# Patient Record
Sex: FEMALE | Race: WHITE | NOT HISPANIC OR LATINO | Employment: UNEMPLOYED | URBAN - METROPOLITAN AREA
[De-identification: names, ages, dates, MRNs, and addresses within clinical notes are randomized per-mention and may not be internally consistent; named-entity substitution may affect disease eponyms.]

---

## 2021-07-30 ENCOUNTER — OFFICE VISIT (OUTPATIENT)
Dept: URGENT CARE | Facility: CLINIC | Age: 51
End: 2021-07-30
Payer: OTHER GOVERNMENT

## 2021-07-30 VITALS
HEART RATE: 99 BPM | TEMPERATURE: 97 F | OXYGEN SATURATION: 99 % | DIASTOLIC BLOOD PRESSURE: 82 MMHG | BODY MASS INDEX: 25.62 KG/M2 | HEIGHT: 69 IN | SYSTOLIC BLOOD PRESSURE: 138 MMHG | WEIGHT: 173 LBS

## 2021-07-30 DIAGNOSIS — Z20.822 EXPOSURE TO COVID-19 VIRUS: Primary | ICD-10-CM

## 2021-07-30 DIAGNOSIS — R05.9 COUGH: ICD-10-CM

## 2021-07-30 LAB
CTP QC/QA: YES
SARS-COV-2 RDRP RESP QL NAA+PROBE: NEGATIVE

## 2021-07-30 PROCEDURE — 99203 PR OFFICE/OUTPT VISIT, NEW, LEVL III, 30-44 MIN: ICD-10-PCS | Mod: S$GLB,,, | Performed by: NURSE PRACTITIONER

## 2021-07-30 PROCEDURE — 99203 OFFICE O/P NEW LOW 30 MIN: CPT | Mod: S$GLB,,, | Performed by: NURSE PRACTITIONER

## 2021-07-30 PROCEDURE — U0002: ICD-10-PCS | Mod: QW,S$GLB,, | Performed by: NURSE PRACTITIONER

## 2021-07-30 PROCEDURE — U0003 INFECTIOUS AGENT DETECTION BY NUCLEIC ACID (DNA OR RNA); SEVERE ACUTE RESPIRATORY SYNDROME CORONAVIRUS 2 (SARS-COV-2) (CORONAVIRUS DISEASE [COVID-19]), AMPLIFIED PROBE TECHNIQUE, MAKING USE OF HIGH THROUGHPUT TECHNOLOGIES AS DESCRIBED BY CMS-2020-01-R: HCPCS | Performed by: NURSE PRACTITIONER

## 2021-07-30 PROCEDURE — U0002 COVID-19 LAB TEST NON-CDC: HCPCS | Mod: QW,S$GLB,, | Performed by: NURSE PRACTITIONER

## 2021-07-30 RX ORDER — FLUOXETINE 10 MG/1
10 CAPSULE ORAL DAILY
COMMUNITY
Start: 2021-07-19 | End: 2021-11-16 | Stop reason: SDUPTHER

## 2021-07-30 RX ORDER — DEXTROAMPHETAMINE SACCHARATE, AMPHETAMINE ASPARTATE MONOHYDRATE, DEXTROAMPHETAMINE SULFATE AND AMPHETAMINE SULFATE 7.5; 7.5; 7.5; 7.5 MG/1; MG/1; MG/1; MG/1
CAPSULE, EXTENDED RELEASE ORAL
COMMUNITY
Start: 2020-10-06 | End: 2021-08-06 | Stop reason: DRUGHIGH

## 2021-07-30 RX ORDER — LEVOTHYROXINE SODIUM 75 UG/1
75 TABLET ORAL DAILY
COMMUNITY
End: 2021-10-22 | Stop reason: SDUPTHER

## 2021-08-02 LAB
SARS-COV-2 RNA RESP QL NAA+PROBE: NOT DETECTED
SARS-COV-2- CYCLE NUMBER: -1

## 2021-08-06 ENCOUNTER — HOSPITAL ENCOUNTER (OUTPATIENT)
Facility: HOSPITAL | Age: 51
Discharge: HOME | End: 2021-08-09
Attending: EMERGENCY MEDICINE | Admitting: INTERNAL MEDICINE
Payer: OTHER GOVERNMENT

## 2021-08-06 DIAGNOSIS — U07.1 COVID-19 VIRUS INFECTION: Primary | ICD-10-CM

## 2021-08-06 DIAGNOSIS — R20.2 PARESTHESIA OF BOTH LEGS: ICD-10-CM

## 2021-08-06 DIAGNOSIS — R29.898 WEAKNESS OF BOTH LOWER EXTREMITIES: ICD-10-CM

## 2021-08-06 LAB
ALBUMIN SERPL BCP-MCNC: 4.4 G/DL (ref 3.5–5.2)
ALP SERPL-CCNC: 74 U/L (ref 55–135)
ALT SERPL W/O P-5'-P-CCNC: 31 U/L (ref 10–44)
ANION GAP SERPL CALC-SCNC: 9 MMOL/L (ref 8–16)
AST SERPL-CCNC: 30 U/L (ref 10–40)
BASOPHILS # BLD AUTO: 0.01 K/UL (ref 0–0.2)
BASOPHILS NFR BLD: 0.2 % (ref 0–1.9)
BILIRUB SERPL-MCNC: 0.7 MG/DL (ref 0.1–1)
BUN SERPL-MCNC: 11 MG/DL (ref 6–20)
CALCIUM SERPL-MCNC: 9.3 MG/DL (ref 8.7–10.5)
CHLORIDE SERPL-SCNC: 100 MMOL/L (ref 95–110)
CK SERPL-CCNC: 78 U/L (ref 20–180)
CO2 SERPL-SCNC: 25 MMOL/L (ref 23–29)
CREAT SERPL-MCNC: 0.8 MG/DL (ref 0.5–1.4)
CRP SERPL-MCNC: 0.15 MG/DL
DIFFERENTIAL METHOD: ABNORMAL
EOSINOPHIL # BLD AUTO: 0 K/UL (ref 0–0.5)
EOSINOPHIL NFR BLD: 0.3 % (ref 0–8)
ERYTHROCYTE [DISTWIDTH] IN BLOOD BY AUTOMATED COUNT: 12.4 % (ref 11.5–14.5)
EST. GFR  (AFRICAN AMERICAN): >60 ML/MIN/1.73 M^2
EST. GFR  (NON AFRICAN AMERICAN): >60 ML/MIN/1.73 M^2
FERRITIN SERPL-MCNC: 135 NG/ML (ref 20–300)
GLUCOSE SERPL-MCNC: 100 MG/DL (ref 70–110)
HCT VFR BLD AUTO: 44.3 % (ref 37–48.5)
HGB BLD-MCNC: 14.9 G/DL (ref 12–16)
IMM GRANULOCYTES # BLD AUTO: 0.01 K/UL (ref 0–0.04)
IMM GRANULOCYTES NFR BLD AUTO: 0.2 % (ref 0–0.5)
LACTATE SERPL-SCNC: 1.4 MMOL/L (ref 0.5–1.9)
LDH SERPL L TO P-CCNC: 159 U/L (ref 110–260)
LYMPHOCYTES # BLD AUTO: 0.7 K/UL (ref 1–4.8)
LYMPHOCYTES NFR BLD: 11 % (ref 18–48)
MCH RBC QN AUTO: 30.2 PG (ref 27–31)
MCHC RBC AUTO-ENTMCNC: 33.6 G/DL (ref 32–36)
MCV RBC AUTO: 90 FL (ref 82–98)
MONOCYTES # BLD AUTO: 0.4 K/UL (ref 0.3–1)
MONOCYTES NFR BLD: 6 % (ref 4–15)
NEUTROPHILS # BLD AUTO: 5.1 K/UL (ref 1.8–7.7)
NEUTROPHILS NFR BLD: 82.3 % (ref 38–73)
NRBC BLD-RTO: 0 /100 WBC
PLATELET # BLD AUTO: 239 K/UL (ref 150–450)
PMV BLD AUTO: 10.1 FL (ref 9.2–12.9)
POTASSIUM SERPL-SCNC: 3.5 MMOL/L (ref 3.5–5.1)
PROCALCITONIN SERPL IA-MCNC: <0.05 NG/ML (ref 0–0.5)
PROT SERPL-MCNC: 8.2 G/DL (ref 6–8.4)
RBC # BLD AUTO: 4.94 M/UL (ref 4–5.4)
SARS-COV-2 RDRP RESP QL NAA+PROBE: POSITIVE
SODIUM SERPL-SCNC: 134 MMOL/L (ref 136–145)
TROPONIN I SERPL DL<=0.01 NG/ML-MCNC: <0.03 NG/ML
WBC # BLD AUTO: 6.21 K/UL (ref 3.9–12.7)

## 2021-08-06 PROCEDURE — G0378 HOSPITAL OBSERVATION PER HR: HCPCS

## 2021-08-06 PROCEDURE — 93005 ELECTROCARDIOGRAM TRACING: CPT | Mod: 59 | Performed by: INTERNAL MEDICINE

## 2021-08-06 PROCEDURE — U0002 COVID-19 LAB TEST NON-CDC: HCPCS | Performed by: EMERGENCY MEDICINE

## 2021-08-06 PROCEDURE — 84484 ASSAY OF TROPONIN QUANT: CPT | Performed by: EMERGENCY MEDICINE

## 2021-08-06 PROCEDURE — 62272 THER SPI PNXR DRG CSF: CPT

## 2021-08-06 PROCEDURE — A9585 GADOBUTROL INJECTION: HCPCS | Performed by: INTERNAL MEDICINE

## 2021-08-06 PROCEDURE — 25000003 PHARM REV CODE 250: Performed by: EMERGENCY MEDICINE

## 2021-08-06 PROCEDURE — 84145 PROCALCITONIN (PCT): CPT | Performed by: EMERGENCY MEDICINE

## 2021-08-06 PROCEDURE — 86140 C-REACTIVE PROTEIN: CPT | Performed by: EMERGENCY MEDICINE

## 2021-08-06 PROCEDURE — 82550 ASSAY OF CK (CPK): CPT | Performed by: EMERGENCY MEDICINE

## 2021-08-06 PROCEDURE — 25500020 PHARM REV CODE 255: Performed by: INTERNAL MEDICINE

## 2021-08-06 PROCEDURE — 93010 EKG 12-LEAD: ICD-10-PCS | Mod: ,,, | Performed by: INTERNAL MEDICINE

## 2021-08-06 PROCEDURE — 99285 EMERGENCY DEPT VISIT HI MDM: CPT | Mod: 25

## 2021-08-06 PROCEDURE — 93010 ELECTROCARDIOGRAM REPORT: CPT | Mod: ,,, | Performed by: INTERNAL MEDICINE

## 2021-08-06 PROCEDURE — 83605 ASSAY OF LACTIC ACID: CPT | Performed by: EMERGENCY MEDICINE

## 2021-08-06 PROCEDURE — 83615 LACTATE (LD) (LDH) ENZYME: CPT | Performed by: EMERGENCY MEDICINE

## 2021-08-06 PROCEDURE — 82728 ASSAY OF FERRITIN: CPT | Performed by: EMERGENCY MEDICINE

## 2021-08-06 PROCEDURE — 80053 COMPREHEN METABOLIC PANEL: CPT | Performed by: EMERGENCY MEDICINE

## 2021-08-06 PROCEDURE — 85025 COMPLETE CBC W/AUTO DIFF WBC: CPT | Performed by: EMERGENCY MEDICINE

## 2021-08-06 RX ORDER — ACETAMINOPHEN 325 MG/1
650 TABLET ORAL EVERY 6 HOURS PRN
Status: DISCONTINUED | OUTPATIENT
Start: 2021-08-07 | End: 2021-08-10 | Stop reason: HOSPADM

## 2021-08-06 RX ORDER — LIDOCAINE HYDROCHLORIDE 10 MG/ML
10 INJECTION, SOLUTION EPIDURAL; INFILTRATION; INTRACAUDAL; PERINEURAL
Status: COMPLETED | OUTPATIENT
Start: 2021-08-06 | End: 2021-08-06

## 2021-08-06 RX ORDER — PREDNISONE 20 MG/1
20 TABLET ORAL DAILY
COMMUNITY
Start: 2021-08-05 | End: 2021-09-16 | Stop reason: SDUPTHER

## 2021-08-06 RX ORDER — AZITHROMYCIN 250 MG/1
1 TABLET, FILM COATED ORAL SEE ADMIN INSTRUCTIONS
Status: ON HOLD | COMMUNITY
Start: 2021-08-05 | End: 2021-08-09 | Stop reason: HOSPADM

## 2021-08-06 RX ORDER — GADOBUTROL 604.72 MG/ML
7.5 INJECTION INTRAVENOUS
Status: COMPLETED | OUTPATIENT
Start: 2021-08-06 | End: 2021-08-06

## 2021-08-06 RX ORDER — LORAZEPAM 2 MG/ML
0.5 INJECTION INTRAMUSCULAR ONCE
Status: DISCONTINUED | OUTPATIENT
Start: 2021-08-06 | End: 2021-08-08

## 2021-08-06 RX ORDER — TALC
6 POWDER (GRAM) TOPICAL NIGHTLY PRN
Status: DISCONTINUED | OUTPATIENT
Start: 2021-08-07 | End: 2021-08-10 | Stop reason: HOSPADM

## 2021-08-06 RX ORDER — ONDANSETRON 2 MG/ML
4 INJECTION INTRAMUSCULAR; INTRAVENOUS EVERY 6 HOURS PRN
Status: DISCONTINUED | OUTPATIENT
Start: 2021-08-07 | End: 2021-08-07

## 2021-08-06 RX ORDER — BENZONATATE 100 MG/1
100 CAPSULE ORAL 3 TIMES DAILY PRN
Status: DISCONTINUED | OUTPATIENT
Start: 2021-08-07 | End: 2021-08-07

## 2021-08-06 RX ORDER — DEXTROAMPHETAMINE SACCHARATE, AMPHETAMINE ASPARTATE MONOHYDRATE, DEXTROAMPHETAMINE SULFATE AND AMPHETAMINE SULFATE 5; 5; 5; 5 MG/1; MG/1; MG/1; MG/1
1 CAPSULE, EXTENDED RELEASE ORAL DAILY
COMMUNITY
End: 2022-04-26

## 2021-08-06 RX ADMIN — GADOBUTROL 7.5 ML: 604.72 INJECTION INTRAVENOUS at 10:08

## 2021-08-06 RX ADMIN — LIDOCAINE HYDROCHLORIDE 100 MG: 10 INJECTION, SOLUTION EPIDURAL; INFILTRATION; INTRACAUDAL; PERINEURAL at 06:08

## 2021-08-07 PROBLEM — R20.2 PARESTHESIA OF BOTH LEGS: Status: ACTIVE | Noted: 2021-08-07

## 2021-08-07 LAB
ALBUMIN SERPL BCP-MCNC: 3.9 G/DL (ref 3.5–5.2)
ALP SERPL-CCNC: 64 U/L (ref 55–135)
ALT SERPL W/O P-5'-P-CCNC: 24 U/L (ref 10–44)
ANION GAP SERPL CALC-SCNC: 10 MMOL/L (ref 8–16)
AST SERPL-CCNC: 21 U/L (ref 10–40)
BASOPHILS # BLD AUTO: 0.01 K/UL (ref 0–0.2)
BASOPHILS NFR BLD: 0.2 % (ref 0–1.9)
BILIRUB SERPL-MCNC: 0.8 MG/DL (ref 0.1–1)
BUN SERPL-MCNC: 19 MG/DL (ref 6–20)
CALCIUM SERPL-MCNC: 9.1 MG/DL (ref 8.7–10.5)
CHLORIDE SERPL-SCNC: 105 MMOL/L (ref 95–110)
CHOLEST SERPL-MCNC: 173 MG/DL (ref 120–199)
CHOLEST/HDLC SERPL: 3.5 {RATIO} (ref 2–5)
CLARITY CSF: CLEAR
CO2 SERPL-SCNC: 27 MMOL/L (ref 23–29)
COLOR CSF: COLORLESS
CREAT SERPL-MCNC: 0.8 MG/DL (ref 0.5–1.4)
DIFFERENTIAL METHOD: NORMAL
EOSINOPHIL # BLD AUTO: 0 K/UL (ref 0–0.5)
EOSINOPHIL NFR BLD: 0.5 % (ref 0–8)
ERYTHROCYTE [DISTWIDTH] IN BLOOD BY AUTOMATED COUNT: 12.6 % (ref 11.5–14.5)
EST. GFR  (AFRICAN AMERICAN): >60 ML/MIN/1.73 M^2
EST. GFR  (NON AFRICAN AMERICAN): >60 ML/MIN/1.73 M^2
ESTIMATED AVG GLUCOSE: 108 MG/DL (ref 68–131)
GLUCOSE CSF-MCNC: 65 MG/DL (ref 40–70)
GLUCOSE SERPL-MCNC: 81 MG/DL (ref 70–110)
HBA1C MFR BLD: 5.4 % (ref 4.5–6.2)
HCT VFR BLD AUTO: 40.8 % (ref 37–48.5)
HDLC SERPL-MCNC: 49 MG/DL (ref 40–75)
HDLC SERPL: 28.3 % (ref 20–50)
HGB BLD-MCNC: 13.8 G/DL (ref 12–16)
IMM GRANULOCYTES # BLD AUTO: 0.02 K/UL (ref 0–0.04)
IMM GRANULOCYTES NFR BLD AUTO: 0.3 % (ref 0–0.5)
LDLC SERPL CALC-MCNC: 111.2 MG/DL (ref 63–159)
LYMPHOCYTES # BLD AUTO: 1.8 K/UL (ref 1–4.8)
LYMPHOCYTES NFR BLD: 31.1 % (ref 18–48)
LYMPHOCYTES NFR CSF MANUAL: 100 % (ref 40–80)
MAGNESIUM SERPL-MCNC: 2.2 MG/DL (ref 1.6–2.6)
MCH RBC QN AUTO: 30.5 PG (ref 27–31)
MCHC RBC AUTO-ENTMCNC: 33.8 G/DL (ref 32–36)
MCV RBC AUTO: 90 FL (ref 82–98)
MONOCYTES # BLD AUTO: 0.7 K/UL (ref 0.3–1)
MONOCYTES NFR BLD: 11.5 % (ref 4–15)
NEUTROPHILS # BLD AUTO: 3.3 K/UL (ref 1.8–7.7)
NEUTROPHILS NFR BLD: 56.4 % (ref 38–73)
NONHDLC SERPL-MCNC: 124 MG/DL
NRBC BLD-RTO: 0 /100 WBC
PHOSPHATE SERPL-MCNC: 3.9 MG/DL (ref 2.7–4.5)
PLATELET # BLD AUTO: 237 K/UL (ref 150–450)
PMV BLD AUTO: 10.3 FL (ref 9.2–12.9)
POTASSIUM SERPL-SCNC: 3.7 MMOL/L (ref 3.5–5.1)
PROT CSF-MCNC: 23 MG/DL (ref 15–40)
PROT SERPL-MCNC: 7.4 G/DL (ref 6–8.4)
RBC # BLD AUTO: 4.52 M/UL (ref 4–5.4)
RBC # CSF: 0 /CU MM
SODIUM SERPL-SCNC: 142 MMOL/L (ref 136–145)
SPECIMEN VOL CSF: 3 ML
TRIGL SERPL-MCNC: 64 MG/DL (ref 30–150)
TSH SERPL DL<=0.005 MIU/L-ACNC: 3.76 UIU/ML (ref 0.34–5.6)
TSH SERPL DL<=0.005 MIU/L-ACNC: 3.76 UIU/ML (ref 0.34–5.6)
VIT B12 SERPL-MCNC: 343 PG/ML (ref 210–950)
WBC # BLD AUTO: 5.76 K/UL (ref 3.9–12.7)
WBC # CSF: 1 /CU MM (ref 0–5)

## 2021-08-07 PROCEDURE — 96372 THER/PROPH/DIAG INJ SC/IM: CPT

## 2021-08-07 PROCEDURE — 82607 VITAMIN B-12: CPT | Performed by: INTERNAL MEDICINE

## 2021-08-07 PROCEDURE — 92610 EVALUATE SWALLOWING FUNCTION: CPT

## 2021-08-07 PROCEDURE — 85025 COMPLETE CBC W/AUTO DIFF WBC: CPT | Performed by: NURSE PRACTITIONER

## 2021-08-07 PROCEDURE — 99900031 HC PATIENT EDUCATION (STAT)

## 2021-08-07 PROCEDURE — 84425 ASSAY OF VITAMIN B-1: CPT | Performed by: INTERNAL MEDICINE

## 2021-08-07 PROCEDURE — 94640 AIRWAY INHALATION TREATMENT: CPT

## 2021-08-07 PROCEDURE — 25000242 PHARM REV CODE 250 ALT 637 W/ HCPCS: Performed by: NURSE PRACTITIONER

## 2021-08-07 PROCEDURE — 99900035 HC TECH TIME PER 15 MIN (STAT)

## 2021-08-07 PROCEDURE — 84157 ASSAY OF PROTEIN OTHER: CPT | Performed by: EMERGENCY MEDICINE

## 2021-08-07 PROCEDURE — 84446 ASSAY OF VITAMIN E: CPT | Performed by: INTERNAL MEDICINE

## 2021-08-07 PROCEDURE — 87205 SMEAR GRAM STAIN: CPT | Performed by: EMERGENCY MEDICINE

## 2021-08-07 PROCEDURE — 36415 COLL VENOUS BLD VENIPUNCTURE: CPT | Performed by: NURSE PRACTITIONER

## 2021-08-07 PROCEDURE — 84100 ASSAY OF PHOSPHORUS: CPT | Performed by: NURSE PRACTITIONER

## 2021-08-07 PROCEDURE — 84443 ASSAY THYROID STIM HORMONE: CPT | Performed by: NURSE PRACTITIONER

## 2021-08-07 PROCEDURE — G0378 HOSPITAL OBSERVATION PER HR: HCPCS

## 2021-08-07 PROCEDURE — 94799 UNLISTED PULMONARY SVC/PX: CPT

## 2021-08-07 PROCEDURE — 89051 BODY FLUID CELL COUNT: CPT | Performed by: EMERGENCY MEDICINE

## 2021-08-07 PROCEDURE — 94761 N-INVAS EAR/PLS OXIMETRY MLT: CPT

## 2021-08-07 PROCEDURE — 94010 BREATHING CAPACITY TEST: CPT

## 2021-08-07 PROCEDURE — 87070 CULTURE OTHR SPECIMN AEROBIC: CPT | Performed by: EMERGENCY MEDICINE

## 2021-08-07 PROCEDURE — 83036 HEMOGLOBIN GLYCOSYLATED A1C: CPT | Performed by: NURSE PRACTITIONER

## 2021-08-07 PROCEDURE — 63600175 PHARM REV CODE 636 W HCPCS: Performed by: NURSE PRACTITIONER

## 2021-08-07 PROCEDURE — 80061 LIPID PANEL: CPT | Performed by: NURSE PRACTITIONER

## 2021-08-07 PROCEDURE — 83735 ASSAY OF MAGNESIUM: CPT | Performed by: NURSE PRACTITIONER

## 2021-08-07 PROCEDURE — 80053 COMPREHEN METABOLIC PANEL: CPT | Performed by: NURSE PRACTITIONER

## 2021-08-07 PROCEDURE — 84207 ASSAY OF VITAMIN B-6: CPT | Performed by: INTERNAL MEDICINE

## 2021-08-07 PROCEDURE — 82945 GLUCOSE OTHER FLUID: CPT | Performed by: EMERGENCY MEDICINE

## 2021-08-07 PROCEDURE — 25000003 PHARM REV CODE 250: Performed by: NURSE PRACTITIONER

## 2021-08-07 RX ORDER — ALBUTEROL SULFATE 90 UG/1
2 AEROSOL, METERED RESPIRATORY (INHALATION) EVERY 8 HOURS
Status: DISCONTINUED | OUTPATIENT
Start: 2021-08-07 | End: 2021-08-08

## 2021-08-07 RX ORDER — ONDANSETRON 2 MG/ML
4 INJECTION INTRAMUSCULAR; INTRAVENOUS EVERY 6 HOURS PRN
Status: DISCONTINUED | OUTPATIENT
Start: 2021-08-07 | End: 2021-08-10 | Stop reason: HOSPADM

## 2021-08-07 RX ORDER — FLUOXETINE 10 MG/1
10 CAPSULE ORAL DAILY
Status: DISCONTINUED | OUTPATIENT
Start: 2021-08-07 | End: 2021-08-10 | Stop reason: HOSPADM

## 2021-08-07 RX ORDER — ATORVASTATIN CALCIUM 40 MG/1
40 TABLET, FILM COATED ORAL DAILY
Status: DISCONTINUED | OUTPATIENT
Start: 2021-08-07 | End: 2021-08-10 | Stop reason: HOSPADM

## 2021-08-07 RX ORDER — SODIUM CHLORIDE 0.9 % (FLUSH) 0.9 %
10 SYRINGE (ML) INJECTION
Status: DISCONTINUED | OUTPATIENT
Start: 2021-08-07 | End: 2021-08-10 | Stop reason: HOSPADM

## 2021-08-07 RX ORDER — ALBUTEROL SULFATE 90 UG/1
2 AEROSOL, METERED RESPIRATORY (INHALATION) EVERY 8 HOURS
Status: DISCONTINUED | OUTPATIENT
Start: 2021-08-07 | End: 2021-08-07

## 2021-08-07 RX ORDER — BENZONATATE 100 MG/1
100 CAPSULE ORAL 3 TIMES DAILY PRN
Status: DISCONTINUED | OUTPATIENT
Start: 2021-08-07 | End: 2021-08-10 | Stop reason: HOSPADM

## 2021-08-07 RX ORDER — ASPIRIN 81 MG/1
81 TABLET ORAL DAILY
Status: DISCONTINUED | OUTPATIENT
Start: 2021-08-07 | End: 2021-08-10 | Stop reason: HOSPADM

## 2021-08-07 RX ORDER — ENOXAPARIN SODIUM 100 MG/ML
40 INJECTION SUBCUTANEOUS EVERY 24 HOURS
Status: DISCONTINUED | OUTPATIENT
Start: 2021-08-07 | End: 2021-08-10 | Stop reason: HOSPADM

## 2021-08-07 RX ORDER — LEVOTHYROXINE SODIUM 25 UG/1
75 TABLET ORAL
Status: DISCONTINUED | OUTPATIENT
Start: 2021-08-07 | End: 2021-08-10 | Stop reason: HOSPADM

## 2021-08-07 RX ADMIN — ASPIRIN 81 MG: 81 TABLET, COATED ORAL at 11:08

## 2021-08-07 RX ADMIN — LEVOTHYROXINE SODIUM 75 MCG: 25 TABLET ORAL at 06:08

## 2021-08-07 RX ADMIN — FLUOXETINE 10 MG: 10 CAPSULE ORAL at 11:08

## 2021-08-07 RX ADMIN — ENOXAPARIN SODIUM 40 MG: 40 INJECTION SUBCUTANEOUS at 05:08

## 2021-08-07 RX ADMIN — ATORVASTATIN CALCIUM 40 MG: 40 TABLET, FILM COATED ORAL at 11:08

## 2021-08-07 RX ADMIN — ALBUTEROL SULFATE 2 PUFF: 90 AEROSOL, METERED RESPIRATORY (INHALATION) at 09:08

## 2021-08-07 RX ADMIN — ALBUTEROL SULFATE 2 PUFF: 90 AEROSOL, METERED RESPIRATORY (INHALATION) at 03:08

## 2021-08-07 RX ADMIN — ALBUTEROL SULFATE 2 PUFF: 90 AEROSOL, METERED RESPIRATORY (INHALATION) at 11:08

## 2021-08-08 LAB
ALBUMIN SERPL BCP-MCNC: 3.7 G/DL (ref 3.5–5.2)
ALP SERPL-CCNC: 61 U/L (ref 55–135)
ALT SERPL W/O P-5'-P-CCNC: 28 U/L (ref 10–44)
ANION GAP SERPL CALC-SCNC: 10 MMOL/L (ref 8–16)
AST SERPL-CCNC: 25 U/L (ref 10–40)
BASOPHILS # BLD AUTO: 0.02 K/UL (ref 0–0.2)
BASOPHILS NFR BLD: 0.4 % (ref 0–1.9)
BILIRUB SERPL-MCNC: 0.4 MG/DL (ref 0.1–1)
BUN SERPL-MCNC: 9 MG/DL (ref 6–20)
CALCIUM SERPL-MCNC: 8.6 MG/DL (ref 8.7–10.5)
CHLORIDE SERPL-SCNC: 102 MMOL/L (ref 95–110)
CO2 SERPL-SCNC: 26 MMOL/L (ref 23–29)
CREAT SERPL-MCNC: 0.8 MG/DL (ref 0.5–1.4)
CRP SERPL-MCNC: 0.04 MG/DL
DIFFERENTIAL METHOD: ABNORMAL
EOSINOPHIL # BLD AUTO: 0.1 K/UL (ref 0–0.5)
EOSINOPHIL NFR BLD: 1.9 % (ref 0–8)
ERYTHROCYTE [DISTWIDTH] IN BLOOD BY AUTOMATED COUNT: 12.6 % (ref 11.5–14.5)
ERYTHROCYTE [SEDIMENTATION RATE] IN BLOOD BY WESTERGREN METHOD: 15 MM/HR (ref 0–20)
EST. GFR  (AFRICAN AMERICAN): >60 ML/MIN/1.73 M^2
EST. GFR  (NON AFRICAN AMERICAN): >60 ML/MIN/1.73 M^2
GLUCOSE SERPL-MCNC: 92 MG/DL (ref 70–110)
HCT VFR BLD AUTO: 39.9 % (ref 37–48.5)
HGB BLD-MCNC: 13.2 G/DL (ref 12–16)
IMM GRANULOCYTES # BLD AUTO: 0.02 K/UL (ref 0–0.04)
IMM GRANULOCYTES NFR BLD AUTO: 0.4 % (ref 0–0.5)
LYMPHOCYTES # BLD AUTO: 2.4 K/UL (ref 1–4.8)
LYMPHOCYTES NFR BLD: 50.1 % (ref 18–48)
MCH RBC QN AUTO: 30.3 PG (ref 27–31)
MCHC RBC AUTO-ENTMCNC: 33.1 G/DL (ref 32–36)
MCV RBC AUTO: 92 FL (ref 82–98)
MONOCYTES # BLD AUTO: 0.4 K/UL (ref 0.3–1)
MONOCYTES NFR BLD: 8.7 % (ref 4–15)
NEUTROPHILS # BLD AUTO: 1.8 K/UL (ref 1.8–7.7)
NEUTROPHILS NFR BLD: 38.5 % (ref 38–73)
NRBC BLD-RTO: 0 /100 WBC
PLATELET # BLD AUTO: 225 K/UL (ref 150–450)
PMV BLD AUTO: 10.2 FL (ref 9.2–12.9)
POTASSIUM SERPL-SCNC: 4 MMOL/L (ref 3.5–5.1)
PROT SERPL-MCNC: 6.9 G/DL (ref 6–8.4)
RBC # BLD AUTO: 4.35 M/UL (ref 4–5.4)
SODIUM SERPL-SCNC: 138 MMOL/L (ref 136–145)
WBC # BLD AUTO: 4.71 K/UL (ref 3.9–12.7)

## 2021-08-08 PROCEDURE — 97535 SELF CARE MNGMENT TRAINING: CPT

## 2021-08-08 PROCEDURE — 83655 ASSAY OF LEAD: CPT | Performed by: INTERNAL MEDICINE

## 2021-08-08 PROCEDURE — 36415 COLL VENOUS BLD VENIPUNCTURE: CPT | Performed by: NURSE PRACTITIONER

## 2021-08-08 PROCEDURE — 97116 GAIT TRAINING THERAPY: CPT

## 2021-08-08 PROCEDURE — 25000003 PHARM REV CODE 250: Performed by: INTERNAL MEDICINE

## 2021-08-08 PROCEDURE — G0378 HOSPITAL OBSERVATION PER HR: HCPCS

## 2021-08-08 PROCEDURE — 96372 THER/PROPH/DIAG INJ SC/IM: CPT | Mod: 59

## 2021-08-08 PROCEDURE — 25000003 PHARM REV CODE 250: Performed by: NURSE PRACTITIONER

## 2021-08-08 PROCEDURE — 94761 N-INVAS EAR/PLS OXIMETRY MLT: CPT

## 2021-08-08 PROCEDURE — 80053 COMPREHEN METABOLIC PANEL: CPT | Performed by: NURSE PRACTITIONER

## 2021-08-08 PROCEDURE — 97161 PT EVAL LOW COMPLEX 20 MIN: CPT

## 2021-08-08 PROCEDURE — 85651 RBC SED RATE NONAUTOMATED: CPT | Performed by: INTERNAL MEDICINE

## 2021-08-08 PROCEDURE — 86140 C-REACTIVE PROTEIN: CPT | Performed by: INTERNAL MEDICINE

## 2021-08-08 PROCEDURE — 86038 ANTINUCLEAR ANTIBODIES: CPT | Performed by: INTERNAL MEDICINE

## 2021-08-08 PROCEDURE — 99900035 HC TECH TIME PER 15 MIN (STAT)

## 2021-08-08 PROCEDURE — 86431 RHEUMATOID FACTOR QUANT: CPT | Performed by: INTERNAL MEDICINE

## 2021-08-08 PROCEDURE — 85025 COMPLETE CBC W/AUTO DIFF WBC: CPT | Performed by: NURSE PRACTITIONER

## 2021-08-08 PROCEDURE — 99900031 HC PATIENT EDUCATION (STAT)

## 2021-08-08 PROCEDURE — 86225 DNA ANTIBODY NATIVE: CPT | Performed by: INTERNAL MEDICINE

## 2021-08-08 PROCEDURE — 63600175 PHARM REV CODE 636 W HCPCS: Performed by: NURSE PRACTITIONER

## 2021-08-08 PROCEDURE — 86235 NUCLEAR ANTIGEN ANTIBODY: CPT | Performed by: INTERNAL MEDICINE

## 2021-08-08 PROCEDURE — 94799 UNLISTED PULMONARY SVC/PX: CPT

## 2021-08-08 PROCEDURE — 97165 OT EVAL LOW COMPLEX 30 MIN: CPT

## 2021-08-08 PROCEDURE — 94640 AIRWAY INHALATION TREATMENT: CPT

## 2021-08-08 PROCEDURE — 36415 COLL VENOUS BLD VENIPUNCTURE: CPT | Performed by: INTERNAL MEDICINE

## 2021-08-08 RX ORDER — BUTALBITAL, ACETAMINOPHEN AND CAFFEINE 50; 325; 40 MG/1; MG/1; MG/1
1 TABLET ORAL EVERY 4 HOURS PRN
Status: DISCONTINUED | OUTPATIENT
Start: 2021-08-08 | End: 2021-08-10 | Stop reason: HOSPADM

## 2021-08-08 RX ORDER — ALBUTEROL SULFATE 90 UG/1
2 AEROSOL, METERED RESPIRATORY (INHALATION) EVERY 8 HOURS
Status: DISCONTINUED | OUTPATIENT
Start: 2021-08-08 | End: 2021-08-09

## 2021-08-08 RX ADMIN — ASPIRIN 81 MG: 81 TABLET, COATED ORAL at 09:08

## 2021-08-08 RX ADMIN — LEVOTHYROXINE SODIUM 75 MCG: 25 TABLET ORAL at 05:08

## 2021-08-08 RX ADMIN — ENOXAPARIN SODIUM 40 MG: 40 INJECTION SUBCUTANEOUS at 06:08

## 2021-08-08 RX ADMIN — BUTALBITAL, ACETAMINOPHEN AND CAFFEINE 1 TABLET: 50; 325; 40 TABLET ORAL at 08:08

## 2021-08-08 RX ADMIN — FLUOXETINE 10 MG: 10 CAPSULE ORAL at 09:08

## 2021-08-08 RX ADMIN — ATORVASTATIN CALCIUM 40 MG: 40 TABLET, FILM COATED ORAL at 09:08

## 2021-08-08 RX ADMIN — ALBUTEROL SULFATE 2 PUFF: 90 AEROSOL, METERED RESPIRATORY (INHALATION) at 05:08

## 2021-08-08 RX ADMIN — ACETAMINOPHEN 650 MG: 325 TABLET, FILM COATED ORAL at 09:08

## 2021-08-08 RX ADMIN — BUTALBITAL, ACETAMINOPHEN AND CAFFEINE 1 TABLET: 50; 325; 40 TABLET ORAL at 01:08

## 2021-08-08 RX ADMIN — ALBUTEROL SULFATE 2 PUFF: 90 AEROSOL, METERED RESPIRATORY (INHALATION) at 12:08

## 2021-08-08 RX ADMIN — ALBUTEROL SULFATE 2 PUFF: 90 AEROSOL, METERED RESPIRATORY (INHALATION) at 11:08

## 2021-08-09 VITALS
TEMPERATURE: 98 F | HEIGHT: 69 IN | HEART RATE: 71 BPM | OXYGEN SATURATION: 98 % | WEIGHT: 169.75 LBS | BODY MASS INDEX: 25.14 KG/M2 | DIASTOLIC BLOOD PRESSURE: 74 MMHG | RESPIRATION RATE: 18 BRPM | SYSTOLIC BLOOD PRESSURE: 127 MMHG

## 2021-08-09 PROBLEM — U07.1 COVID-19 VIRUS INFECTION: Status: RESOLVED | Noted: 2021-08-06 | Resolved: 2021-08-09

## 2021-08-09 PROBLEM — R20.2 PARESTHESIA OF BOTH LEGS: Status: RESOLVED | Noted: 2021-08-07 | Resolved: 2021-08-09

## 2021-08-09 PROCEDURE — G0378 HOSPITAL OBSERVATION PER HR: HCPCS

## 2021-08-09 PROCEDURE — 92526 ORAL FUNCTION THERAPY: CPT

## 2021-08-09 PROCEDURE — 97116 GAIT TRAINING THERAPY: CPT | Mod: CQ

## 2021-08-09 PROCEDURE — 25000003 PHARM REV CODE 250: Performed by: INTERNAL MEDICINE

## 2021-08-09 PROCEDURE — 94761 N-INVAS EAR/PLS OXIMETRY MLT: CPT

## 2021-08-09 PROCEDURE — 96372 THER/PROPH/DIAG INJ SC/IM: CPT | Mod: 59

## 2021-08-09 PROCEDURE — 94640 AIRWAY INHALATION TREATMENT: CPT

## 2021-08-09 PROCEDURE — 25000003 PHARM REV CODE 250: Performed by: NURSE PRACTITIONER

## 2021-08-09 PROCEDURE — 63600175 PHARM REV CODE 636 W HCPCS: Performed by: NURSE PRACTITIONER

## 2021-08-09 RX ORDER — LANOLIN ALCOHOL/MO/W.PET/CERES
1000 CREAM (GRAM) TOPICAL DAILY
Status: DISCONTINUED | OUTPATIENT
Start: 2021-08-10 | End: 2021-08-10 | Stop reason: HOSPADM

## 2021-08-09 RX ORDER — CYANOCOBALAMIN 1000 UG/ML
1000 INJECTION, SOLUTION INTRAMUSCULAR; SUBCUTANEOUS ONCE
Status: COMPLETED | OUTPATIENT
Start: 2021-08-09 | End: 2021-08-09

## 2021-08-09 RX ORDER — ALBUTEROL SULFATE 90 UG/1
2 AEROSOL, METERED RESPIRATORY (INHALATION) EVERY 8 HOURS
Status: DISCONTINUED | OUTPATIENT
Start: 2021-08-09 | End: 2021-08-10 | Stop reason: HOSPADM

## 2021-08-09 RX ADMIN — ALBUTEROL SULFATE 2 PUFF: 90 AEROSOL, METERED RESPIRATORY (INHALATION) at 08:08

## 2021-08-09 RX ADMIN — ACETAMINOPHEN 650 MG: 325 TABLET, FILM COATED ORAL at 04:08

## 2021-08-09 RX ADMIN — CYANOCOBALAMIN 1000 MCG: 1000 INJECTION, SOLUTION INTRAMUSCULAR at 11:08

## 2021-08-09 RX ADMIN — FLUOXETINE 10 MG: 10 CAPSULE ORAL at 08:08

## 2021-08-09 RX ADMIN — ATORVASTATIN CALCIUM 40 MG: 40 TABLET, FILM COATED ORAL at 08:08

## 2021-08-09 RX ADMIN — LEVOTHYROXINE SODIUM 75 MCG: 25 TABLET ORAL at 05:08

## 2021-08-09 RX ADMIN — BUTALBITAL, ACETAMINOPHEN AND CAFFEINE 1 TABLET: 50; 325; 40 TABLET ORAL at 10:08

## 2021-08-09 RX ADMIN — ASPIRIN 81 MG: 81 TABLET, COATED ORAL at 08:08

## 2021-08-10 LAB
ANA TITR SER IF: NEGATIVE {TITER}
DSDNA AB SER-ACNC: 3 IU/ML (ref 0–9)
LEAD BLDV-MCNC: <1 UG/DL (ref 0–4)
RHEUMATOID FACT SERPL-ACNC: <10 IU/ML (ref 0–13.9)

## 2021-08-11 LAB
CMV SPEC QL SHELL VIAL CULT: NO GROWTH
GRAM STN SPEC: NORMAL
GRAM STN SPEC: NORMAL
HISTONE IGG SER IA-ACNC: 0.6 UNITS (ref 0–0.9)

## 2021-08-12 ENCOUNTER — PATIENT MESSAGE (OUTPATIENT)
Dept: FAMILY MEDICINE | Facility: CLINIC | Age: 51
End: 2021-08-12

## 2021-08-12 LAB
A-TOCOPHEROL VIT E SERPL-MCNC: 8.1 MG/L (ref 7–25.1)
GAMMA TOCOPHEROL SERPL-MCNC: 1.3 MG/L (ref 0.5–5.5)
VIT B1 BLD-SCNC: 119.6 NMOL/L (ref 66.5–200)
VIT B6 SERPL-MCNC: 7.6 UG/L (ref 2–32.8)

## 2021-08-19 ENCOUNTER — OFFICE VISIT (OUTPATIENT)
Dept: FAMILY MEDICINE | Facility: CLINIC | Age: 51
End: 2021-08-19
Payer: OTHER GOVERNMENT

## 2021-08-19 DIAGNOSIS — Z00.00 ROUTINE GENERAL MEDICAL EXAMINATION AT A HEALTH CARE FACILITY: ICD-10-CM

## 2021-08-19 DIAGNOSIS — Z86.16 HISTORY OF COVID-19: ICD-10-CM

## 2021-08-19 DIAGNOSIS — Z09 HOSPITAL DISCHARGE FOLLOW-UP: Primary | ICD-10-CM

## 2021-08-19 PROCEDURE — 99212 PR OFFICE/OUTPT VISIT, EST, LEVL II, 10-19 MIN: ICD-10-PCS | Mod: 95,S$PBB,, | Performed by: STUDENT IN AN ORGANIZED HEALTH CARE EDUCATION/TRAINING PROGRAM

## 2021-08-19 PROCEDURE — 99212 OFFICE O/P EST SF 10 MIN: CPT | Mod: 95,S$PBB,, | Performed by: STUDENT IN AN ORGANIZED HEALTH CARE EDUCATION/TRAINING PROGRAM

## 2021-08-19 PROCEDURE — 99213 OFFICE O/P EST LOW 20 MIN: CPT | Mod: PBBFAC,PO | Performed by: STUDENT IN AN ORGANIZED HEALTH CARE EDUCATION/TRAINING PROGRAM

## 2021-08-19 PROCEDURE — 99999 PR PBB SHADOW E&M-EST. PATIENT-LVL III: ICD-10-PCS | Mod: PBBFAC,,, | Performed by: STUDENT IN AN ORGANIZED HEALTH CARE EDUCATION/TRAINING PROGRAM

## 2021-08-19 PROCEDURE — 99999 PR PBB SHADOW E&M-EST. PATIENT-LVL III: CPT | Mod: PBBFAC,,, | Performed by: STUDENT IN AN ORGANIZED HEALTH CARE EDUCATION/TRAINING PROGRAM

## 2021-08-25 DIAGNOSIS — Z12.31 OTHER SCREENING MAMMOGRAM: ICD-10-CM

## 2021-09-16 ENCOUNTER — LAB VISIT (OUTPATIENT)
Dept: LAB | Facility: HOSPITAL | Age: 51
End: 2021-09-16
Attending: STUDENT IN AN ORGANIZED HEALTH CARE EDUCATION/TRAINING PROGRAM
Payer: OTHER GOVERNMENT

## 2021-09-16 ENCOUNTER — TELEPHONE (OUTPATIENT)
Dept: FAMILY MEDICINE | Facility: CLINIC | Age: 51
End: 2021-09-16

## 2021-09-16 ENCOUNTER — OFFICE VISIT (OUTPATIENT)
Dept: FAMILY MEDICINE | Facility: CLINIC | Age: 51
End: 2021-09-16
Payer: OTHER GOVERNMENT

## 2021-09-16 VITALS
RESPIRATION RATE: 18 BRPM | OXYGEN SATURATION: 98 % | SYSTOLIC BLOOD PRESSURE: 114 MMHG | HEART RATE: 101 BPM | WEIGHT: 172.81 LBS | DIASTOLIC BLOOD PRESSURE: 84 MMHG | HEIGHT: 69 IN | BODY MASS INDEX: 25.6 KG/M2

## 2021-09-16 DIAGNOSIS — R23.3 EASY BRUISING: ICD-10-CM

## 2021-09-16 DIAGNOSIS — M65.331 TRIGGER MIDDLE FINGER OF RIGHT HAND: ICD-10-CM

## 2021-09-16 DIAGNOSIS — G62.9 NEUROPATHY: ICD-10-CM

## 2021-09-16 DIAGNOSIS — U07.1 COVID-19: Primary | ICD-10-CM

## 2021-09-16 DIAGNOSIS — G56.01 CARPAL TUNNEL SYNDROME OF RIGHT WRIST: ICD-10-CM

## 2021-09-16 LAB
BASOPHILS # BLD AUTO: 0.03 K/UL (ref 0–0.2)
BASOPHILS NFR BLD: 0.5 % (ref 0–1.9)
DIFFERENTIAL METHOD: NORMAL
EOSINOPHIL # BLD AUTO: 0.1 K/UL (ref 0–0.5)
EOSINOPHIL NFR BLD: 0.8 % (ref 0–8)
ERYTHROCYTE [DISTWIDTH] IN BLOOD BY AUTOMATED COUNT: 13.2 % (ref 11.5–14.5)
HCT VFR BLD AUTO: 37.5 % (ref 37–48.5)
HGB BLD-MCNC: 12.4 G/DL (ref 12–16)
IMM GRANULOCYTES # BLD AUTO: 0.01 K/UL (ref 0–0.04)
IMM GRANULOCYTES NFR BLD AUTO: 0.2 % (ref 0–0.5)
LYMPHOCYTES # BLD AUTO: 2.1 K/UL (ref 1–4.8)
LYMPHOCYTES NFR BLD: 32.3 % (ref 18–48)
MCH RBC QN AUTO: 30 PG (ref 27–31)
MCHC RBC AUTO-ENTMCNC: 33.1 G/DL (ref 32–36)
MCV RBC AUTO: 91 FL (ref 82–98)
MONOCYTES # BLD AUTO: 0.5 K/UL (ref 0.3–1)
MONOCYTES NFR BLD: 6.9 % (ref 4–15)
NEUTROPHILS # BLD AUTO: 3.9 K/UL (ref 1.8–7.7)
NEUTROPHILS NFR BLD: 59.3 % (ref 38–73)
NRBC BLD-RTO: 0 /100 WBC
PLATELET # BLD AUTO: 342 K/UL (ref 150–450)
PMV BLD AUTO: 12.4 FL (ref 9.2–12.9)
RBC # BLD AUTO: 4.13 M/UL (ref 4–5.4)
WBC # BLD AUTO: 6.5 K/UL (ref 3.9–12.7)

## 2021-09-16 PROCEDURE — 99214 PR OFFICE/OUTPT VISIT, EST, LEVL IV, 30-39 MIN: ICD-10-PCS | Mod: S$PBB,,, | Performed by: STUDENT IN AN ORGANIZED HEALTH CARE EDUCATION/TRAINING PROGRAM

## 2021-09-16 PROCEDURE — 99214 OFFICE O/P EST MOD 30 MIN: CPT | Mod: S$PBB,,, | Performed by: STUDENT IN AN ORGANIZED HEALTH CARE EDUCATION/TRAINING PROGRAM

## 2021-09-16 PROCEDURE — 99214 OFFICE O/P EST MOD 30 MIN: CPT | Mod: PBBFAC,PO | Performed by: STUDENT IN AN ORGANIZED HEALTH CARE EDUCATION/TRAINING PROGRAM

## 2021-09-16 PROCEDURE — 85730 THROMBOPLASTIN TIME PARTIAL: CPT | Performed by: STUDENT IN AN ORGANIZED HEALTH CARE EDUCATION/TRAINING PROGRAM

## 2021-09-16 PROCEDURE — 85610 PROTHROMBIN TIME: CPT | Performed by: STUDENT IN AN ORGANIZED HEALTH CARE EDUCATION/TRAINING PROGRAM

## 2021-09-16 PROCEDURE — 99999 PR PBB SHADOW E&M-EST. PATIENT-LVL IV: CPT | Mod: PBBFAC,,, | Performed by: STUDENT IN AN ORGANIZED HEALTH CARE EDUCATION/TRAINING PROGRAM

## 2021-09-16 PROCEDURE — 36415 COLL VENOUS BLD VENIPUNCTURE: CPT | Mod: PO | Performed by: STUDENT IN AN ORGANIZED HEALTH CARE EDUCATION/TRAINING PROGRAM

## 2021-09-16 PROCEDURE — 85025 COMPLETE CBC W/AUTO DIFF WBC: CPT | Performed by: STUDENT IN AN ORGANIZED HEALTH CARE EDUCATION/TRAINING PROGRAM

## 2021-09-16 PROCEDURE — 99999 PR PBB SHADOW E&M-EST. PATIENT-LVL IV: ICD-10-PCS | Mod: PBBFAC,,, | Performed by: STUDENT IN AN ORGANIZED HEALTH CARE EDUCATION/TRAINING PROGRAM

## 2021-09-16 RX ORDER — ALPRAZOLAM 0.5 MG/1
0.5 TABLET ORAL DAILY PRN
COMMUNITY
Start: 2021-09-08

## 2021-09-16 RX ORDER — ESCITALOPRAM OXALATE 10 MG/1
TABLET ORAL
COMMUNITY
End: 2021-09-27

## 2021-09-16 RX ORDER — PREDNISONE 20 MG/1
20 TABLET ORAL DAILY
Qty: 5 TABLET | Refills: 0 | Status: SHIPPED | OUTPATIENT
Start: 2021-09-16 | End: 2021-09-21

## 2021-09-17 LAB
APTT BLDCRRT: 29.6 SEC (ref 21–32)
INR PPP: 1.1 (ref 0.8–1.2)
PROTHROMBIN TIME: 11.7 SEC (ref 9–12.5)

## 2021-09-22 DIAGNOSIS — M79.642 BILATERAL HAND PAIN: Primary | ICD-10-CM

## 2021-09-22 DIAGNOSIS — M79.641 BILATERAL HAND PAIN: Primary | ICD-10-CM

## 2021-09-23 ENCOUNTER — PATIENT OUTREACH (OUTPATIENT)
Dept: ADMINISTRATIVE | Facility: OTHER | Age: 51
End: 2021-09-23

## 2021-09-23 ENCOUNTER — HOSPITAL ENCOUNTER (OUTPATIENT)
Dept: RADIOLOGY | Facility: HOSPITAL | Age: 51
Discharge: HOME | End: 2021-09-23
Attending: ORTHOPAEDIC SURGERY
Payer: OTHER GOVERNMENT

## 2021-09-23 ENCOUNTER — OFFICE VISIT (OUTPATIENT)
Dept: ORTHOPEDICS | Facility: CLINIC | Age: 51
End: 2021-09-23
Payer: OTHER GOVERNMENT

## 2021-09-23 ENCOUNTER — TELEPHONE (OUTPATIENT)
Dept: ORTHOPEDICS | Facility: CLINIC | Age: 51
End: 2021-09-23

## 2021-09-23 ENCOUNTER — PATIENT MESSAGE (OUTPATIENT)
Dept: FAMILY MEDICINE | Facility: CLINIC | Age: 51
End: 2021-09-23

## 2021-09-23 VITALS — BODY MASS INDEX: 25.48 KG/M2 | WEIGHT: 172 LBS | HEIGHT: 69 IN

## 2021-09-23 DIAGNOSIS — M79.641 BILATERAL HAND PAIN: ICD-10-CM

## 2021-09-23 DIAGNOSIS — M77.11 LATERAL EPICONDYLITIS, RIGHT ELBOW: Primary | ICD-10-CM

## 2021-09-23 DIAGNOSIS — G56.01 CARPAL TUNNEL SYNDROME OF RIGHT WRIST: ICD-10-CM

## 2021-09-23 DIAGNOSIS — M65.331 TRIGGER MIDDLE FINGER OF RIGHT HAND: ICD-10-CM

## 2021-09-23 DIAGNOSIS — M25.511 RIGHT SHOULDER PAIN, UNSPECIFIED CHRONICITY: Primary | ICD-10-CM

## 2021-09-23 DIAGNOSIS — M79.642 BILATERAL HAND PAIN: ICD-10-CM

## 2021-09-23 PROCEDURE — 99999 PR PBB SHADOW E&M-EST. PATIENT-LVL III: CPT | Mod: PBBFAC,,, | Performed by: ORTHOPAEDIC SURGERY

## 2021-09-23 PROCEDURE — 99204 OFFICE O/P NEW MOD 45 MIN: CPT | Mod: S$PBB,,, | Performed by: ORTHOPAEDIC SURGERY

## 2021-09-23 PROCEDURE — 73130 X-RAY EXAM OF HAND: CPT | Mod: TC,50,PN

## 2021-09-23 PROCEDURE — 99213 OFFICE O/P EST LOW 20 MIN: CPT | Mod: PBBFAC,PN | Performed by: ORTHOPAEDIC SURGERY

## 2021-09-23 PROCEDURE — 99999 PR PBB SHADOW E&M-EST. PATIENT-LVL III: ICD-10-PCS | Mod: PBBFAC,,, | Performed by: ORTHOPAEDIC SURGERY

## 2021-09-23 PROCEDURE — 73130 XR HAND COMPLETE 3 VIEWS BILATERAL: ICD-10-PCS | Mod: 26,50,, | Performed by: RADIOLOGY

## 2021-09-23 PROCEDURE — 99204 PR OFFICE/OUTPT VISIT, NEW, LEVL IV, 45-59 MIN: ICD-10-PCS | Mod: S$PBB,,, | Performed by: ORTHOPAEDIC SURGERY

## 2021-09-23 PROCEDURE — 73130 X-RAY EXAM OF HAND: CPT | Mod: 26,50,, | Performed by: RADIOLOGY

## 2021-09-27 ENCOUNTER — OFFICE VISIT (OUTPATIENT)
Dept: ORTHOPEDICS | Facility: CLINIC | Age: 51
End: 2021-09-27
Payer: OTHER GOVERNMENT

## 2021-09-27 ENCOUNTER — HOSPITAL ENCOUNTER (OUTPATIENT)
Dept: RADIOLOGY | Facility: HOSPITAL | Age: 51
Discharge: HOME | End: 2021-09-27
Attending: ORTHOPAEDIC SURGERY
Payer: OTHER GOVERNMENT

## 2021-09-27 VITALS — HEIGHT: 69 IN | BODY MASS INDEX: 25.48 KG/M2 | WEIGHT: 172 LBS | RESPIRATION RATE: 16 BRPM

## 2021-09-27 DIAGNOSIS — S40.011A CONTUSION OF RIGHT SHOULDER, INITIAL ENCOUNTER: Primary | ICD-10-CM

## 2021-09-27 DIAGNOSIS — M25.511 RIGHT SHOULDER PAIN, UNSPECIFIED CHRONICITY: Primary | ICD-10-CM

## 2021-09-27 DIAGNOSIS — M25.511 RIGHT SHOULDER PAIN, UNSPECIFIED CHRONICITY: ICD-10-CM

## 2021-09-27 PROCEDURE — 73030 X-RAY EXAM OF SHOULDER: CPT | Mod: TC,PN,RT

## 2021-09-27 PROCEDURE — 99999 PR PBB SHADOW E&M-EST. PATIENT-LVL III: CPT | Mod: PBBFAC,,, | Performed by: ORTHOPAEDIC SURGERY

## 2021-09-27 PROCEDURE — 99213 OFFICE O/P EST LOW 20 MIN: CPT | Mod: PBBFAC,PN | Performed by: ORTHOPAEDIC SURGERY

## 2021-09-27 PROCEDURE — 99213 PR OFFICE/OUTPT VISIT, EST, LEVL III, 20-29 MIN: ICD-10-PCS | Mod: S$PBB,,, | Performed by: ORTHOPAEDIC SURGERY

## 2021-09-27 PROCEDURE — 73030 XR SHOULDER TRAUMA 3 VIEW RIGHT: ICD-10-PCS | Mod: 26,RT,, | Performed by: RADIOLOGY

## 2021-09-27 PROCEDURE — 99213 OFFICE O/P EST LOW 20 MIN: CPT | Mod: S$PBB,,, | Performed by: ORTHOPAEDIC SURGERY

## 2021-09-27 PROCEDURE — 99999 PR PBB SHADOW E&M-EST. PATIENT-LVL III: ICD-10-PCS | Mod: PBBFAC,,, | Performed by: ORTHOPAEDIC SURGERY

## 2021-09-27 PROCEDURE — 73030 X-RAY EXAM OF SHOULDER: CPT | Mod: 26,RT,, | Performed by: RADIOLOGY

## 2021-09-27 RX ORDER — METHOCARBAMOL 750 MG/1
750 TABLET, FILM COATED ORAL 3 TIMES DAILY
Qty: 30 TABLET | Refills: 0 | Status: SHIPPED | OUTPATIENT
Start: 2021-09-27 | End: 2021-10-07

## 2021-09-28 ENCOUNTER — CLINICAL SUPPORT (OUTPATIENT)
Dept: REHABILITATION | Facility: HOSPITAL | Age: 51
End: 2021-09-28
Attending: ORTHOPAEDIC SURGERY
Payer: OTHER GOVERNMENT

## 2021-09-28 DIAGNOSIS — R29.898 WEAKNESS OF BOTH LOWER EXTREMITIES: ICD-10-CM

## 2021-09-28 DIAGNOSIS — R26.89 ABNORMALITY OF GAIT DUE TO IMPAIRMENT OF BALANCE: ICD-10-CM

## 2021-09-28 DIAGNOSIS — M25.611 DECREASED RIGHT SHOULDER RANGE OF MOTION: ICD-10-CM

## 2021-09-28 DIAGNOSIS — Z74.09 IMPAIRED MOBILITY AND ENDURANCE: ICD-10-CM

## 2021-09-28 DIAGNOSIS — G62.9 NEUROPATHY: ICD-10-CM

## 2021-09-28 PROCEDURE — 97162 PT EVAL MOD COMPLEX 30 MIN: CPT | Mod: PN

## 2021-09-30 ENCOUNTER — TELEPHONE (OUTPATIENT)
Dept: FAMILY MEDICINE | Facility: CLINIC | Age: 51
End: 2021-09-30

## 2021-09-30 DIAGNOSIS — E53.8 B12 DEFICIENCY: Primary | ICD-10-CM

## 2021-09-30 RX ORDER — CYANOCOBALAMIN 1000 UG/ML
1000 INJECTION, SOLUTION INTRAMUSCULAR; SUBCUTANEOUS
Qty: 10 ML | Refills: 0 | Status: SHIPPED | OUTPATIENT
Start: 2021-09-30 | End: 2022-04-26 | Stop reason: SDUPTHER

## 2021-10-07 ENCOUNTER — CLINICAL SUPPORT (OUTPATIENT)
Dept: REHABILITATION | Facility: HOSPITAL | Age: 51
End: 2021-10-07
Payer: OTHER GOVERNMENT

## 2021-10-07 ENCOUNTER — PATIENT MESSAGE (OUTPATIENT)
Dept: ADMINISTRATIVE | Facility: HOSPITAL | Age: 51
End: 2021-10-07

## 2021-10-07 DIAGNOSIS — G56.01 CARPAL TUNNEL SYNDROME OF RIGHT WRIST: ICD-10-CM

## 2021-10-07 DIAGNOSIS — M65.331 TRIGGER MIDDLE FINGER OF RIGHT HAND: ICD-10-CM

## 2021-10-07 DIAGNOSIS — R68.89 IMPAIRED FUNCTION OF UPPER EXTREMITY: ICD-10-CM

## 2021-10-07 DIAGNOSIS — R29.898 WEAKNESS OF BOTH LOWER EXTREMITIES: ICD-10-CM

## 2021-10-07 DIAGNOSIS — Z74.09 IMPAIRED MOBILITY AND ENDURANCE: ICD-10-CM

## 2021-10-07 DIAGNOSIS — R68.89 DECREASED FUNCTIONAL ACTIVITY TOLERANCE: ICD-10-CM

## 2021-10-07 DIAGNOSIS — M79.601 RIGHT ARM PAIN: ICD-10-CM

## 2021-10-07 DIAGNOSIS — R26.89 ABNORMALITY OF GAIT DUE TO IMPAIRMENT OF BALANCE: ICD-10-CM

## 2021-10-07 DIAGNOSIS — M77.11 LATERAL EPICONDYLITIS, RIGHT ELBOW: ICD-10-CM

## 2021-10-07 DIAGNOSIS — M25.611 DECREASED RIGHT SHOULDER RANGE OF MOTION: ICD-10-CM

## 2021-10-07 DIAGNOSIS — Z78.9 IMPAIRED INSTRUMENTAL ACTIVITIES OF DAILY LIVING (IADL): ICD-10-CM

## 2021-10-07 PROCEDURE — 97110 THERAPEUTIC EXERCISES: CPT | Mod: PN

## 2021-10-07 PROCEDURE — 97165 OT EVAL LOW COMPLEX 30 MIN: CPT | Mod: PN

## 2021-10-11 ENCOUNTER — CLINICAL SUPPORT (OUTPATIENT)
Dept: REHABILITATION | Facility: HOSPITAL | Age: 51
End: 2021-10-11
Payer: OTHER GOVERNMENT

## 2021-10-11 DIAGNOSIS — Z74.09 IMPAIRED MOBILITY AND ENDURANCE: ICD-10-CM

## 2021-10-11 DIAGNOSIS — M25.611 DECREASED RIGHT SHOULDER RANGE OF MOTION: ICD-10-CM

## 2021-10-11 DIAGNOSIS — R29.898 WEAKNESS OF BOTH LOWER EXTREMITIES: ICD-10-CM

## 2021-10-11 DIAGNOSIS — R26.89 ABNORMALITY OF GAIT DUE TO IMPAIRMENT OF BALANCE: ICD-10-CM

## 2021-10-11 PROCEDURE — 97110 THERAPEUTIC EXERCISES: CPT | Mod: PN,CQ

## 2021-10-12 ENCOUNTER — TELEPHONE (OUTPATIENT)
Dept: FAMILY MEDICINE | Facility: CLINIC | Age: 51
End: 2021-10-12

## 2021-10-12 DIAGNOSIS — Z86.16 HISTORY OF COVID-19: Primary | ICD-10-CM

## 2021-10-12 PROBLEM — R68.89 DECREASED FUNCTIONAL ACTIVITY TOLERANCE: Status: ACTIVE | Noted: 2021-10-12

## 2021-10-12 PROBLEM — Z78.9 IMPAIRED INSTRUMENTAL ACTIVITIES OF DAILY LIVING (IADL): Status: ACTIVE | Noted: 2021-10-07

## 2021-10-12 PROBLEM — M79.601 RIGHT ARM PAIN: Status: ACTIVE | Noted: 2021-10-07

## 2021-10-12 PROBLEM — R68.89 IMPAIRED FUNCTION OF UPPER EXTREMITY: Status: ACTIVE | Noted: 2021-10-07

## 2021-10-13 ENCOUNTER — CLINICAL SUPPORT (OUTPATIENT)
Dept: REHABILITATION | Facility: HOSPITAL | Age: 51
End: 2021-10-13
Payer: OTHER GOVERNMENT

## 2021-10-13 ENCOUNTER — PATIENT MESSAGE (OUTPATIENT)
Dept: FAMILY MEDICINE | Facility: CLINIC | Age: 51
End: 2021-10-13

## 2021-10-13 DIAGNOSIS — R26.89 ABNORMALITY OF GAIT DUE TO IMPAIRMENT OF BALANCE: ICD-10-CM

## 2021-10-13 DIAGNOSIS — Z74.09 IMPAIRED MOBILITY AND ENDURANCE: ICD-10-CM

## 2021-10-13 DIAGNOSIS — M25.611 DECREASED RIGHT SHOULDER RANGE OF MOTION: ICD-10-CM

## 2021-10-13 DIAGNOSIS — R29.898 WEAKNESS OF BOTH LOWER EXTREMITIES: ICD-10-CM

## 2021-10-13 PROCEDURE — 97110 THERAPEUTIC EXERCISES: CPT | Mod: PN

## 2021-10-13 PROCEDURE — 97112 NEUROMUSCULAR REEDUCATION: CPT | Mod: PN

## 2021-10-14 ENCOUNTER — PATIENT MESSAGE (OUTPATIENT)
Dept: FAMILY MEDICINE | Facility: CLINIC | Age: 51
End: 2021-10-14

## 2021-10-14 ENCOUNTER — CLINICAL SUPPORT (OUTPATIENT)
Dept: REHABILITATION | Facility: HOSPITAL | Age: 51
End: 2021-10-14
Payer: OTHER GOVERNMENT

## 2021-10-14 DIAGNOSIS — M79.601 RIGHT ARM PAIN: ICD-10-CM

## 2021-10-14 DIAGNOSIS — R68.89 IMPAIRED FUNCTION OF UPPER EXTREMITY: Primary | ICD-10-CM

## 2021-10-14 DIAGNOSIS — Z78.9 IMPAIRED INSTRUMENTAL ACTIVITIES OF DAILY LIVING (IADL): ICD-10-CM

## 2021-10-14 DIAGNOSIS — R68.89 DECREASED FUNCTIONAL ACTIVITY TOLERANCE: ICD-10-CM

## 2021-10-14 PROCEDURE — 97530 THERAPEUTIC ACTIVITIES: CPT | Mod: PN

## 2021-10-18 ENCOUNTER — CLINICAL SUPPORT (OUTPATIENT)
Dept: REHABILITATION | Facility: HOSPITAL | Age: 51
End: 2021-10-18
Payer: OTHER GOVERNMENT

## 2021-10-18 ENCOUNTER — HOSPITAL ENCOUNTER (OUTPATIENT)
Dept: RADIOLOGY | Facility: HOSPITAL | Age: 51
Discharge: HOME | End: 2021-10-18
Attending: OBSTETRICS & GYNECOLOGY
Payer: OTHER GOVERNMENT

## 2021-10-18 ENCOUNTER — OFFICE VISIT (OUTPATIENT)
Dept: OBSTETRICS AND GYNECOLOGY | Facility: CLINIC | Age: 51
End: 2021-10-18
Payer: OTHER GOVERNMENT

## 2021-10-18 VITALS
WEIGHT: 172.38 LBS | SYSTOLIC BLOOD PRESSURE: 114 MMHG | DIASTOLIC BLOOD PRESSURE: 80 MMHG | HEIGHT: 69 IN | BODY MASS INDEX: 25.53 KG/M2

## 2021-10-18 DIAGNOSIS — Z74.09 IMPAIRED MOBILITY AND ENDURANCE: ICD-10-CM

## 2021-10-18 DIAGNOSIS — R31.29 OTHER MICROSCOPIC HEMATURIA: ICD-10-CM

## 2021-10-18 DIAGNOSIS — Z12.31 OTHER SCREENING MAMMOGRAM: ICD-10-CM

## 2021-10-18 DIAGNOSIS — Z01.411 ENCOUNTER FOR GYNECOLOGICAL EXAMINATION (GENERAL) (ROUTINE) WITH ABNORMAL FINDINGS: Primary | ICD-10-CM

## 2021-10-18 DIAGNOSIS — R26.89 ABNORMALITY OF GAIT DUE TO IMPAIRMENT OF BALANCE: ICD-10-CM

## 2021-10-18 DIAGNOSIS — Z12.31 SCREENING MAMMOGRAM FOR BREAST CANCER: ICD-10-CM

## 2021-10-18 DIAGNOSIS — Z86.16 HISTORY OF COVID-19: ICD-10-CM

## 2021-10-18 DIAGNOSIS — Z12.4 ENCOUNTER FOR PAPANICOLAOU SMEAR FOR CERVICAL CANCER SCREENING: ICD-10-CM

## 2021-10-18 DIAGNOSIS — M25.611 DECREASED RIGHT SHOULDER RANGE OF MOTION: ICD-10-CM

## 2021-10-18 DIAGNOSIS — N95.2 VAGINAL ATROPHY: ICD-10-CM

## 2021-10-18 DIAGNOSIS — R29.898 WEAKNESS OF BOTH LOWER EXTREMITIES: ICD-10-CM

## 2021-10-18 DIAGNOSIS — R41.841 COGNITIVE COMMUNICATION DISORDER: Primary | ICD-10-CM

## 2021-10-18 PROCEDURE — 96125 COGNITIVE TEST BY HC PRO: CPT | Mod: PN

## 2021-10-18 PROCEDURE — 99999 PR PBB SHADOW E&M-EST. PATIENT-LVL III: ICD-10-PCS | Mod: PBBFAC,,, | Performed by: OBSTETRICS & GYNECOLOGY

## 2021-10-18 PROCEDURE — 77067 SCR MAMMO BI INCL CAD: CPT | Mod: 26,,, | Performed by: RADIOLOGY

## 2021-10-18 PROCEDURE — 87624 HPV HI-RISK TYP POOLED RSLT: CPT | Performed by: OBSTETRICS & GYNECOLOGY

## 2021-10-18 PROCEDURE — 77063 BREAST TOMOSYNTHESIS BI: CPT | Mod: 26,,, | Performed by: RADIOLOGY

## 2021-10-18 PROCEDURE — 99386 PR PREVENTIVE VISIT,NEW,40-64: ICD-10-PCS | Mod: 25,S$PBB,, | Performed by: OBSTETRICS & GYNECOLOGY

## 2021-10-18 PROCEDURE — 77067 SCR MAMMO BI INCL CAD: CPT | Mod: TC,PN

## 2021-10-18 PROCEDURE — 77067 MAMMO DIGITAL SCREENING BILAT WITH TOMO: ICD-10-PCS | Mod: 26,,, | Performed by: RADIOLOGY

## 2021-10-18 PROCEDURE — 97110 THERAPEUTIC EXERCISES: CPT | Mod: PN,CQ

## 2021-10-18 PROCEDURE — 88175 CYTOPATH C/V AUTO FLUID REDO: CPT | Performed by: OBSTETRICS & GYNECOLOGY

## 2021-10-18 PROCEDURE — 99386 PREV VISIT NEW AGE 40-64: CPT | Mod: 25,S$PBB,, | Performed by: OBSTETRICS & GYNECOLOGY

## 2021-10-18 PROCEDURE — 99999 PR PBB SHADOW E&M-EST. PATIENT-LVL III: CPT | Mod: PBBFAC,,, | Performed by: OBSTETRICS & GYNECOLOGY

## 2021-10-18 PROCEDURE — 77063 MAMMO DIGITAL SCREENING BILAT WITH TOMO: ICD-10-PCS | Mod: 26,,, | Performed by: RADIOLOGY

## 2021-10-18 PROCEDURE — 99213 OFFICE O/P EST LOW 20 MIN: CPT | Mod: PBBFAC,PN | Performed by: OBSTETRICS & GYNECOLOGY

## 2021-10-19 ENCOUNTER — CLINICAL SUPPORT (OUTPATIENT)
Dept: REHABILITATION | Facility: HOSPITAL | Age: 51
End: 2021-10-19
Payer: OTHER GOVERNMENT

## 2021-10-19 ENCOUNTER — OFFICE VISIT (OUTPATIENT)
Dept: DERMATOLOGY | Facility: CLINIC | Age: 51
End: 2021-10-19
Payer: OTHER GOVERNMENT

## 2021-10-19 DIAGNOSIS — M79.601 RIGHT ARM PAIN: ICD-10-CM

## 2021-10-19 DIAGNOSIS — R68.89 DECREASED FUNCTIONAL ACTIVITY TOLERANCE: ICD-10-CM

## 2021-10-19 DIAGNOSIS — Z78.9 IMPAIRED INSTRUMENTAL ACTIVITIES OF DAILY LIVING (IADL): ICD-10-CM

## 2021-10-19 DIAGNOSIS — D22.9 MULTIPLE BENIGN NEVI: ICD-10-CM

## 2021-10-19 DIAGNOSIS — L81.4 LENTIGINES: ICD-10-CM

## 2021-10-19 DIAGNOSIS — D18.01 CHERRY ANGIOMA: ICD-10-CM

## 2021-10-19 DIAGNOSIS — R68.89 IMPAIRED FUNCTION OF UPPER EXTREMITY: Primary | ICD-10-CM

## 2021-10-19 DIAGNOSIS — L82.1 SEBORRHEIC KERATOSES: ICD-10-CM

## 2021-10-19 DIAGNOSIS — L82.0 SEBORRHEIC KERATOSES, INFLAMED: ICD-10-CM

## 2021-10-19 DIAGNOSIS — H01.131 ECZEMATOUS DERMATITIS, UPPER EYELIDS, BILATERAL: Primary | ICD-10-CM

## 2021-10-19 DIAGNOSIS — L91.8 SKIN TAG: ICD-10-CM

## 2021-10-19 DIAGNOSIS — L21.9 SEBORRHEIC DERMATITIS: ICD-10-CM

## 2021-10-19 DIAGNOSIS — H01.134 ECZEMATOUS DERMATITIS, UPPER EYELIDS, BILATERAL: Primary | ICD-10-CM

## 2021-10-19 PROCEDURE — 97530 THERAPEUTIC ACTIVITIES: CPT | Mod: PN

## 2021-10-19 PROCEDURE — 99204 OFFICE O/P NEW MOD 45 MIN: CPT | Mod: S$PBB,,, | Performed by: STUDENT IN AN ORGANIZED HEALTH CARE EDUCATION/TRAINING PROGRAM

## 2021-10-19 PROCEDURE — 99999 PR PBB SHADOW E&M-EST. PATIENT-LVL III: CPT | Mod: PBBFAC,,, | Performed by: STUDENT IN AN ORGANIZED HEALTH CARE EDUCATION/TRAINING PROGRAM

## 2021-10-19 PROCEDURE — 99213 OFFICE O/P EST LOW 20 MIN: CPT | Mod: PBBFAC,PO | Performed by: STUDENT IN AN ORGANIZED HEALTH CARE EDUCATION/TRAINING PROGRAM

## 2021-10-19 PROCEDURE — 99204 PR OFFICE/OUTPT VISIT, NEW, LEVL IV, 45-59 MIN: ICD-10-PCS | Mod: S$PBB,,, | Performed by: STUDENT IN AN ORGANIZED HEALTH CARE EDUCATION/TRAINING PROGRAM

## 2021-10-19 PROCEDURE — 99999 PR PBB SHADOW E&M-EST. PATIENT-LVL III: ICD-10-PCS | Mod: PBBFAC,,, | Performed by: STUDENT IN AN ORGANIZED HEALTH CARE EDUCATION/TRAINING PROGRAM

## 2021-10-19 RX ORDER — TRIAMCINOLONE ACETONIDE 0.25 MG/G
CREAM TOPICAL 2 TIMES DAILY
Qty: 80 G | Refills: 0 | Status: SHIPPED | OUTPATIENT
Start: 2021-10-19

## 2021-10-20 ENCOUNTER — CLINICAL SUPPORT (OUTPATIENT)
Dept: REHABILITATION | Facility: HOSPITAL | Age: 51
End: 2021-10-20
Payer: OTHER GOVERNMENT

## 2021-10-20 ENCOUNTER — PATIENT MESSAGE (OUTPATIENT)
Dept: FAMILY MEDICINE | Facility: CLINIC | Age: 51
End: 2021-10-20

## 2021-10-20 DIAGNOSIS — R26.89 ABNORMALITY OF GAIT DUE TO IMPAIRMENT OF BALANCE: ICD-10-CM

## 2021-10-20 DIAGNOSIS — R29.898 WEAKNESS OF BOTH LOWER EXTREMITIES: ICD-10-CM

## 2021-10-20 DIAGNOSIS — M25.611 DECREASED RIGHT SHOULDER RANGE OF MOTION: ICD-10-CM

## 2021-10-20 DIAGNOSIS — Z74.09 IMPAIRED MOBILITY AND ENDURANCE: ICD-10-CM

## 2021-10-20 DIAGNOSIS — R41.841 COGNITIVE COMMUNICATION DISORDER: ICD-10-CM

## 2021-10-20 PROCEDURE — 97129 THER IVNTJ 1ST 15 MIN: CPT | Mod: PN

## 2021-10-20 PROCEDURE — 97110 THERAPEUTIC EXERCISES: CPT | Mod: PN,CQ

## 2021-10-20 PROCEDURE — 97130 THER IVNTJ EA ADDL 15 MIN: CPT | Mod: PN

## 2021-10-21 ENCOUNTER — CLINICAL SUPPORT (OUTPATIENT)
Dept: REHABILITATION | Facility: HOSPITAL | Age: 51
End: 2021-10-21
Payer: OTHER GOVERNMENT

## 2021-10-21 DIAGNOSIS — Z78.9 IMPAIRED INSTRUMENTAL ACTIVITIES OF DAILY LIVING (IADL): ICD-10-CM

## 2021-10-21 DIAGNOSIS — R68.89 DECREASED FUNCTIONAL ACTIVITY TOLERANCE: ICD-10-CM

## 2021-10-21 DIAGNOSIS — R68.89 IMPAIRED FUNCTION OF UPPER EXTREMITY: Primary | ICD-10-CM

## 2021-10-21 DIAGNOSIS — M79.601 RIGHT ARM PAIN: ICD-10-CM

## 2021-10-21 PROCEDURE — 97530 THERAPEUTIC ACTIVITIES: CPT | Mod: PN

## 2021-10-22 ENCOUNTER — PATIENT MESSAGE (OUTPATIENT)
Dept: FAMILY MEDICINE | Facility: CLINIC | Age: 51
End: 2021-10-22
Payer: OTHER GOVERNMENT

## 2021-10-22 DIAGNOSIS — E03.9 HYPOTHYROIDISM, UNSPECIFIED TYPE: Primary | ICD-10-CM

## 2021-10-22 RX ORDER — LEVOTHYROXINE SODIUM 75 UG/1
75 TABLET ORAL DAILY
Qty: 30 TABLET | Refills: 0 | Status: SHIPPED | OUTPATIENT
Start: 2021-10-22 | End: 2021-12-09

## 2021-10-25 ENCOUNTER — CLINICAL SUPPORT (OUTPATIENT)
Dept: REHABILITATION | Facility: HOSPITAL | Age: 51
End: 2021-10-25
Payer: OTHER GOVERNMENT

## 2021-10-25 DIAGNOSIS — R29.898 WEAKNESS OF BOTH LOWER EXTREMITIES: ICD-10-CM

## 2021-10-25 DIAGNOSIS — Z74.09 IMPAIRED MOBILITY AND ENDURANCE: ICD-10-CM

## 2021-10-25 DIAGNOSIS — M25.611 DECREASED RIGHT SHOULDER RANGE OF MOTION: ICD-10-CM

## 2021-10-25 DIAGNOSIS — R26.89 ABNORMALITY OF GAIT DUE TO IMPAIRMENT OF BALANCE: ICD-10-CM

## 2021-10-25 PROCEDURE — 97110 THERAPEUTIC EXERCISES: CPT | Mod: PN

## 2021-10-25 PROCEDURE — 97112 NEUROMUSCULAR REEDUCATION: CPT | Mod: PN

## 2021-10-27 ENCOUNTER — CLINICAL SUPPORT (OUTPATIENT)
Dept: REHABILITATION | Facility: HOSPITAL | Age: 51
End: 2021-10-27
Payer: OTHER GOVERNMENT

## 2021-10-27 ENCOUNTER — DOCUMENTATION ONLY (OUTPATIENT)
Dept: REHABILITATION | Facility: HOSPITAL | Age: 51
End: 2021-10-27
Payer: OTHER GOVERNMENT

## 2021-10-27 DIAGNOSIS — R41.841 COGNITIVE COMMUNICATION DISORDER: ICD-10-CM

## 2021-10-27 PROCEDURE — 97130 THER IVNTJ EA ADDL 15 MIN: CPT | Mod: PN

## 2021-10-27 PROCEDURE — 97129 THER IVNTJ 1ST 15 MIN: CPT | Mod: PN

## 2021-10-27 RX ORDER — LEVOTHYROXINE SODIUM 75 UG/1
75 TABLET ORAL DAILY
Qty: 90 TABLET | Refills: 1 | Status: CANCELLED | OUTPATIENT
Start: 2021-10-27

## 2021-10-28 ENCOUNTER — CLINICAL SUPPORT (OUTPATIENT)
Dept: REHABILITATION | Facility: HOSPITAL | Age: 51
End: 2021-10-28
Payer: OTHER GOVERNMENT

## 2021-10-28 DIAGNOSIS — R68.89 DECREASED FUNCTIONAL ACTIVITY TOLERANCE: ICD-10-CM

## 2021-10-28 DIAGNOSIS — Z78.9 IMPAIRED INSTRUMENTAL ACTIVITIES OF DAILY LIVING (IADL): ICD-10-CM

## 2021-10-28 DIAGNOSIS — M79.601 RIGHT ARM PAIN: ICD-10-CM

## 2021-10-28 DIAGNOSIS — R68.89 IMPAIRED FUNCTION OF UPPER EXTREMITY: Primary | ICD-10-CM

## 2021-10-28 DIAGNOSIS — R41.841 COGNITIVE COMMUNICATION DISORDER: ICD-10-CM

## 2021-10-28 PROCEDURE — 97530 THERAPEUTIC ACTIVITIES: CPT | Mod: PN

## 2021-10-28 PROCEDURE — 97129 THER IVNTJ 1ST 15 MIN: CPT | Mod: PN

## 2021-10-28 PROCEDURE — 97130 THER IVNTJ EA ADDL 15 MIN: CPT | Mod: PN

## 2021-11-01 ENCOUNTER — CLINICAL SUPPORT (OUTPATIENT)
Dept: REHABILITATION | Facility: HOSPITAL | Age: 51
End: 2021-11-01
Payer: OTHER GOVERNMENT

## 2021-11-01 DIAGNOSIS — M25.611 DECREASED RIGHT SHOULDER RANGE OF MOTION: ICD-10-CM

## 2021-11-01 DIAGNOSIS — R29.898 WEAKNESS OF BOTH LOWER EXTREMITIES: ICD-10-CM

## 2021-11-01 DIAGNOSIS — Z74.09 IMPAIRED MOBILITY AND ENDURANCE: ICD-10-CM

## 2021-11-01 DIAGNOSIS — R26.89 ABNORMALITY OF GAIT DUE TO IMPAIRMENT OF BALANCE: ICD-10-CM

## 2021-11-01 PROCEDURE — 97112 NEUROMUSCULAR REEDUCATION: CPT | Mod: PN

## 2021-11-01 PROCEDURE — 97110 THERAPEUTIC EXERCISES: CPT | Mod: PN

## 2021-11-05 ENCOUNTER — CLINICAL SUPPORT (OUTPATIENT)
Dept: REHABILITATION | Facility: HOSPITAL | Age: 51
End: 2021-11-05
Payer: OTHER GOVERNMENT

## 2021-11-05 DIAGNOSIS — Z78.9 IMPAIRED INSTRUMENTAL ACTIVITIES OF DAILY LIVING (IADL): ICD-10-CM

## 2021-11-05 DIAGNOSIS — M79.601 RIGHT ARM PAIN: ICD-10-CM

## 2021-11-05 DIAGNOSIS — M25.611 DECREASED RIGHT SHOULDER RANGE OF MOTION: ICD-10-CM

## 2021-11-05 DIAGNOSIS — R68.89 DECREASED FUNCTIONAL ACTIVITY TOLERANCE: ICD-10-CM

## 2021-11-05 DIAGNOSIS — R29.898 WEAKNESS OF BOTH LOWER EXTREMITIES: ICD-10-CM

## 2021-11-05 DIAGNOSIS — Z74.09 IMPAIRED MOBILITY AND ENDURANCE: ICD-10-CM

## 2021-11-05 DIAGNOSIS — R68.89 IMPAIRED FUNCTION OF UPPER EXTREMITY: Primary | ICD-10-CM

## 2021-11-05 DIAGNOSIS — R41.841 COGNITIVE COMMUNICATION DISORDER: ICD-10-CM

## 2021-11-05 DIAGNOSIS — R26.89 ABNORMALITY OF GAIT DUE TO IMPAIRMENT OF BALANCE: ICD-10-CM

## 2021-11-05 PROCEDURE — 97130 THER IVNTJ EA ADDL 15 MIN: CPT | Mod: PN

## 2021-11-05 PROCEDURE — 97530 THERAPEUTIC ACTIVITIES: CPT | Mod: PN

## 2021-11-05 PROCEDURE — 97129 THER IVNTJ 1ST 15 MIN: CPT | Mod: PN

## 2021-11-07 ENCOUNTER — PATIENT OUTREACH (OUTPATIENT)
Dept: ADMINISTRATIVE | Facility: OTHER | Age: 51
End: 2021-11-07
Payer: OTHER GOVERNMENT

## 2021-11-09 ENCOUNTER — CLINICAL SUPPORT (OUTPATIENT)
Dept: REHABILITATION | Facility: HOSPITAL | Age: 51
End: 2021-11-09
Payer: OTHER GOVERNMENT

## 2021-11-09 DIAGNOSIS — M79.601 RIGHT ARM PAIN: ICD-10-CM

## 2021-11-09 DIAGNOSIS — R68.89 DECREASED FUNCTIONAL ACTIVITY TOLERANCE: ICD-10-CM

## 2021-11-09 DIAGNOSIS — R41.841 COGNITIVE COMMUNICATION DISORDER: ICD-10-CM

## 2021-11-09 DIAGNOSIS — R68.89 IMPAIRED FUNCTION OF UPPER EXTREMITY: Primary | ICD-10-CM

## 2021-11-09 DIAGNOSIS — Z78.9 IMPAIRED INSTRUMENTAL ACTIVITIES OF DAILY LIVING (IADL): ICD-10-CM

## 2021-11-09 PROCEDURE — 97110 THERAPEUTIC EXERCISES: CPT | Mod: PN

## 2021-11-09 PROCEDURE — 97530 THERAPEUTIC ACTIVITIES: CPT | Mod: PN

## 2021-11-09 PROCEDURE — 97130 THER IVNTJ EA ADDL 15 MIN: CPT | Mod: PN

## 2021-11-09 PROCEDURE — 97129 THER IVNTJ 1ST 15 MIN: CPT | Mod: PN

## 2021-11-10 ENCOUNTER — PATIENT MESSAGE (OUTPATIENT)
Dept: FAMILY MEDICINE | Facility: CLINIC | Age: 51
End: 2021-11-10
Payer: OTHER GOVERNMENT

## 2021-11-11 ENCOUNTER — CLINICAL SUPPORT (OUTPATIENT)
Dept: REHABILITATION | Facility: HOSPITAL | Age: 51
End: 2021-11-11
Payer: OTHER GOVERNMENT

## 2021-11-11 DIAGNOSIS — R41.841 COGNITIVE COMMUNICATION DISORDER: ICD-10-CM

## 2021-11-11 PROCEDURE — 97129 THER IVNTJ 1ST 15 MIN: CPT | Mod: PN

## 2021-11-11 PROCEDURE — 97130 THER IVNTJ EA ADDL 15 MIN: CPT | Mod: PN

## 2021-11-16 ENCOUNTER — OFFICE VISIT (OUTPATIENT)
Dept: FAMILY MEDICINE | Facility: CLINIC | Age: 51
End: 2021-11-16
Payer: OTHER GOVERNMENT

## 2021-11-16 VITALS
DIASTOLIC BLOOD PRESSURE: 72 MMHG | WEIGHT: 170.19 LBS | OXYGEN SATURATION: 100 % | SYSTOLIC BLOOD PRESSURE: 128 MMHG | HEIGHT: 69 IN | HEART RATE: 97 BPM | BODY MASS INDEX: 25.21 KG/M2

## 2021-11-16 DIAGNOSIS — Z86.16 HISTORY OF COVID-19: ICD-10-CM

## 2021-11-16 DIAGNOSIS — F43.23 ADJUSTMENT DISORDER WITH MIXED ANXIETY AND DEPRESSED MOOD: Primary | ICD-10-CM

## 2021-11-16 PROCEDURE — 99214 OFFICE O/P EST MOD 30 MIN: CPT | Mod: PBBFAC,PO | Performed by: STUDENT IN AN ORGANIZED HEALTH CARE EDUCATION/TRAINING PROGRAM

## 2021-11-16 PROCEDURE — 99999 PR PBB SHADOW E&M-EST. PATIENT-LVL IV: ICD-10-PCS | Mod: PBBFAC,,, | Performed by: STUDENT IN AN ORGANIZED HEALTH CARE EDUCATION/TRAINING PROGRAM

## 2021-11-16 PROCEDURE — 99214 OFFICE O/P EST MOD 30 MIN: CPT | Mod: S$PBB,,, | Performed by: STUDENT IN AN ORGANIZED HEALTH CARE EDUCATION/TRAINING PROGRAM

## 2021-11-16 PROCEDURE — 99999 PR PBB SHADOW E&M-EST. PATIENT-LVL IV: CPT | Mod: PBBFAC,,, | Performed by: STUDENT IN AN ORGANIZED HEALTH CARE EDUCATION/TRAINING PROGRAM

## 2021-11-16 PROCEDURE — 99214 PR OFFICE/OUTPT VISIT, EST, LEVL IV, 30-39 MIN: ICD-10-PCS | Mod: S$PBB,,, | Performed by: STUDENT IN AN ORGANIZED HEALTH CARE EDUCATION/TRAINING PROGRAM

## 2021-11-16 RX ORDER — FLUOXETINE HYDROCHLORIDE 20 MG/1
20 CAPSULE ORAL DAILY
Qty: 30 CAPSULE | Refills: 2 | Status: SHIPPED | OUTPATIENT
Start: 2021-11-16 | End: 2023-03-28 | Stop reason: SDUPTHER

## 2021-11-17 ENCOUNTER — CLINICAL SUPPORT (OUTPATIENT)
Dept: REHABILITATION | Facility: HOSPITAL | Age: 51
End: 2021-11-17
Payer: OTHER GOVERNMENT

## 2021-11-17 DIAGNOSIS — R41.841 COGNITIVE COMMUNICATION DISORDER: ICD-10-CM

## 2021-11-17 DIAGNOSIS — Z74.09 IMPAIRED MOBILITY AND ENDURANCE: ICD-10-CM

## 2021-11-17 DIAGNOSIS — R26.89 ABNORMALITY OF GAIT DUE TO IMPAIRMENT OF BALANCE: ICD-10-CM

## 2021-11-17 DIAGNOSIS — R29.898 WEAKNESS OF BOTH LOWER EXTREMITIES: ICD-10-CM

## 2021-11-17 DIAGNOSIS — M25.611 DECREASED RIGHT SHOULDER RANGE OF MOTION: ICD-10-CM

## 2021-11-17 PROCEDURE — 97130 THER IVNTJ EA ADDL 15 MIN: CPT | Mod: PN

## 2021-11-17 PROCEDURE — 97110 THERAPEUTIC EXERCISES: CPT | Mod: PN,CQ

## 2021-11-17 PROCEDURE — 97112 NEUROMUSCULAR REEDUCATION: CPT | Mod: PN,CQ

## 2021-11-17 PROCEDURE — 97129 THER IVNTJ 1ST 15 MIN: CPT | Mod: PN

## 2021-11-19 ENCOUNTER — CLINICAL SUPPORT (OUTPATIENT)
Dept: REHABILITATION | Facility: HOSPITAL | Age: 51
End: 2021-11-19
Payer: OTHER GOVERNMENT

## 2021-11-19 ENCOUNTER — PATIENT MESSAGE (OUTPATIENT)
Dept: FAMILY MEDICINE | Facility: CLINIC | Age: 51
End: 2021-11-19
Payer: OTHER GOVERNMENT

## 2021-11-19 ENCOUNTER — LAB VISIT (OUTPATIENT)
Dept: LAB | Facility: HOSPITAL | Age: 51
End: 2021-11-19
Attending: STUDENT IN AN ORGANIZED HEALTH CARE EDUCATION/TRAINING PROGRAM
Payer: OTHER GOVERNMENT

## 2021-11-19 DIAGNOSIS — Z78.9 IMPAIRED INSTRUMENTAL ACTIVITIES OF DAILY LIVING (IADL): ICD-10-CM

## 2021-11-19 DIAGNOSIS — M25.611 DECREASED RIGHT SHOULDER RANGE OF MOTION: ICD-10-CM

## 2021-11-19 DIAGNOSIS — R68.89 IMPAIRED FUNCTION OF UPPER EXTREMITY: Primary | ICD-10-CM

## 2021-11-19 DIAGNOSIS — F43.23 ADJUSTMENT DISORDER WITH MIXED ANXIETY AND DEPRESSED MOOD: ICD-10-CM

## 2021-11-19 DIAGNOSIS — R26.89 ABNORMALITY OF GAIT DUE TO IMPAIRMENT OF BALANCE: ICD-10-CM

## 2021-11-19 DIAGNOSIS — R68.89 DECREASED FUNCTIONAL ACTIVITY TOLERANCE: ICD-10-CM

## 2021-11-19 DIAGNOSIS — R23.3 EASY BRUISING: ICD-10-CM

## 2021-11-19 DIAGNOSIS — M79.601 RIGHT ARM PAIN: ICD-10-CM

## 2021-11-19 DIAGNOSIS — R23.3 EASY BRUISING: Primary | ICD-10-CM

## 2021-11-19 DIAGNOSIS — R41.841 COGNITIVE COMMUNICATION DISORDER: ICD-10-CM

## 2021-11-19 DIAGNOSIS — Z74.09 IMPAIRED MOBILITY AND ENDURANCE: ICD-10-CM

## 2021-11-19 DIAGNOSIS — R29.898 WEAKNESS OF BOTH LOWER EXTREMITIES: ICD-10-CM

## 2021-11-19 LAB
BASOPHILS # BLD AUTO: 0.03 K/UL (ref 0–0.2)
BASOPHILS NFR BLD: 0.7 % (ref 0–1.9)
CORTIS SERPL-MCNC: 8.3 UG/DL
DIFFERENTIAL METHOD: NORMAL
EOSINOPHIL # BLD AUTO: 0.1 K/UL (ref 0–0.5)
EOSINOPHIL NFR BLD: 2 % (ref 0–8)
ERYTHROCYTE [DISTWIDTH] IN BLOOD BY AUTOMATED COUNT: 11.9 % (ref 11.5–14.5)
HCT VFR BLD AUTO: 42.1 % (ref 37–48.5)
HGB BLD-MCNC: 13.7 G/DL (ref 12–16)
IMM GRANULOCYTES # BLD AUTO: 0 K/UL (ref 0–0.04)
IMM GRANULOCYTES NFR BLD AUTO: 0 % (ref 0–0.5)
LYMPHOCYTES # BLD AUTO: 1.6 K/UL (ref 1–4.8)
LYMPHOCYTES NFR BLD: 37.3 % (ref 18–48)
MCH RBC QN AUTO: 30.6 PG (ref 27–31)
MCHC RBC AUTO-ENTMCNC: 32.5 G/DL (ref 32–36)
MCV RBC AUTO: 94 FL (ref 82–98)
MONOCYTES # BLD AUTO: 0.4 K/UL (ref 0.3–1)
MONOCYTES NFR BLD: 8.2 % (ref 4–15)
NEUTROPHILS # BLD AUTO: 2.3 K/UL (ref 1.8–7.7)
NEUTROPHILS NFR BLD: 51.8 % (ref 38–73)
NRBC BLD-RTO: 0 /100 WBC
PLATELET # BLD AUTO: 315 K/UL (ref 150–450)
PMV BLD AUTO: 11.5 FL (ref 9.2–12.9)
RBC # BLD AUTO: 4.47 M/UL (ref 4–5.4)
TSH SERPL DL<=0.005 MIU/L-ACNC: 1.54 UIU/ML (ref 0.4–4)
WBC # BLD AUTO: 4.4 K/UL (ref 3.9–12.7)

## 2021-11-19 PROCEDURE — 97130 THER IVNTJ EA ADDL 15 MIN: CPT | Mod: PN

## 2021-11-19 PROCEDURE — 80053 COMPREHEN METABOLIC PANEL: CPT | Performed by: STUDENT IN AN ORGANIZED HEALTH CARE EDUCATION/TRAINING PROGRAM

## 2021-11-19 PROCEDURE — 36415 COLL VENOUS BLD VENIPUNCTURE: CPT | Mod: PO | Performed by: STUDENT IN AN ORGANIZED HEALTH CARE EDUCATION/TRAINING PROGRAM

## 2021-11-19 PROCEDURE — 97110 THERAPEUTIC EXERCISES: CPT | Mod: PN

## 2021-11-19 PROCEDURE — 84443 ASSAY THYROID STIM HORMONE: CPT | Performed by: STUDENT IN AN ORGANIZED HEALTH CARE EDUCATION/TRAINING PROGRAM

## 2021-11-19 PROCEDURE — 85025 COMPLETE CBC W/AUTO DIFF WBC: CPT | Performed by: STUDENT IN AN ORGANIZED HEALTH CARE EDUCATION/TRAINING PROGRAM

## 2021-11-19 PROCEDURE — 97530 THERAPEUTIC ACTIVITIES: CPT | Mod: PN

## 2021-11-19 PROCEDURE — 97129 THER IVNTJ 1ST 15 MIN: CPT | Mod: PN

## 2021-11-19 PROCEDURE — 82533 TOTAL CORTISOL: CPT | Performed by: STUDENT IN AN ORGANIZED HEALTH CARE EDUCATION/TRAINING PROGRAM

## 2021-11-20 LAB
ALBUMIN SERPL BCP-MCNC: 4 G/DL (ref 3.5–5.2)
ALP SERPL-CCNC: 72 U/L (ref 55–135)
ALT SERPL W/O P-5'-P-CCNC: 14 U/L (ref 10–44)
ANION GAP SERPL CALC-SCNC: 10 MMOL/L (ref 8–16)
AST SERPL-CCNC: 21 U/L (ref 10–40)
BILIRUB SERPL-MCNC: 0.6 MG/DL (ref 0.1–1)
BUN SERPL-MCNC: 13 MG/DL (ref 6–20)
CALCIUM SERPL-MCNC: 9.6 MG/DL (ref 8.7–10.5)
CHLORIDE SERPL-SCNC: 103 MMOL/L (ref 95–110)
CO2 SERPL-SCNC: 27 MMOL/L (ref 23–29)
CREAT SERPL-MCNC: 0.8 MG/DL (ref 0.5–1.4)
EST. GFR  (AFRICAN AMERICAN): >60 ML/MIN/1.73 M^2
EST. GFR  (NON AFRICAN AMERICAN): >60 ML/MIN/1.73 M^2
GLUCOSE SERPL-MCNC: 60 MG/DL (ref 70–110)
POTASSIUM SERPL-SCNC: 3.8 MMOL/L (ref 3.5–5.1)
PROT SERPL-MCNC: 7.3 G/DL (ref 6–8.4)
SODIUM SERPL-SCNC: 140 MMOL/L (ref 136–145)

## 2021-11-23 ENCOUNTER — CLINICAL SUPPORT (OUTPATIENT)
Dept: REHABILITATION | Facility: HOSPITAL | Age: 51
End: 2021-11-23
Payer: OTHER GOVERNMENT

## 2021-11-23 DIAGNOSIS — R41.841 COGNITIVE COMMUNICATION DISORDER: ICD-10-CM

## 2021-11-23 DIAGNOSIS — Z78.9 IMPAIRED INSTRUMENTAL ACTIVITIES OF DAILY LIVING (IADL): ICD-10-CM

## 2021-11-23 DIAGNOSIS — R68.89 IMPAIRED FUNCTION OF UPPER EXTREMITY: Primary | ICD-10-CM

## 2021-11-23 DIAGNOSIS — R26.89 ABNORMALITY OF GAIT DUE TO IMPAIRMENT OF BALANCE: ICD-10-CM

## 2021-11-23 DIAGNOSIS — Z74.09 IMPAIRED MOBILITY AND ENDURANCE: ICD-10-CM

## 2021-11-23 DIAGNOSIS — M25.611 DECREASED RIGHT SHOULDER RANGE OF MOTION: ICD-10-CM

## 2021-11-23 DIAGNOSIS — R29.898 WEAKNESS OF BOTH LOWER EXTREMITIES: ICD-10-CM

## 2021-11-23 DIAGNOSIS — M79.601 RIGHT ARM PAIN: ICD-10-CM

## 2021-11-23 DIAGNOSIS — R68.89 DECREASED FUNCTIONAL ACTIVITY TOLERANCE: ICD-10-CM

## 2021-11-23 PROCEDURE — 97110 THERAPEUTIC EXERCISES: CPT | Mod: PN

## 2021-11-23 PROCEDURE — 97129 THER IVNTJ 1ST 15 MIN: CPT | Mod: PN

## 2021-11-23 PROCEDURE — 97530 THERAPEUTIC ACTIVITIES: CPT | Mod: PN

## 2021-11-23 PROCEDURE — 97130 THER IVNTJ EA ADDL 15 MIN: CPT | Mod: PN

## 2021-11-30 ENCOUNTER — HOSPITAL ENCOUNTER (OUTPATIENT)
Dept: RADIOLOGY | Facility: HOSPITAL | Age: 51
Discharge: HOME | End: 2021-11-30
Attending: STUDENT IN AN ORGANIZED HEALTH CARE EDUCATION/TRAINING PROGRAM
Payer: OTHER GOVERNMENT

## 2021-11-30 ENCOUNTER — PATIENT MESSAGE (OUTPATIENT)
Dept: FAMILY MEDICINE | Facility: CLINIC | Age: 51
End: 2021-11-30
Payer: OTHER GOVERNMENT

## 2021-11-30 DIAGNOSIS — R79.89 LOW SERUM CORTISOL LEVEL: ICD-10-CM

## 2021-11-30 DIAGNOSIS — Z86.16 HISTORY OF COVID-19: Primary | ICD-10-CM

## 2021-11-30 DIAGNOSIS — Z86.16 HISTORY OF COVID-19: ICD-10-CM

## 2021-11-30 PROCEDURE — 70551 MRI BRAIN STEM W/O DYE: CPT | Mod: TC

## 2021-11-30 PROCEDURE — 70551 MRI BRAIN STEM W/O DYE: CPT | Mod: 26,,, | Performed by: RADIOLOGY

## 2021-11-30 PROCEDURE — 70551 MRI BRAIN WITHOUT CONTRAST: ICD-10-PCS | Mod: 26,,, | Performed by: RADIOLOGY

## 2021-12-01 ENCOUNTER — PATIENT MESSAGE (OUTPATIENT)
Dept: FAMILY MEDICINE | Facility: CLINIC | Age: 51
End: 2021-12-01
Payer: OTHER GOVERNMENT

## 2021-12-01 DIAGNOSIS — E34.8: Primary | ICD-10-CM

## 2021-12-02 ENCOUNTER — TELEPHONE (OUTPATIENT)
Dept: FAMILY MEDICINE | Facility: CLINIC | Age: 51
End: 2021-12-02
Payer: OTHER GOVERNMENT

## 2021-12-02 ENCOUNTER — PATIENT MESSAGE (OUTPATIENT)
Dept: FAMILY MEDICINE | Facility: CLINIC | Age: 51
End: 2021-12-02
Payer: OTHER GOVERNMENT

## 2021-12-02 ENCOUNTER — HOSPITAL ENCOUNTER (OUTPATIENT)
Dept: RADIOLOGY | Facility: HOSPITAL | Age: 51
Discharge: HOME | End: 2021-12-02
Attending: STUDENT IN AN ORGANIZED HEALTH CARE EDUCATION/TRAINING PROGRAM
Payer: OTHER GOVERNMENT

## 2021-12-02 ENCOUNTER — OFFICE VISIT (OUTPATIENT)
Dept: ORTHOPEDICS | Facility: CLINIC | Age: 51
End: 2021-12-02
Payer: OTHER GOVERNMENT

## 2021-12-02 DIAGNOSIS — M77.11 LATERAL EPICONDYLITIS, RIGHT ELBOW: Primary | ICD-10-CM

## 2021-12-02 DIAGNOSIS — E34.8: ICD-10-CM

## 2021-12-02 PROCEDURE — 70553 MRI BRAIN STEM W/O & W/DYE: CPT | Mod: 26,,, | Performed by: RADIOLOGY

## 2021-12-02 PROCEDURE — 99213 OFFICE O/P EST LOW 20 MIN: CPT | Mod: S$PBB,,, | Performed by: ORTHOPAEDIC SURGERY

## 2021-12-02 PROCEDURE — 99212 OFFICE O/P EST SF 10 MIN: CPT | Mod: PBBFAC,25,PN | Performed by: ORTHOPAEDIC SURGERY

## 2021-12-02 PROCEDURE — 70553 MRI BRAIN STEM W/O & W/DYE: CPT | Mod: TC

## 2021-12-02 PROCEDURE — 25500020 PHARM REV CODE 255: Performed by: STUDENT IN AN ORGANIZED HEALTH CARE EDUCATION/TRAINING PROGRAM

## 2021-12-02 PROCEDURE — A9585 GADOBUTROL INJECTION: HCPCS | Performed by: STUDENT IN AN ORGANIZED HEALTH CARE EDUCATION/TRAINING PROGRAM

## 2021-12-02 PROCEDURE — 70553 MRI BRAIN PITUITARY W W/O CONTRAST: ICD-10-PCS | Mod: 26,,, | Performed by: RADIOLOGY

## 2021-12-02 PROCEDURE — 99999 PR PBB SHADOW E&M-EST. PATIENT-LVL II: CPT | Mod: PBBFAC,,, | Performed by: ORTHOPAEDIC SURGERY

## 2021-12-02 PROCEDURE — 99999 PR PBB SHADOW E&M-EST. PATIENT-LVL II: ICD-10-PCS | Mod: PBBFAC,,, | Performed by: ORTHOPAEDIC SURGERY

## 2021-12-02 PROCEDURE — 99213 PR OFFICE/OUTPT VISIT, EST, LEVL III, 20-29 MIN: ICD-10-PCS | Mod: S$PBB,,, | Performed by: ORTHOPAEDIC SURGERY

## 2021-12-02 RX ORDER — GADOBUTROL 604.72 MG/ML
4 INJECTION INTRAVENOUS
Status: COMPLETED | OUTPATIENT
Start: 2021-12-02 | End: 2021-12-02

## 2021-12-02 RX ADMIN — GADOBUTROL 4 ML: 604.72 INJECTION INTRAVENOUS at 08:12

## 2021-12-03 ENCOUNTER — PATIENT MESSAGE (OUTPATIENT)
Dept: FAMILY MEDICINE | Facility: CLINIC | Age: 51
End: 2021-12-03
Payer: OTHER GOVERNMENT

## 2021-12-07 ENCOUNTER — CLINICAL SUPPORT (OUTPATIENT)
Dept: REHABILITATION | Facility: HOSPITAL | Age: 51
End: 2021-12-07
Payer: OTHER GOVERNMENT

## 2021-12-07 ENCOUNTER — PATIENT MESSAGE (OUTPATIENT)
Dept: FAMILY MEDICINE | Facility: CLINIC | Age: 51
End: 2021-12-07
Payer: OTHER GOVERNMENT

## 2021-12-07 DIAGNOSIS — R41.841 COGNITIVE COMMUNICATION DISORDER: ICD-10-CM

## 2021-12-07 DIAGNOSIS — R29.898 WEAKNESS OF BOTH LOWER EXTREMITIES: ICD-10-CM

## 2021-12-07 DIAGNOSIS — R26.89 ABNORMALITY OF GAIT DUE TO IMPAIRMENT OF BALANCE: ICD-10-CM

## 2021-12-07 DIAGNOSIS — E34.8 CYST OF PINEAL GLAND: Primary | ICD-10-CM

## 2021-12-07 DIAGNOSIS — M25.611 DECREASED RIGHT SHOULDER RANGE OF MOTION: ICD-10-CM

## 2021-12-07 DIAGNOSIS — Z74.09 IMPAIRED MOBILITY AND ENDURANCE: ICD-10-CM

## 2021-12-07 PROCEDURE — 97110 THERAPEUTIC EXERCISES: CPT | Mod: PN

## 2021-12-07 PROCEDURE — 97129 THER IVNTJ 1ST 15 MIN: CPT | Mod: PN

## 2021-12-07 PROCEDURE — 97130 THER IVNTJ EA ADDL 15 MIN: CPT | Mod: PN

## 2021-12-08 ENCOUNTER — TELEPHONE (OUTPATIENT)
Dept: FAMILY MEDICINE | Facility: CLINIC | Age: 51
End: 2021-12-08
Payer: OTHER GOVERNMENT

## 2021-12-08 ENCOUNTER — PATIENT MESSAGE (OUTPATIENT)
Dept: FAMILY MEDICINE | Facility: CLINIC | Age: 51
End: 2021-12-08
Payer: OTHER GOVERNMENT

## 2021-12-08 ENCOUNTER — TELEPHONE (OUTPATIENT)
Dept: NEUROSURGERY | Facility: CLINIC | Age: 51
End: 2021-12-08
Payer: OTHER GOVERNMENT

## 2021-12-08 DIAGNOSIS — E03.9 HYPOTHYROIDISM, UNSPECIFIED TYPE: ICD-10-CM

## 2021-12-09 ENCOUNTER — TELEPHONE (OUTPATIENT)
Dept: OPTOMETRY | Facility: CLINIC | Age: 51
End: 2021-12-09
Payer: OTHER GOVERNMENT

## 2021-12-09 ENCOUNTER — PATIENT MESSAGE (OUTPATIENT)
Dept: FAMILY MEDICINE | Facility: CLINIC | Age: 51
End: 2021-12-09
Payer: OTHER GOVERNMENT

## 2021-12-09 RX ORDER — LEVOTHYROXINE SODIUM 75 UG/1
TABLET ORAL
Qty: 90 TABLET | Refills: 3 | Status: SHIPPED | OUTPATIENT
Start: 2021-12-09 | End: 2022-06-02 | Stop reason: SDUPTHER

## 2021-12-14 ENCOUNTER — PATIENT OUTREACH (OUTPATIENT)
Dept: ADMINISTRATIVE | Facility: OTHER | Age: 51
End: 2021-12-14
Payer: OTHER GOVERNMENT

## 2021-12-14 ENCOUNTER — DOCUMENTATION ONLY (OUTPATIENT)
Dept: REHABILITATION | Facility: HOSPITAL | Age: 51
End: 2021-12-14
Payer: OTHER GOVERNMENT

## 2021-12-15 ENCOUNTER — LAB VISIT (OUTPATIENT)
Dept: LAB | Facility: HOSPITAL | Age: 51
End: 2021-12-15
Attending: INTERNAL MEDICINE
Payer: OTHER GOVERNMENT

## 2021-12-15 ENCOUNTER — OFFICE VISIT (OUTPATIENT)
Dept: ENDOCRINOLOGY | Facility: CLINIC | Age: 51
End: 2021-12-15
Payer: OTHER GOVERNMENT

## 2021-12-15 VITALS
BODY MASS INDEX: 25.76 KG/M2 | DIASTOLIC BLOOD PRESSURE: 78 MMHG | OXYGEN SATURATION: 99 % | WEIGHT: 173.94 LBS | HEART RATE: 81 BPM | HEIGHT: 69 IN | SYSTOLIC BLOOD PRESSURE: 129 MMHG

## 2021-12-15 DIAGNOSIS — E03.9 HYPOTHYROIDISM, UNSPECIFIED TYPE: ICD-10-CM

## 2021-12-15 DIAGNOSIS — R79.89 LOW SERUM CORTISOL LEVEL: ICD-10-CM

## 2021-12-15 DIAGNOSIS — E34.8 PINEAL GLAND CYST: ICD-10-CM

## 2021-12-15 DIAGNOSIS — E03.9 HYPOTHYROIDISM, UNSPECIFIED TYPE: Primary | ICD-10-CM

## 2021-12-15 LAB — T4 FREE SERPL-MCNC: 0.95 NG/DL (ref 0.71–1.51)

## 2021-12-15 PROCEDURE — 99244 PR OFFICE CONSULTATION,LEVEL IV: ICD-10-PCS | Mod: S$PBB,,, | Performed by: INTERNAL MEDICINE

## 2021-12-15 PROCEDURE — 99999 PR PBB SHADOW E&M-EST. PATIENT-LVL III: ICD-10-PCS | Mod: PBBFAC,,, | Performed by: INTERNAL MEDICINE

## 2021-12-15 PROCEDURE — 36415 COLL VENOUS BLD VENIPUNCTURE: CPT | Performed by: INTERNAL MEDICINE

## 2021-12-15 PROCEDURE — 84439 ASSAY OF FREE THYROXINE: CPT | Performed by: INTERNAL MEDICINE

## 2021-12-15 PROCEDURE — 99244 OFF/OP CNSLTJ NEW/EST MOD 40: CPT | Mod: S$PBB,,, | Performed by: INTERNAL MEDICINE

## 2021-12-15 PROCEDURE — 99999 PR PBB SHADOW E&M-EST. PATIENT-LVL III: CPT | Mod: PBBFAC,,, | Performed by: INTERNAL MEDICINE

## 2021-12-15 PROCEDURE — 99213 OFFICE O/P EST LOW 20 MIN: CPT | Mod: PBBFAC | Performed by: INTERNAL MEDICINE

## 2021-12-17 PROBLEM — R79.89 LOW SERUM CORTISOL LEVEL: Status: ACTIVE | Noted: 2021-12-17

## 2021-12-17 PROBLEM — E34.8 PINEAL GLAND CYST: Status: ACTIVE | Noted: 2021-12-17

## 2021-12-17 PROBLEM — E03.9 HYPOTHYROIDISM: Status: ACTIVE | Noted: 2021-12-17

## 2021-12-17 RX ORDER — COSYNTROPIN 0.25 MG/ML
0.25 INJECTION, POWDER, FOR SOLUTION INTRAMUSCULAR; INTRAVENOUS ONCE
Status: COMPLETED | OUTPATIENT
Start: 2021-12-17 | End: 2021-12-29

## 2021-12-21 ENCOUNTER — OFFICE VISIT (OUTPATIENT)
Dept: NEUROSURGERY | Facility: CLINIC | Age: 51
End: 2021-12-21
Payer: OTHER GOVERNMENT

## 2021-12-21 VITALS
HEART RATE: 124 BPM | BODY MASS INDEX: 25.72 KG/M2 | WEIGHT: 174.19 LBS | SYSTOLIC BLOOD PRESSURE: 148 MMHG | DIASTOLIC BLOOD PRESSURE: 88 MMHG

## 2021-12-21 DIAGNOSIS — E34.8 CYST OF PINEAL GLAND: ICD-10-CM

## 2021-12-21 DIAGNOSIS — R29.898 WEAKNESS OF BOTH LOWER EXTREMITIES: ICD-10-CM

## 2021-12-21 DIAGNOSIS — R41.841 COGNITIVE COMMUNICATION DISORDER: Primary | ICD-10-CM

## 2021-12-21 PROCEDURE — 99999 PR PBB SHADOW E&M-EST. PATIENT-LVL IV: ICD-10-PCS | Mod: PBBFAC,,, | Performed by: NEUROLOGICAL SURGERY

## 2021-12-21 PROCEDURE — 99204 PR OFFICE/OUTPT VISIT, NEW, LEVL IV, 45-59 MIN: ICD-10-PCS | Mod: S$PBB,,, | Performed by: NEUROLOGICAL SURGERY

## 2021-12-21 PROCEDURE — 99214 OFFICE O/P EST MOD 30 MIN: CPT | Mod: PBBFAC | Performed by: NEUROLOGICAL SURGERY

## 2021-12-21 PROCEDURE — 99204 OFFICE O/P NEW MOD 45 MIN: CPT | Mod: S$PBB,,, | Performed by: NEUROLOGICAL SURGERY

## 2021-12-21 PROCEDURE — 99999 PR PBB SHADOW E&M-EST. PATIENT-LVL IV: CPT | Mod: PBBFAC,,, | Performed by: NEUROLOGICAL SURGERY

## 2021-12-29 ENCOUNTER — OFFICE VISIT (OUTPATIENT)
Dept: OPHTHALMOLOGY | Facility: CLINIC | Age: 51
End: 2021-12-29
Payer: OTHER GOVERNMENT

## 2021-12-29 ENCOUNTER — CLINICAL SUPPORT (OUTPATIENT)
Dept: ENDOCRINOLOGY | Facility: CLINIC | Age: 51
End: 2021-12-29
Payer: OTHER GOVERNMENT

## 2021-12-29 ENCOUNTER — CLINICAL SUPPORT (OUTPATIENT)
Dept: OPHTHALMOLOGY | Facility: CLINIC | Age: 51
End: 2021-12-29
Payer: OTHER GOVERNMENT

## 2021-12-29 DIAGNOSIS — R79.89 LOW SERUM CORTISOL LEVEL: ICD-10-CM

## 2021-12-29 DIAGNOSIS — E34.8 PINEAL GLAND CYST: Primary | ICD-10-CM

## 2021-12-29 DIAGNOSIS — E34.8 PINEAL GLAND CYST: ICD-10-CM

## 2021-12-29 LAB
CORTIS SERPL-MCNC: 20 UG/DL
CORTIS SERPL-MCNC: 23.8 UG/DL
CORTIS SERPL-MCNC: 7.7 UG/DL

## 2021-12-29 PROCEDURE — 99999 PR PBB SHADOW E&M-EST. PATIENT-LVL III: ICD-10-PCS | Mod: PBBFAC,,, | Performed by: OPHTHALMOLOGY

## 2021-12-29 PROCEDURE — 82533 TOTAL CORTISOL: CPT | Performed by: INTERNAL MEDICINE

## 2021-12-29 PROCEDURE — 82024 ASSAY OF ACTH: CPT | Performed by: INTERNAL MEDICINE

## 2021-12-29 PROCEDURE — 99213 OFFICE O/P EST LOW 20 MIN: CPT | Mod: PBBFAC | Performed by: OPHTHALMOLOGY

## 2021-12-29 PROCEDURE — 99999 PR PBB SHADOW E&M-EST. PATIENT-LVL III: CPT | Mod: PBBFAC,,, | Performed by: OPHTHALMOLOGY

## 2021-12-29 PROCEDURE — 99202 PR OFFICE/OUTPT VISIT, NEW, LEVL II, 15-29 MIN: ICD-10-PCS | Mod: S$PBB,,, | Performed by: OPHTHALMOLOGY

## 2021-12-29 PROCEDURE — 99202 OFFICE O/P NEW SF 15 MIN: CPT | Mod: S$PBB,,, | Performed by: OPHTHALMOLOGY

## 2021-12-29 RX ORDER — SUMATRIPTAN SUCCINATE 25 MG/1
TABLET ORAL
COMMUNITY
Start: 2021-12-02 | End: 2023-03-31 | Stop reason: SDUPTHER

## 2021-12-29 RX ORDER — BUSPIRONE HYDROCHLORIDE 10 MG/1
10 TABLET ORAL 2 TIMES DAILY
COMMUNITY
Start: 2021-12-14 | End: 2022-04-26

## 2021-12-29 RX ADMIN — COSYNTROPIN 0.25 MG: 0.25 INJECTION, POWDER, LYOPHILIZED, FOR SOLUTION INTRAMUSCULAR; INTRAVENOUS at 10:12

## 2021-12-30 LAB — ACTH PLAS-MCNC: 8 PG/ML (ref 0–46)

## 2022-01-06 ENCOUNTER — TELEPHONE (OUTPATIENT)
Dept: REHABILITATION | Facility: HOSPITAL | Age: 52
End: 2022-01-06
Payer: OTHER GOVERNMENT

## 2022-01-06 NOTE — TELEPHONE ENCOUNTER
"OT called to check in w/ pt. She says she's doing "OK" but has been overwhelmed by the multiple medical appointments. Nonetheless, she assured OT she will be at PT session Tuesday next week. She wants to return to [facility] to work on additional strategies and understands we will work on adding [facility] appointments that correspond with her current PT schedule and we will need to do plan of care updates at the next appointment.   Mary Beth Wakefield, PING, LOTR   "

## 2022-01-11 ENCOUNTER — HOSPITAL ENCOUNTER (OUTPATIENT)
Dept: RADIOLOGY | Facility: HOSPITAL | Age: 52
Discharge: HOME | End: 2022-01-11
Attending: NEUROLOGICAL SURGERY
Payer: OTHER GOVERNMENT

## 2022-01-11 ENCOUNTER — CLINICAL SUPPORT (OUTPATIENT)
Dept: REHABILITATION | Facility: HOSPITAL | Age: 52
End: 2022-01-11
Payer: OTHER GOVERNMENT

## 2022-01-11 DIAGNOSIS — R26.89 ABNORMALITY OF GAIT DUE TO IMPAIRMENT OF BALANCE: ICD-10-CM

## 2022-01-11 DIAGNOSIS — R29.898 WEAKNESS OF BOTH LOWER EXTREMITIES: ICD-10-CM

## 2022-01-11 DIAGNOSIS — E34.8 CYST OF PINEAL GLAND: ICD-10-CM

## 2022-01-11 DIAGNOSIS — R41.841 COGNITIVE COMMUNICATION DISORDER: ICD-10-CM

## 2022-01-11 DIAGNOSIS — Z74.09 IMPAIRED MOBILITY AND ENDURANCE: ICD-10-CM

## 2022-01-11 DIAGNOSIS — M25.611 DECREASED RIGHT SHOULDER RANGE OF MOTION: ICD-10-CM

## 2022-01-11 PROCEDURE — 70553 MRI BRAIN STEM W/O & W/DYE: CPT | Mod: TC

## 2022-01-11 PROCEDURE — 70553 MRI BRAIN STEM W/O & W/DYE: CPT | Mod: 26,,, | Performed by: RADIOLOGY

## 2022-01-11 PROCEDURE — 70553 MRI BRAIN SYNAPTIVE STEALTH W/WO CONTRAST: ICD-10-PCS | Mod: 26,,, | Performed by: RADIOLOGY

## 2022-01-11 PROCEDURE — 97530 THERAPEUTIC ACTIVITIES: CPT | Mod: PN

## 2022-01-11 PROCEDURE — 25500020 PHARM REV CODE 255: Performed by: NEUROLOGICAL SURGERY

## 2022-01-11 PROCEDURE — A9585 GADOBUTROL INJECTION: HCPCS | Performed by: NEUROLOGICAL SURGERY

## 2022-01-11 RX ORDER — GADOBUTROL 604.72 MG/ML
9 INJECTION INTRAVENOUS
Status: COMPLETED | OUTPATIENT
Start: 2022-01-11 | End: 2022-01-11

## 2022-01-11 RX ADMIN — GADOBUTROL 9 ML: 604.72 INJECTION INTRAVENOUS at 03:01

## 2022-01-11 NOTE — PROGRESS NOTES
[facility]   Physical Therapy Treatment Note     Name: Jessy Mayberry  Clinic Number: [id number]    Therapy Diagnosis:   Encounter Diagnoses   Name Primary?    Weakness of both lower extremities     Abnormality of gait due to impairment of balance     Impaired mobility and endurance     Decreased right shoulder range of motion      Physician: Roger Nation MD    Visit Date: 1/11/2022    Physician Orders: PT Eval and Treat   Medical Diagnosis from Referral: Neuropathy [G62.9]  Evaluation Date: 9/28/2021; Updated Plan of Care 11/23/2021  Authorization Period Expiration: 1/20/2022  Plan of Care Expiration: 2/4/2022  Progress Note Due: 12/23/2021   Visit # / Visits authorized: 1/3 (14 episode visits)     PTA Visit #: 0/5     Time In: 1046  Time Out: 1131  Total Billable Time: 45 minutes    SUBJECTIVE     Pt reports: Her break was very good mentally. She continues to have fatigue. She did not over extend herself during the break but she did things that made her happy like painting some things in her house. Reports her symptoms are relatively the same, she still sways with her balance when she is tired. It feels like she is walking on broken glass when walking with bare feet. She has another MRI today to further look at the cyst.   She was compliant with home exercise program.  Response to previous treatment: no reported complaints with regards to previous treatment session.   Functional change: ongoing    Pain: 0/10  Location: N/A    OBJECTIVE     Objective Measures updated at progress report unless specified.     Treatment     Jessy received the treatments listed below:      therapeutic activities to improve functional performance for 45 minutes, including:    Strength:   Muscle Group Right Lower Extremity Left Lower Extremity   Hip Flexion 4/5 4/5   Hip Extension  4/5 4/5   Hip Abduction 3/5 3+/5   Knee Flexion 4/5 4/5   Knee Extension 4/5 4/5   Dorsiflexion 4/5 4/5   Plantarflexion  4/5 4/5     Functional Gait Assessment:   1. Gait on level surface =  3   (3) Normal: less than 5.5 sec, no A.D., no imbalance, normal gait pattern, deviates< 6in   (2) Mild impairment: 7-5.6 sec, uses A.D., mild gait deviations, or deviates 6-10 in   (1) Moderate impairment: > 7 sec, slow speed, imbalance, deviates 10-15 in.   (0) Severe impairment: needs assist, deviates >15 in, reach/touch wall  2. Change in Gait Speed = 2   (3) Normal: smooth change w/o loss of balance or gait deviation, deviates < 6 in, significant difference between speeds   (2) Mild impairment: changes speed, but demonstrates mild gait deviations, deviates 6-10 in, OR no deviations but unable to significantly speed, OR uses A.D.   (1) Moderate impairment: minor changes to speed, OR changes speed w/ significant deviations, deviates 10-15 in, OR  Changes speed , but loses balance & recovers   (0) Severe impairment: cannot change speed, deviates >15 in, or loses balance & needs assist  3. Gait with horizontal head turns  = 2   (3) Normal: no change in gait, deviates <6 in   (2) Mild impairment: slight change in speed, deviates 6-10 in, OR uses A.D.   (1) Moderate impairment: moderate change in speed, deviates 10-15 in   (0) Severe impairment: severe disruption of gait, deviates >15in  4. Gait with vertical head turns = 2   (3) Normal: no change in gait, deviates <6 in   (2) Mild impairment: slight change in speed, deviates 6-10 in OR uses A.D.   (1) Moderate impairment: moderate change in speed, deviates 10-15 in   (0) Severe impairment: severe disruption of gait, deviates >15 in  5. Gait with pivot turns = 3   (3) Normal: performs safely in 3 sec, no LOB   (2) Mild impairment: performs in >3 sec & no LOB, OR turns safely & requires several steps to regain LOB   (1) Moderate impairment: turns slow, OR requires several small steps for balance following turn & stop   (0) Severe impairment: cannot turn safely, needs assist  6. Step over obstacle =  3   (3) Normal: steps over 2 stacked boxes w/o change in speed or LOB   (2) Mild impairment: able to step over 1 box w/o change in speed or LOB   (1) Moderate impairment: steps over 1 box but must slow down, may require VC   (0) Severe impairment: cannot perform w/o assist  7. Gait with Narrow JOSE F = 3   (3) Normal: 10 steps no staggering   (2) Mild impairment: 7-9 steps   (1) Moderate impairment: 4-7 steps   (0) Severe impairment: < 4 steps or cannot perform w/o assist  8. Gait with eyes closed = 3   (3) Normal: < 7 sec, no A.D., no LOB, normal gait pattern, deviates <6 in   (2) Mild impairment: 7.1-9 sec, mild gait deviations, deviates 6-10 in   (1) Moderate impairment: > 9 sec, abnormal pattern, LOB, deviates 10-15 in   (0) Severe impairment: cannot perform w/o assist, LOB, deviates >15in  9. Ambulating Backwards = 2   (3) Normal: no A.D., no LOB, normal gait pattern, deviates <6in   (2) Mild impairment: uses A.D., slower speed, mild gait deviations, deviates 6-10 in   (1) Moderate impairment: slow speed, abnormal gait pattern, LOB, deviates 10-15 in   (0) Severe impairment: severe gait deviations or LOB, deviates >15in  10. Steps = 2   (3) Normal: alternating feet, no rail   (2) Mild Impairment: alternating feet, uses rail   (1) Moderate impairment: step-to, uses rail   (0) Severe impairment: cannot perform safely  Score 25/30   Score:   <22/30 fall risk   <20/30 fall risk in older adults   <18/30 fall risk in Parkinsons       2 Minute Walk Test: 610 feet     SciFit recumbent bike x 15 minutes at level 2.   Post activity vitals = ; SpO2 98% on room air      Patient Education and Home Exercises     Home Exercises Provided and Patient Education Provided     Education provided:   - Patient provided with verbal and demonstrative instruction for all activities performed in today's session.      Written Home Exercises Provided: Patient instructed to cont prior HEP. Exercises were reviewed and Jessy was able to  demonstrate them prior to the end of the session.  Jessy demonstrated good  understanding of the education provided. See EMR under Patient Instructions for exercises provided during therapy sessions    ASSESSMENT   Jessy provided good effort and participation toward therapeutic interventions today with focus on assessment of progress toward goals after a few weeks break from therapy. Jessy reports that her symptoms have remained relatively the same, but she is able to better manage her activities throughout the day to avoid excessive fatigue. She demonstrates progress with her strength, but continues to have difficulty sustaining a contraction for longer than a few seconds in all muscle groups. She improved her score on the Functional Gait Assessment to 25/30 demonstrating improvements in her dynamic standing balance and community level gait. Jessy then complete the 2 minute walk test and surpassed her goal of 540 feet by walking 610 feet. Lastly, she completed 15 minutes on the [employer] bike and was able to maintain conversation while doing so. Her post activity vitals are within normal ranges. Jessy will continue to benefit from skilled services to address her activity tolerance, higher level dynamic standing balance, and lower extremity strength.     Jessy is progressing well towards her goals.   Pt prognosis is Good.     Pt will continue to benefit from skilled outpatient physical therapy to address the deficits listed in the problem list box on initial evaluation, provide pt/family education and to maximize pt's level of independence in the home and community environment.     Pt's spiritual, cultural and educational needs considered and pt agreeable to plan of care and goals.     Anticipated barriers to physical therapy: co-morbidities    Goals:  Short Term Goals: 4 weeks  Date Last Assessed Status   1. Patient will be provided with a comprehensive home exercise program in order to continue with  strength, balance, and endurance training at home.  11/23/2021 Met   2. Patient will participate in assessment of the 2 Minute Walk Test to further assess her endurance and tolerance to community ambulation.  9/28/2021 Modified/Met   3. Patient will improve her strength in all major muscle groups of bilateral lower extremities by 1/2 grade to improve stability and endurance and gait.  11/5/2021 Met   4. Patient will improve her right shoulder active abduction range of motion to 115 degrees to improve her ability to perform functional ADL's.  11/5/2021 Met   5. Patient will improve her right shoulder active flexion range of motion to 150 degrees to improve her ability to perform functional ADL's.  11/5/2021 Met         Long Term Goals: 8 weeks Date Last Assessed Status   1. Patient will demonstrate improved score on the Roth Balance Assessment to 52/56 to demonstrate improved functional mobility, balance and decreased risk for falls.  11/23/2021 Met   2. Patient will demonstrate improved score on the Functional Gait Assessment to 27/30 to demonstrate improved balance, safety, and efficiency with community gait.  1/11/2022 Progressing   3. Patient will improve her strength in all major muscle groups of bilateral lower extremities by 1 grade to improve stability and endurance and gait.  1/11/2022 Progressing   4. Patient will improve her right shoulder active abduction range of motion to 145 degrees to improve her ability to perform functional ADL's. 11/23/2021 Met   5. Patient will improve her right shoulder active flexion range of motion to 170 degrees to improve her ability to perform functional ADL's. 11/23/2021 Met   6. Patient will tolerate 15 minutes on the SciFit/NuStep with post activity heart rate between 50-80% of age determined heart rate maximum to demonstrate improved cardiovascular endurance and activity tolerance.  1/11/2022 Met   7. Patient will improve her distance on the 2 minutes walk test from 433  feet to 540 feet to demonstrate improved endurance and activity tolerance.  1/11/2022 Met       PLAN   Updated Certification Period: 11/23/2021 to 2/4/2022  Recommended Treatment Plan: 2 times per week for 10 weeks:  Gait Training, Manual Therapy, Moist Heat/ Ice, Neuromuscular Re-ed, Orthotic Management and Training, Patient Education, Therapeutic Activites and Therapeutic Exercise    Recommendations for future treatment sessions: lower extremity strength, endurance, and dynamic standing balance.       Ata Wolfe PT, DPT, C/NDT

## 2022-01-20 NOTE — PROGRESS NOTES
[facility]   Physical Therapy Treatment Note     Name: Jessy Mayberry  Clinic Number: [id number]    Therapy Diagnosis:   Encounter Diagnoses   Name Primary?    Weakness of both lower extremities     Abnormality of gait due to impairment of balance     Impaired mobility and endurance     Decreased right shoulder range of motion      Physician: Roger Nation MD    Visit Date: 1/24/2022    Physician Orders: PT Eval and Treat   Medical Diagnosis from Referral: Neuropathy [G62.9]  Evaluation Date: 9/28/2021; Updated Plan of Care 11/23/2021  Authorization Period Expiration: 1/20/2022  Plan of Care Expiration: 2/4/2022  Progress Note Due: 12/23/2021   Visit # / Visits authorized: 2/8 (15 episode visits)     PTA Visit #: 0/5     Time In: 1330  Time Out: 1410  Total Billable Time: 40 minutes    SUBJECTIVE     Pt reports: She is feeling tired today. She has been sick the last week but getting better. She slipped on their new floors and did not fall but felt like she pulled a muscle in her back when she jerked her arm in attempt to catch her balance.   She was compliant with home exercise program.  Response to previous treatment: no reported complaints with regards to previous treatment session.   Functional change: ongoing    Pain: 7/10  Location: low back    OBJECTIVE     Objective Measures updated at progress report unless specified.     Treatment     Jessy received the treatments listed below:      therapeutic exercises to develop strength and endurance for 25 minutes including:  - Treadmill walking x 15 minutes at self selected speed of 1.9 mph  - Double knee to chest stretch 3 x 1 minute bilaterally.   - Seated forward stretch with rolling ball forward to provide stretch to low back x 20 repetitions.     manual therapy techniques: Soft tissue Mobilization were applied to the: low back for 15 minutes, including:  - Soft tissue mobilization with increasing pressure as tolerated.          Patient Education and Home Exercises     Home Exercises Provided and Patient Education Provided     Education provided:   - Patient provided with verbal and demonstrative instruction for all activities performed in today's session.      Written Home Exercises Provided: Patient instructed to cont prior HEP. Exercises were reviewed and Jessy was able to demonstrate them prior to the end of the session.  Jessy demonstrated good  understanding of the education provided. See EMR under Patient Instructions for exercises provided during therapy sessions    ASSESSMENT   Jessy provided good effort and participation toward therapeutic interventions today with focus on cardiovascular endurance and pain management for muscle strain in low back. Jessy tolerated manual therapy techniques and stretching well with pain reduction from 7/10 to 5/10 at end of session. PT to continue to monitor pain and response to activity.     Jessy is progressing well towards her goals.   Pt prognosis is Good.     Pt will continue to benefit from skilled outpatient physical therapy to address the deficits listed in the problem list box on initial evaluation, provide pt/family education and to maximize pt's level of independence in the home and community environment.     Pt's spiritual, cultural and educational needs considered and pt agreeable to plan of care and goals.     Anticipated barriers to physical therapy: co-morbidities    Goals:  Short Term Goals: 4 weeks  Date Last Assessed Status   1. Patient will be provided with a comprehensive home exercise program in order to continue with strength, balance, and endurance training at home.  11/23/2021 Met   2. Patient will participate in assessment of the 2 Minute Walk Test to further assess her endurance and tolerance to community ambulation.  9/28/2021 Modified/Met   3. Patient will improve her strength in all major muscle groups of bilateral lower extremities by 1/2 grade to improve  stability and endurance and gait.  11/5/2021 Met   4. Patient will improve her right shoulder active abduction range of motion to 115 degrees to improve her ability to perform functional ADL's.  11/5/2021 Met   5. Patient will improve her right shoulder active flexion range of motion to 150 degrees to improve her ability to perform functional ADL's.  11/5/2021 Met         Long Term Goals: 8 weeks Date Last Assessed Status   1. Patient will demonstrate improved score on the Roth Balance Assessment to 52/56 to demonstrate improved functional mobility, balance and decreased risk for falls.  11/23/2021 Met   2. Patient will demonstrate improved score on the Functional Gait Assessment to 27/30 to demonstrate improved balance, safety, and efficiency with community gait.  1/11/2022 Progressing   3. Patient will improve her strength in all major muscle groups of bilateral lower extremities by 1 grade to improve stability and endurance and gait.  1/11/2022 Progressing   4. Patient will improve her right shoulder active abduction range of motion to 145 degrees to improve her ability to perform functional ADL's. 11/23/2021 Met   5. Patient will improve her right shoulder active flexion range of motion to 170 degrees to improve her ability to perform functional ADL's. 11/23/2021 Met   6. Patient will tolerate 15 minutes on the SciFit/NuStep with post activity heart rate between 50-80% of age determined heart rate maximum to demonstrate improved cardiovascular endurance and activity tolerance.  1/11/2022 Met   7. Patient will improve her distance on the 2 minutes walk test from 433 feet to 540 feet to demonstrate improved endurance and activity tolerance.  1/11/2022 Met       PLAN   Updated Certification Period: 11/23/2021 to 2/4/2022  Recommended Treatment Plan: 2 times per week for 10 weeks:  Gait Training, Manual Therapy, Moist Heat/ Ice, Neuromuscular Re-ed, Orthotic Management and Training, Patient Education, Therapeutic  Activites and Therapeutic Exercise    Recommendations for future treatment sessions: lower extremity strength, endurance, and dynamic standing balance.       Ata Wolfe PT, DPT, C/NDT

## 2022-01-24 ENCOUNTER — TELEPHONE (OUTPATIENT)
Dept: NEUROSURGERY | Facility: CLINIC | Age: 52
End: 2022-01-24
Payer: OTHER GOVERNMENT

## 2022-01-24 ENCOUNTER — CLINICAL SUPPORT (OUTPATIENT)
Dept: REHABILITATION | Facility: HOSPITAL | Age: 52
End: 2022-01-24
Payer: OTHER GOVERNMENT

## 2022-01-24 DIAGNOSIS — M25.611 DECREASED RIGHT SHOULDER RANGE OF MOTION: ICD-10-CM

## 2022-01-24 DIAGNOSIS — R26.89 ABNORMALITY OF GAIT DUE TO IMPAIRMENT OF BALANCE: ICD-10-CM

## 2022-01-24 DIAGNOSIS — Z74.09 IMPAIRED MOBILITY AND ENDURANCE: ICD-10-CM

## 2022-01-24 DIAGNOSIS — R29.898 WEAKNESS OF BOTH LOWER EXTREMITIES: ICD-10-CM

## 2022-01-24 DIAGNOSIS — R41.841 COGNITIVE COMMUNICATION DISORDER: ICD-10-CM

## 2022-01-24 DIAGNOSIS — R68.89 IMPAIRED FUNCTION OF UPPER EXTREMITY: Primary | ICD-10-CM

## 2022-01-24 DIAGNOSIS — M79.601 RIGHT ARM PAIN: ICD-10-CM

## 2022-01-24 DIAGNOSIS — Z78.9 IMPAIRED INSTRUMENTAL ACTIVITIES OF DAILY LIVING (IADL): ICD-10-CM

## 2022-01-24 DIAGNOSIS — R68.89 DECREASED FUNCTIONAL ACTIVITY TOLERANCE: ICD-10-CM

## 2022-01-24 PROCEDURE — 97110 THERAPEUTIC EXERCISES: CPT | Mod: PN

## 2022-01-24 PROCEDURE — 97530 THERAPEUTIC ACTIVITIES: CPT | Mod: PN

## 2022-01-24 PROCEDURE — 97130 THER IVNTJ EA ADDL 15 MIN: CPT | Mod: PN

## 2022-01-24 PROCEDURE — 97140 MANUAL THERAPY 1/> REGIONS: CPT | Mod: PN

## 2022-01-24 PROCEDURE — 97129 THER IVNTJ 1ST 15 MIN: CPT | Mod: PN

## 2022-01-24 NOTE — PROGRESS NOTES
"[facility]  Occupational Therapy Plan of Care Note    Name: Jessy Mayberry  Clinic Number: [id number]    Therapy Diagnosis:   Encounter Diagnoses   Name Primary?    Impaired function of upper extremity Yes    Right arm pain     Impaired instrumental activities of daily living (IADL)     Decreased functional activity tolerance      Physician: Roger Nation MD; Michel Calderon MD    Visit Date: 1/24/2022    Physician Orders: OT eval & Treat  Medical Diagnosis from Referral: right epicondylitis, carpal tunnel syndrome, LF trigger finger, right thumb CMC pain, & status-post covid-19  Evaluation Date: 10/7/2021  Authorization Period Expiration: 1/23/2022  Previous Plan of Care Expiration: 12/3/2021  New Plan of Care Expiration: 4/22/2022  Progress Note Due: 2/25/2022   Visit # / Visits authorized: 1/ 1  FOTO: not assessed     Time In: 1420  Time Out: 1500  Total Time: 40 min    Precautions: Standard and Fall  Functional Level Prior to Evaluation:  Prior to Covid: independent except for occasionally dropping things after painting or other repetitive motions   Level of Function at time of Eval: independent with ADLs except not doing hair because of fatigue in arms, not doing "normal" makeup, dropping things, sensory deficits,    SUBJECTIVE     Update:  Jessy reports having an upper respiratory infection last week, but feeling better now. Slipped on new ashish yesterday and now has tightness in low back/ left side.     She reports her right trigger finger and elbow are "chronic", though the elbow is feeling better. Sleeping with brace so the trigger finger only popped 2-3 times in the past week or so. Right carpal tunnel is still an issue with pain at base of thumb.    She doesn't feel like her body is different (compared to evaluation), but she's learned to assess her energy levels & make choices so she can do the things she wants. Because she saved her spoons & had her daughter drive, she " was able to attend a [employer] ball this past weekend.     Concerns: Some days trying to hold a pen is difficult. She would like to work on coordination/ functional position of her hands. She is dropping things with her right hand because her arm gets tired. Would like to improve  so she doesn't drop stuff. Also, weakness, balance, endurance, cognition.     Social and ADL History:  Activities of Daily Living Checklist:   Key to Score:   0: Unable to do the activity  1: Can do the activity with great difficulty  2: Can do the activity with little difficulty   3: No problem with activity  N/A: This is not an activity I do  H/T: Have not tried yet  nt: not tested    Score 2022  (score on eval)  Personal Care:   Homemaking:      Dressing Upper Body:  3 (2)  Meal preparation: 2 (h/t)   Dressing Lower Body:  2 (2) Kitchen Cleanup: 2 (h/t)   Bathin (2) Childcare:  n/a (n/a)   Hair Care:  2 (1) Grocery shoppin (2)   Sleep:  2 (1)  Vacuuming/moppin (2)       Emptying trash/ garbage ba (1)   General Mobility:    Bed making changing: (complains of decreased strength/ / pinch for pulling sheets into place).  1 (1)    Sittin (nt) Laundry (carrying laundry basket up/down stairs is a fall risk; shaking out the clothes is just exhausting) 1 (0)   Bendin (nt)       Getting in/out of bed:  2 (nt) Home Maintenance:     Standing:  3 (nt) Mowing, raking:  n/a   Walking:  3 (nt) Gardening:  n/a   Twisting:  3 (nt) Home Repairs:  n/a   Liftin (nt) Painting:  n/a             Getting around:          Getting in and out of car:  2 (1)       Buckling up your seat belt:  3 (2)       Opening heavy doors:  2 (nt)       Climbing/descending stairs:  2 (nt)                   Pain: highest pain in the right arm/wrist/base of thumb is 2/10 in the past week.    OBJECTIVE     Therapeutic Activities x40 minutes    Update:  5x sit<>stand=19.16s    Hand Strength (CORIE Dynamometer, Setting II)   (lbs)  Right Left   1 40 54   2 52 59   3 53 62   avg 48.3 58.3   [norms for women aged 45-49: R=62.2 +/-15.1; L=56.0 +/-12.7 (Carlotta et al, 1985)]    Pinch Right Left   Lateral 11 11   Tip (2 point) 3 9   3 jaw meng 9 13     Fine motor coordination:   9 Hole Peg Test (9HPT): (R) 23.04s (L) 26.88s  [norms for women aged 46-50: R=17.36 +/-2.01s; L=17.96s +/-2.30s (Bella et al, 2003)]    ASSESSMENT     Update: Jessy hasn't been seen since November 23, 2021 due to additional medical concerns, including a pineal gland cyst, about which she has been consulting neurosurgery. At this point, Jessy feels like she has not made significant progress in her overall energy levels, but that she is managing her energy better and able to better prioritize the activities in which she participates. She does continue to have orthopedic symptoms related to her carpal tunnel and trigger finger, but states the epicondylitis symptoms have improved. At this point, Jessy can continue to benefit from occupational therapy to further manage her carpal tunnel symptoms, provide proximal strengthening and continue with progression of activity tolerance and activity management.     Previous Goals Status:     Short Term Goals:  4 weeks  1. Pt will demonstrate correct use of breathing techniques (Diaphragmatic breathing & Pursed- Lip Breathing) to promote muscles of inspiration and expiration and to increase lung capacity. (met for PLBs on 10/28/2021; MET for diaphragmatic breating 11/5/2021)   2. Pt will demonstrate independence with Breath of Frida to properly promote accessory muscles of inspiration (progressing 11/23/2021)   3. Pt will demonstrate independence with implementing activity pacing and energy banking techniques (Met per pt report with examples as noted above 1/25/2022)  4. Pt will demonstrate understanding and independence with dietary and inflammation changes to promote health and nutrition (progressing 11/23/2021)   5. Pt will report  "understanding of mindfulness & importance of stress reduction (progressing 11/23/2021)   6. Pt will be independent with initial HEP for CTS, epicondylitis.  (progressing 11/23/2021)   7. Pt will be independent with orthotic wear and care at night (and during day if necessary) for trigger finger. (MET per pt report 11/5/2021)   8. Anxiety and Stress scores on the DASS21 will improve by one grade (ex: severe->moderate) (MET 11/5/2021)      Long Term Goals: 12 weeks  1. Pt. will demonstrate 5x sit to stand in 12 seconds or less to ambulate safely in home and in community and for patient to be in low risk for falls category (progressing 11/23/2021)   2. Pt will demonstrate use of mindfulness, stress management, and coping strategies for improved participation in daily routine. (progressing 11/23/2021)   3. Pt will report pain no worse than 3/10 throughout the right upper extremity to increase functional use of the arm. (progressing 11/23/2021)   4. Pt will improve DASS21 stress & anxiety scores by one grade compared to eval. (progressing 11/23/2021)   5. Pt will be able to complete hair care with "little difficulty" or better on the above chart of ADL-related activities. (MET 1/25/2022)   6. Pt will be able to get in/out of the car with "little difficulty" or better on the above chart of ADL-related activities. (MET 11/5/2021)   7. Right  to improve by at least 8#. (progressing 11/23/2021)   8. Right pinch to improve by at least 2.5#. (progressing 11/23/2021)   Coordination to be assessed and goals added as appropriate.    New Short Term Goals Status:     4 weeks:  Jessy will trial different pens to assess best ability to hold pen and write without dropping.  Jessy will trial different cups to assess best option for being able to use cup/mug without dropping.    Long Term Goal Status: continue per initial plan of care and add goals as below  Pt will be able to make/ change the bed with "little difficulty" or " "better on the above chart of ADL-related activities.  Pt will be able to do laundry with "little difficulty" or better on the above chart of ADL-related activities with modifications as necessary for safety concerns related to transporting laundry up/down the stairs.   Jessy will demonstrate improved FMC, bilateral upper extremity, as evidenced by a 9-hole peg test time improvement of 3s or better.   Additional functional goals to be added as pt progresses and per her priorities.    Reasons for Recertification of Therapy: Jessy has made progress with occupational therapy, meeting 4/8 ST goals and 2/8 LT goals. Progress was limited by decreased ability to participate towards end of previous POC due to additional medical concerns. However, Jessy states she is now ready to return to therapy. 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     PLAN     Updated Certification Period: 1/24/2022 to 4/22/2022   Recommended Treatment Plan: 1-2 times per week for 12 weeks (for a total of 15 visits):  Manual Therapy, Moist Heat/ Ice, Neuromuscular Re-ed, Patient Education, Self Care, Therapeutic Activities and Therapeutic Exercise    Mary Beth Wakefield OT    I CERTIFY THE NEED FOR THESE SERVICES FURNISHED UNDER THIS PLAN OF TREATMENT AND WHILE UNDER MY CARE  Physician's comments:      Physician's Signature: ___________________________________________________            "

## 2022-01-24 NOTE — PROGRESS NOTES
[facility]   Physical Therapy Treatment Note     Name: Jessy Mayberry  Clinic Number: [id number]    Therapy Diagnosis:   Encounter Diagnoses   Name Primary?    Weakness of both lower extremities     Abnormality of gait due to impairment of balance     Impaired mobility and endurance     Decreased right shoulder range of motion      Physician: Roger Nation MD    Visit Date: 1/25/2022    Physician Orders: PT Eval and Treat   Medical Diagnosis from Referral: Neuropathy [G62.9]  Evaluation Date: 9/28/2021; Updated Plan of Care 11/23/2021  Authorization Period Expiration: 1/20/2022  Plan of Care Expiration: 2/4/2022  Progress Note Due: 12/23/2021   Visit # / Visits authorized: 3/8 (16 episode visits)     PTA Visit #: 0/5     Time In: 1000  Time Out: 1040  Total Billable Time: 40 minutes    SUBJECTIVE     Pt reports: Her back is feeling a little better and she is feeling stronger today than she was yesterday.   She was compliant with home exercise program.  Response to previous treatment: no reported complaints with regards to previous treatment session.   Functional change: ongoing    Pain: 4/10  Location: low back, left side    OBJECTIVE     Objective Measures updated at progress report unless specified.     Treatment     Jessy received the treatments listed below:      therapeutic exercises to develop strength, endurance and flexibility for 20 minutes including:  - Treadmill walking x 15 minutes at self selected speed of 2.0 mph  - Supine double knee to chest stretch 3 x 1 minute.     neuromuscular re-education activities to improve: Balance, Coordination and Proprioception for 20 minutes. The following activities were included:  - Heel raise/toe raise on airex foam cushion without shoes on x 30 repetitions.   - Left/right weight shift on airex foam cushion wihtout shoes on x 30 repetitions.   - Standing on airex foam cushion with feet together, cross body punch holding 2# weight with  trunk rotations 3 x 10 bilaterally.   - Tandem stance 2 x 30 seconds with each foot in front without upper extremity support.     Patient Education and Home Exercises     Home Exercises Provided and Patient Education Provided     Education provided:   - Patient provided with verbal and demonstrative instruction for all activities performed in today's session.      Written Home Exercises Provided: Patient instructed to cont prior HEP. Exercises were reviewed and Jessy was able to demonstrate them prior to the end of the session.  Jessy demonstrated good  understanding of the education provided. See EMR under Patient Instructions for exercises provided during therapy sessions    ASSESSMENT   Jessy provided good effort and participation toward therapeutic interventions today with focus on endurance and dynamic standing balance. Jessy tolerated session today with decreased pain reports as compared to yesterday. She was able to participate in dynamic standing balance activities and demonstrated minimal loss of balance.     Jessy is progressing well towards her goals.   Pt prognosis is Good.     Pt will continue to benefit from skilled outpatient physical therapy to address the deficits listed in the problem list box on initial evaluation, provide pt/family education and to maximize pt's level of independence in the home and community environment.     Pt's spiritual, cultural and educational needs considered and pt agreeable to plan of care and goals.     Anticipated barriers to physical therapy: co-morbidities    Goals:  Short Term Goals: 4 weeks  Date Last Assessed Status   1. Patient will be provided with a comprehensive home exercise program in order to continue with strength, balance, and endurance training at home.  11/23/2021 Met   2. Patient will participate in assessment of the 2 Minute Walk Test to further assess her endurance and tolerance to community ambulation.  9/28/2021 Modified/Met   3. Patient will  improve her strength in all major muscle groups of bilateral lower extremities by 1/2 grade to improve stability and endurance and gait.  11/5/2021 Met   4. Patient will improve her right shoulder active abduction range of motion to 115 degrees to improve her ability to perform functional ADL's.  11/5/2021 Met   5. Patient will improve her right shoulder active flexion range of motion to 150 degrees to improve her ability to perform functional ADL's.  11/5/2021 Met         Long Term Goals: 8 weeks Date Last Assessed Status   1. Patient will demonstrate improved score on the Roth Balance Assessment to 52/56 to demonstrate improved functional mobility, balance and decreased risk for falls.  11/23/2021 Met   2. Patient will demonstrate improved score on the Functional Gait Assessment to 27/30 to demonstrate improved balance, safety, and efficiency with community gait.  1/11/2022 Progressing   3. Patient will improve her strength in all major muscle groups of bilateral lower extremities by 1 grade to improve stability and endurance and gait.  1/11/2022 Progressing   4. Patient will improve her right shoulder active abduction range of motion to 145 degrees to improve her ability to perform functional ADL's. 11/23/2021 Met   5. Patient will improve her right shoulder active flexion range of motion to 170 degrees to improve her ability to perform functional ADL's. 11/23/2021 Met   6. Patient will tolerate 15 minutes on the SciFit/NuStep with post activity heart rate between 50-80% of age determined heart rate maximum to demonstrate improved cardiovascular endurance and activity tolerance.  1/11/2022 Met   7. Patient will improve her distance on the 2 minutes walk test from 433 feet to 540 feet to demonstrate improved endurance and activity tolerance.  1/11/2022 Met       PLAN   Updated Certification Period: 11/23/2021 to 2/4/2022  Recommended Treatment Plan: 2 times per week for 10 weeks:  Gait Training, Manual Therapy,  Moist Heat/ Ice, Neuromuscular Re-ed, Orthotic Management and Training, Patient Education, Therapeutic Activites and Therapeutic Exercise    Recommendations for future treatment sessions: lower extremity strength, endurance, and dynamic standing balance.       Ata Wolfe PT, DPT, C/NDT

## 2022-01-25 ENCOUNTER — CLINICAL SUPPORT (OUTPATIENT)
Dept: REHABILITATION | Facility: HOSPITAL | Age: 52
End: 2022-01-25
Payer: OTHER GOVERNMENT

## 2022-01-25 DIAGNOSIS — R29.898 WEAKNESS OF BOTH LOWER EXTREMITIES: ICD-10-CM

## 2022-01-25 DIAGNOSIS — Z74.09 IMPAIRED MOBILITY AND ENDURANCE: ICD-10-CM

## 2022-01-25 DIAGNOSIS — R26.89 ABNORMALITY OF GAIT DUE TO IMPAIRMENT OF BALANCE: ICD-10-CM

## 2022-01-25 DIAGNOSIS — M25.611 DECREASED RIGHT SHOULDER RANGE OF MOTION: ICD-10-CM

## 2022-01-25 PROCEDURE — 97110 THERAPEUTIC EXERCISES: CPT | Mod: PN

## 2022-01-25 PROCEDURE — 97112 NEUROMUSCULAR REEDUCATION: CPT | Mod: PN

## 2022-01-26 NOTE — PROGRESS NOTES
"[facility]  Speech Therapy Treatment Note- Neurological Rehabilitation    Name: Jessy Mayberry   MRN: [id number]   Therapy Diagnosis:   Encounter Diagnosis   Name Primary?    Cognitive communication disorder    Physician: Roger Nation MD  Physician Orders: Ambulatory Referral to Speech Therapy   Medical Diagnosis: History of COVID-19    Visit #/ Visits Authorized: 2/12 ( with 10 prior to this authorization)  Date of Evaluation:  10/18/2021  Insurance Authorization Period: 1/1/2022 to 6/15/2022  Plan of Care Expiration Date:    12/10/2021, 2/2/2022- See Plan of Care update   Extended POC:  n/a   Progress Note: due 12/18/2021- see Plan of Care update  Visits Cancelled: 3- due to medical appointments   Visits No Show: 0    Time In:  1500  Time Out:  1540  Total Billable Time: 40 minutes    Precautions: Standard  Subjective:   Pt reports: She returns today following a hold on therapy due to medical change and follow up MD appointments with endo, neurology, neurosurgery, Primary Care Physician (PCP), and neurophthalmology    She was complaint with given HEP.   Response to previous treatment: tolerated well  Pain Scale: 0/10 on VAS currently.   Pain Location: no pain indicated throughout session   Objective:   TIMED  Procedure Min.   Cognitive Therapeutic Interventions, first 15 minutes CPT 67289  15   Cognitive Therapeutic Interventions, each additional 15 minutes CPT 45120  25     Total Timed Units: 3  Total Untimed Units: 0  Charges Billed/# of units: 3    Short Term Goals: (6 weeks) Current Progress:   1. Patient will identify appropriate fatigue level and provide subcategories of fatigue (physical, emotional, cognitive) for 3-4 daily activities with minimal clinician cueing.      Progressing/ Not Met 1/24/2022   Patient able to independently defferentiate between subtypes of fatigue with physical and emotional fatigue.     She appears and reports today with better "spoon" management and functional " strategies to aid in life participation better such as planning for fun events with friends, tasks around the house, etc    Significant fatigue and cognitive overwhelm continued to be noted with cognitive based tasks.    Met x2   2. Patient will complete a simple task to improve attention and memory (I.e. sample bill paying activity, recipe, or multiple choice comprehension questions to 1 paragraphs) with 80% accuracy and natural environment noise distractions (TV news background, music, etc.).     Progressing/ Not Met 2022   Not addressed in today's session.    3. Patient will utilize various strategies such as efficient visual scanning and organization to facilitate completion of a task in a dynamic environment (various sensory stimuli) with minimal assistance for use of strategies.     Progressing/ Not Met 2022   Not addressed in today's session.       Met x1   4. Patient will independently generate strategies to improve ability to complete tasks with enhanced accuracy and time, based on review of objective previous performance on trials of functional tasks with 80% accuracy.      Progressing/ Not Met 2022   Goals for therapy discussed at length and benefits of therapy weighed. Patient believes therapy is beneficial for her; however, she wonders if the current level of functioning/strategy need is her new standard baseline.   5. Patient will utilize Goal-Plan-Action-Review with 90% accuracy independently.     Progressing/ Not Met 2022   Not addressed in today's session.    6. Patient will participate in high level problem solving tasks with 90% accuracy independently and a rating of no more than 3 on the relative scale of cognitive load.     Progressing/ Not Met 2022   Not addressed in today's session.         Patient Education/Response:   Patient educated regardin. Plan of Care updated and decisions regarding therapy- see associated Plan of Care update    Patient verbalized  understanding to all education provided.    Home program established: yes-Pt to complete spoon theory tracking   Exercises were reviewed and Jessy was able to demonstrate them prior to the end of the session.  Jessy demonstrated good  understanding of the education provided.     See EMR under Patient Instructions for exercises provided throughout therapy.  Assessment:   Jessy is progressing well towards her goals. See updated Plan of Care. Current goals remain appropriate. Goals to be updated as necessary.     Pt prognosis is Good. Pt will continue to benefit from skilled outpatient speech and language therapy to address the deficits listed in the problem list on initial evaluation, provide patient/family education and to maximize patient's level of independence in the home and community environment.   Medical necessity is demonstrated by the following IMPAIRMENTS:  Deficits in executive functioning and memory prevent the pt from returning to work, and place her at risk of unsafe behavior and a decline in quality of life.  Barriers to Therapy: none identified  Pt's spiritual, cultural and educational needs considered and pt agreeable to plan of care and goals.  Plan:   Continue POC with focus on addressing cognitive communication changes post russell Levine CCC-SLP     1/24/2022

## 2022-01-26 NOTE — PLAN OF CARE
[facility]  Speech Therapy Updated Plan of Care-Neurological Rehabilitation and Post-COVID Recovery         Date: 1/24/2022   Name: Jessy Mayberry  Clinic Number: [id number]    Therapy Diagnosis:   Encounter Diagnosis   Name Primary?    Cognitive communication disorder      Physician: Roger Nation MD    Physician Orders: Ambulatory Referral to Speech Therapy   Medical Diagnosis: History of COVID-19    Visit #/ Visits Authorized:  2/12 (with 10 prior to this authorization)  Evaluation Date: 10/18/2021  Insurance Authorization Period: 1/1/2022 to 6/15/2022  Plan of Care Expiration:    2/4/2022  New POC Certification Period:  4/22/2022    Total Visits Received: 11    Precautions:Standard     Subjective     Update: Patient was placed on hold from Speech Therapy services due to the need for multiple medical appointment follows following diagnosis of pineal gland cyst. She returns today for Plan of Care update and reassessment of goals.    Patient reports progress with better use of spoon theory and applications at home and physical improvement have been noted by the patient. However, it should be noted patient continues to present with deficits in fatigue management, cognitive fatigue, overwhelm with cognitive based tasks, etc. Apathy was noted regarding these tasks today as patient is able to plan for these tasks; however, the overwhelm and the anticipation of cognitive fatigue (and the resulting cognitive fatigue) from 30 minutes with a cognitive based task is difficult. Patient would likely be unable to cognitively handle returning to work.     Objective     Update: see follow up note dated 1/24/2022    Assessment     Update: Jessy presents to [facility] s/p medical diagnosis of history of COVID-19.  Demonstrates impairments including limitations as described in the problem list. She presents with mild cognitive communication disorder characterized by deficits in Executive  Functions. Positive prognostic factors include time since onset and prior level of function. Negative prognostic factors include multiple long hauler symptoms. No barriers to therapy identified. Patient will benefit from skilled, outpatient neurological rehabilitation speech therapy.    Rehab Potential: good   Pt's spiritual, cultural, and educational needs considered and patient agreeable to plan of care and goals.    Education: Plan of Care, role of SLP in care, memory strategies, attention shifting strategies, scheduling/ cancellation policy and problem solving strategies have been discussed with the patient. Patient verbalized understanding to all education provided    Previous Short Term Goals Status:   Short Term Goals: (6 weeks) Current Progress:   1. Patient will identify appropriate fatigue level and provide subcategories of fatigue (physical, emotional, cognitive) for 3-4 daily activities with minimal clinician cueing.   Progressing/Not Met   Met x2   2. Patient will complete a simple task to improve attention and memory (I.e. sample bill paying activity, recipe, or multiple choice comprehension questions to 1 paragraphs) with 80% accuracy and natural environment noise distractions (TV news background, music, etc.).  Progressing/Not Met     3. Patient will utilize various strategies such as efficient visual scanning and organization to facilitate completion of a task in a dynamic environment (various sensory stimuli) with minimal assistance for use of strategies.  Progressing/Not Met      Met x1   4. Patient will independently generate strategies to improve ability to complete tasks with enhanced accuracy and time, based on review of objective previous performance on trials of functional tasks with 80% accuracy.   Progressing/Not Met     5. Patient will utilize Goal-Plan-Action-Review with 90% accuracy independently.  Progressing/Not Met    6. Patient will participate in high level problem solving tasks  with 90% accuracy independently and a rating of no more than 3 on the relative scale of cognitive load.  Progressing/Not Met            New Short Term Goals: 10 weeks  Continue all previously unmet goals     Long Term Goal Status:  8 weeks  1. Patient will increase attention and processing skills with use of appropriate and functional independent strategies.   2. Patient will execute daily activities (work, home, etc) with independent use of strategies.    Goals Previously Met:  1. Patient will report fatigue level for 3-4 daily activities and demonstrate and understanding of the rationale of using spoon theory with minimal cueing.     Reasons for Recertification of Therapy: Patient  continues to display deficits which warrant skilled ST services and good consistent progress is noted in therapy     Plan     Updated Certification Period: 1/24/2022 to 4/22/2022    Recommended Treatment Plan: Patient will participate in the [facility] rehabilitation program for speech therapy 1 times per week to address her Cognition deficits, to educate patient and their family, and to participate in a home exercise program.        Therapist's Name:  Mariza Levine CCC-SLP   1/24/2022      I CERTIFY THE NEED FOR THESE SERVICES FURNISHED UNDER THIS PLAN OF TREATMENT AND WHILE UNDER MY CARE      Physician Name: _______________________________    Physician Signature: ____________________________

## 2022-01-28 NOTE — PLAN OF CARE
"[facility]  Occupational Therapy Plan of Care Note    Name: Jessy Mayberry  Clinic Number: [id number]    Therapy Diagnosis:   Encounter Diagnoses   Name Primary?    Impaired function of upper extremity Yes    Right arm pain     Impaired instrumental activities of daily living (IADL)     Decreased functional activity tolerance      Physician: Roger Nation MD; Michel Calderon MD    Visit Date: 1/24/2022    Physician Orders: OT eval & Treat  Medical Diagnosis from Referral: right epicondylitis, carpal tunnel syndrome, LF trigger finger, right thumb CMC pain, & status-post covid-19  Evaluation Date: 10/7/2021  Authorization Period Expiration: 1/23/2022  Previous Plan of Care Expiration: 12/3/2021  New Plan of Care Expiration: 4/22/2022  Progress Note Due: 2/25/2022   Visit # / Visits authorized: 1/ 1  FOTO: not assessed     Time In: 1420  Time Out: 1500  Total Time: 40 min    Precautions: Standard and Fall  Functional Level Prior to Evaluation:  Prior to Covid: independent except for occasionally dropping things after painting or other repetitive motions   Level of Function at time of Eval: independent with ADLs except not doing hair because of fatigue in arms, not doing "normal" makeup, dropping things, sensory deficits,    SUBJECTIVE     Update:  Jessy reports having an upper respiratory infection last week, but feeling better now. Slipped on new ashish yesterday and now has tightness in low back/ left side.     She reports her right trigger finger and elbow are "chronic", though the elbow is feeling better. Sleeping with brace so the trigger finger only popped 2-3 times in the past week or so. Right carpal tunnel is still an issue with pain at base of thumb.    She doesn't feel like her body is different (compared to evaluation), but she's learned to assess her energy levels & make choices so she can do the things she wants. Because she saved her spoons & had her daughter drive, she " was able to attend a [employer] ball this past weekend.     Concerns: Some days trying to hold a pen is difficult. She would like to work on coordination/ functional position of her hands. She is dropping things with her right hand because her arm gets tired. Would like to improve  so she doesn't drop stuff. Also, weakness, balance, endurance, cognition.     Social and ADL History:  Activities of Daily Living Checklist:   Key to Score:   0: Unable to do the activity  1: Can do the activity with great difficulty  2: Can do the activity with little difficulty   3: No problem with activity  N/A: This is not an activity I do  H/T: Have not tried yet  nt: not tested    Score 2022  (score on eval)  Personal Care:   Homemaking:      Dressing Upper Body:  3 (2)  Meal preparation: 2 (h/t)   Dressing Lower Body:  2 (2) Kitchen Cleanup: 2 (h/t)   Bathin (2) Childcare:  n/a (n/a)   Hair Care:  2 (1) Grocery shoppin (2)   Sleep:  2 (1)  Vacuuming/moppin (2)       Emptying trash/ garbage ba (1)   General Mobility:    Bed making changing: (complains of decreased strength/ / pinch for pulling sheets into place).  1 (1)    Sittin (nt) Laundry (carrying laundry basket up/down stairs is a fall risk; shaking out the clothes is just exhausting) 1 (0)   Bendin (nt)       Getting in/out of bed:  2 (nt) Home Maintenance:     Standing:  3 (nt) Mowing, raking:  n/a   Walking:  3 (nt) Gardening:  n/a   Twisting:  3 (nt) Home Repairs:  n/a   Liftin (nt) Painting:  n/a             Getting around:          Getting in and out of car:  2 (1)       Buckling up your seat belt:  3 (2)       Opening heavy doors:  2 (nt)       Climbing/descending stairs:  2 (nt)                   Pain: highest pain in the right arm/wrist/base of thumb is 2/10 in the past week.    OBJECTIVE     Therapeutic Activities x40 minutes    Update:  5x sit<>stand=19.16s    Hand Strength (CORIE Dynamometer, Setting II)   (lbs)  Right Left   1 40 54   2 52 59   3 53 62   avg 48.3 58.3   [norms for women aged 45-49: R=62.2 +/-15.1; L=56.0 +/-12.7 (Carlotta et al, 1985)]    Pinch Right Left   Lateral 11 11   Tip (2 point) 3 9   3 jaw meng 9 13     Fine motor coordination:   9 Hole Peg Test (9HPT): (R) 23.04s (L) 26.88s  [norms for women aged 46-50: R=17.36 +/-2.01s; L=17.96s +/-2.30s (Bella et al, 2003)]    ASSESSMENT     Update: Jessy hasn't been seen since November 23, 2021 due to additional medical concerns, including a pineal gland cyst, about which she has been consulting neurosurgery. At this point, Jessy feels like she has not made significant progress in her overall energy levels, but that she is managing her energy better and able to better prioritize the activities in which she participates. She does continue to have orthopedic symptoms related to her carpal tunnel and trigger finger, but states the epicondylitis symptoms have improved. At this point, Jessy can continue to benefit from occupational therapy to further manage her carpal tunnel symptoms, provide proximal strengthening and continue with progression of activity tolerance and activity management.     Previous Goals Status:     Short Term Goals:  4 weeks  1. Pt will demonstrate correct use of breathing techniques (Diaphragmatic breathing & Pursed- Lip Breathing) to promote muscles of inspiration and expiration and to increase lung capacity. (met for PLBs on 10/28/2021; MET for diaphragmatic breating 11/5/2021)   2. Pt will demonstrate independence with Breath of Frida to properly promote accessory muscles of inspiration (progressing 11/23/2021)   3. Pt will demonstrate independence with implementing activity pacing and energy banking techniques (Met per pt report with examples as noted above 1/25/2022)  4. Pt will demonstrate understanding and independence with dietary and inflammation changes to promote health and nutrition (progressing 11/23/2021)   5. Pt will report  "understanding of mindfulness & importance of stress reduction (progressing 11/23/2021)   6. Pt will be independent with initial HEP for CTS, epicondylitis.  (progressing 11/23/2021)   7. Pt will be independent with orthotic wear and care at night (and during day if necessary) for trigger finger. (MET per pt report 11/5/2021)   8. Anxiety and Stress scores on the DASS21 will improve by one grade (ex: severe->moderate) (MET 11/5/2021)      Long Term Goals: 12 weeks  1. Pt. will demonstrate 5x sit to stand in 12 seconds or less to ambulate safely in home and in community and for patient to be in low risk for falls category (progressing 11/23/2021)   2. Pt will demonstrate use of mindfulness, stress management, and coping strategies for improved participation in daily routine. (progressing 11/23/2021)   3. Pt will report pain no worse than 3/10 throughout the right upper extremity to increase functional use of the arm. (progressing 11/23/2021)   4. Pt will improve DASS21 stress & anxiety scores by one grade compared to eval. (progressing 11/23/2021)   5. Pt will be able to complete hair care with "little difficulty" or better on the above chart of ADL-related activities. (MET 1/25/2022)   6. Pt will be able to get in/out of the car with "little difficulty" or better on the above chart of ADL-related activities. (MET 11/5/2021)   7. Right  to improve by at least 8#. (progressing 11/23/2021)   8. Right pinch to improve by at least 2.5#. (progressing 11/23/2021)   Coordination to be assessed and goals added as appropriate.    New Short Term Goals Status:     4 weeks:  Jessy will trial different pens to assess best ability to hold pen and write without dropping.  Jessy will trial different cups to assess best option for being able to use cup/mug without dropping.    Long Term Goal Status: continue per initial plan of care and add goals as below  Pt will be able to make/ change the bed with "little difficulty" or " "better on the above chart of ADL-related activities.  Pt will be able to do laundry with "little difficulty" or better on the above chart of ADL-related activities with modifications as necessary for safety concerns related to transporting laundry up/down the stairs.   Jessy will demonstrate improved FMC, bilateral upper extremity, as evidenced by a 9-hole peg test time improvement of 3s or better.   Additional functional goals to be added as pt progresses and per her priorities.    Reasons for Recertification of Therapy: Jessy has made progress with occupational therapy, meeting 4/8 ST goals and 2/8 LT goals. Progress was limited by decreased ability to participate towards end of previous POC due to additional medical concerns. However, Jessy states she is now ready to return to therapy. 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     PLAN     Updated Certification Period: 1/24/2022 to 4/22/2022   Recommended Treatment Plan: 1-2 times per week for 12 weeks (for a total of 15 visits):  Manual Therapy, Moist Heat/ Ice, Neuromuscular Re-ed, Patient Education, Self Care, Therapeutic Activities and Therapeutic Exercise    Mary Beth Wakefield OT    I CERTIFY THE NEED FOR THESE SERVICES FURNISHED UNDER THIS PLAN OF TREATMENT AND WHILE UNDER MY CARE  Physician's comments:      Physician's Signature: ___________________________________________________              "

## 2022-02-02 ENCOUNTER — DOCUMENTATION ONLY (OUTPATIENT)
Dept: REHABILITATION | Facility: HOSPITAL | Age: 52
End: 2022-02-02
Payer: OTHER GOVERNMENT

## 2022-02-02 NOTE — PROGRESS NOTES
PT/PTA met face to face to discuss pt's treatment plan and progress towards established goals. Pt will be seen by a physical therapist minimally every 6th visit or every 30 days.    Ata Wolfe PT, DPT

## 2022-02-03 ENCOUNTER — CLINICAL SUPPORT (OUTPATIENT)
Dept: REHABILITATION | Facility: HOSPITAL | Age: 52
End: 2022-02-03
Payer: OTHER GOVERNMENT

## 2022-02-03 DIAGNOSIS — R29.898 WEAKNESS OF BOTH LOWER EXTREMITIES: ICD-10-CM

## 2022-02-03 DIAGNOSIS — R26.89 ABNORMALITY OF GAIT DUE TO IMPAIRMENT OF BALANCE: ICD-10-CM

## 2022-02-03 DIAGNOSIS — M25.611 DECREASED RIGHT SHOULDER RANGE OF MOTION: ICD-10-CM

## 2022-02-03 DIAGNOSIS — Z74.09 IMPAIRED MOBILITY AND ENDURANCE: ICD-10-CM

## 2022-02-03 PROCEDURE — 97530 THERAPEUTIC ACTIVITIES: CPT | Mod: PN

## 2022-02-03 NOTE — PROGRESS NOTES
Outpatient Therapy Discharge Summary     Name: Jessy Mayberry  Clinic Number: [id number]    Therapy Diagnosis:   Encounter Diagnoses   Name Primary?    Weakness of both lower extremities     Abnormality of gait due to impairment of balance     Impaired mobility and endurance     Decreased right shoulder range of motion      Physician: Roger Nation MD    Physician Orders: PT Eval and Treat   Medical Diagnosis from Referral: Neuropathy [G62.9]  Evaluation Date: 9/28/2021; Updated Plan of Care 11/23/2021  Current Certification Period:  11/23/2021 to 2/4/2022  Authorization Period Expiration: 3/20/2022  Plan of Care Expiration: 2/4/2022    Date of Last visit: 2/3/2022  Total Visits Received: 17    Assessment    Jessy is appropriate for discharge at this time. Pt has met 5/5 STG and 7/7 LTG. Pt has progressed in her balance, strength, and endurance with increased activity tolerance. Patient is agreeable to discharge at this time to focus more with speech therapy and her COVID related brain fog/cognitive symptoms that manifest into fatigue. Patient is aware of how to return to physical therapy if needed in the future.     Goals:  Short Term Goals: 4 weeks  Date Last Assessed Status   1. Patient will be provided with a comprehensive home exercise program in order to continue with strength, balance, and endurance training at home.  11/23/2021 Met   2. Patient will participate in assessment of the 2 Minute Walk Test to further assess her endurance and tolerance to community ambulation.  9/28/2021 Modified/Met   3. Patient will improve her strength in all major muscle groups of bilateral lower extremities by 1/2 grade to improve stability and endurance and gait.  11/5/2021 Met   4. Patient will improve her right shoulder active abduction range of motion to 115 degrees to improve her ability to perform functional ADL's.  11/5/2021 Met   5. Patient will improve her right shoulder active flexion range of motion to 150  degrees to improve her ability to perform functional ADL's.  11/5/2021 Met      Long Term Goals: 8 weeks Date Last Assessed Status   1. Patient will demonstrate improved score on the Roth Balance Assessment to 52/56 to demonstrate improved functional mobility, balance and decreased risk for falls.  11/23/2021 Met   2. Patient will demonstrate improved score on the Functional Gait Assessment to 27/30 to demonstrate improved balance, safety, and efficiency with community gait.  2/3/2022 Met   3. Patient will improve her strength in all major muscle groups of bilateral lower extremities by 1 grade to improve stability and endurance and gait.  2/3/2022 Partially met   4. Patient will improve her right shoulder active abduction range of motion to 145 degrees to improve her ability to perform functional ADL's. 11/23/2021 Met   5. Patient will improve her right shoulder active flexion range of motion to 170 degrees to improve her ability to perform functional ADL's. 11/23/2021 Met   6. Patient will tolerate 15 minutes on the SciFit/NuStep with post activity heart rate between 50-80% of age determined heart rate maximum to demonstrate improved cardiovascular endurance and activity tolerance.  1/11/2022 Met   7. Patient will improve her distance on the 2 minutes walk test from 433 feet to 540 feet to demonstrate improved endurance and activity tolerance.  1/11/2022 Met      Discharge reason: Patient has met all of her goals and Patient has reached the maximum rehab potential for the present time    Plan   Pt is appropriate for discharge from PT with [facility] at this time.    Ata Wolfe PT, DPT, C/NDT

## 2022-02-03 NOTE — PROGRESS NOTES
[facility]  Physical Therapy Plan of Care Note    Name: Jessy Mayberry  Clinic Number: [id number]    Therapy Diagnosis:   Encounter Diagnoses   Name Primary?    Weakness of both lower extremities     Abnormality of gait due to impairment of balance     Impaired mobility and endurance     Decreased right shoulder range of motion      Physician: Roger Nation MD    Visit Date: 2/3/2022    Physician Orders: PT Eval and Treat   Medical Diagnosis from Referral: Neuropathy [G62.9]  Evaluation Date: 2/3/2022  Authorization Period Expiration: 3/20/2022   Plan of Care Expiration: N/A discharge  Progress Note Due: N/A  Visit # / Visits authorized: 4/ 8 (17 episode visits)      Precautions: Standard  Functional Level Prior to Evaluation:  Independent prior to COVID diagnosis    Time In: 1007  Time Out: 1047  Total Billable Time: 40 minutes    SUBJECTIVE     Update: She is tired today. She went to the city on Tuesday and went to 3 different counter top stores. The cognitive load of this was a lot for her and she made it home but felt like she was too tired to drive home. She had to pull over for about an hour before she felt okay to drive the rest of the way. Patient states she does not know how much more progress she can make with PT secondary to her fatigue being a result of her cognitive load/brain fog. Her physical state varies from day to day depending on what she has to do that day or in days previous.     OBJECTIVE     Update:   Strength:   Muscle Group Right Lower Extremity Right Lower Extremity  11/23/2021 Left Lower Extremity Left Lower Extremity  11/23/2021   Hip Flexion 4/5 3+/5 4+/5 4/5   Hip Extension  4/5 4/5 4+/5 4/5   Hip Abduction 4/5 3+/5 3+/5 3+/5   Knee Flexion 4+/5 4/5 4+/5 4/5   Knee Extension 4+/5 3+/5 4/5 4/5   Dorsiflexion 4/5 3+/5 4/5 4/5   Plantarflexion 4/5 3+/5 4/5 4/5     Functional Gait Assessment:   1. Gait on level surface =  3   (3) Normal: less than 5.5 sec,  no A.D., no imbalance, normal gait pattern, deviates< 6in   (2) Mild impairment: 7-5.6 sec, uses A.D., mild gait deviations, or deviates 6-10 in   (1) Moderate impairment: > 7 sec, slow speed, imbalance, deviates 10-15 in.   (0) Severe impairment: needs assist, deviates >15 in, reach/touch wall  2. Change in Gait Speed = 3   (3) Normal: smooth change w/o loss of balance or gait deviation, deviates < 6 in, significant difference between speeds   (2) Mild impairment: changes speed, but demonstrates mild gait deviations, deviates 6-10 in, OR no deviations but unable to significantly speed, OR uses A.D.   (1) Moderate impairment: minor changes to speed, OR changes speed w/ significant deviations, deviates 10-15 in, OR  Changes speed , but loses balance & recovers   (0) Severe impairment: cannot change speed, deviates >15 in, or loses balance & needs assist  3. Gait with horizontal head turns  = 3   (3) Normal: no change in gait, deviates <6 in   (2) Mild impairment: slight change in speed, deviates 6-10 in, OR uses A.D.   (1) Moderate impairment: moderate change in speed, deviates 10-15 in   (0) Severe impairment: severe disruption of gait, deviates >15in  4. Gait with vertical head turns = 3   (3) Normal: no change in gait, deviates <6 in   (2) Mild impairment: slight change in speed, deviates 6-10 in OR uses A.D.   (1) Moderate impairment: moderate change in speed, deviates 10-15 in   (0) Severe impairment: severe disruption of gait, deviates >15 in  5. Gait with pivot turns = 3   (3) Normal: performs safely in 3 sec, no LOB   (2) Mild impairment: performs in >3 sec & no LOB, OR turns safely & requires several steps to regain LOB   (1) Moderate impairment: turns slow, OR requires several small steps for balance following turn & stop   (0) Severe impairment: cannot turn safely, needs assist  6. Step over obstacle = 3   (3) Normal: steps over 2 stacked boxes w/o change in speed or LOB   (2) Mild impairment: able to step  over 1 box w/o change in speed or LOB   (1) Moderate impairment: steps over 1 box but must slow down, may require VC   (0) Severe impairment: cannot perform w/o assist  7. Gait with Narrow JOSE F = 3   (3) Normal: 10 steps no staggering   (2) Mild impairment: 7-9 steps   (1) Moderate impairment: 4-7 steps   (0) Severe impairment: < 4 steps or cannot perform w/o assist  8. Gait with eyes closed = 2   (3) Normal: < 7 sec, no A.D., no LOB, normal gait pattern, deviates <6 in   (2) Mild impairment: 7.1-9 sec, mild gait deviations, deviates 6-10 in   (1) Moderate impairment: > 9 sec, abnormal pattern, LOB, deviates 10-15 in   (0) Severe impairment: cannot perform w/o assist, LOB, deviates >15in  9. Ambulating Backwards = 2   (3) Normal: no A.D., no LOB, normal gait pattern, deviates <6in   (2) Mild impairment: uses A.D., slower speed, mild gait deviations, deviates 6-10 in   (1) Moderate impairment: slow speed, abnormal gait pattern, LOB, deviates 10-15 in   (0) Severe impairment: severe gait deviations or LOB, deviates >15in  10. Steps = 3   (3) Normal: alternating feet, no rail   (2) Mild Impairment: alternating feet, uses rail   (1) Moderate impairment: step-to, uses rail   (0) Severe impairment: cannot perform safely    Score 28/30   Score:   <22/30 fall risk   <20/30 fall risk in older adults   <18/30 fall risk in Parkinsons        ASSESSMENT     Update: Jessy provided good effort and participation toward therapeutic interventions today with focus on assessment of remaining goals including lower extremity strength, and the Functional Gait Assessment. Jessy demonstrated progress with her strength and partially met her goal for improving strength by 1 grade in bilateral lower extremities from initial evaluation. She then completed the Functional Gait Assessment scoring a 28/30 meeting her long term goal. Jessy reports that she is on board with discharge from PT so that she can focus more on her speech therapy at this  time. Patient and therapist discussed that if she ever feels she needs to return to PT that she may obtain another referral from her doctor.    Short Term Goals Status: 5/5 met  Long Term Goal Status: 7/7 Met      Goals:  Short Term Goals: 4 weeks  Date Last Assessed Status   1. Patient will be provided with a comprehensive home exercise program in order to continue with strength, balance, and endurance training at home.  11/23/2021 Met   2. Patient will participate in assessment of the 2 Minute Walk Test to further assess her endurance and tolerance to community ambulation.  9/28/2021 Modified/Met   3. Patient will improve her strength in all major muscle groups of bilateral lower extremities by 1/2 grade to improve stability and endurance and gait.  11/5/2021 Met   4. Patient will improve her right shoulder active abduction range of motion to 115 degrees to improve her ability to perform functional ADL's.  11/5/2021 Met   5. Patient will improve her right shoulder active flexion range of motion to 150 degrees to improve her ability to perform functional ADL's.  11/5/2021 Met        Long Term Goals: 8 weeks Date Last Assessed Status   1. Patient will demonstrate improved score on the Roth Balance Assessment to 52/56 to demonstrate improved functional mobility, balance and decreased risk for falls.  11/23/2021 Met   2. Patient will demonstrate improved score on the Functional Gait Assessment to 27/30 to demonstrate improved balance, safety, and efficiency with community gait.  2/3/2022 Met   3. Patient will improve her strength in all major muscle groups of bilateral lower extremities by 1 grade to improve stability and endurance and gait.  2/3/2022 Partially met   4. Patient will improve her right shoulder active abduction range of motion to 145 degrees to improve her ability to perform functional ADL's. 11/23/2021 Met   5. Patient will improve her right shoulder active flexion range of motion to 170 degrees to  improve her ability to perform functional ADL's. 11/23/2021 Met   6. Patient will tolerate 15 minutes on the SciFit/NuStep with post activity heart rate between 50-80% of age determined heart rate maximum to demonstrate improved cardiovascular endurance and activity tolerance.  1/11/2022 Met   7. Patient will improve her distance on the 2 minutes walk test from 433 feet to 540 feet to demonstrate improved endurance and activity tolerance.  1/11/2022 Met       PLAN   Discharge patient from outpatient PT.     Ata Wolfe PT, DPT, C/NDT

## 2022-02-07 ENCOUNTER — TELEPHONE (OUTPATIENT)
Dept: FAMILY MEDICINE | Facility: CLINIC | Age: 52
End: 2022-02-07
Payer: OTHER GOVERNMENT

## 2022-02-09 ENCOUNTER — CLINICAL SUPPORT (OUTPATIENT)
Dept: REHABILITATION | Facility: HOSPITAL | Age: 52
End: 2022-02-09
Payer: OTHER GOVERNMENT

## 2022-02-09 DIAGNOSIS — Z78.9 IMPAIRED INSTRUMENTAL ACTIVITIES OF DAILY LIVING (IADL): ICD-10-CM

## 2022-02-09 DIAGNOSIS — R68.89 DECREASED FUNCTIONAL ACTIVITY TOLERANCE: ICD-10-CM

## 2022-02-09 DIAGNOSIS — R68.89 IMPAIRED FUNCTION OF UPPER EXTREMITY: Primary | ICD-10-CM

## 2022-02-09 DIAGNOSIS — M79.601 RIGHT ARM PAIN: ICD-10-CM

## 2022-02-09 PROCEDURE — 97110 THERAPEUTIC EXERCISES: CPT | Mod: PN

## 2022-02-09 PROCEDURE — 97530 THERAPEUTIC ACTIVITIES: CPT | Mod: PN
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
## 2022-02-18 ENCOUNTER — CLINICAL SUPPORT (OUTPATIENT)
Dept: REHABILITATION | Facility: HOSPITAL | Age: 52
End: 2022-02-18
Payer: OTHER GOVERNMENT

## 2022-02-18 DIAGNOSIS — R41.841 COGNITIVE COMMUNICATION DISORDER: ICD-10-CM

## 2022-02-18 DIAGNOSIS — M79.601 RIGHT ARM PAIN: ICD-10-CM

## 2022-02-18 DIAGNOSIS — R68.89 IMPAIRED FUNCTION OF UPPER EXTREMITY: Primary | ICD-10-CM

## 2022-02-18 DIAGNOSIS — R68.89 DECREASED FUNCTIONAL ACTIVITY TOLERANCE: ICD-10-CM

## 2022-02-18 DIAGNOSIS — Z78.9 IMPAIRED INSTRUMENTAL ACTIVITIES OF DAILY LIVING (IADL): ICD-10-CM

## 2022-02-18 PROCEDURE — 97530 THERAPEUTIC ACTIVITIES: CPT | Mod: PN

## 2022-02-18 PROCEDURE — 97110 THERAPEUTIC EXERCISES: CPT | Mod: PN

## 2022-02-18 PROCEDURE — 97130 THER IVNTJ EA ADDL 15 MIN: CPT | Mod: PN

## 2022-02-18 PROCEDURE — 97129 THER IVNTJ 1ST 15 MIN: CPT | Mod: PN

## 2022-02-18 NOTE — PROGRESS NOTES
"[facility]  Occupational Therapy Treatment Note    Name: Jessy Mayberry  Clinic Number: [id number]    Therapy Diagnosis:   Encounter Diagnoses   Name Primary?    Impaired function of upper extremity Yes    Right arm pain     Impaired instrumental activities of daily living (IADL)     Decreased functional activity tolerance      Physician: Roger Nation MD; Michel Calderon MD    Visit Date: 2/18/2022    Physician Orders: OT eval & Treat  Medical Diagnosis from Referral: right epicondylitis, carpal tunnel syndrome, LF trigger finger, right thumb CMC pain, & status-post covid-19  Evaluation Date: 10/7/2021  Authorization Period Expiration: 6/15/2022  Plan of Care Expiration: 4/22/2022  Progress Note Due: 2/25/2022   Visit # / Visits authorized: 3/20  Total Visits: 12  FOTO: not assessed     Time In: 0847  Time Out: 0930  Total Time: 40 min    Precautions: Standard and Fall    SUBJECTIVE     Pt reports: neurologist gave her gabapentin, but "it makes me so tired". She's taking it as she feels she needs it, but does note that it helped the sensory symptoms. She saw ENT - ears are clear and now she's scheduled for hearing test and sleep study.   Started today's session with 5 spoons. Feeling overwhelmed with health issues and home decisions    she was compliant with home exercise program given last session.   Response to previous treatment: handwriting is easier with the pencil , but she hasn't been writing a lot because she loses her lists. So, she's been putting things into her phone that she needs to remember.   Functional change:  no significant change compared to previous session per pt report     Pain: 2/10 at start and end of session.   Location: right elbow (lateral epicondyle)     OBJECTIVE     Objective Measures updated at monthly progress note unless otherwise indicated.     Treatment     Jessy received the treatments listed below:     Therapeutic exercises to develop " "endurance for 10 minutes, including:  -UBE x8 min forward/ 2 min back w/ orville UE, level 1.2, standing to increase orville UE act vickey and UB strength. She was encouraged to keep rate of perceived exertion (RPE) at "easy to moderate" (3-4/10). She was encouraged the keep the right hand as open as possible to limit irritation to the extensor wad. Kyle avg=6, peak kyle=10.     Therapeutic activities to improve functional performance for 30 minutes, including:  -Pt ed re: options for cups including portable handle, using smaller cups, cups with lids. She reported difficulty and discomfort with opening her hand around a standard cup whether she was using the handle or not. Improved tolerance when she placed her hand under the handle and her thumb over the handle.  -Pt completed serial opposition with small misael for 16 groups of 4 to improve FMC and focus on mindfulness and increasing attention to task at hand. Performed with each hand.  -Picking up and placing small misael one at a time in 8 groups of 8 to improve finger<>palm translation for in-hand manipulation and focus on mindfulness and increasing attention to task at hand. Performed with each hand. When placing misael with the right hand, she was noted to abduct and internally rotate the shoulder. She also did this some with the left hand, but less often than the right.       Home Exercises and Education Provided     Education provided:   - as above re: cup/ handle  - benefit of neoprene thumb splint (with metal stay removed) to address right thumb CMC pain  - Progress towards goals   - remember to make lists and then prioritize the lists as trying to keep multiple things in working memory increases cognitive load, increasing fatigue.    Written Home Exercises Provided: Patient instructed to cont prior HEP.  Exercises were reviewed and Jessy was able to demonstrate them prior to the end of the session.  Jessy demonstrated good  understanding of the HEP provided. "   .   See EMR under Patient Instructions for exercises provided during therapy sessions.     ASSESSMENT     Pt would continue to benefit from skilled OT. Jessy appeared more overwhelmed today than at last visit with significant difficulty organizing her thoughts and plans for the home. She was able to safely hold a cup with handle, hand placed under handle, thumb over handle and fingers wrapped around cup. Difficulty with attention to task during FMC. Jessy can continue to benefit from occupational therapy to further manage her carpal tunnel symptoms, provide proximal strengthening and continue with progression of activity tolerance and activity management.     Jessy is progressing slowly towards her goals and there are no updates to goals at this time. Pt prognosis is Good.     Pt will continue to benefit from skilled outpatient occupational therapy to address the deficits listed in the problem list on initial evaluation provide pt/family education and to maximize pt's level of independence in the home and community environment.     Anticipated barriers to occupational therapy: multiple medical concerns    Pt's spiritual, cultural and educational needs considered and pt agreeable to plan of care and goals.    Goals:      Short Term Goals:  4 weeks  1. Pt will demonstrate correct use of breathing techniques (Diaphragmatic breathing & Pursed- Lip Breathing) to promote muscles of inspiration and expiration and to increase lung capacity. (met for PLBs on 10/28/2021; MET for diaphragmatic breating 11/5/2021)   2. Pt will demonstrate independence with Breath of Frida to properly promote accessory muscles of inspiration (progressing 11/23/2021)   3. Pt will demonstrate independence with implementing activity pacing and energy banking techniques (Met per pt report with examples as noted above 1/25/2022)  4. Pt will demonstrate understanding and independence with dietary and inflammation changes to promote health and  "nutrition (progressing 11/23/2021)   5. Pt will report understanding of mindfulness & importance of stress reduction (progressing 11/23/2021)   6. Pt will be independent with initial HEP for CTS, epicondylitis.  (progressing 11/23/2021)   7. Pt will be independent with orthotic wear and care at night (and during day if necessary) for trigger finger. (MET per pt report 11/5/2021)   8. Anxiety and Stress scores on the DASS21 will improve by one grade (ex: severe->moderate) (MET 11/5/2021)   9. Jessy will trial different pens to assess best ability to hold pen and write without dropping. (MET 2/9/2022)   10. Jessy will trial different cups to assess best option for being able to use cup/mug without dropping.     Long Term Goals: 12 weeks  1. Pt. will demonstrate 5x sit to stand in 12 seconds or less to ambulate safely in home and in community and for patient to be in low risk for falls category (progressing 11/23/2021)   2. Pt will demonstrate use of mindfulness, stress management, and coping strategies for improved participation in daily routine. (progressing 11/23/2021)   3. Pt will report pain no worse than 3/10 throughout the right upper extremity to increase functional use of the arm. (progressing 11/23/2021)   4. Pt will improve DASS21 stress & anxiety scores by one grade compared to eval. (progressing 11/23/2021)   5. Pt will be able to complete hair care with "little difficulty" or better on the above chart of ADL-related activities. (MET 1/25/2022)   6. Pt will be able to get in/out of the car with "little difficulty" or better on the above chart of ADL-related activities. (MET 11/5/2021)   7. Right  to improve by at least 8#. (progressing 11/23/2021)   8. Right pinch to improve by at least 2.5#. (progressing 11/23/2021)   9. Pt will be able to make/ change the bed with "little difficulty" or better on the above chart of ADL-related activities.  10. Pt will be able to do laundry with "little difficulty" " or better on the above chart of ADL-related activities with modifications as necessary for safety concerns related to transporting laundry up/down the stairs.   11. Jessy will demonstrate improved FMC, bilateral upper extremity, as evidenced by a 9-hole peg test time improvement of 3s or better.   Additional functional goals to be added as pt progresses and per her priorities.    PLAN     Updated Certification Period: 1/24/2022 to 4/22/2022   Recommended Treatment Plan: 1-2 times per week for 12 weeks (for a total of 15 visits):  Manual Therapy, Moist Heat/ Ice, Neuromuscular Re-ed, Patient Education, Self Care, Therapeutic Activities and Therapeutic Exercise    Plan/ upgrading for next session: activity tolerance related to making the bed.     Mary Beth Wakefield, OT

## 2022-02-18 NOTE — PROGRESS NOTES
[facility]  Speech Therapy Treatment Note- Neurological Rehabilitation    Name: Jessy Mayberry   MRN: [id number]   Therapy Diagnosis:   Encounter Diagnosis   Name Primary?    Cognitive communication disorder    Physician: Roger Nation MD  Physician Orders: Ambulatory Referral to Speech Therapy   Medical Diagnosis: History of COVID-19    Visit #/ Visits Authorized: 2/12 ( with 10 prior to this authorization)  Date of Evaluation:  10/18/2021  Insurance Authorization Period: 1/1/2022 to 6/15/2022  Plan of Care Expiration Date:    12/10/2021, 2/2/2022, 4/22/2022  Extended POC:  Yes, see above  Progress Note: due 2/24/2022  Visits Cancelled: 3- due to medical appointments   Visits No Show: 0    Time In:  0802  Time Out:  0845  Total Billable Time: 43 minutes    Precautions: Standard  Subjective:   Pt reports: She has been significantly overwhelmed when making decisions lately. She states she has had medication changes- addition of Gabapentin by neurology. Significant tinnitus was noted by the patient    She was complaint with given HEP.   Response to previous treatment: tolerated well  Pain Scale: 0/10 on VAS currently.   Pain Location: no pain indicated throughout session   Objective:   TIMED  Procedure Min.   Cognitive Therapeutic Interventions, first 15 minutes CPT 23174  15   Cognitive Therapeutic Interventions, each additional 15 minutes CPT 14944  28     Total Timed Units: 3  Total Untimed Units: 0  Charges Billed/# of units: 3    Short Term Goals: (6 weeks) Current Progress:   1. Patient will identify appropriate fatigue level and provide subcategories of fatigue (physical, emotional, cognitive) for 3-4 daily activities with minimal clinician cueing.   Patient able to independently defferentiate between subtypes of fatigue with physical and emotional fatigue.     Significant fatigue and cognitive overwhelm continued to be noted with cognitive based tasks.    Goal Met 2/18/2022     2. Patient  will complete a simple task to improve attention and memory (I.e. sample bill paying activity, recipe, or multiple choice comprehension questions to 1 paragraphs) with 80% accuracy and natural environment noise distractions (TV news background, music, etc.).     Progressing/ Not Met 2022   Not addressed in today's session.    3. Patient will utilize various strategies such as efficient visual scanning and organization to facilitate completion of a task in a dynamic environment (various sensory stimuli) with minimal assistance for use of strategies.     Progressing/ Not Met 2022   Not addressed in today's session.       Met x1   4. Patient will independently generate strategies to improve ability to complete tasks with enhanced accuracy and time, based on review of objective previous performance on trials of functional tasks with 80% accuracy.      Progressing/ Not Met 2022   Not addressed in today's session.    5. Patient will utilize Goal-Plan-Action-Review with 90% accuracy independently.     Progressing/ Not Met 2022   Goal-Plan-Action-Review utilized with listing tasks which needed to be completed for multiple decisions which needed to be made for home, disability paperwork, and appointments which needed to be scheduled. Moderate cues for completion   6. Patient will participate in high level problem solving tasks with 90% accuracy independently and a rating of no more than 3 on the relative scale of cognitive load.     Progressing/ Not Met 2022   Not addressed in today's session.         Patient Education/Response:   Patient educated regardin. Goal-Plan-Action-Review    Patient verbalized understanding to all education provided.    Home program established: yes-Pt to complete spoon theory tracking   Exercises were reviewed and Jessy was able to demonstrate them prior to the end of the session.  Jessy demonstrated good  understanding of the education provided.     See EMR under  Patient Instructions for exercises provided throughout therapy.  Assessment:   Jessy is progressing well towards her goals. Patient with significant cognitive overwhelm noted today. Appears to be worse than previous session/s. Patient with strategy use and some apathy noted to cognitive based tasks. Current goals remain appropriate. Goals to be updated as necessary.     Pt prognosis is Good. Pt will continue to benefit from skilled outpatient speech and language therapy to address the deficits listed in the problem list on initial evaluation, provide patient/family education and to maximize patient's level of independence in the home and community environment.   Medical necessity is demonstrated by the following IMPAIRMENTS:  Deficits in executive functioning and memory prevent the pt from returning to work, and place her at risk of unsafe behavior and a decline in quality of life.  Barriers to Therapy: none identified  Pt's spiritual, cultural and educational needs considered and pt agreeable to plan of care and goals.  Plan:   Continue POC with focus on addressing cognitive communication changes post russell Levine CCC-SLP     2/18/2022

## 2022-02-23 ENCOUNTER — TELEPHONE (OUTPATIENT)
Dept: FAMILY MEDICINE | Facility: CLINIC | Age: 52
End: 2022-02-23
Payer: OTHER GOVERNMENT

## 2022-02-23 NOTE — TELEPHONE ENCOUNTER
I spoke with Dr. Tyson, no specific time that you can call back she states that anytime is fine. Please advise, thank you !     ----- Message from Gisselle Moreno sent at 2/23/2022  3:44 PM CST -----  Contact: dr tyson  Type: Needs Medical Advice  Who Called:  Dr Tyson  Best Call Back Number: [phone]  Additional Information: requesting a call back from dr Nation for a peer to peer conversation to discuss patients disability request and any work restrictions

## 2022-02-24 ENCOUNTER — PATIENT MESSAGE (OUTPATIENT)
Dept: FAMILY MEDICINE | Facility: CLINIC | Age: 52
End: 2022-02-24
Payer: OTHER GOVERNMENT

## 2022-02-24 PROBLEM — R29.898 WEAKNESS OF BOTH LOWER EXTREMITIES: Status: RESOLVED | Noted: 2021-09-28 | Resolved: 2022-02-24

## 2022-02-24 PROBLEM — Z74.09 IMPAIRED MOBILITY AND ENDURANCE: Status: RESOLVED | Noted: 2021-09-28 | Resolved: 2022-02-24

## 2022-02-24 PROBLEM — M25.611 DECREASED RIGHT SHOULDER RANGE OF MOTION: Status: RESOLVED | Noted: 2021-09-28 | Resolved: 2022-02-24

## 2022-02-24 PROBLEM — R26.89 ABNORMALITY OF GAIT DUE TO IMPAIRMENT OF BALANCE: Status: RESOLVED | Noted: 2021-09-28 | Resolved: 2022-02-24

## 2022-02-24 NOTE — TELEPHONE ENCOUNTER
Appointment scheduled for tomorrow's date 2- (first available). Patient agreed to appointment date, time, and location. Appointment also added to wait list for possible earlier access.

## 2022-02-28 ENCOUNTER — TELEPHONE (OUTPATIENT)
Dept: FAMILY MEDICINE | Facility: CLINIC | Age: 52
End: 2022-02-28
Payer: OTHER GOVERNMENT

## 2022-03-04 ENCOUNTER — OFFICE VISIT (OUTPATIENT)
Dept: FAMILY MEDICINE | Facility: CLINIC | Age: 52
End: 2022-03-04
Payer: OTHER GOVERNMENT

## 2022-03-04 ENCOUNTER — CLINICAL SUPPORT (OUTPATIENT)
Dept: REHABILITATION | Facility: HOSPITAL | Age: 52
End: 2022-03-04
Payer: OTHER GOVERNMENT

## 2022-03-04 ENCOUNTER — HOSPITAL ENCOUNTER (OUTPATIENT)
Dept: RADIOLOGY | Facility: CLINIC | Age: 52
Discharge: HOME | End: 2022-03-04
Attending: STUDENT IN AN ORGANIZED HEALTH CARE EDUCATION/TRAINING PROGRAM
Payer: OTHER GOVERNMENT

## 2022-03-04 VITALS
DIASTOLIC BLOOD PRESSURE: 84 MMHG | BODY MASS INDEX: 26.48 KG/M2 | HEIGHT: 69 IN | OXYGEN SATURATION: 99 % | RESPIRATION RATE: 18 BRPM | HEART RATE: 82 BPM | WEIGHT: 178.81 LBS | SYSTOLIC BLOOD PRESSURE: 132 MMHG

## 2022-03-04 DIAGNOSIS — R10.32 LEFT INGUINAL PAIN: ICD-10-CM

## 2022-03-04 DIAGNOSIS — R41.841 COGNITIVE COMMUNICATION DISORDER: Primary | ICD-10-CM

## 2022-03-04 DIAGNOSIS — R10.32 LEFT INGUINAL PAIN: Primary | ICD-10-CM

## 2022-03-04 PROCEDURE — 99213 OFFICE O/P EST LOW 20 MIN: CPT | Mod: S$PBB,,, | Performed by: STUDENT IN AN ORGANIZED HEALTH CARE EDUCATION/TRAINING PROGRAM

## 2022-03-04 PROCEDURE — 73502 X-RAY EXAM HIP UNI 2-3 VIEWS: CPT | Mod: TC,FY,PO,LT

## 2022-03-04 PROCEDURE — 73502 X-RAY EXAM HIP UNI 2-3 VIEWS: CPT | Mod: 26,LT,S$GLB, | Performed by: RADIOLOGY

## 2022-03-04 PROCEDURE — 99999 PR PBB SHADOW E&M-EST. PATIENT-LVL V: ICD-10-PCS | Mod: PBBFAC,,, | Performed by: STUDENT IN AN ORGANIZED HEALTH CARE EDUCATION/TRAINING PROGRAM

## 2022-03-04 PROCEDURE — 99999 PR PBB SHADOW E&M-EST. PATIENT-LVL V: CPT | Mod: PBBFAC,,, | Performed by: STUDENT IN AN ORGANIZED HEALTH CARE EDUCATION/TRAINING PROGRAM

## 2022-03-04 PROCEDURE — 97130 THER IVNTJ EA ADDL 15 MIN: CPT | Mod: PN

## 2022-03-04 PROCEDURE — 73502 XR HIP WITH PELVIS WHEN PERFORMED, 2 OR 3 VIEWS LEFT: ICD-10-PCS | Mod: 26,LT,S$GLB, | Performed by: RADIOLOGY

## 2022-03-04 PROCEDURE — 99215 OFFICE O/P EST HI 40 MIN: CPT | Mod: PBBFAC,PO | Performed by: STUDENT IN AN ORGANIZED HEALTH CARE EDUCATION/TRAINING PROGRAM

## 2022-03-04 PROCEDURE — 97129 THER IVNTJ 1ST 15 MIN: CPT | Mod: PN

## 2022-03-04 PROCEDURE — 99213 PR OFFICE/OUTPT VISIT, EST, LEVL III, 20-29 MIN: ICD-10-PCS | Mod: S$PBB,,, | Performed by: STUDENT IN AN ORGANIZED HEALTH CARE EDUCATION/TRAINING PROGRAM

## 2022-03-04 RX ORDER — MELOXICAM 15 MG/1
15 TABLET ORAL DAILY
Qty: 30 TABLET | Refills: 0 | Status: SHIPPED | OUTPATIENT
Start: 2022-03-04 | End: 2022-06-02

## 2022-03-04 RX ORDER — HYDROXYZINE HYDROCHLORIDE 25 MG/1
TABLET, FILM COATED ORAL
COMMUNITY
End: 2023-09-27

## 2022-03-04 RX ORDER — DEXTROAMPHETAMINE SACCHARATE, AMPHETAMINE ASPARTATE MONOHYDRATE, DEXTROAMPHETAMINE SULFATE AND AMPHETAMINE SULFATE 7.5; 7.5; 7.5; 7.5 MG/1; MG/1; MG/1; MG/1
CAPSULE, EXTENDED RELEASE ORAL EVERY MORNING
COMMUNITY
Start: 2022-01-26 | End: 2023-02-06 | Stop reason: DRUGHIGH

## 2022-03-04 RX ORDER — IBUPROFEN 400 MG/1
TABLET ORAL
COMMUNITY
End: 2022-03-04 | Stop reason: DRUGHIGH

## 2022-03-04 RX ORDER — GABAPENTIN 300 MG/1
CAPSULE ORAL
COMMUNITY
Start: 2022-02-08

## 2022-03-04 RX ORDER — LORATADINE 10 MG/1
TABLET ORAL
COMMUNITY

## 2022-03-04 NOTE — PROGRESS NOTES
[facility] NOTE    Patient Name: Jessy Mayberry  YOB: 1970    PRESENTING HISTORY   Chief Complaint:   Chief Complaint   Patient presents with    Groin Pain     Left side         History of Present Illness:  Ms. Jessy Mayberry is a 51 y.o. female here with left sided groin pain.     Left groin pain.   About 2 weeks.   Went mostly away, now worsening.   Pain with rotation of the hip.   Lifting leg to get in/out of car makes it worse.     2 inguinal hernia repairs on right.   None on left.     Knee feels okay.     Ibuprofen, heat. Taking 600 BID.   Tylenol added also sometimes.                                            Review of Systems   Constitutional: Negative for fever and weight loss.   Gastrointestinal: Positive for nausea. Negative for constipation, diarrhea, melena and vomiting.   Genitourinary: Negative for dysuria, frequency and hematuria.   Musculoskeletal: Positive for myalgias (chronic).   Neurological: Positive for headaches.         PAST HISTORY:     Past Medical History:   Diagnosis Date    Adrenal insufficiency     Hypothyroidism     Pituitary mass        Past Surgical History:   Procedure Laterality Date    ANKLE SURGERY      CARDIAC ELECTROPHYSIOLOGY MAPPING AND ABLATION      DILATION AND CURETTAGE OF UTERUS      3 times    INGUINAL HERNIA REPAIR      twice    TONSILLECTOMY         Family History   Problem Relation Age of Onset    Breast cancer Maternal Aunt 42    Bone cancer Maternal Aunt     Cancer Maternal Aunt     Rheum arthritis Maternal Aunt     Hepatitis Maternal Aunt     Psoriasis Child     Hypertension Mother     Coronary artery disease Mother     Hyperlipidemia Mother     Cancer Mother     Hypertension Father     Retinal detachment Father     Atrial fibrillation Sister     Hypertension Brother     Rheum arthritis Daughter     Asthma Daughter     Hypertension Son     Heart disease Maternal Grandmother     Hypertension Maternal  "Grandmother     Kidney failure Maternal Grandmother     Atrial fibrillation Maternal Grandmother     Melanoma Neg Hx     Lupus Neg Hx        Social History     Socioeconomic History    Marital status:    Tobacco Use    Smoking status: Never Smoker    Smokeless tobacco: Never Used   Substance and Sexual Activity    Alcohol use: Not Currently       MEDICATIONS & ALLERGIES:     Current Outpatient Medications on File Prior to Visit   Medication Sig    ALPRAZolam (XANAX) 0.5 MG tablet Take 0.5 mg by mouth daily as needed.    busPIRone (BUSPAR) 10 MG tablet Take 10 mg by mouth 2 (two) times daily.    cyanocobalamin 1,000 mcg/mL injection Inject 1 mL (1,000 mcg total) into the muscle every 28 days.    dextroamphetamine-amphetamine (ADDERALL XR) 30 MG 24 hr capsule Take by mouth every morning.    FLUoxetine 20 MG capsule Take 1 capsule (20 mg total) by mouth once daily.    gabapentin (NEURONTIN) 300 MG capsule     hydrOXYzine HCL (ATARAX) 25 MG tablet     ibuprofen (ADVIL,MOTRIN) 400 MG tablet     loratadine (CLARITIN) 10 mg tablet     sumatriptan (IMITREX) 25 MG Tab 1 tablet at least 2 hours between doses as needed    SYNTHROID 75 mcg tablet TAKE 1 TABLET(75 MCG) BY MOUTH EVERY DAY    triamcinolone acetonide 0.025% (KENALOG) 0.025 % cream Apply topically 2 (two) times daily.    dextroamphetamine-amphetamine (ADDERALL XR) 20 MG 24 hr capsule Take 1 capsule by mouth once daily.    estradioL (ESTRING) 2 mg (7.5 mcg /24 hour) vaginal ring Place 2 mg vaginally once. for 1 dose     No current facility-administered medications on file prior to visit.       Review of patient's allergies indicates:   Allergen Reactions    Oxycontin [oxycodone]     Promethazine Other (See Comments)       OBJECTIVE:   Vital Signs:  Vitals:    03/04/22 1352   BP: 132/84   Pulse: 82   Resp: 18   SpO2: 99%   Weight: 81.1 kg (178 lb 12.7 oz)   Height: 5' 9" (1.753 m)       No results found for this or any previous visit " (from the past 24 hour(s)).      Physical Exam  Vitals and nursing note reviewed. Exam conducted with a chaperone present.   Constitutional:       General: She is not in acute distress.     Appearance: She is not toxic-appearing or diaphoretic.   Neck:      Thyroid: No thyromegaly.   Cardiovascular:      Rate and Rhythm: Normal rate and regular rhythm.      Heart sounds: Normal heart sounds. No murmur heard.  Abdominal:      General: Abdomen is flat.      Palpations: Abdomen is soft. There is no mass.      Tenderness: There is no abdominal tenderness. There is no rebound.      Hernia: No hernia is present.   Musculoskeletal:         General: No tenderness or deformity. Normal range of motion.      Comments: Left hip- no point tenderness. Mild pain with external rotation, otherwise normal exam.      Skin:     General: Skin is warm and dry.      Findings: No erythema or rash.   Neurological:      Mental Status: She is alert.      Gait: Gait is intact.   Psychiatric:         Mood and Affect: Affect normal.         Cognition and Memory: Memory normal.         ASSESSMENT & PLAN:     Left inguinal pain  -     X-Ray Hip 2 or 3 views Left (with Pelvis when performed); Future; Expected date: 03/04/2022  -     US Abdomen Limited; Future; Expected date: 03/04/2022  -     meloxicam (MOBIC) 15 MG tablet; Take 1 tablet (15 mg total) by mouth once daily.  Dispense: 30 tablet; Refill: 0     Unsure etiology of groin pain.     Suspect hip pathology. Get xray.     Trial of mobic.     Ensure no hernia.       Roger Nation MD   Internal Medicine    This note was created using Dragon voice recognition software that occasionally misinterprets phrases or words.          Answers for HPI/ROS submitted by the patient on 3/1/2022  Chronicity: new  Onset: 1 to 4 weeks ago  Onset quality: gradual  Frequency: constantly  Episode duration: 24 hours  Progression since onset: unchanged  Pain location: LLQ  Pain - numeric: 7/10  Pain quality:  aching, burning, sharp  anorexia: No  arthralgias: Yes  belching: No  flatus: No  hematochezia: No  Aggravated by: coughing, movement, palpation  Relieved by: being still  Pain severity: moderate  Treatments tried: acetaminophen, antacids, H2 blockers, proton pump inhibitors  Improvement on treatment: no relief  abdominal surgery: Yes  colon cancer: No  Crohn's disease: No  gallstones: No  GERD: Yes  irritable bowel syndrome: Yes  kidney stones: No  pancreatitis: No  PUD: No  ulcerative colitis: No  UTI: Yes

## 2022-03-07 NOTE — PLAN OF CARE
Outpatient Therapy Discharge Summary   Discharge Date: 3/4/2022   Name: Jessy Mayberry  Clinic Number: [id number]  Therapy Diagnosis:   Encounter Diagnosis   Name Primary?    Cognitive communication disorder Yes     Physician: Roger Nation MD  Physician Orders: Ambulatory Referral to Speech Therapy   Medical Diagnosis: History of COVID-19  Evaluation Date: 10/18/2021    Date of Last visit: 3/4/2022   Total Visits Received: 13  Cancelled Visits: 3- due to medical appointments  No Show Visits: 0    Assessment    Assessment of Current Status: Jessy participated well in all therapy sessions. She has not returned to baseline cognitive functioning and is filing for disability. She has had multiple medical changes from evaluation to discharge. Referral was requested to Dr. Eloy Clayton for psychological care. Patient may return to Speech Therapy services as needed.    Goals:   Short Term Goals: (6 weeks) Current Progress:   1. Patient will identify appropriate fatigue level and provide subcategories of fatigue (physical, emotional, cognitive) for 3-4 daily activities with minimal clinician cueing.   Goal Met 2/18/2022     2. Patient will complete a simple task to improve attention and memory (I.e. sample bill paying activity, recipe, or multiple choice comprehension questions to 1 paragraphs) with 80% accuracy and natural environment noise distractions (TV news background, music, etc.).  Not met   3. Patient will utilize various strategies such as efficient visual scanning and organization to facilitate completion of a task in a dynamic environment (various sensory stimuli) with minimal assistance for use of strategies.  Met in 1 of 3 sessions   4. Patient will independently generate strategies to improve ability to complete tasks with enhanced accuracy and time, based on review of objective previous performance on trials of functional tasks with 80% accuracy.     Goal Met 3/4/2022        5. Patient will utilize  Goal-Plan-Action-Review with 90% accuracy independently.  Not met   6. Patient will participate in high level problem solving tasks with 90% accuracy independently and a rating of no more than 3 on the relative scale of cognitive load.  Met for accuracy, but not for the relative scale of cognitive lead aspect of the goal     Long Term Goals:  1. Patient will increase attention and processing skills with use of appropriate and functional independent strategies. - NOT MET  2. Patient will execute daily activities (work, home, etc) with independent use of strategies. -Goal Met 3/4/2022      Discharge reason: Patient has reached the maximum rehab potential for the present time    Plan   This patient is discharged from Speech Therapy    Mariza Levine CCC-SLP   3/4/2022

## 2022-03-07 NOTE — PROGRESS NOTES
[facility]  Speech Therapy Treatment Note- Neurological Rehabilitation    Name: Jessy Mayberry   MRN: [id number]   Therapy Diagnosis:   Encounter Diagnosis   Name Primary?    Cognitive communication disorder Yes   Physician: Roger Nation MD  Physician Orders: Ambulatory Referral to Speech Therapy   Medical Diagnosis: History of COVID-19    Visit #/ Visits Authorized: 3/12 ( with 10 prior to this authorization)  Date of Evaluation:  10/18/2021  Insurance Authorization Period: 1/1/2022 to 6/15/2022  Plan of Care Expiration Date:    12/10/2021, 2/2/2022, 4/22/2022  Extended POC:  Yes, see above  Progress Note: due 2/24/2022- see discharge note  Visits Cancelled: 3- due to medical appointments   Visits No Show: 0    Time In:  0802  Time Out:  0845  Total Billable Time: 43 minutes    Precautions: Standard  Subjective:   Pt reports: She has been significantly overwhelmed when making decisions lately- more overwhelming then previously.   She was complaint with given HEP.   Response to previous treatment: tolerated well  Pain Scale: 0/10 on VAS currently.   Pain Location: no pain indicated throughout session   Objective:   TIMED  Procedure Min.   Cognitive Therapeutic Interventions, first 15 minutes CPT 41057  15   Cognitive Therapeutic Interventions, each additional 15 minutes CPT 76513  28     Total Timed Units: 3  Total Untimed Units: 0  Charges Billed/# of units: 3    Short Term Goals: (6 weeks) Current Progress:   1. Patient will identify appropriate fatigue level and provide subcategories of fatigue (physical, emotional, cognitive) for 3-4 daily activities with minimal clinician cueing.   Goal Met 2/18/2022     2. Patient will complete a simple task to improve attention and memory (I.e. sample bill paying activity, recipe, or multiple choice comprehension questions to 1 paragraphs) with 80% accuracy and natural environment noise distractions (TV news background, music, etc.).     Progressing/ Not  Met 3/4/2022   Not addressed in today's session.    3. Patient will utilize various strategies such as efficient visual scanning and organization to facilitate completion of a task in a dynamic environment (various sensory stimuli) with minimal assistance for use of strategies.     Progressing/ Not Met 3/4/2022   Not addressed in today's session.       Met x1   4. Patient will independently generate strategies to improve ability to complete tasks with enhanced accuracy and time, based on review of objective previous performance on trials of functional tasks with 80% accuracy.       Patient reports use of several pacing strategies throughout her days including:   · Asking for help as needed\  · Knowing when to stay in the car and when to push herself  · Pursed lip breathing  · Rating fatigue and evaluating for next task  · Note taking  · Planning ahead  · Etc     Goal Met 3/4/2022       5. Patient will utilize Goal-Plan-Action-Review with 90% accuracy independently.     Progressing/ Not Met 3/4/2022   Not addressed in today's session.    6. Patient will participate in high level problem solving tasks with 90% accuracy independently and a rating of no more than 3 on the relative scale of cognitive load.     Progressing/ Not Met 3/4/2022   Patient completed high level deductive reasoning task given extended time with 100% accuracy independently.    Patient rated the task a 5-6 on The Relative Scale of Cognitive Load        Patient Education/Response:   Patient educated regardin. Strategies going forward  2. Referral to psychological services with Dr. Clayton     Patient verbalized understanding to all education provided.    Home program established: yes-Pt to complete spoon theory tracking   Exercises were reviewed and Jessy was able to demonstrate them prior to the end of the session.  Jessy demonstrated good  understanding of the education provided.     See EMR under Patient Instructions for exercises provided  throughout therapy.  Assessment:   Jessy has progressing well towards her goals. Jessy participated well in all therapy sessions. She has not returned to baseline cognitive functioning and is filing for disability. She has had multiple medical changes from evaluation to discharge. Referral was requested to Dr. Eloy Clayton for psychological care. Patient may return to Speech Therapy services as needed.    Patient prognosis is Good. Patient has benefited from skilled outpatient speech and language therapy to address the deficits listed in the problem list on initial evaluation, provide patient/family education and to maximize patient's level of independence in the home and community environment.   Medical necessity is demonstrated by the following IMPAIRMENTS:  Deficits in executive functioning and memory prevent the patient from returning to work, and place her at risk of unsafe behavior and a decline in quality of life.  Barriers to Therapy: none identified  Pt's spiritual, cultural and educational needs considered and pt agreeable to plan of care and goals.  Plan:   Discharge from Speech Therapy at this time.    Mariza Levine CCC-SLP     3/4/2022

## 2022-03-11 ENCOUNTER — HOSPITAL ENCOUNTER (OUTPATIENT)
Dept: RADIOLOGY | Facility: HOSPITAL | Age: 52
Discharge: HOME | End: 2022-03-11
Attending: STUDENT IN AN ORGANIZED HEALTH CARE EDUCATION/TRAINING PROGRAM
Payer: OTHER GOVERNMENT

## 2022-03-11 DIAGNOSIS — R10.32 LEFT INGUINAL PAIN: ICD-10-CM

## 2022-03-11 PROCEDURE — 76705 ECHO EXAM OF ABDOMEN: CPT | Mod: 26,,, | Performed by: RADIOLOGY

## 2022-03-11 PROCEDURE — 76705 US ABDOMEN LIMITED: ICD-10-PCS | Mod: 26,,, | Performed by: RADIOLOGY

## 2022-03-11 PROCEDURE — 76705 ECHO EXAM OF ABDOMEN: CPT | Mod: TC

## 2022-03-18 ENCOUNTER — PATIENT MESSAGE (OUTPATIENT)
Dept: ADMINISTRATIVE | Facility: HOSPITAL | Age: 52
End: 2022-03-18
Payer: OTHER GOVERNMENT

## 2022-03-18 ENCOUNTER — PATIENT MESSAGE (OUTPATIENT)
Dept: NEUROSURGERY | Facility: CLINIC | Age: 52
End: 2022-03-18
Payer: OTHER GOVERNMENT

## 2022-03-23 ENCOUNTER — CLINICAL SUPPORT (OUTPATIENT)
Dept: REHABILITATION | Facility: HOSPITAL | Age: 52
End: 2022-03-23
Payer: OTHER GOVERNMENT

## 2022-03-23 DIAGNOSIS — R68.89 IMPAIRED FUNCTION OF UPPER EXTREMITY: Primary | ICD-10-CM

## 2022-03-23 DIAGNOSIS — M79.601 RIGHT ARM PAIN: ICD-10-CM

## 2022-03-23 DIAGNOSIS — Z78.9 IMPAIRED INSTRUMENTAL ACTIVITIES OF DAILY LIVING (IADL): ICD-10-CM

## 2022-03-23 DIAGNOSIS — R68.89 DECREASED FUNCTIONAL ACTIVITY TOLERANCE: ICD-10-CM

## 2022-03-23 PROCEDURE — 97110 THERAPEUTIC EXERCISES: CPT | Mod: PN

## 2022-03-23 PROCEDURE — 97530 THERAPEUTIC ACTIVITIES: CPT | Mod: PN
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
## 2022-03-31 ENCOUNTER — OFFICE VISIT (OUTPATIENT)
Dept: PSYCHIATRY | Facility: CLINIC | Age: 52
End: 2022-03-31
Payer: OTHER GOVERNMENT

## 2022-03-31 DIAGNOSIS — F43.22 ADJUSTMENT DISORDER WITH ANXIETY: ICD-10-CM

## 2022-03-31 PROCEDURE — 90791 PSYCH DIAGNOSTIC EVALUATION: CPT | Mod: 95,,, | Performed by: STUDENT IN AN ORGANIZED HEALTH CARE EDUCATION/TRAINING PROGRAM

## 2022-03-31 PROCEDURE — 90791 PR PSYCHIATRIC DIAGNOSTIC EVALUATION: ICD-10-PCS | Mod: 95,,, | Performed by: STUDENT IN AN ORGANIZED HEALTH CARE EDUCATION/TRAINING PROGRAM

## 2022-04-02 NOTE — PROGRESS NOTES
Psychodiagnostic Assessment  Date: 3/31/2022  Site: [facility] (telemedicine visit)  Referral source: Dr. Lagunas, Post-COVID Clinic  Clinical status of patient: Outpatient  Chief complaint/reason for encounter: Anxiety, adjustment to illness    History of present illness: 52 y/o female with a history of anxiety and ADHD who contracted COVID in August 2021. She is currently struggling with long-COVID symptoms, particularly cognitive impairment, fatigue, weakness.    Symptoms  · Mood: Reported occasional brief periods of low mood that may several hours. Also reported irritability. Endorsed low energy, impaired concentration, and diminished interest.  · Anxiety: Moderate anxiety relate to worries about health, COVID recovery, functional impairments. Reported difficulties controlling worry, persistent muscle tension, fatigue. Denied panic symptoms.   · Substance abuse: No symptoms of substance use disorder.    Alcohol: 1-2 glasses of wine per week.    Tobacco: None.    Marijuana: None.    Illicit drugs: None.    Caffeine: Minimal; occasional Coke Zero or cup of coffee.  · Cognitive functioning: Reported impaired concentration, impaired executive functioning (set switching, multi-tasking, planning). Described recent difficulties organizing air travel plans.  · Health behaviors: Reported good medication adherence.    Psychiatric history: Patient reported a history of anxiety and ADHD. ADHD first diagnosed around [id number]. Prescribed Adderall at that time. First prescribed Prozac and Xanax in 2005 to treat anxiety. Explained that anxiety was linked to stress of frequently moving and lack of social support. Stated that she uses Xanax infrequently, typically 1 time per month. No past psychiatric hospitalizations. No history of suicide attempt or self-harm behavior.    Medical history  Past Medical History:   Diagnosis Date    Adrenal insufficiency     Hypothyroidism     Pituitary mass      Past  Surgical History:   Procedure Laterality Date    ANKLE SURGERY      CARDIAC ELECTROPHYSIOLOGY MAPPING AND ABLATION      DILATION AND CURETTAGE OF UTERUS      3 times    INGUINAL HERNIA REPAIR      twice    TONSILLECTOMY       Family history of psychiatric illness  Brother: Depression, alcohol problem.  Grandfather (paternal): Schizophrenia.    Social history: Patient lives with her  and youngest daughter. She has 3 children: son, age 20, away at college, daughter, age 18, away at college, and daughter, age 14, living at home. Born and raised in [place]. Moved every 3 years due to 's position in the . He has now retired, and she and her family plan to stay in [place] permanently. 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 Stopped working due to COVID-related health problems.     Medications  Current Outpatient Medications on File Prior to Visit   Medication Sig Dispense Refill    ALPRAZolam (XANAX) 0.5 MG tablet Take 0.5 mg by mouth daily as needed.      dextroamphetamine-amphetamine (ADDERALL XR) 30 MG 24 hr capsule Take by mouth every morning.      estradioL (ESTRING) 2 mg (7.5 mcg /24 hour) vaginal ring Place 2 mg vaginally once. for 1 dose 1 each 3    FLUoxetine 20 MG capsule Take 1 capsule (20 mg total) by mouth once daily. 30 capsule 2    gabapentin (NEURONTIN) 300 MG capsule       hydrOXYzine HCL (ATARAX) 25 MG tablet       loratadine (CLARITIN) 10 mg tablet       meloxicam (MOBIC) 15 MG tablet Take 1 tablet (15 mg total) by mouth once daily. 30 tablet 0    sumatriptan (IMITREX) 25 MG Tab 1 tablet at least 2 hours between doses as needed      SYNTHROID 75 mcg tablet TAKE 1 TABLET(75 MCG) BY MOUTH EVERY DAY 90 tablet 3    triamcinolone acetonide 0.025% (KENALOG) 0.025 % cream Apply topically 2 (two) times daily. 80 g 0     No current facility-administered medications on file  prior to visit.     Strengths and liabilities  Strengths: good judgment, accepts guidance and feedback, intelligent, articulate and good social support  Liabilities: History of anxiety and ADHD    Mental Status Exam  General Appearance:  unremarkable, age appropriate, dressed neatly, good grooming and hygiene   Speech: normal tone, normal rate, normal pitch, normal volume      Level of Cooperation: cooperative      Thought Processes: normal and logical   Mood: anxious      Thought Content: normal, no suicidality, no homicidality, delusions, or paranoia   Affect: congruent and appropriate   Orientation: Oriented x 4   Memory: STM problem per patient   Attention & Concentration: Impaired per patient   Fund of General Knowledge: intact and appropriate to age and level of education   Abstract Reasoning: No deficits noted.   Judgment & Insight: good   Language: intact   Behavioral Observations: Arrived on time. Good eye contact. No signs of psychomotor agitation or retardation.       Diagnostic Impression - Plan:       ICD-10-CM ICD-9-CM   1. Adjustment disorder with anxiety  F43.22 309.24       Plan: Individual psychotherapy, regular CBT sessions. Patient agrees with this plan and appears capable of adhering to it.    Return to Clinic: 1 month    Length of Service: 60 minutes    Telemedicine Details  The patient location is: [place], [place]  The chief complaint leading to consultation is: Anxiety, adjustment to illness    Visit type: audiovisual    Face to Face time with patient: 60 minutes  60 minutes of total time spent on the encounter, which includes face to face time and non-face to face time preparing to see the patient (eg, review of tests), Obtaining and/or reviewing separately obtained history, Documenting clinical information in the electronic or other health record, Independently interpreting results (not separately reported) and communicating results to the patient/family/caregiver, or Care coordination (not  separately reported).     Each patient to whom he or she provides medical services by telemedicine is:  (1) informed of the relationship between the physician and patient and the respective role of any other health care provider with respect to management of the patient; and (2) notified that he or she may decline to receive medical services by telemedicine and may withdraw from such care at any time.

## 2022-04-26 ENCOUNTER — PATIENT MESSAGE (OUTPATIENT)
Dept: ORTHOPEDICS | Facility: CLINIC | Age: 52
End: 2022-04-26
Payer: OTHER GOVERNMENT

## 2022-04-26 ENCOUNTER — OFFICE VISIT (OUTPATIENT)
Dept: FAMILY MEDICINE | Facility: CLINIC | Age: 52
End: 2022-04-26
Payer: OTHER GOVERNMENT

## 2022-04-26 DIAGNOSIS — E53.8 B12 DEFICIENCY: ICD-10-CM
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
## 2022-04-26 DIAGNOSIS — S99.911A ANKLE INJURY, RIGHT, INITIAL ENCOUNTER: Primary | ICD-10-CM

## 2022-04-26 PROCEDURE — 99213 PR OFFICE/OUTPT VISIT, EST, LEVL III, 20-29 MIN: ICD-10-PCS | Mod: 95,,,

## 2022-04-26 PROCEDURE — 99213 OFFICE O/P EST LOW 20 MIN: CPT | Mod: 95,,,

## 2022-04-26 RX ORDER — CYANOCOBALAMIN 1000 UG/ML
1000 INJECTION, SOLUTION INTRAMUSCULAR; SUBCUTANEOUS
Qty: 10 ML | Refills: 0 | Status: SHIPPED | OUTPATIENT
Start: 2022-04-26 | End: 2023-05-22

## 2022-04-26 NOTE — PATIENT INSTRUCTIONS
León Jiménez,     If you are due for any health screening(s) below please notify me so we can arrange them to be ordered and scheduled to maintain your health. Most healthy patients complete it. Don't lose out on improving your health.     Health Maintenance   Topic Date Due    Hepatitis C Screening  Never done    TETANUS VACCINE  09/01/2007    Mammogram  10/18/2022    Lipid Panel  08/07/2026          Colon Cancer Screening    Of cancers affecting both men and women, colorectal cancer is the third leading cancer killer in the [place]. But it doesnt have to be. Screening can prevent colorectal cancer or find it at an early stage when treatment often leads to a cure.    A colonoscopy is the preferred test for detecting colon cancer. It is needed only once every 10 years if results are negative. While sedated, a flexible, lighted tube with a tiny camera is inserted into the rectum and advanced through the colon to look for cancers. An alternative screening test that is used at home and returned to the lab may also be used. It detects hidden blood in bowel movements which could indicate cancer in the colon. If results are positive, you will need a colonoscopy to determine if the blood is a sign of cancer. This type of follow up (diagnostic) colonoscopy usually requires additional copays as required by your insurance provider. Please contact your PCP if you have any questions.    Although you are still overdue for this important screening, due to the COVID-19 pandemic, we recommend scheduling it in the near future.

## 2022-04-26 NOTE — PROGRESS NOTES
Subjective:       Patient ID: Jessy Mayberry is a 51 y.o. female.    Chief Complaint: No chief complaint on file.    Jessy Mayberry is a 50 yo F pt w/ MHx listed below that presents to this virtual appt w/ concern of R ankle injury after falling. She explains that she has had issues w/ balance since having COVID. She has undergone PT, OT. She fell yesterday and injured her R ankle. It is now swollen, bruised and very painful. She states that has wrapped the ankle in an ACE bandage to splint it. She can tolerate light weight but cannot tolerate her full weight on it.     The patient location is: [place]  The chief complaint leading to consultation is: R ankle injury after fall    Visit type: audiovisual      Face to Face time with patient: 10  20 minutes of total time spent on the encounter, which includes face to face time and non-face to face time preparing to see the patient (eg, review of tests), Obtaining and/or reviewing separately obtained history, Documenting clinical information in the electronic or other health record, Independently interpreting results (not separately reported) and communicating results to the patient/family/caregiver, or Care coordination (not separately reported).     Each patient to whom he or she provides medical services by telemedicine is:  (1) informed of the relationship between the physician and patient and the respective role of any other health care provider with respect to management of the patient; and (2) notified that he or she may decline to receive medical services by telemedicine and may withdraw from such care at any time.    Past Medical History:   Diagnosis Date    Adrenal insufficiency     Hypothyroidism     Pituitary mass        Review of patient's allergies indicates:   Allergen Reactions    Oxycontin [oxycodone]     Promethazine Other (See Comments)         Current Outpatient Medications:     ALPRAZolam (XANAX) 0.5 MG tablet, Take 0.5 mg by mouth daily as  needed., Disp: , Rfl:     busPIRone (BUSPAR) 10 MG tablet, Take 10 mg by mouth 2 (two) times daily., Disp: , Rfl:     cyanocobalamin 1,000 mcg/mL injection, Inject 1 mL (1,000 mcg total) into the muscle every 28 days., Disp: 10 mL, Rfl: 0    dextroamphetamine-amphetamine (ADDERALL XR) 20 MG 24 hr capsule, Take 1 capsule by mouth once daily., Disp: , Rfl:     dextroamphetamine-amphetamine (ADDERALL XR) 30 MG 24 hr capsule, Take by mouth every morning., Disp: , Rfl:     estradioL (ESTRING) 2 mg (7.5 mcg /24 hour) vaginal ring, Place 2 mg vaginally once. for 1 dose, Disp: 1 each, Rfl: 3    FLUoxetine 20 MG capsule, Take 1 capsule (20 mg total) by mouth once daily., Disp: 30 capsule, Rfl: 2    gabapentin (NEURONTIN) 300 MG capsule, , Disp: , Rfl:     hydrOXYzine HCL (ATARAX) 25 MG tablet, , Disp: , Rfl:     loratadine (CLARITIN) 10 mg tablet, , Disp: , Rfl:     meloxicam (MOBIC) 15 MG tablet, Take 1 tablet (15 mg total) by mouth once daily., Disp: 30 tablet, Rfl: 0    sumatriptan (IMITREX) 25 MG Tab, 1 tablet at least 2 hours between doses as needed, Disp: , Rfl:     SYNTHROID 75 mcg tablet, TAKE 1 TABLET(75 MCG) BY MOUTH EVERY DAY, Disp: 90 tablet, Rfl: 3    triamcinolone acetonide 0.025% (KENALOG) 0.025 % cream, Apply topically 2 (two) times daily., Disp: 80 g, Rfl: 0    Review of Systems   Constitutional: Positive for activity change. Negative for appetite change, chills, diaphoresis, fatigue, fever and unexpected weight change.   HENT: Positive for hearing loss. Negative for congestion, ear pain, postnasal drip, rhinorrhea, sinus pressure, sneezing, sore throat and trouble swallowing.    Eyes: Negative for pain, discharge, itching and visual disturbance.   Respiratory: Negative for cough, chest tightness, shortness of breath and wheezing.    Cardiovascular: Positive for palpitations. Negative for chest pain and leg swelling.   Gastrointestinal: Positive for constipation. Negative for abdominal  distention, abdominal pain, blood in stool, diarrhea, nausea and vomiting.   Endocrine: Negative for cold intolerance, heat intolerance, polydipsia and polyuria.   Genitourinary: Negative for difficulty urinating, dysuria, frequency, hematuria, menstrual problem and urgency.   Musculoskeletal: Positive for arthralgias and joint swelling. Negative for back pain, myalgias and neck pain.   Skin: Negative for color change, pallor, rash and wound.   Neurological: Positive for weakness and headaches. Negative for dizziness, syncope and numbness.   Hematological: Negative for adenopathy.   Psychiatric/Behavioral: Negative for behavioral problems and dysphoric mood. The patient is not nervous/anxious.        Objective:      There were no vitals taken for this visit.  Physical Exam  Constitutional:       General: She is not in acute distress.     Appearance: Normal appearance. She is not ill-appearing or toxic-appearing.      Comments: PE limited due to this being a virtual appt   Musculoskeletal:         General: Swelling and tenderness present.   Skin:     Findings: Bruising present.   Neurological:      Mental Status: She is alert.         Assessment:       1. Ankle injury, right, initial encounter    2. Fall, initial encounter        Plan:       Ankle injury, right, initial encounter  -     X-Ray Ankle Complete Right; Future; Expected date: 04/26/2022        -     Ice, Elevation. Continue ACE wrap.         Patient to follow up w/ DR. Nation or me routinely.        Vasu Jain PA-C  Family Medicine Physician Assistant       I spent a total of 20 minutes on the day of the visit.This includes face to face time and non-face to face time preparing to see the patient (eg, review of tests), obtaining and/or reviewing separately obtained history, documenting clinical information in the electronic or other health record, independently interpreting results and communicating results to the patient/family/caregiver, or care  coordinator.      We have addressed [3] Low: 2 or more self-limited or minor problems / 1 stable chronic illness / 1 acute, uncomplicated illness or injury  The complexity of the data reviewed and analyzed for this visit was [3] Limited (Reviewed prior external note, ordered unique testing or reviewed the results of each unique test)   The risk of complications and/or morbidity or mortality are [3] Low risk   The level of Medical Decision Making for this visit is [3] Low

## 2022-04-27 ENCOUNTER — HOSPITAL ENCOUNTER (OUTPATIENT)
Dept: RADIOLOGY | Facility: CLINIC | Age: 52
Discharge: HOME | End: 2022-04-27
Payer: OTHER GOVERNMENT

## 2022-04-27 ENCOUNTER — TELEPHONE (OUTPATIENT)
Dept: FAMILY MEDICINE | Facility: CLINIC | Age: 52
End: 2022-04-27
Payer: OTHER GOVERNMENT

## 2022-04-27 DIAGNOSIS — S92.154D NONDISPLACED AVULSION FRACTURE (CHIP FRACTURE) OF RIGHT TALUS, SUBSEQUENT ENCOUNTER FOR FRACTURE WITH ROUTINE HEALING: Primary | ICD-10-CM

## 2022-04-27 DIAGNOSIS — S99.911A ANKLE INJURY, RIGHT, INITIAL ENCOUNTER: Primary | ICD-10-CM

## 2022-04-27 DIAGNOSIS — S92.154D NONDISPLACED AVULSION FRACTURE (CHIP FRACTURE) OF RIGHT TALUS, SUBSEQUENT ENCOUNTER FOR FRACTURE WITH ROUTINE HEALING: ICD-10-CM

## 2022-04-27 DIAGNOSIS — S99.911A ANKLE INJURY, RIGHT, INITIAL ENCOUNTER: ICD-10-CM

## 2022-04-27 PROCEDURE — 73610 X-RAY EXAM OF ANKLE: CPT | Mod: 26,RT,S$GLB, | Performed by: RADIOLOGY

## 2022-04-27 PROCEDURE — 73610 XR ANKLE COMPLETE 3 VIEW RIGHT: ICD-10-PCS | Mod: 26,RT,S$GLB, | Performed by: RADIOLOGY

## 2022-04-27 PROCEDURE — 73610 X-RAY EXAM OF ANKLE: CPT | Mod: TC,FY,PO,RT

## 2022-04-27 NOTE — TELEPHONE ENCOUNTER
Pt has urgent referral in for ortho, can you please assist? Thank you ! ( Placed by Dr. Nation pts PCP)

## 2022-04-28 ENCOUNTER — PATIENT OUTREACH (OUTPATIENT)
Dept: ADMINISTRATIVE | Facility: OTHER | Age: 52
End: 2022-04-28
Payer: OTHER GOVERNMENT

## 2022-04-28 NOTE — PROGRESS NOTES
Chart was reviewed for overdue Proactive [facility] Encounters (NAYELI)  topics  Updates were requested from care everywhere  Health Maintenance has been updated  LIN[facility] immunization registry triggered

## 2022-04-29 ENCOUNTER — OFFICE VISIT (OUTPATIENT)
Dept: SPORTS MEDICINE | Facility: CLINIC | Age: 52
End: 2022-04-29
Payer: OTHER GOVERNMENT

## 2022-04-29 VITALS
SYSTOLIC BLOOD PRESSURE: 122 MMHG | HEART RATE: 77 BPM | DIASTOLIC BLOOD PRESSURE: 80 MMHG | HEIGHT: 69 IN | WEIGHT: 179.88 LBS | OXYGEN SATURATION: 98 % | BODY MASS INDEX: 26.64 KG/M2

## 2022-04-29 DIAGNOSIS — S99.911A INJURY OF RIGHT ANKLE, INITIAL ENCOUNTER: ICD-10-CM

## 2022-04-29 DIAGNOSIS — S93.491A SPRAIN OF ANTERIOR TALOFIBULAR LIGAMENT OF RIGHT ANKLE, INITIAL ENCOUNTER: ICD-10-CM

## 2022-04-29 DIAGNOSIS — S92.154D NONDISPLACED AVULSION FRACTURE (CHIP FRACTURE) OF RIGHT TALUS, SUBSEQUENT ENCOUNTER FOR FRACTURE WITH ROUTINE HEALING: Primary | ICD-10-CM

## 2022-04-29 PROBLEM — F43.22 ADJUSTMENT DISORDER WITH ANXIETY: Status: ACTIVE | Noted: 2022-04-29

## 2022-04-29 PROCEDURE — 99214 PR OFFICE/OUTPT VISIT, EST, LEVL IV, 30-39 MIN: ICD-10-PCS | Mod: 57,S$PBB,, | Performed by: FAMILY MEDICINE

## 2022-04-29 PROCEDURE — 99214 OFFICE O/P EST MOD 30 MIN: CPT | Mod: 57,S$PBB,, | Performed by: FAMILY MEDICINE

## 2022-04-29 PROCEDURE — 28430 CLTX TALUS FRACTURE W/O MNPJ: CPT | Mod: S$PBB,RT,, | Performed by: FAMILY MEDICINE

## 2022-04-29 PROCEDURE — 28430 PR CLOSED RX TALUS FX: ICD-10-PCS | Mod: S$PBB,RT,, | Performed by: FAMILY MEDICINE

## 2022-04-29 PROCEDURE — 99213 OFFICE O/P EST LOW 20 MIN: CPT | Mod: PBBFAC,PO,25 | Performed by: FAMILY MEDICINE

## 2022-04-29 PROCEDURE — 99999 PR PBB SHADOW E&M-EST. PATIENT-LVL III: ICD-10-PCS | Mod: PBBFAC,,, | Performed by: FAMILY MEDICINE

## 2022-04-29 PROCEDURE — 28430 CLTX TALUS FRACTURE W/O MNPJ: CPT | Mod: PBBFAC,PO,RT | Performed by: FAMILY MEDICINE

## 2022-04-29 PROCEDURE — 99999 PR PBB SHADOW E&M-EST. PATIENT-LVL III: CPT | Mod: PBBFAC,,, | Performed by: FAMILY MEDICINE

## 2022-04-29 NOTE — PROGRESS NOTES
Subjective:          Chief Complaint: Jessy Mayberry is a 51 y.o. female who had no chief complaint listed for this encounter.    Patient is here today for evaluation of right ankle injury.  Occurred five days ago.  She forcefully inverted her ankle feeling immediate pain and tenderness in the lateral aspect of her ankle.  Was immediate swelling but she notes that the swelling has gone down last five days.  She has been taking ibuprofen using ice and elevation with some relief.  She had virtual visit with PA or x-ray was ordered and showed a possible fracture of the lateral talus.  She has ordered a boot but is not come in yet.  She has been nonweightbearing using crutches since that time.      Review of Systems   Constitutional: Negative for chills and decreased appetite.   HENT: Negative for congestion and sore throat.    Eyes: Negative for blurred vision.   Cardiovascular: Negative for chest pain, dyspnea on exertion and palpitations.   Respiratory: Negative for cough and shortness of breath.    Skin: Negative for rash.   Neurological: Negative for difficulty with concentration, disturbances in coordination and headaches.   Psychiatric/Behavioral: Negative for altered mental status, depression, hallucinations, memory loss and suicidal ideas.                   Objective:        General: Jessy is well-developed, well-nourished, appears stated age, in no acute distress, alert and oriented to time, place and person.     General    Nursing note and vitals reviewed.  Constitutional: She is oriented to person, place, and time. She appears well-developed and well-nourished.   HENT:   Nose: Nose normal.   Eyes: EOM are normal. Pupils are equal, round, and reactive to light.   Neck: Neck supple.   Cardiovascular: Normal rate.    Pulmonary/Chest: Effort normal.   Abdominal: Soft.   Neurological: She is alert and oriented to person, place, and time. She has normal reflexes.   Psychiatric: She has a normal mood and affect.  Her behavior is normal. Judgment and thought content normal.         Right Ankle/Foot Exam     Inspection   Bruising: Ankle - present   Effusion: Ankle - present     Swelling   The patient is swollen on the lateral malleolus and lateral talar dome.    Tenderness   The patient is tender to palpation of the ATF, lateral talar dome and peroneals.    Pain   The patient exhibits pain of the lateral talar dome.    Range of Motion   The patient has normal right ankle ROM.    Muscle Strength   The patient has normal right ankle strength.    Tests   Anterior drawer: positive  Varus tilt: trace  Squeeze Test: negative    Other   Sensation: normal    Left Ankle/Foot Exam   Left ankle exam is normal.      Vascular Exam     Right Pulses    Posterior Tibial:      2+        Edema  Right Lower Leg: absent      Xray images from two days ago reviewed in clinic by me. They show a questionable lateral talar fracture that could represent an avulsion although age is undetermined by film. There is some soft tissue swelling visible.         Assessment:       Encounter Diagnoses   Name Primary?    Nondisplaced avulsion fracture (chip fracture) of right talus, subsequent encounter for fracture with routine healing Yes    Sprain of anterior talofibular ligament of right ankle, initial encounter     Injury of right ankle, initial encounter           Plan:         Will treat as an avulsion fracture of talus with concurrent sprain of the ATFL.  Will place her in a short boot and remain nonweightbearing for two weeks.  Have her follow-up with repeat x-rays in two weeks.  Instructed on ankle mobility exercises when she is at rest and out of the boot.                  Patient questionnaires may have been collected.

## 2022-05-13 ENCOUNTER — HOSPITAL ENCOUNTER (OUTPATIENT)
Dept: RADIOLOGY | Facility: CLINIC | Age: 52
Discharge: HOME | End: 2022-05-13
Attending: FAMILY MEDICINE
Payer: OTHER GOVERNMENT

## 2022-05-13 DIAGNOSIS — S93.491A SPRAIN OF ANTERIOR TALOFIBULAR LIGAMENT OF RIGHT ANKLE, INITIAL ENCOUNTER: ICD-10-CM

## 2022-05-13 DIAGNOSIS — S99.911A INJURY OF RIGHT ANKLE, INITIAL ENCOUNTER: ICD-10-CM

## 2022-05-13 DIAGNOSIS — S92.154D NONDISPLACED AVULSION FRACTURE (CHIP FRACTURE) OF RIGHT TALUS, SUBSEQUENT ENCOUNTER FOR FRACTURE WITH ROUTINE HEALING: ICD-10-CM

## 2022-05-13 PROCEDURE — 73610 X-RAY EXAM OF ANKLE: CPT | Mod: TC,FY,PO,RT

## 2022-05-13 PROCEDURE — 73610 XR ANKLE COMPLETE 3 VIEW RIGHT: ICD-10-PCS | Mod: 26,RT,S$GLB, | Performed by: RADIOLOGY

## 2022-05-13 PROCEDURE — 73610 X-RAY EXAM OF ANKLE: CPT | Mod: 26,RT,S$GLB, | Performed by: RADIOLOGY

## 2022-05-20 ENCOUNTER — OFFICE VISIT (OUTPATIENT)
Dept: SPORTS MEDICINE | Facility: CLINIC | Age: 52
End: 2022-05-20
Payer: OTHER GOVERNMENT

## 2022-05-20 VITALS
HEART RATE: 108 BPM | HEIGHT: 69 IN | DIASTOLIC BLOOD PRESSURE: 72 MMHG | BODY MASS INDEX: 26.09 KG/M2 | WEIGHT: 176.13 LBS | OXYGEN SATURATION: 98 % | SYSTOLIC BLOOD PRESSURE: 128 MMHG

## 2022-05-20 DIAGNOSIS — S93.491A SPRAIN OF ANTERIOR TALOFIBULAR LIGAMENT OF RIGHT ANKLE, INITIAL ENCOUNTER: ICD-10-CM

## 2022-05-20 DIAGNOSIS — S92.154D NONDISPLACED AVULSION FRACTURE (CHIP FRACTURE) OF RIGHT TALUS, SUBSEQUENT ENCOUNTER FOR FRACTURE WITH ROUTINE HEALING: Primary | ICD-10-CM

## 2022-05-20 DIAGNOSIS — S99.911A INJURY OF RIGHT ANKLE, INITIAL ENCOUNTER: ICD-10-CM

## 2022-05-20 PROCEDURE — 99024 PR POST-OP FOLLOW-UP VISIT: ICD-10-PCS | Mod: S$PBB,,, | Performed by: FAMILY MEDICINE

## 2022-05-20 PROCEDURE — 99024 POSTOP FOLLOW-UP VISIT: CPT | Mod: S$PBB,,, | Performed by: FAMILY MEDICINE

## 2022-05-20 PROCEDURE — 99213 OFFICE O/P EST LOW 20 MIN: CPT | Mod: PBBFAC,PO | Performed by: FAMILY MEDICINE

## 2022-05-20 PROCEDURE — 99999 PR PBB SHADOW E&M-EST. PATIENT-LVL III: ICD-10-PCS | Mod: PBBFAC,,, | Performed by: FAMILY MEDICINE

## 2022-05-20 PROCEDURE — 99999 PR PBB SHADOW E&M-EST. PATIENT-LVL III: CPT | Mod: PBBFAC,,, | Performed by: FAMILY MEDICINE

## 2022-05-20 NOTE — PROGRESS NOTES
Subjective:          Chief Complaint: Jessy Mayberry is a 51 y.o. female who had concerns including Follow-up.    Returns today for follow-up of right ankle with avulsion fracture of lateral talus and ankle sprain.  Has been compliant wearing the boot.  Has been taking ibuprofen for pain notes that it is affective.  She had her x-ray done last week and had to reschedule her actual appointment today.      Review of Systems   Constitutional: Negative for chills and decreased appetite.   HENT: Negative for congestion and sore throat.    Eyes: Negative for blurred vision.   Cardiovascular: Negative for chest pain, dyspnea on exertion and palpitations.   Respiratory: Negative for cough and shortness of breath.    Skin: Negative for rash.   Neurological: Negative for difficulty with concentration, disturbances in coordination and headaches.   Psychiatric/Behavioral: Negative for altered mental status, depression, hallucinations, memory loss and suicidal ideas.                   Objective:        General: Jessy is well-developed, well-nourished, appears stated age, in no acute distress, alert and oriented to time, place and person.     General    Nursing note and vitals reviewed.  Constitutional: She is oriented to person, place, and time. She appears well-developed and well-nourished.   HENT:   Nose: Nose normal.   Eyes: EOM are normal. Pupils are equal, round, and reactive to light.   Neck: Neck supple.   Cardiovascular: Normal rate.    Pulmonary/Chest: Effort normal.   Abdominal: Soft.   Neurological: She is alert and oriented to person, place, and time. She has normal reflexes.   Psychiatric: She has a normal mood and affect. Her behavior is normal. Judgment and thought content normal.         Right Ankle/Foot Exam     Inspection   Bruising: Ankle - absent   Effusion: Ankle - absent     Tenderness   The patient is tender to palpation of the lateral talar dome.    Pain   The patient exhibits pain of the lateral talar  dome.    Range of Motion   The patient has normal right ankle ROM.    Muscle Strength   The patient has normal right ankle strength.    Tests   Anterior drawer: negative  Varus tilt: trace  Squeeze Test: negative    Other   Sensation: normal    Left Ankle/Foot Exam   Left ankle exam is normal.      Vascular Exam     Right Pulses    Posterior Tibial:      2+        Edema  Right Lower Leg: absent      X-ray images ordered obtained interpreted by me.  They show avulsion fracture of the lateral talus with evidence of stability and healing compared to previous exam.        Assessment:       Encounter Diagnoses   Name Primary?    Nondisplaced avulsion fracture (chip fracture) of right talus, subsequent encounter for fracture with routine healing Yes    Sprain of anterior talofibular ligament of right ankle, initial encounter     Injury of right ankle, initial encounter           Plan:         She is progressing well.  Main walking boot for protection and follow up in four weeks for repeat x-rays.  Or any setback should have adequate healing by then.                  Patient questionnaires may have been collected.

## 2022-05-23 ENCOUNTER — OFFICE VISIT (OUTPATIENT)
Dept: PSYCHIATRY | Facility: CLINIC | Age: 52
End: 2022-05-23
Payer: OTHER GOVERNMENT

## 2022-05-23 DIAGNOSIS — F43.22 ADJUSTMENT DISORDER WITH ANXIETY: Primary | ICD-10-CM

## 2022-05-23 PROCEDURE — 90834 PR PSYCHOTHERAPY W/PATIENT, 45 MIN: ICD-10-PCS | Mod: 95,,, | Performed by: STUDENT IN AN ORGANIZED HEALTH CARE EDUCATION/TRAINING PROGRAM

## 2022-05-23 PROCEDURE — 90834 PSYTX W PT 45 MINUTES: CPT | Mod: 95,,, | Performed by: STUDENT IN AN ORGANIZED HEALTH CARE EDUCATION/TRAINING PROGRAM

## 2022-06-01 ENCOUNTER — OFFICE VISIT (OUTPATIENT)
Dept: PSYCHIATRY | Facility: CLINIC | Age: 52
End: 2022-06-01
Payer: OTHER GOVERNMENT

## 2022-06-01 DIAGNOSIS — F43.22 ADJUSTMENT DISORDER WITH ANXIETY: Primary | ICD-10-CM

## 2022-06-01 PROCEDURE — 90834 PR PSYCHOTHERAPY W/PATIENT, 45 MIN: ICD-10-PCS | Mod: 95,,, | Performed by: STUDENT IN AN ORGANIZED HEALTH CARE EDUCATION/TRAINING PROGRAM

## 2022-06-01 PROCEDURE — 90834 PSYTX W PT 45 MINUTES: CPT | Mod: 95,,, | Performed by: STUDENT IN AN ORGANIZED HEALTH CARE EDUCATION/TRAINING PROGRAM

## 2022-06-02 ENCOUNTER — OFFICE VISIT (OUTPATIENT)
Dept: FAMILY MEDICINE | Facility: CLINIC | Age: 52
End: 2022-06-02
Payer: OTHER GOVERNMENT

## 2022-06-02 DIAGNOSIS — U09.9 LONG COVID: Primary | ICD-10-CM

## 2022-06-02 DIAGNOSIS — E03.9 HYPOTHYROIDISM, UNSPECIFIED TYPE: ICD-10-CM

## 2022-06-02 PROCEDURE — 99213 PR OFFICE/OUTPT VISIT, EST, LEVL III, 20-29 MIN: ICD-10-PCS | Mod: 95,24,, | Performed by: STUDENT IN AN ORGANIZED HEALTH CARE EDUCATION/TRAINING PROGRAM

## 2022-06-02 PROCEDURE — 99213 OFFICE O/P EST LOW 20 MIN: CPT | Mod: 95,24,, | Performed by: STUDENT IN AN ORGANIZED HEALTH CARE EDUCATION/TRAINING PROGRAM

## 2022-06-02 RX ORDER — LEVOTHYROXINE SODIUM 75 UG/1
75 TABLET ORAL DAILY
Qty: 90 TABLET | Refills: 3 | Status: SHIPPED | OUTPATIENT
Start: 2022-06-02 | End: 2023-03-28 | Stop reason: SDUPTHER

## 2022-06-02 NOTE — PROGRESS NOTES
The patient location is: [place], [place]  The chief complaint leading to consultation is: Long COVID    Visit type: audiovisual    Face to Face time with patient: 17 minutes  22 minutes of total time spent on the encounter, which includes face to face time and non-face to face time preparing to see the patient (eg, review of tests), Obtaining and/or reviewing separately obtained history, Documenting clinical information in the electronic or other health record, Independently interpreting results (not separately reported) and communicating results to the patient/family/caregiver, or Care coordination (not separately reported).         Each patient to whom he or she provides medical services by telemedicine is:  (1) informed of the relationship between the physician and patient and the respective role of any other health care provider with respect to management of the patient; and (2) notified that he or she may decline to receive medical services by telemedicine and may withdraw from such care at any time.    Notes:       [facility] CLINIC NOTE    Patient Name: Jessy Mayberyr  YOB: 1970    PRESENTING HISTORY   Chief Complaint: No chief complaint on file.       History of Present Illness:  Ms. Jessy Mayberry is a 51 y.o. female     Has completed SLP, OT, PT  Has not met goals.   Does not feel she can work.   Coping has been difficult as a result. Seeing a therapist.     Still having brain fog, word finding difficulties, lapses in memory.   Still having right sided weakness.     Prominent fatigue. Has to sleep or else worn out for 2-3 days.     Stimulation and stimulating environments worsen.       Still falling.   Recent fx as a result.   Just fell again.       Still seeing [facility], last appointment 3 weeks ago.       After visit appointment today I called and spoke with the FLEX she has been seeing to discuss.                                                     Review of Systems   All  other systems reviewed and are negative.        PAST HISTORY:     Past Medical History:   Diagnosis Date    Adrenal insufficiency     Hypothyroidism     Pituitary mass        Past Surgical History:   Procedure Laterality Date    ANKLE SURGERY      CARDIAC ELECTROPHYSIOLOGY MAPPING AND ABLATION      DILATION AND CURETTAGE OF UTERUS      3 times    INGUINAL HERNIA REPAIR      twice    TONSILLECTOMY         Family History   Problem Relation Age of Onset    Breast cancer Maternal Aunt 42    Bone cancer Maternal Aunt     Cancer Maternal Aunt     Rheum arthritis Maternal Aunt     Hepatitis Maternal Aunt     Psoriasis Child     Hypertension Mother     Coronary artery disease Mother     Hyperlipidemia Mother     Cancer Mother     Hypertension Father     Retinal detachment Father     Atrial fibrillation Sister     Hypertension Brother     Rheum arthritis Daughter     Asthma Daughter     Hypertension Son     Heart disease Maternal Grandmother     Hypertension Maternal Grandmother     Kidney failure Maternal Grandmother     Atrial fibrillation Maternal Grandmother     Melanoma Neg Hx     Lupus Neg Hx        Social History     Socioeconomic History    Marital status:    Tobacco Use    Smoking status: Never Smoker    Smokeless tobacco: Never Used   Substance and Sexual Activity    Alcohol use: Not Currently     Social Determinants of Health     Financial Resource Strain: Medium Risk    Difficulty of Paying Living Expenses: Somewhat hard   Food Insecurity: No Food Insecurity    Worried About Running Out of Food in the Last Year: Never true    Ran Out of Food in the Last Year: Never true   Transportation Needs: No Transportation Needs    Lack of Transportation (Medical): No    Lack of Transportation (Non-Medical): No   Physical Activity: Unknown    Days of Exercise per Week: 0 days   Stress: Stress Concern Present    Feeling of Stress : Very much   Social Connections: Unknown     Frequency of Communication with Friends and Family: Three times a week    Frequency of Social Gatherings with Friends and Family: Once a week    Active Member of Clubs or Organizations: Yes    Attends Club or Organization Meetings: 1 to 4 times per year    Marital Status:    Housing Stability: Low Risk     Unable to Pay for Housing in the Last Year: No    Number of Places Lived in the Last Year: 1    Unstable Housing in the Last Year: No       MEDICATIONS & ALLERGIES:     Current Outpatient Medications on File Prior to Visit   Medication Sig    ALPRAZolam (XANAX) 0.5 MG tablet Take 0.5 mg by mouth daily as needed.    cyanocobalamin 1,000 mcg/mL injection Inject 1 mL (1,000 mcg total) into the muscle every 28 days.    dextroamphetamine-amphetamine (ADDERALL XR) 30 MG 24 hr capsule Take by mouth every morning.    estradioL (ESTRING) 2 mg (7.5 mcg /24 hour) vaginal ring Place 2 mg vaginally once. for 1 dose    FLUoxetine 20 MG capsule Take 1 capsule (20 mg total) by mouth once daily.    gabapentin (NEURONTIN) 300 MG capsule     hydrOXYzine HCL (ATARAX) 25 MG tablet     loratadine (CLARITIN) 10 mg tablet     meloxicam (MOBIC) 15 MG tablet Take 1 tablet (15 mg total) by mouth once daily.    sumatriptan (IMITREX) 25 MG Tab 1 tablet at least 2 hours between doses as needed    SYNTHROID 75 mcg tablet TAKE 1 TABLET(75 MCG) BY MOUTH EVERY DAY    triamcinolone acetonide 0.025% (KENALOG) 0.025 % cream Apply topically 2 (two) times daily.     No current facility-administered medications on file prior to visit.       Review of patient's allergies indicates:   Allergen Reactions    Oxycontin [oxycodone]     Promethazine Other (See Comments)       OBJECTIVE:   Vital Signs:  There were no vitals filed for this visit.    No results found for this or any previous visit (from the past 24 hour(s)).      Physical Exam  Vitals and nursing note reviewed.   Constitutional:       General: She is not in acute  distress.     Appearance: She is not toxic-appearing or diaphoretic.         ASSESSMENT & PLAN:     Long COVID  Without significant improvement.   She is applying for long term disability.   Discussed may require neuropsych testing, but will wait on decision.     Hypothyroidism, unspecified type  -     SYNTHROID 75 mcg tablet; Take 1 tablet (75 mcg total) by mouth once daily.  Dispense: 90 tablet; Refill: 3     Roger Nation MD   Internal Medicine    This note was created using Dragon voice recognition software that occasionally misinterprets phrases or words.

## 2022-06-07 ENCOUNTER — OFFICE VISIT (OUTPATIENT)
Dept: NEUROSURGERY | Facility: CLINIC | Age: 52
End: 2022-06-07
Payer: OTHER GOVERNMENT

## 2022-06-07 ENCOUNTER — PATIENT MESSAGE (OUTPATIENT)
Dept: NEUROSURGERY | Facility: CLINIC | Age: 52
End: 2022-06-07

## 2022-06-07 DIAGNOSIS — R41.841 COGNITIVE COMMUNICATION DISORDER: Primary | ICD-10-CM

## 2022-06-07 DIAGNOSIS — U09.9 LONG COVID: ICD-10-CM

## 2022-06-07 PROCEDURE — 99214 OFFICE O/P EST MOD 30 MIN: CPT | Mod: 95,,, | Performed by: NEUROLOGICAL SURGERY

## 2022-06-07 PROCEDURE — 99214 PR OFFICE/OUTPT VISIT, EST, LEVL IV, 30-39 MIN: ICD-10-PCS | Mod: 95,,, | Performed by: NEUROLOGICAL SURGERY

## 2022-06-07 NOTE — PROGRESS NOTES
The patient location is: home  The chief complaint leading to consultation is: MRI review  Visit type: Virtual visit with synchronous audio and video.  Total time spent with patient: 10 minutes  Each patient to whom he or she provides medical services by telemedicine is:  (1) informed of the relationship between the physician and patient and the respective role of any other health care provider with respect to management of the patient; and (2) notified that he or she may decline to receive medical services by telemedicine and may withdraw from such care at any time.       Subjective:   I, Lili Travis, attest that this documentation has been prepared under the direction and in the presence of Adan Ybarra MD.     Patient ID: Jessy Mayberry is a 51 y.o. female     Chief Complaint: No chief complaint on file.        HPI  Ms. Jsesy Mayberry is a 51 y.o. woman with adrenal insufficiency, cognitive communication disorder, presenting today to review MRI results.    This is a pt who was diagnosed with COVID-19 in August 2021, with significant respiratory and neurological symptoms. An MRI brain was done for further evaluation and revealed pineal cyst.   Today the pt reports occasional brain fog.        Past Medical History:   Diagnosis Date    Adrenal insufficiency     Hypothyroidism     Pituitary mass        Objective:    There were no vitals filed for this visit.   Physical Exam  Constitutional:       General: She is not in acute distress.     Appearance: Normal appearance.   HENT:      Head: Normocephalic and atraumatic.   Neurological:      Mental Status: She is alert and oriented to person, place, and time.            IMAGING:  MRI Brain Synaptive Stealth W WO Contrast (1/11/2022)  Synaptive and stealth protocol MR examination performed for purposes of procedure localization. Stable exam, without evidence of acute intracranial pathology, as above.  No appreciable change from recent MRI 12/02/2021.    I have  personally reviewed the images with the pt.      I, Dr. Adan Ybarra, personally performed the services described in this documentation. All medical record entries made by the scribe, Lili Travis, were at my direction and in my presence.  I have reviewed the chart and agree that the record reflects my personal performance and is accurate and complete. Adan Ybarra MD. 06/07/2022    Assessment:       Long covid     Plan:   I have personally reviewed the MRI brain with the pt which shows synaptive and stealth protocol MR examination performed for purposes of procedure localization. Stable exam, without evidence of acute intracranial pathology, as above.  No appreciable change from recent MRI 12/02/2021.    I will schedule the patient for Omniscient scan (functional MRI and DTI)

## 2022-06-07 NOTE — PATIENT INSTRUCTIONS
I have personally reviewed the MRI brain with the pt which shows synaptive and stealth protocol MR examination performed for purposes of procedure localization. Stable exam, without evidence of acute intracranial pathology, as above.  No appreciable change from recent MRI 12/02/2021.    I will schedule the patient for MRI brain with DTI.

## 2022-06-08 ENCOUNTER — TELEPHONE (OUTPATIENT)
Dept: NEUROSURGERY | Facility: CLINIC | Age: 52
End: 2022-06-08
Payer: OTHER GOVERNMENT

## 2022-06-08 DIAGNOSIS — R41.841 COGNITIVE COMMUNICATION DISORDER: Primary | ICD-10-CM

## 2022-06-08 DIAGNOSIS — R29.898 WEAKNESS OF BOTH LOWER EXTREMITIES: ICD-10-CM

## 2022-06-08 DIAGNOSIS — U09.9 LONG COVID: ICD-10-CM

## 2022-06-09 ENCOUNTER — OFFICE VISIT (OUTPATIENT)
Dept: PSYCHIATRY | Facility: CLINIC | Age: 52
End: 2022-06-09
Payer: OTHER GOVERNMENT

## 2022-06-09 DIAGNOSIS — F43.22 ADJUSTMENT DISORDER WITH ANXIETY: Primary | ICD-10-CM

## 2022-06-09 PROCEDURE — 90834 PSYTX W PT 45 MINUTES: CPT | Mod: 95,,, | Performed by: STUDENT IN AN ORGANIZED HEALTH CARE EDUCATION/TRAINING PROGRAM

## 2022-06-09 PROCEDURE — 90834 PR PSYCHOTHERAPY W/PATIENT, 45 MIN: ICD-10-PCS | Mod: 95,,, | Performed by: STUDENT IN AN ORGANIZED HEALTH CARE EDUCATION/TRAINING PROGRAM

## 2022-06-10 ENCOUNTER — PATIENT MESSAGE (OUTPATIENT)
Dept: NEUROSURGERY | Facility: CLINIC | Age: 52
End: 2022-06-10
Payer: OTHER GOVERNMENT

## 2022-06-16 ENCOUNTER — TELEPHONE (OUTPATIENT)
Dept: NEUROSURGERY | Facility: CLINIC | Age: 52
End: 2022-06-16
Payer: OTHER GOVERNMENT

## 2022-06-16 NOTE — PROGRESS NOTES
Individual Psychotherapy  Date: 5/23/2022  Site: [facility] (telemedicine visit)      Session number: 2  Present in session: Patient  Therapeutic Intervention: Cognitive behavioral therapy; Outpatient - Behavior modifying psychotherapy 45 min - CPT code 27020  Chief complaint/reason for encounter: Anxiety, adjustment to disability    Interval history and content of current session: Patient reported moderate anxiety symptoms as well as regular brief periods of low mood. She stated that she recently fractured her ankle. Impaired mobility is an added stress. Rated current pain as 2/10. Denied concerns regarding pain management.Patient expressed frustration with cognitive impairments. She noted that she usually avoids tasks that are cognitively demanding (e.g., reading mail, organizing insurance claims, paying bills, organizing finances, online shopping. Elicited description of psychotherapy goals: decreased anxiety, decreased irritability, improved organization, increased productivity. Provided overview of cognitive behavioral therapy, including brief description of research literature. Reviewed relationships between thoughts, emotions, and behaviors using a recent stressful event. Introduced emotion tracking exercise.     HW: Daily emotion tracking    Mental Status Exam  General Appearance:  unremarkable, age appropriate, dressed neatly, good grooming and hygiene   Speech: normal tone, normal rate, normal pitch, normal volume      Level of Cooperation: cooperative      Thought Processes: normal and logical   Mood: anxious      Thought Content: normal, no suicidality, no homicidality, delusions, or paranoia   Affect: congruent and appropriate   Orientation: Oriented x 4   Memory: STM problem per patient   Attention & Concentration: Impaired per patient   Fund of General Knowledge: intact and appropriate to age and level of education   Abstract Reasoning: No deficits noted.   Judgment &  Insight: good   Language: intact   Behavioral Observations: Arrived on time. Good eye contact. No signs of psychomotor agitation or retardation.     Treatment plan:  · Target symptoms: Anxiety, adjustment to disability  · Why chosen therapy is appropriate versus another modality: relevant to diagnosis, patient responds to this modality, evidence based practice  · Outcome monitoring methods: self-report, checklist/rating scale  · Therapeutic intervention type: behavior modifying psychotherapy    Risk parameters:  Patient reports no suicidal ideation  Patient reports no homicidal ideation  Patient reports no self-injurious behavior  Patient reports no violent behavior    Verbal deficits: None    Patient's response to intervention:  The patient's response to intervention is accepting.    Progress toward goals and other mental status changes:  The patient's progress toward goals is good.    Diagnosis:     ICD-10-CM ICD-9-CM   1. Adjustment disorder with anxiety  F43.22 309.24       Plan: Individual psychotherapy, regular CBT sessions. Patient agrees with this plan and appears capable of adhering to it.    Return to clinic: 2 weeks    Length of Service: 50 minutes    Telemedicine Details  The patient location is: [place], [place]  The chief complaint leading to consultation is: Anxiety, adjustment to illness    Visit type: audiovisual    Face to Face time with patient: 50 minutes  50 minutes of total time spent on the encounter, which includes face to face time and non-face to face time preparing to see the patient (eg, review of tests), Obtaining and/or reviewing separately obtained history, Documenting clinical information in the electronic or other health record, Independently interpreting results (not separately reported) and communicating results to the patient/family/caregiver, or Care coordination (not separately reported).     Each patient to whom he or she provides medical services by telemedicine is:  (1)  informed of the relationship between the physician and patient and the respective role of any other health care provider with respect to management of the patient; and (2) notified that he or she may decline to receive medical services by telemedicine and may withdraw from such care at any time.

## 2022-06-21 DIAGNOSIS — S82.891G CLOSED FRACTURE OF RIGHT ANKLE WITH DELAYED HEALING, SUBSEQUENT ENCOUNTER: Primary | ICD-10-CM

## 2022-06-27 ENCOUNTER — HOSPITAL ENCOUNTER (OUTPATIENT)
Dept: RADIOLOGY | Facility: HOSPITAL | Age: 52
Discharge: HOME | End: 2022-06-27
Attending: FAMILY MEDICINE
Payer: OTHER GOVERNMENT

## 2022-06-27 ENCOUNTER — TELEPHONE (OUTPATIENT)
Dept: FAMILY MEDICINE | Facility: CLINIC | Age: 52
End: 2022-06-27
Payer: OTHER GOVERNMENT

## 2022-06-27 ENCOUNTER — PATIENT MESSAGE (OUTPATIENT)
Dept: FAMILY MEDICINE | Facility: CLINIC | Age: 52
End: 2022-06-27
Payer: OTHER GOVERNMENT

## 2022-06-27 ENCOUNTER — OFFICE VISIT (OUTPATIENT)
Dept: ORTHOPEDICS | Facility: CLINIC | Age: 52
End: 2022-06-27
Payer: OTHER GOVERNMENT

## 2022-06-27 DIAGNOSIS — S82.891D CLOSED FRACTURE OF RIGHT ANKLE WITH ROUTINE HEALING, SUBSEQUENT ENCOUNTER: Primary | ICD-10-CM

## 2022-06-27 DIAGNOSIS — S82.891G CLOSED FRACTURE OF RIGHT ANKLE WITH DELAYED HEALING, SUBSEQUENT ENCOUNTER: ICD-10-CM

## 2022-06-27 DIAGNOSIS — M25.571 RIGHT ANKLE PAIN, UNSPECIFIED CHRONICITY: ICD-10-CM

## 2022-06-27 DIAGNOSIS — Z00.00 ROUTINE GENERAL MEDICAL EXAMINATION AT A HEALTH CARE FACILITY: Primary | ICD-10-CM

## 2022-06-27 PROCEDURE — 99024 PR POST-OP FOLLOW-UP VISIT: ICD-10-PCS | Mod: ,,, | Performed by: FAMILY MEDICINE

## 2022-06-27 PROCEDURE — 99999 PR PBB SHADOW E&M-EST. PATIENT-LVL III: ICD-10-PCS | Mod: PBBFAC,,, | Performed by: FAMILY MEDICINE

## 2022-06-27 PROCEDURE — 73610 XR ANKLE COMPLETE 3 VIEW RIGHT: ICD-10-PCS | Mod: 26,RT,, | Performed by: RADIOLOGY

## 2022-06-27 PROCEDURE — 99024 POSTOP FOLLOW-UP VISIT: CPT | Mod: ,,, | Performed by: FAMILY MEDICINE

## 2022-06-27 PROCEDURE — 73610 X-RAY EXAM OF ANKLE: CPT | Mod: 26,RT,, | Performed by: RADIOLOGY

## 2022-06-27 PROCEDURE — 99999 PR PBB SHADOW E&M-EST. PATIENT-LVL III: CPT | Mod: PBBFAC,,, | Performed by: FAMILY MEDICINE

## 2022-06-27 PROCEDURE — 99213 OFFICE O/P EST LOW 20 MIN: CPT | Mod: PBBFAC,PN | Performed by: FAMILY MEDICINE

## 2022-06-27 PROCEDURE — 73610 X-RAY EXAM OF ANKLE: CPT | Mod: TC,PN,RT

## 2022-06-27 RX ORDER — MELOXICAM 15 MG/1
15 TABLET ORAL DAILY PRN
Qty: 30 TABLET | Refills: 2 | Status: SHIPPED | OUTPATIENT
Start: 2022-06-27 | End: 2023-01-17

## 2022-06-27 NOTE — TELEPHONE ENCOUNTER
Pt requesting a referral for obgyn. She will be seeing Dr.Julie Talley. Please advise, thank you !

## 2022-06-27 NOTE — PROGRESS NOTES
Subjective:      Patient ID: Jessy Mayberry is a 52 y.o. female.    Chief Complaint: Injury of the Right Ankle    Here today for follow-up of right ankle pain secondary to lateral talar avulsion fracture.  She has been compliant wearing the boot.  She is still having some pain with ankle stiffness.  Has been in the boot for nearly two months.      Review of Systems   Constitutional: Negative for chills and decreased appetite.   HENT: Negative for congestion and sore throat.    Eyes: Negative for blurred vision.   Cardiovascular: Negative for chest pain, dyspnea on exertion and palpitations.   Respiratory: Negative for cough and shortness of breath.    Skin: Negative for rash.   Neurological: Negative for difficulty with concentration, disturbances in coordination and headaches.   Psychiatric/Behavioral: Negative for altered mental status, depression, hallucinations, memory loss and suicidal ideas.         Objective:            General    Nursing note and vitals reviewed.  Constitutional: She is oriented to person, place, and time. She appears well-developed and well-nourished.   HENT:   Nose: Nose normal.   Eyes: EOM are normal. Pupils are equal, round, and reactive to light.   Neck: Neck supple.   Cardiovascular: Normal rate.    Pulmonary/Chest: Effort normal.   Abdominal: Soft.   Neurological: She is alert and oriented to person, place, and time. She has normal reflexes.   Psychiatric: She has a normal mood and affect. Her behavior is normal. Judgment and thought content normal.         Right Ankle/Foot Exam     Inspection   Bruising: Ankle - absent   Effusion: Ankle - absent     Tenderness   The patient is tender to palpation of the lateral talar dome.    Pain   The patient exhibits pain of the lateral talar dome.    Range of Motion   The patient has normal right ankle ROM.  Ankle Joint   Dorsiflexion: 25   Plantar flexion: 45   Subtalar Joint   Inversion: normal   Eversion: normal     Muscle Strength   The  patient has normal right ankle strength.    Tests   Anterior drawer: negative  Varus tilt: trace  Squeeze Test: negative    Other   Ankle Crepitus: absent  Sensation: normal    Left Ankle/Foot Exam   Left ankle exam is normal.      Vascular Exam     Right Pulses    Posterior Tibial:      2+        Edema  Right Lower Leg: absent      X-ray images ordered obtained interpreted by me.  They show adequate healing of her lateral talar fracture.          Assessment:       Encounter Diagnoses   Name Primary?    Closed fracture of right ankle with routine healing, subsequent encounter Yes    Right ankle pain, unspecified chronicity           Plan:       Jessy was seen today for injury.    Diagnoses and all orders for this visit:    Closed fracture of right ankle with routine healing, subsequent encounter    Right ankle pain, unspecified chronicity    Other orders  -     meloxicam (MOBIC) 15 MG tablet; Take 1 tablet (15 mg total) by mouth daily as needed for Pain.    She may discontinue use of the boot.  She has some ankle stiffness resulting in some pain.  Will prescribe her meloxicam to take as needed instructed to continue with her and mobility exercises.  If this fails to improve we will send her to physical therapy.

## 2022-06-27 NOTE — TELEPHONE ENCOUNTER
I spoke with pt via phone, I advised her that I was able to get the referral approved.    Separate message sent to Dr. Nation in regards to obgyn referral. ( pt has )

## 2022-06-29 NOTE — PROGRESS NOTES
"Individual Psychotherapy  Date: 6/1/2022  Site: [facility] (telemedicine visit)      Session number: 3  Present in session: Patient  Therapeutic Intervention: Cognitive behavioral therapy; Outpatient - Behavior modifying psychotherapy 45 min - CPT code 92824  Chief complaint/reason for encounter: Anxiety, adjustment to disability    Interval history and content of current session: Patient reported continued difficulties managing anxiety and depressive symptoms. Reported severe fatigue for the past 3 days, which started following an all-day BBQ with family on Sunday, 5/29. Patient did not complete emotion tracking exercise. We decided to change our approach and may return to cognitive restructuring work sometime in the future. Reviewed patient's typical day. She usually wakes up at 6:45 am. From 8:00 - 11:00am is when she tries to accomplish tasks. She finds that she is most productive in the mornings. She takes a relaxation break from 11:00 - 12:30 to lay on the couch in silence. Also does some breathing exercises during that time. Lunch is 12:30 - 1:00. Patient stated that the afternoon is unstructured, and that she spends time with family members who happen to be home. Patient completed goal of cleaning off a space so that she has somewhere to work that is not cluttered. Reviewed process of breaking down large goals into "bite-size" pieces. Reviewed process of setting "mini-goals" at the start of a work period. Discussed importance of focusing on one thing at a time. Introduced the use of a master to-do list as a strategy to improve organization and focus.     HW: Create master to-do list    Mental Status Exam  General Appearance:  unremarkable, age appropriate, dressed neatly, good grooming and hygiene   Speech: normal tone, normal rate, normal pitch, normal volume      Level of Cooperation: cooperative      Thought Processes: normal and logical   Mood: anxious      Thought Content: " normal, no suicidality, no homicidality, delusions, or paranoia   Affect: congruent and appropriate   Orientation: Oriented x 4   Memory: STM problem per patient   Attention & Concentration: Impaired per patient   Fund of General Knowledge: intact and appropriate to age and level of education   Abstract Reasoning: No deficits noted.   Judgment & Insight: good   Language: intact   Behavioral Observations: Arrived on time. Good eye contact. No signs of psychomotor agitation or retardation.     Treatment plan:  · Target symptoms: Anxiety, adjustment to disability  · Why chosen therapy is appropriate versus another modality: relevant to diagnosis, patient responds to this modality, evidence based practice  · Outcome monitoring methods: self-report, checklist/rating scale  · Therapeutic intervention type: behavior modifying psychotherapy    Risk parameters:  Patient reports no suicidal ideation  Patient reports no homicidal ideation  Patient reports no self-injurious behavior  Patient reports no violent behavior    Verbal deficits: None    Patient's response to intervention:  The patient's response to intervention is accepting.    Progress toward goals and other mental status changes:  The patient's progress toward goals is good.    Diagnosis:     ICD-10-CM ICD-9-CM   1. Adjustment disorder with anxiety  F43.22 309.24       Plan: Individual psychotherapy, regular CBT sessions. Patient agrees with this plan and appears capable of adhering to it.    Return to clinic: 2 weeks    Length of Service: 50 minutes    Telemedicine Details  The patient location is: [place], [place]  The chief complaint leading to consultation is: Anxiety, adjustment to illness    Visit type: audiovisual    Face to Face time with patient: 50 minutes  50 minutes of total time spent on the encounter, which includes face to face time and non-face to face time preparing to see the patient (eg, review of tests), Obtaining and/or reviewing separately  obtained history, Documenting clinical information in the electronic or other health record, Independently interpreting results (not separately reported) and communicating results to the patient/family/caregiver, or Care coordination (not separately reported).     Each patient to whom he or she provides medical services by telemedicine is:  (1) informed of the relationship between the physician and patient and the respective role of any other health care provider with respect to management of the patient; and (2) notified that he or she may decline to receive medical services by telemedicine and may withdraw from such care at any time.

## 2022-07-07 ENCOUNTER — OFFICE VISIT (OUTPATIENT)
Dept: PSYCHIATRY | Facility: CLINIC | Age: 52
End: 2022-07-07
Payer: OTHER GOVERNMENT

## 2022-07-07 ENCOUNTER — PATIENT MESSAGE (OUTPATIENT)
Dept: PSYCHIATRY | Facility: CLINIC | Age: 52
End: 2022-07-07
Payer: OTHER GOVERNMENT

## 2022-07-07 DIAGNOSIS — F43.22 ADJUSTMENT DISORDER WITH ANXIETY: Primary | ICD-10-CM

## 2022-07-07 PROCEDURE — 90834 PSYTX W PT 45 MINUTES: CPT | Mod: 95,,, | Performed by: STUDENT IN AN ORGANIZED HEALTH CARE EDUCATION/TRAINING PROGRAM

## 2022-07-07 PROCEDURE — 90834 PR PSYCHOTHERAPY W/PATIENT, 45 MIN: ICD-10-PCS | Mod: 95,,, | Performed by: STUDENT IN AN ORGANIZED HEALTH CARE EDUCATION/TRAINING PROGRAM

## 2022-07-07 NOTE — PROGRESS NOTES
"Individual Psychotherapy  Date: 6/9/2022  Site: [facility] (telemedicine visit)      Session number: 4  Present in session: Patient  Therapeutic Intervention: Cognitive behavioral therapy; Outpatient - Behavior modifying psychotherapy 45 min - CPT code 76061  Chief complaint/reason for encounter: Anxiety, adjustment to disability    Interval history and content of current session: Patient reported moderate anxiety and depressive symptoms over the past 2 weeks. She continues to struggle with fatigue, impaired concentration and attention, impaired short-term memory, and problems with decision making, organization, planning, and set switching. Patient reported frustration with medical care. She explained that she found inaccuracies in a recent note in her medical record. Patient decided to contact that provider to request a correction. Patient completed goal of creating a clutter-free workspace. She noted that having a clean, distraction-free desk has helped somewhat with concentration. Patient did not complete goal of creating a master to-do list. She stated that she has not tried goal-setting strategy reviewed in a previous session. Reviewed concept of pacing as a strategy to manage fatigue. Discussed importance of taking scheduled breaks and alternating between low- and high-energy tasks. Discussed differences between tasks that required physical versus mental energy. Patient described anxiety associated with belief that family does not fully recognize difficulties she faces. Reviewed strategies for discussing concept of "invisible disability" with family members. Validated and normalized distress.    HW: Create master to-do list    Mental Status Exam  General Appearance:  unremarkable, age appropriate, dressed neatly, good grooming and hygiene   Speech: normal tone, normal rate, normal pitch, normal volume      Level of Cooperation: cooperative      Thought Processes: normal and logical "   Mood: anxious      Thought Content: normal, no suicidality, no homicidality, delusions, or paranoia   Affect: congruent and appropriate   Orientation: Oriented x 4   Memory: STM problem per patient   Attention & Concentration: Impaired per patient   Fund of General Knowledge: intact and appropriate to age and level of education   Abstract Reasoning: No deficits noted.   Judgment & Insight: good   Language: intact   Behavioral Observations: Arrived on time. Good eye contact. No signs of psychomotor agitation or retardation.     Treatment plan:  · Target symptoms: Anxiety, adjustment to disability  · Why chosen therapy is appropriate versus another modality: relevant to diagnosis, patient responds to this modality, evidence based practice  · Outcome monitoring methods: self-report, checklist/rating scale  · Therapeutic intervention type: behavior modifying psychotherapy    Risk parameters:  Patient reports no suicidal ideation  Patient reports no homicidal ideation  Patient reports no self-injurious behavior  Patient reports no violent behavior    Verbal deficits: None    Patient's response to intervention:  The patient's response to intervention is accepting.    Progress toward goals and other mental status changes:  The patient's progress toward goals is good.    Diagnosis:     ICD-10-CM ICD-9-CM   1. Adjustment disorder with anxiety  F43.22 309.24       Plan: Individual psychotherapy, regular CBT sessions. Patient agrees with this plan and appears capable of adhering to it.    Return to clinic: 2 weeks    Length of Service: 50 minutes    Telemedicine Details  The patient location is: [place], [place]  The chief complaint leading to consultation is: Anxiety, adjustment to illness    Visit type: audiovisual    Face to Face time with patient: 50 minutes  50 minutes of total time spent on the encounter, which includes face to face time and non-face to face time preparing to see the patient (eg, review of tests),  Obtaining and/or reviewing separately obtained history, Documenting clinical information in the electronic or other health record, Independently interpreting results (not separately reported) and communicating results to the patient/family/caregiver, or Care coordination (not separately reported).     Each patient to whom he or she provides medical services by telemedicine is:  (1) informed of the relationship between the physician and patient and the respective role of any other health care provider with respect to management of the patient; and (2) notified that he or she may decline to receive medical services by telemedicine and may withdraw from such care at any time.

## 2022-07-08 ENCOUNTER — TELEPHONE (OUTPATIENT)
Dept: PSYCHIATRY | Facility: CLINIC | Age: 52
End: 2022-07-08
Payer: OTHER GOVERNMENT

## 2022-07-08 NOTE — TELEPHONE ENCOUNTER
----- Message from Nikki Travis LCSW sent at 7/7/2022 12:39 PM CDT -----  Regarding: FW: long covid referral  Hi Vanna:  Are you able to add this patient to my 4 p.m. group today?  Please advise and thanks.  Nikki Martin.  ----- Message -----  From: Eloy Clayton, PhD  Sent: 7/7/2022  11:08 AM CDT  To: Nikki Travis LCSW  Subject: long covid referral                              Hi Nikki    I've got a patient interested in the long COVID support group. She's primarily struggling with fatigue and brain fog. 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 Just let me know if you need any more info.     Thanks  Eloy

## 2022-07-11 ENCOUNTER — PATIENT MESSAGE (OUTPATIENT)
Dept: PSYCHIATRY | Facility: CLINIC | Age: 52
End: 2022-07-11
Payer: OTHER GOVERNMENT

## 2022-07-12 ENCOUNTER — HOSPITAL ENCOUNTER (OUTPATIENT)
Dept: RADIOLOGY | Facility: HOSPITAL | Age: 52
Discharge: HOME | End: 2022-07-12
Attending: NEUROLOGICAL SURGERY
Payer: OTHER GOVERNMENT

## 2022-07-12 DIAGNOSIS — R41.841 COGNITIVE COMMUNICATION DISORDER: ICD-10-CM

## 2022-07-12 DIAGNOSIS — U09.9 LONG COVID: ICD-10-CM

## 2022-07-12 DIAGNOSIS — R29.898 WEAKNESS OF BOTH LOWER EXTREMITIES: ICD-10-CM

## 2022-07-12 PROCEDURE — 70554 FMRI BRAIN BY TECH: CPT | Mod: 26,RT,, | Performed by: RADIOLOGY

## 2022-07-12 PROCEDURE — 70554 MRI BRAIN FUNCTIONAL WITHOUT A PHYSICIAN: ICD-10-PCS | Mod: 26,RT,, | Performed by: RADIOLOGY

## 2022-07-12 PROCEDURE — 25500020 PHARM REV CODE 255: Performed by: NEUROLOGICAL SURGERY

## 2022-07-12 PROCEDURE — 70554 FMRI BRAIN BY TECH: CPT | Mod: TC

## 2022-07-12 PROCEDURE — A9585 GADOBUTROL INJECTION: HCPCS | Performed by: NEUROLOGICAL SURGERY

## 2022-07-12 RX ORDER — GADOBUTROL 604.72 MG/ML
8 INJECTION INTRAVENOUS
Status: COMPLETED | OUTPATIENT
Start: 2022-07-12 | End: 2022-07-12

## 2022-07-12 RX ADMIN — GADOBUTROL 8 ML: 604.72 INJECTION INTRAVENOUS at 10:07

## 2022-07-18 ENCOUNTER — PATIENT MESSAGE (OUTPATIENT)
Dept: NEUROSURGERY | Facility: CLINIC | Age: 52
End: 2022-07-18
Payer: OTHER GOVERNMENT

## 2022-07-18 ENCOUNTER — OFFICE VISIT (OUTPATIENT)
Dept: PSYCHIATRY | Facility: CLINIC | Age: 52
End: 2022-07-18
Payer: OTHER GOVERNMENT

## 2022-07-18 DIAGNOSIS — F43.22 ADJUSTMENT DISORDER WITH ANXIETY: Primary | ICD-10-CM

## 2022-07-18 PROCEDURE — 90834 PR PSYCHOTHERAPY W/PATIENT, 45 MIN: ICD-10-PCS | Mod: 95,,, | Performed by: STUDENT IN AN ORGANIZED HEALTH CARE EDUCATION/TRAINING PROGRAM

## 2022-07-18 PROCEDURE — 90834 PSYTX W PT 45 MINUTES: CPT | Mod: 95,,, | Performed by: STUDENT IN AN ORGANIZED HEALTH CARE EDUCATION/TRAINING PROGRAM

## 2022-07-18 NOTE — PROGRESS NOTES
"Individual Psychotherapy  Date: 7/7/2022  Site: [facility] (telemedicine visit)      Session number: 5  Present in session: Patient  Therapeutic Intervention: Cognitive behavioral therapy; Outpatient - Behavior modifying psychotherapy 45 min - CPT code 30496  Chief complaint/reason for encounter: Anxiety, adjustment to disability    Interval history and content of current session: Patient reported mild-moderate anxiety symptoms over the past 2 weeks. Also reported mild depressive symptoms and continued difficulties managing fatigue and cognitive impairments. Patient described feeling discouraged ("I'm just so tired of being tired."). Patient did not create a master todo list as planned in our previous session. We reviewed organizational skills: goal setting, scheduling, use of to-do list + calendar. Also reviewed pacing. Patient stated that she understands these strategies but has not put them to use. She demonstrates good insight into her current condition, however, she has struggled to implement new coping strategies. She described a recent overexertion incident in which she spent 3 hours working on documents to submit to her insurance. The task was cognitively demanding. Patient explained that she felt okay in the moment so she continued working. She then found that she was too fatigued to leave the couch for the next 2 days. She added that cognitive symptoms are exacerbated by fatigue. Validated and normalized distress.Discussed importance of taking scheduled breaks and alternating between low- and high-energy tasks. Reviewed self-care activities. Encouraged patient to attend Long-COVID group.    Mental Status Exam  General Appearance:  unremarkable, age appropriate, dressed neatly, good grooming and hygiene   Speech: normal tone, normal rate, normal pitch, normal volume      Level of Cooperation: cooperative      Thought Processes: normal and logical   Mood: anxious      Thought " Content: normal, no suicidality, no homicidality, delusions, or paranoia   Affect: congruent and appropriate   Orientation: Oriented x 4   Memory: STM problem per patient   Attention & Concentration: Impaired per patient   Fund of General Knowledge: intact and appropriate to age and level of education   Abstract Reasoning: No deficits noted.   Judgment & Insight: good   Language: intact   Behavioral Observations: Arrived on time. Good eye contact. No signs of psychomotor agitation or retardation.     Treatment plan:  · Target symptoms: Anxiety, adjustment to disability  · Why chosen therapy is appropriate versus another modality: relevant to diagnosis, patient responds to this modality, evidence based practice  · Outcome monitoring methods: self-report, checklist/rating scale  · Therapeutic intervention type: behavior modifying psychotherapy    Risk parameters:  Patient reports no suicidal ideation  Patient reports no homicidal ideation  Patient reports no self-injurious behavior  Patient reports no violent behavior    Verbal deficits: None    Patient's response to intervention:  The patient's response to intervention is accepting.    Progress toward goals and other mental status changes:  The patient's progress toward goals is good.    Diagnosis:     ICD-10-CM ICD-9-CM   1. Adjustment disorder with anxiety  F43.22 309.24       Plan: Individual psychotherapy, regular CBT sessions. Patient agrees with this plan and appears capable of adhering to it.    Return to clinic: 2 weeks    Length of Service: 50 minutes    Telemedicine Details  The patient location is: [place], [place]  The chief complaint leading to consultation is: Anxiety, adjustment to illness    Visit type: audiovisual    Face to Face time with patient: 50 minutes  50 minutes of total time spent on the encounter, which includes face to face time and non-face to face time preparing to see the patient (eg, review of tests), Obtaining and/or reviewing  separately obtained history, Documenting clinical information in the electronic or other health record, Independently interpreting results (not separately reported) and communicating results to the patient/family/caregiver, or Care coordination (not separately reported).     Each patient to whom he or she provides medical services by telemedicine is:  (1) informed of the relationship between the physician and patient and the respective role of any other health care provider with respect to management of the patient; and (2) notified that he or she may decline to receive medical services by telemedicine and may withdraw from such care at any time.

## 2022-07-21 ENCOUNTER — OFFICE VISIT (OUTPATIENT)
Dept: PSYCHIATRY | Facility: CLINIC | Age: 52
End: 2022-07-21
Payer: OTHER GOVERNMENT

## 2022-07-21 DIAGNOSIS — U09.9 COVID-19 LONG HAULER: Primary | ICD-10-CM

## 2022-07-21 PROCEDURE — 99499 UNLISTED E&M SERVICE: CPT | Mod: S$PBB,,, | Performed by: SOCIAL WORKER

## 2022-07-21 PROCEDURE — 99499 NO LOS: ICD-10-PCS | Mod: S$PBB,,, | Performed by: SOCIAL WORKER

## 2022-07-22 NOTE — PROGRESS NOTES
Group Psychotherapy    Site: [place]    Clinical status of patient: Outpatient    7/21/2022    Length of service:[id number]    Referred by: self     Number of patients in attendance: 12    Target symptoms: anxiety , adjustment, Long Haulers Syndrome    Patient's response to intervention:  The patient's response to intervention is active listening, frequent questions, self-disclosure, feedback to other patients.    Progress toward goals and other mental status changes:  The patient's progress toward goals is fair .    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  Receives validation, support, from group members.  Appears optimistic despite the many changes and setbacks.  A welcome addition to the group.      Diagnosis: Long Haulers Syndrome    Plan: group psychotherapy    Return to clinic: as scheduled

## 2022-07-24 ENCOUNTER — PATIENT MESSAGE (OUTPATIENT)
Dept: FAMILY MEDICINE | Facility: CLINIC | Age: 52
End: 2022-07-24
Payer: OTHER GOVERNMENT

## 2022-07-24 DIAGNOSIS — I47.9 TACHYCARDIA, PAROXYSMAL: Primary | ICD-10-CM

## 2022-07-25 NOTE — TELEPHONE ENCOUNTER
This has been fully explained to the patient, who indicates understanding.  Phone number given to juan krishnan.

## 2022-07-26 ENCOUNTER — PATIENT MESSAGE (OUTPATIENT)
Dept: FAMILY MEDICINE | Facility: CLINIC | Age: 52
End: 2022-07-26
Payer: OTHER GOVERNMENT

## 2022-07-27 DIAGNOSIS — Z12.11 COLON CANCER SCREENING: ICD-10-CM

## 2022-07-28 ENCOUNTER — TELEPHONE (OUTPATIENT)
Dept: FAMILY MEDICINE | Facility: CLINIC | Age: 52
End: 2022-07-28
Payer: OTHER GOVERNMENT

## 2022-07-28 ENCOUNTER — PATIENT MESSAGE (OUTPATIENT)
Dept: ADMINISTRATIVE | Facility: HOSPITAL | Age: 52
End: 2022-07-28
Payer: OTHER GOVERNMENT

## 2022-07-28 DIAGNOSIS — Z12.11 SCREENING FOR COLON CANCER: ICD-10-CM

## 2022-07-28 NOTE — TELEPHONE ENCOUNTER
Call placed to pt in regards to disability paper work, will place at the  for pickup and scan a copy into pts chart.

## 2022-08-01 ENCOUNTER — PATIENT MESSAGE (OUTPATIENT)
Dept: ADMINISTRATIVE | Facility: HOSPITAL | Age: 52
End: 2022-08-01
Payer: OTHER GOVERNMENT

## 2022-08-03 ENCOUNTER — PATIENT MESSAGE (OUTPATIENT)
Dept: NEUROSURGERY | Facility: CLINIC | Age: 52
End: 2022-08-03
Payer: OTHER GOVERNMENT

## 2022-08-03 ENCOUNTER — TELEPHONE (OUTPATIENT)
Dept: FAMILY MEDICINE | Facility: CLINIC | Age: 52
End: 2022-08-03
Payer: OTHER GOVERNMENT

## 2022-08-03 NOTE — TELEPHONE ENCOUNTER
I spoke with pt via phone, I advised her that [facility] is not performing the test at this time. Phone number given to [facility].     ----- Message from Lorie Vail sent at 8/3/2022  9:39 AM CDT -----  Regarding: TIlt Table Tests @ [facility]  I see that this PT has been ordered a Tilt Table Test, however [facility] is not preforming this test at this time.  We do not have a date of when we will resume Tilt Table Testing, therefore you may want to send this order to another facility.    I understand that [facility] is doing them.    Thank you,   Lorie  [facility] Scheduling Team

## 2022-08-04 ENCOUNTER — OFFICE VISIT (OUTPATIENT)
Dept: PSYCHIATRY | Facility: CLINIC | Age: 52
End: 2022-08-04
Payer: OTHER GOVERNMENT

## 2022-08-04 DIAGNOSIS — U09.9 COVID-19 LONG HAULER: Primary | ICD-10-CM

## 2022-08-04 PROCEDURE — 99499 NO LOS: ICD-10-PCS | Mod: S$PBB,,, | Performed by: SOCIAL WORKER

## 2022-08-04 PROCEDURE — 99499 UNLISTED E&M SERVICE: CPT | Mod: S$PBB,,, | Performed by: SOCIAL WORKER

## 2022-08-05 ENCOUNTER — PATIENT OUTREACH (OUTPATIENT)
Dept: ADMINISTRATIVE | Facility: HOSPITAL | Age: 52
End: 2022-08-05
Payer: OTHER GOVERNMENT

## 2022-08-05 NOTE — PROGRESS NOTES
Fit kit result GAP report-I spoke to pt regarding her Fit kit that was not mailed back. Pt stated she will complete and mail back.

## 2022-08-05 NOTE — PROGRESS NOTES
Group Psychotherapy    Site: [place]    Clinical status of patient: Outpatient    8/4/2022    Length of service:[id number]    Referred by: Nikki Travis LCSW     Number of patients in attendance:  14    Target symptoms: anxiety , adjustment, Long COVID    Patient's response to intervention:  The patient's response to intervention is active listening, frequent questions, self-disclosure, feedback to other patients.    Progress toward goals and other mental status changes:  The patient's progress toward goals is good.    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  Engaged in Mindfulness exercise with group and contributed to the process.      Diagnosis: Long Haulers Syndrome    Plan: group psychotherapy    Return to clinic: as scheduled

## 2022-08-09 ENCOUNTER — OFFICE VISIT (OUTPATIENT)
Dept: NEUROSURGERY | Facility: CLINIC | Age: 52
End: 2022-08-09
Payer: OTHER GOVERNMENT

## 2022-08-09 ENCOUNTER — TELEPHONE (OUTPATIENT)
Dept: NEUROSURGERY | Facility: CLINIC | Age: 52
End: 2022-08-09

## 2022-08-09 DIAGNOSIS — E34.8 CYST OF PINEAL GLAND: ICD-10-CM

## 2022-08-09 DIAGNOSIS — U09.9 LONG COVID: Primary | ICD-10-CM

## 2022-08-09 DIAGNOSIS — R41.841 COGNITIVE COMMUNICATION DISORDER: Primary | ICD-10-CM

## 2022-08-09 PROCEDURE — 99213 PR OFFICE/OUTPT VISIT, EST, LEVL III, 20-29 MIN: ICD-10-PCS | Mod: 95,,, | Performed by: NEUROLOGICAL SURGERY

## 2022-08-09 PROCEDURE — 99213 OFFICE O/P EST LOW 20 MIN: CPT | Mod: 95,,, | Performed by: NEUROLOGICAL SURGERY

## 2022-08-09 NOTE — PROGRESS NOTES
Established Patient - Audio Only Telehealth Visit     The patient location is: home  The chief complaint leading to consultation is: follow up  Visit type: Virtual visit with audio only (telephone)  Total time spent with patient: 15 minutes       The reason for the audio only service rather than synchronous audio and video virtual visit was related to technical difficulties or patient preference/necessity.     Each patient to whom I provide medical services by telemedicine is:  (1) informed of the relationship between the physician and patient and the respective role of any other health care provider with respect to management of the patient; and (2) notified that they may decline to receive medical services by telemedicine and may withdraw from such care at any time. Patient verbally consented to receive this service via voice-only telephone call.     This service was not originating from a related E/M service provided within the previous 7 days nor will  to an E/M service or procedure within the next 24 hours or my soonest available appointment.  Prevailing standard of care was able to be met in this audio-only visit.      Subjective:   I, Lili Travis, attest that this documentation has been prepared under the direction and in the presence of Adan Ybarra MD.     Patient ID: Jessy Mayberry is a 52 y.o. female     Chief Complaint: No chief complaint on file.        HPI  Ms. Jessy Mayberry is a 52 y.o. woman with adrenal insufficiency, cognitive communication disorder, presenting today for follow up. This is a pt who was diagnosed with COVID-19 in August 2021, with significant respiratory and neurological symptoms. An MRI brain was done for further evaluation and revealed pineal cyst. At the time of our last visit on 6/7/2022, the pt reports occasional brain fog. Today the pt reports no change in symptoms. She reports a recent episode in which the pt was hypertensive and tachycardic, however, she notes the  episode had resolved on its own since onset. At the time of the incident, pt reports SOB and lying down for relief. She reports no secondary episode since the first incident, but will occasionally experience dizzy spells. Pt does not currently follow up with her endocrinologist, however, she is in contact with her PCP.    Review of Systems   Constitutional: Negative for activity change, appetite change, fatigue, fever and unexpected weight change.   HENT: Negative for facial swelling.    Eyes: Negative.    Respiratory: Negative.    Cardiovascular: Negative.    Gastrointestinal: Negative for diarrhea, nausea and vomiting.   Endocrine: Negative.    Genitourinary: Negative.    Musculoskeletal: Negative for back pain, joint swelling, myalgias and neck pain.   Neurological: Positive for dizziness. Negative for seizures, weakness, numbness and headaches.        + brain fog   Psychiatric/Behavioral: Negative.       Past Medical History:   Diagnosis Date    Adrenal insufficiency     Hypothyroidism     Pituitary mass        Objective:      Physical Exam  Constitutional:       General: She is not in acute distress.     Appearance: Normal appearance.   HENT:      Head: Normocephalic and atraumatic.   Neurological:      Mental Status: She is alert and oriented to person, place, and time.       IMAGING:  MRI Brain Functional (7/12/2022):  [facility]. Resting state fMRI and DTI to be post processed and analyzed separately by proprietary third party software. No evidence of acute intracranial pathology. Images can be used purposes of procedure localization.  Analysis by [id number] showed decreased connection between hemisphere and associative areas.     I have personally reviewed the images with the pt.      I, Dr. Adan Ybarra, personally performed the services described in this documentation. All medical record entries made by the scribe, Lili Travis, were at my direction and in my presence.  I have  reviewed the chart and agree that the record reflects my personal performance and is accurate and complete. Adan Ybarra MD. 08/09/2022    Assessment:       Pituitary mass     Plan:   I have personally reviewed the fMRI with the pt which shows omniscient protocol MR examination. Resting state fMRI and DTI to be post processed and analyzed separately by proprietary third party software. No evidence of acute intracranial pathology. Images can be used purposes of procedure localization.    I recommend neuro-psych evaluation. I will refer the pt to neurology for further workup.

## 2022-08-09 NOTE — PATIENT INSTRUCTIONS
I have personally reviewed the fMRI with the pt which shows omniscient protocol MR examination. Resting state fMRI and DTI to be post processed and analyzed separately by proprietary third party software. No evidence of acute intracranial pathology. Images can be used purposes of procedure localization.    I recommend neuro-psych evaluation. I will refer the pt to neurology for further workup.

## 2022-08-11 ENCOUNTER — OFFICE VISIT (OUTPATIENT)
Dept: PSYCHIATRY | Facility: CLINIC | Age: 52
End: 2022-08-11
Payer: OTHER GOVERNMENT

## 2022-08-11 DIAGNOSIS — U09.9 COVID-19 LONG HAULER: Primary | ICD-10-CM

## 2022-08-11 PROCEDURE — 99499 NO LOS: ICD-10-PCS | Mod: S$PBB,,, | Performed by: SOCIAL WORKER

## 2022-08-11 PROCEDURE — 99499 UNLISTED E&M SERVICE: CPT | Mod: S$PBB,,, | Performed by: SOCIAL WORKER

## 2022-08-12 ENCOUNTER — PATIENT MESSAGE (OUTPATIENT)
Dept: PSYCHIATRY | Facility: CLINIC | Age: 52
End: 2022-08-12
Payer: OTHER GOVERNMENT

## 2022-08-12 NOTE — PROGRESS NOTES
"Individual Psychotherapy  Date: 7/18/2022  Site: [facility] (telemedicine visit)      Session number: 6  Present in session: Patient  Therapeutic Intervention: Cognitive behavioral therapy; Outpatient - Behavior modifying psychotherapy 45 min - CPT code 09126  Chief complaint/reason for encounter: Anxiety, adjustment to disability    Interval history and content of current session: Patient reported decreased anxiety and improved mood over the past 10 days ("I've been pretty good, not totally sure why."). Patient stated that she continues to struggle with fatigue, low energy, and cognitive impairments ("brain fog"). She noted that it has been a challenge to use the pacing strategies introduced in previous sessions. She explained that certain activities involve a surprising amount of energy whereas in other situations she engages in tasks without considering her energy level, energy reserves, and plan for the day. She described how washing her chihuahua was an overexertion event that resulted in low energy for the following day. As another example of energy mismanagement, she noted that she made a round-trip drive to [place] with her daughter, which caused her to feel exhausted. We reviewed the process of making "energy-use predictions" prior to engaging in an activity. Also reviewed seeking social support as a skill. Patient described her fatigue as an unpredictable mystery. Encouraged her to return to energy tracking exercise. Patient stated that she continues to use breathing exercises as a relaxation strategy. This was a termination session. We reviewed course of psychotherapy. Thanked patient for opportunity to participate in her care. Encouraged her to follow up as needed.     Mental Status Exam  General Appearance:  unremarkable, age appropriate, dressed neatly, good grooming and hygiene   Speech: normal tone, normal rate, normal pitch, normal volume      Level of Cooperation: " cooperative      Thought Processes: normal and logical   Mood: anxious      Thought Content: normal, no suicidality, no homicidality, delusions, or paranoia   Affect: congruent and appropriate   Orientation: Oriented x 4   Memory: STM problem per patient   Attention & Concentration: Impaired per patient   Fund of General Knowledge: intact and appropriate to age and level of education   Abstract Reasoning: No deficits noted.   Judgment & Insight: good   Language: intact   Behavioral Observations: Arrived on time. Good eye contact. No signs of psychomotor agitation or retardation.     Treatment plan:  · Target symptoms: Anxiety, adjustment to disability  · Why chosen therapy is appropriate versus another modality: relevant to diagnosis, patient responds to this modality, evidence based practice  · Outcome monitoring methods: self-report, checklist/rating scale  · Therapeutic intervention type: behavior modifying psychotherapy    Risk parameters:  Patient reports no suicidal ideation  Patient reports no homicidal ideation  Patient reports no self-injurious behavior  Patient reports no violent behavior    Verbal deficits: None    Patient's response to intervention:  The patient's response to intervention is accepting.    Progress toward goals and other mental status changes:  The patient's progress toward goals is good.    Diagnosis:     ICD-10-CM ICD-9-CM   1. Adjustment disorder with anxiety  F43.22 309.24       Plan: Individual psychotherapy, regular CBT sessions. Patient agrees with this plan and appears capable of adhering to it.    Return to clinic: as needed    Length of Service: 50 minutes    Telemedicine Details  The patient location is: [place], [place]  The chief complaint leading to consultation is: Anxiety, adjustment to illness    Visit type: audiovisual    Face to Face time with patient: 50 minutes  50 minutes of total time spent on the encounter, which includes face to face time and non-face to face time  preparing to see the patient (eg, review of tests), Obtaining and/or reviewing separately obtained history, Documenting clinical information in the electronic or other health record, Independently interpreting results (not separately reported) and communicating results to the patient/family/caregiver, or Care coordination (not separately reported).     Each patient to whom he or she provides medical services by telemedicine is:  (1) informed of the relationship between the physician and patient and the respective role of any other health care provider with respect to management of the patient; and (2) notified that he or she may decline to receive medical services by telemedicine and may withdraw from such care at any time.

## 2022-08-19 NOTE — PROGRESS NOTES
Group Psychotherapy    Site: [place]    Clinical status of patient: Outpatient    8/11/2022    Length of service:[id number]    Referred by: self     Number of patients in attendance: 14    Target symptoms: recurrent depression, anxiety , adjustment, Long Haulers Syndrome    Patient's response to intervention:  The patient's response to intervention is active listening, self-disclosure, feedback to other patients.    Progress toward goals and other mental status changes:  The patient's progress toward goals is fair .    Interval history: Active listening and engages well with others. Shares current status.     Diagnosis: Long Haulers Syndrome    Plan: group psychotherapy    Return to clinic: as scheduled

## 2022-08-20 LAB — HEMOCCULT STL QL IA: NEGATIVE

## 2022-08-26 ENCOUNTER — PATIENT MESSAGE (OUTPATIENT)
Dept: OBSTETRICS AND GYNECOLOGY | Facility: CLINIC | Age: 52
End: 2022-08-26
Payer: OTHER GOVERNMENT

## 2022-09-21 ENCOUNTER — PATIENT MESSAGE (OUTPATIENT)
Dept: NEUROSURGERY | Facility: CLINIC | Age: 52
End: 2022-09-21
Payer: OTHER GOVERNMENT

## 2022-09-21 ENCOUNTER — PATIENT MESSAGE (OUTPATIENT)
Dept: FAMILY MEDICINE | Facility: CLINIC | Age: 52
End: 2022-09-21
Payer: OTHER GOVERNMENT

## 2022-09-29 ENCOUNTER — OFFICE VISIT (OUTPATIENT)
Dept: PSYCHIATRY | Facility: CLINIC | Age: 52
End: 2022-09-29
Payer: OTHER GOVERNMENT

## 2022-09-29 DIAGNOSIS — U09.9 COVID-19 LONG HAULER: Primary | ICD-10-CM

## 2022-09-29 PROCEDURE — 99499 UNLISTED E&M SERVICE: CPT | Mod: S$PBB,,, | Performed by: SOCIAL WORKER

## 2022-09-29 PROCEDURE — 99499 NO LOS: ICD-10-PCS | Mod: S$PBB,,, | Performed by: SOCIAL WORKER

## 2022-09-30 NOTE — PROGRESS NOTES
Group Psychotherapy    Site: [place]    Clinical status of patient: Outpatient    9/29/2022    Length of service:[id number]    Referred by: self     Number of patients in attendance: 12    Target symptoms: recurrent depression, anxiety , adjustment, Long Haulers    Patient's response to intervention:  The patient's response to intervention is active listening, self-disclosure, feedback to other patients.    Progress toward goals and other mental status changes:  The patient's progress toward goals is fair .    Interval history: Speaks to her own challenges with Long COVID, draining, fatigue, challenging her in ways not experienced before.  Validated for others their shared experience(s).  Viewed video re: changing how we think about stress and employing comparison(s) in reframing current status.  Shared with group.  Appears dysthymic although steady.       Diagnosis: Long Haulers Syndrome    Plan: group psychotherapy    Return to clinic: as scheduled

## 2022-10-26 ENCOUNTER — OFFICE VISIT (OUTPATIENT)
Dept: NEUROLOGY | Facility: CLINIC | Age: 52
End: 2022-10-26
Payer: OTHER GOVERNMENT

## 2022-10-26 DIAGNOSIS — R41.841 COGNITIVE COMMUNICATION DISORDER: ICD-10-CM

## 2022-10-26 DIAGNOSIS — F43.22 ADJUSTMENT DISORDER WITH ANXIETY: Primary | ICD-10-CM

## 2022-10-26 PROCEDURE — 99499 UNLISTED E&M SERVICE: CPT | Mod: 95,,, | Performed by: CLINICAL NEUROPSYCHOLOGIST

## 2022-10-26 PROCEDURE — 90791 PR PSYCHIATRIC DIAGNOSTIC EVALUATION: ICD-10-PCS | Mod: FQ,95,, | Performed by: CLINICAL NEUROPSYCHOLOGIST

## 2022-10-26 PROCEDURE — 90791 PSYCH DIAGNOSTIC EVALUATION: CPT | Mod: FQ,95,, | Performed by: CLINICAL NEUROPSYCHOLOGIST

## 2022-10-26 PROCEDURE — 99499 NO LOS: ICD-10-PCS | Mod: 95,,, | Performed by: CLINICAL NEUROPSYCHOLOGIST

## 2022-10-26 NOTE — PROGRESS NOTES
NEUROPSYCHOLOGICAL EVALUATION - CONFIDENTIAL    Referring Provider: Adan Ybarra MD  Medical Necessity: Evaluate cognitive and emotional functioning, participate in treatment planning/management, and provide supportive therapy in the setting of cognitive communication disorder.  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 We discussed the limits of confidentiality and discussed an emergency plan.    Telemedicine Details:   Established Patient - Audio Only Telehealth Visit  The patient location is: home  The chief complaint leading to consultation is: cognitive communication disorder  Visit type: Virtual visit with audio only (telephone)  Total time spent with patient: 50 minutes   The reason for the audio only service rather than synchronous audio and video virtual visit was related to technical difficulties or patient preference/necessity.   Each patient to whom I provide medical services by telemedicine is:  (1) informed of the relationship between the physician and patient and the respective role of any other health care provider with respect to management of the patient; and (2) notified that they may decline to receive medical services by telemedicine and may withdraw from such care at any time. Patient verbally consented to receive this service via voice-only telephone call.     ASSESSMENT & PLAN:   Ms. Jessy Mayberry is an 52 y.o., female with 18 years of education and pertinent medical history including ADD, adrenal insufficiency, cognitive communication disorder, adjustment disorder with anxiety  who was referred for a neuropsychological evaluation in the setting of cognitive difficulties.     Full report to follow  "completion of testing.   Problem List Items Addressed This Visit          Neuro    Cognitive communication disorder       Psychiatric    Adjustment disorder with anxiety - Primary   Thank you for allowing me to assist in Ms. Jessy Mayberry's care. If you have any questions, please contact me at [phone].      Anne Plasencia, PhD  Licensed Clinical Neuropsychologist  [facility]     CLINICAL INTERVIEW & RECORD REVIEW:     Cognitive Functioning   Cognitive screener: none  Previous evaluation(s): none  Onset & course of difficulty: At her cognitive baseline until suffering from COVID-19 in August 2021. She was hospitalized for 3 days (8/6/2021 - 8/9/2021) and remained on room air. Symptoms reportedly experienced include the following: "headaches, tinnitus, heat intolerance/AC on high blowing, excessive sweat when outside (abn. sweating, like dripping off elbows with just sitting), palpitations, neuropathies, weakness, balance issues, falls, memory loss, can't think/plan/ focus/sustain, extreme fatigue with or without activity, leg jerks/spasm." An LP was unremarkable and MRI brain and MRI spine were unrevealing of any acute processes. Following discharge, she attended ST, OT, and PT on an outpatient basis and reported significant benefit. She attended outpatient psychotherapy but stopped attending because she did not find this beneficial. Her symptoms, unfortunately, have largely persisted over time. She reports that her cognitive symptoms have "gotten a little better," but she believes this is largely due to learning ways to cope with the changes as opposed to actual improvement. She has been placed on long-term disability.   Fluctuations: none  Severity of changes: "4/10" where 10/10 represents baseline level of cognitive functioning   Examples:   Attention/Working Memory/Executive Functioning: Mid-twenties diagnosed with ADD and has taken Adderall throughout " "her adult life with periodic stops in the interim. issues with time management. Hard time focusing her attention and keeping up with attention. Taking her "forever" to do a task. Took her much longer to organize a task. Planning much harder. Confusing details and mixing up, structuring. Multitasking problematic.   Processing Speed: harder time processing information, trying to focus on just a few things at a time. "Can't process on a dime anymore"  Language: word-finding difficulty, no comprehension difficulty. Difficulty retaining information she reads. Sometimes can't handle conversations so will go take a shower.   Visuospatial: was driving a much bigger car but now driving a much smaller car because she is having trouble judging distances. Driving makes her fatigued easily so can't go far, can't have radio on, is now driving too slowly.   Learning & Memory: forgetful of whole conversations. Leaves sticky notes and forgets why. Forgot her friend was coming to visit. Forgets to turn her car off. Misplacing items around her house.   Exacerbating factors: times when it's worse, like when there is another distraction present. When fatigue is really really bad, can't handle any stimulation.   Ameliorating factors: rest. Trying to get her thoughts right. Wraps herself up and it helps her simmer. I am not a tiffanie medication person, so I try to do my breathing and go into a quiet room and try to wrap up. If doesn't work, takes her medications.   Medication for cognition: Adderall         Daily Functioning   ADLs:    Bathing: Independent and without difficulty  Dressing: Independent and without difficulty  Grooming: Independent and without difficulty   Toileting: Independent and without difficulty  Transferring: Independent and without difficulty.  Eating: Independent and without difficulty.   IADLs:    Finances: paying some bills twice and others not paying. Happening all the time and her credit score has gone to shit " "now. Even with automatic bill pay.   Medication Mgmt: Independent and without difficulty. Doesn't take them all at the same time each day, but taking them.   Driving: was driving a much bigger car but now driving a much smaller car because she is having trouble judging distances. Driving makes her fatigued easily so can't go far, can't have radio on, is now driving too slowly.   Household Mgmt:  handles all of it. She has stacks of clothes piled up because it's a lot to put in the drawers.  Cooking/Meal Preparation: family managing. Very fatiguing for her, multitasking.   Shopping: family managing. Very fatiguing for her, multitasking.   Appointment Mgmt: puts reminders out and does group therapy every other Thursday. Will repeat this all week to try to remember. Then missed her appt in the end. Didn't even realize until 2 days later. Common.   Employment: out on long-term disability   Other: Can't help kids with their homework right now. Has to eliminate to process and that is something that she had to cut out. Her whole family has adapted but she feels it's terrible. Feels like she has an invisible disability.     Psychiatric/Neuropsychiatric Symptoms   Depression: no  Cassie/Hypomania: no  Anxiety: "I have been labeled as anxious but I am not." Everyone tries to tell her she has anxiety but "this is not anxiety."  Stress: not engaging in things that would make her stressed before so not getting stressed.   Social Withdrawal: yes. Used to be very social but now can't do all of that anymore. Over stimulating and gets   Neurovegetative Sxs:  Appetite: varies. Starving yesterday and then started eating and got tired.   Sleep: varies. Sometimes it's hard to fall asleep & stay asleep, and then other times she will sleep a long time. No sleep apnea. No dream enactment. Sleeps with a weighted blanket and a papouse.   Energy: fatigue    Hallucinations: no  Delusional/Paranoid Thinking: no  Impulsivity: " "no  Obsessive/Compulsive Behaviors: no  Disinhibition: no  Irritability/Agitation: yes. No patience.   Aggression: no  Apathy/Indifference: yes  Other changes in personality: choices - she used to be very involved in family issues. She is the peace keeper. But now feels like she doesn't have time for that now. Avoids some conversations because she can't listen to it. So for those reasons she is withdrawn, that and not recalling conversations.     Physical Functioning   headaches, tinnitus, heat intolerance/AC on high blowing, excessive sweat when outside (abn. sweating, like dripping off elbows with just sitting), palpitations, neuropathies, weakness, balance issues, falls, memory loss, can't think/plan/ focus/sustain, extreme fatigue with or without activity, leg jerks/spasm."   Lightheadedness: yes  Urinary Urgency: no  Sensory Sxs: yes - feels like she gets overstimulated. She notes of new onset of vision changes and wears prescription glasses. Nerve endings in legs are problematic.   Pain: yes  Physical Exercise Routine:  wants her to go walking, but if does, then it will wipe her out      RELEVANT HISTORY  This patient has a past medical history of Adrenal insufficiency, Hypothyroidism, and Pituitary mass.    Past Surgical History:   Procedure Laterality Date    ANKLE SURGERY      CARDIAC ELECTROPHYSIOLOGY MAPPING AND ABLATION      DILATION AND CURETTAGE OF UTERUS      3 times    INGUINAL HERNIA REPAIR      twice    TONSILLECTOMY       Neurological History    Headaches/Migraines: yes  TBI: no  Seizures: no  Stroke: no  Tumor: no   Previous Episodes of Delirium: no  Movement Disorder: no  CNS Infection: no  Other:   COVID-19 infection.   pineal cyst     Neurodiagnostics     Results for orders placed or performed during the hospital encounter of 07/12/22   MRI Brain Functional WO a physician    Narrative    EXAMINATION:  MRI BRAIN FUNCTIONAL WITHOUT A PHYSICIAN    CLINICAL HISTORY:  Cognitive communication " deficit    TECHNIQUE:  Diffusion tensor imaging performed, with ADC, TRACE, FA, color FA maps generated.    Resting state BOLD fMRI sequence.    Volumetric T1 MP rage imaging of the brain following the administration of 8 mL Gadavist intravenous contrast.  Axial, sagittal coronal reconstructions were performed.    COMPARISON:  01/11/2022    FINDINGS:  Ventricles are stable in size.  No evidence of hydrocephalus.    Brain parenchyma unchanged on these limited pulse sequences.  No new mass, hemorrhage, edema or infarction.  No enhancing lesions.    No new extra-axial blood, mass or fluid collections are identified.    The vascular structures are unremarkable.      Impression    Omniscient protocol MR examination.    Resting state fMRI and DTI to be post processed and analyzed separately by proprietary third party software.    No evidence of acute intracranial pathology.    Images can be used purposes of procedure localization.      Electronically signed by: Jose Luis Rapp MD  Date:    07/12/2022  Time:    11:47   Results for orders placed or performed during the hospital encounter of 11/30/21   MRI Brain Without Contrast    Narrative    EXAMINATION:  MRI BRAIN WITHOUT CONTRAST    CLINICAL HISTORY:  Evaluate for MS;.  Personal history of COVID-19    TECHNIQUE:  Multiplanar multisequence MR imaging of the brain was performed without the administration of intravenous contrast.    COMPARISON:  None.    FINDINGS:  Ventricles and sulci are normal in size for age without evidence of hydrocephalus. No extra-axial blood or fluid collections.    Pineal gland appears enlarged and measures 10 mm in AP diameter, more conspicuous on today's sagittal FLAIR sequence.  The T2 weighted imaging demonstrates a cystic lesion with internal septations, which does not appear significantly changed in size as compared to the prior study.  However, it now demonstrates incomplete suppression on FLAIR, representing a change since 08/06/2021,  possibly reflecting interval hemorrhage or development of proteinaceous fluid.    Otherwise, the brain parenchyma appears within normal limits for the patient's age.  No white matter lesions or interval detrimental change as compared to the prior study.  Diffusion-weighted images demonstrate no evidence of an acute infarct.   Susceptibility weighted images demonstrate no evidence of acute or chronic hemorrhage. No mass effect or midline shift.    Normal vascular flow voids are preserved.    Bone marrow signal intensity is normal. Paranasal sinuses and mastoid air cells are essentially clear. Orbits are unremarkable.      Impression    1. Enlarged pituitary gland with nonspecific complex cystic lesion, not significantly changed in size as compared to the prior study on 08/06/2021.  However, there is new incomplete suppression on FLAIR, possibly reflecting interval hemorrhage or development of proteinaceous fluid.  Primary differential considerations include pineal cyst versus cystic pineal neoplasm, such as a pineocytoma.  Further evaluation with pituitary protocol MRI with and without contrast recommended.  2. Otherwise, no evidence of an acute intracranial abnormality or significant interval change in the appearance of the brain parenchyma as compared to the prior study.  This report was flagged in Epic as abnormal.      Electronically signed by: Chino Trent  Date:    12/01/2021  Time:    08:45   Results for orders placed or performed during the hospital encounter of 08/06/21   CT Head Without Contrast    Narrative    All CT scans at this facility used dose modulation, iterative reconstruction and/or weight-based dosing when appropriate to reduce radiation doses  as low as reasonably achievable.    CLINICAL INFORMATION:  Bilateral leg numbness    FINDINGS:   The ventricles and sulci are normal in size and configuration for age.  There is no intraparenchymal hemorrhage, mass or midline shift.  There are no  extra-axial fluid collections.  The paranasal sinus and mastoid air cells are clear.    IMPRESSION:   NO ACUTE INTRACRANIAL PROCESS.    Electronically signed by:  Shahrzad Felix MD  8/6/2021 4:46 PM CDT Workstation: [id number]       Had EMG performed. She was told she has carpal tunnel in her right upper extremity and no other etiology was found.    Pertinent Lab Work     Lab Results   Component Value Date    [id number] 343 08/07/2021     No results found for: RPR  No results found for: FOLATE  Lab Results   Component Value Date    TSH 1.544 11/19/2021     Lab Results   Component Value Date    HGBA1C 5.4 08/07/2021     No results found for: HIV1X2, [id number]      Medications     Current Outpatient Medications   Medication Instructions    ALPRAZolam (XANAX) 0.5 mg, Oral, Daily PRN    cyanocobalamin 1,000 mcg, Intramuscular, Every 28 days    dextroamphetamine-amphetamine (ADDERALL XR) 30 MG 24 hr capsule Oral, Every morning    estradioL (ESTRING) 2 mg, Vaginal, Once    FLUoxetine 20 mg, Oral, Daily    gabapentin (NEURONTIN) 300 MG capsule No dose, route, or frequency recorded.    hydrOXYzine HCL (ATARAX) 25 MG tablet No dose, route, or frequency recorded.    loratadine (CLARITIN) 10 mg tablet No dose, route, or frequency recorded.    meloxicam (MOBIC) 15 mg, Oral, Daily PRN    sumatriptan (IMITREX) 25 MG Tab 1 tablet at least 2 hours between doses as needed    SYNTHROID 75 mcg, Oral, Daily    triamcinolone acetonide 0.025% (KENALOG) 0.025 % cream Topical (Top), 2 times daily     Psychiatric History   Prior Diagnoses: ADD, adjustment disorder with anxiety   History of Trauma/Abuse: no  History of Suicide Attempts: no  Current Ideation, Intention, or Plan: no  Homicidal Ideation: no   Medication(s):   Adderall 30 mg extended release so she has 10 mg of non-extended release.   Xanax 0.5 mg   Fluoxetine 20 mg  Hospitalization(s): no  Psychotherapy/Counseling:   Marriage counseling (past)   Attempted virtual talk therapy  through [facility]. Doesn't feel like it helped. It was actually making her feel worse. Felt like it was a constant reminder/reality check so didn't want to do it. 8 weeks and then stopped.   Other: no     Substance Use History     Social History     Tobacco Use    Smoking status: Never    Smokeless tobacco: Never   Substance and Sexual Activity    Alcohol use: Not Currently    Drug use: Not on file    Sexual activity: Not on file     History of abuse/overuse: drinks wine maybe once or twice weekly     Family Neurological & Psychiatric History       Family History   Problem Relation Age of Onset    Breast cancer Maternal Aunt 42    Bone cancer Maternal Aunt     Cancer Maternal Aunt     Rheum arthritis Maternal Aunt     Hepatitis Maternal Aunt     Psoriasis Child     Hypertension Mother     Coronary artery disease Mother     Hyperlipidemia Mother     Cancer Mother     Hypertension Father     Retinal detachment Father     Atrial fibrillation Sister     Hypertension Brother     Rheum arthritis Daughter     Asthma Daughter     Hypertension Son     Heart disease Maternal Grandmother     Hypertension Maternal Grandmother     Kidney failure Maternal Grandmother     Atrial fibrillation Maternal Grandmother     Melanoma Neg Hx     Lupus Neg Hx      Neurologic: mother is 79 and is very forgetful. Paternal grandfather had AD (later in age).   Psychiatric: Younger brother, 22, ASD. Other brother in his 50s has OCD, ADD, but it's all drug abuse related.     Development  Education   Born & raised: [place] - just moved back home 2 years ago. 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 Mid-twenties diagnosed with ADD and has taken Adderall throughout her adult life with periodic stops in the interim  Repeated  Grade(s): no  Typical Grades: C student        Occupation  Social    Service: no  Occupational Status: Long-term disability   Primary Occupation: FNP  Family Status: . 3 children (19 y/o son, 19 y/o daughter, 15 y/o daughter)  Support System: good - family   Hobbies/Activities: no longer participating in hobbies  Current Living Situation: lives at home with  and youngest child. Mother stays during the week to help out.        Legal History   Current: yes     OBJECTIVE:     MENTAL STATUS AND OBSERVATIONS:   Appearance: Unable to assess   Alertness: Attentive and alert.   Orientation:   O x 4    Gait:  Unable to assess   Psychomotor:  Unable to assess   Handedness:  Right   Vision & Hearing:  Adequate for session   Speech/language: Normal in rate, rhythm, tone, and volume. No significant word finding difficulty observed. Comprehension was normal.   Mood/Affect:  The patients mood and affect were frustrated and anxious. She was intermittently tearful.     Interpersonal Behavior:  Rapport was quickly and easily established    Suicidality/Homicidality: Denied   Hallucinations/Delusions:  None evidenced or endorsed   Thought Content: Logical   Though Processes: Goal-directed   Insight & Judgment:  Appropriate   Participation in Interview:  Full     PROCEDURES/TESTS ADMINISTERED: Performed a review of pertinent medical records, reviewed limits to confidentiality, conducted a clinical interview, and explained procedures.                   This service was not originating from a related E/M service provided within the previous 7 days nor will  to an E/M service or procedure within the next 24 hours or my soonest available appointment.  Prevailing standard of care was able to be met in this audio-only visit.

## 2022-10-27 DIAGNOSIS — Z12.31 OTHER SCREENING MAMMOGRAM: ICD-10-CM

## 2022-10-31 ENCOUNTER — PATIENT MESSAGE (OUTPATIENT)
Dept: ADMINISTRATIVE | Facility: HOSPITAL | Age: 52
End: 2022-10-31
Payer: OTHER GOVERNMENT

## 2022-11-15 ENCOUNTER — OFFICE VISIT (OUTPATIENT)
Dept: NEUROLOGY | Facility: CLINIC | Age: 52
End: 2022-11-15
Payer: OTHER GOVERNMENT

## 2022-11-15 DIAGNOSIS — F98.8 ATTENTION DEFICIT DISORDER, UNSPECIFIED HYPERACTIVITY PRESENCE: ICD-10-CM

## 2022-11-15 DIAGNOSIS — E34.8 CYST OF PINEAL GLAND: ICD-10-CM

## 2022-11-15 DIAGNOSIS — F45.0 SOMATIZATION DISORDER: ICD-10-CM

## 2022-11-15 DIAGNOSIS — R41.841 COGNITIVE COMMUNICATION DISORDER: Primary | ICD-10-CM

## 2022-11-15 DIAGNOSIS — F43.22 ADJUSTMENT DISORDER WITH ANXIETY: ICD-10-CM

## 2022-11-15 PROCEDURE — 99499 UNLISTED E&M SERVICE: CPT | Mod: S$PBB,,, | Performed by: CLINICAL NEUROPSYCHOLOGIST

## 2022-11-15 PROCEDURE — 96132 PR NEUROPSYCHOLOGIC TEST EVAL SVCS, 1ST HR: ICD-10-PCS | Mod: ,,, | Performed by: CLINICAL NEUROPSYCHOLOGIST

## 2022-11-15 PROCEDURE — 96139 PSYCL/NRPSYC TST TECH EA: CPT | Mod: ,,, | Performed by: CLINICAL NEUROPSYCHOLOGIST

## 2022-11-15 PROCEDURE — 96133 PR NEUROPSYCHOLOGIC TEST EVAL SVCS, EA ADDTL HR: ICD-10-PCS | Mod: ,,, | Performed by: CLINICAL NEUROPSYCHOLOGIST

## 2022-11-15 PROCEDURE — 96138 PR PSYCH/NEUROPSYCH TEST ADMIN/SCORING, BY TECH, 2+ TESTS, 1ST 30 MIN: ICD-10-PCS | Mod: ,,, | Performed by: CLINICAL NEUROPSYCHOLOGIST

## 2022-11-15 PROCEDURE — 99499 NO LOS: ICD-10-PCS | Mod: S$PBB,,, | Performed by: CLINICAL NEUROPSYCHOLOGIST

## 2022-11-15 PROCEDURE — 96138 PSYCL/NRPSYC TECH 1ST: CPT | Mod: ,,, | Performed by: CLINICAL NEUROPSYCHOLOGIST

## 2022-11-15 PROCEDURE — 99999 PR PBB SHADOW E&M-EST. PATIENT-LVL II: CPT | Mod: PBBFAC,,, | Performed by: CLINICAL NEUROPSYCHOLOGIST

## 2022-11-15 PROCEDURE — 99999 PR PBB SHADOW E&M-EST. PATIENT-LVL II: ICD-10-PCS | Mod: PBBFAC,,, | Performed by: CLINICAL NEUROPSYCHOLOGIST

## 2022-11-15 PROCEDURE — 96132 NRPSYC TST EVAL PHYS/QHP 1ST: CPT | Mod: ,,, | Performed by: CLINICAL NEUROPSYCHOLOGIST

## 2022-11-15 PROCEDURE — 96139 PR PSYCH/NEUROPSYCH TEST ADMIN/SCORING, BY TECH, 2+ TESTS, EA ADDTL 30 MIN: ICD-10-PCS | Mod: ,,, | Performed by: CLINICAL NEUROPSYCHOLOGIST

## 2022-11-15 PROCEDURE — 96133 NRPSYC TST EVAL PHYS/QHP EA: CPT | Mod: ,,, | Performed by: CLINICAL NEUROPSYCHOLOGIST

## 2022-11-15 PROCEDURE — 99212 OFFICE O/P EST SF 10 MIN: CPT | Mod: PBBFAC | Performed by: CLINICAL NEUROPSYCHOLOGIST

## 2022-11-18 NOTE — PROGRESS NOTES
ADDENDUM - 12/2/2022    Patient able to complete ADLs, but has significant difficulty doing so from a physical standpoint   Regarding the IADL of shopping, she has made errors. For example, she went to the grocery store and meant to buy 1 potato but ended up buying 6  Conversations cause significant fatigue and so she chooses to not listen to them otherwise it will take away from her other activities. Unfortunately, it is a trade off.   She has stacks of clothes piled up because it's a lot to put in the drawers (was making errors and putting clothing away in the wrong drawers or putting the wrong items in the drawers instead of clothes. This was becoming overwhelming as she would have to correct the error she made in addition to the original task).        NEUROPSYCHOLOGICAL EVALUATION - CONFIDENTIAL    Referring Provider: Adan Ybarra MD  Medical Necessity: Evaluate cognitive and emotional functioning, participate in treatment planning/management, and provide supportive therapy in the setting of cognitive communication disorder.  Date Conducted: 10/26/2022 & 11/15/2022  Present At Visit: the patient  Referral Diagnoses: R41.841 (ICD-10-CM) - Cognitive communication disorder     E34.8 (ICD-10-CM) - Cyst of pineal gland  Consent: The patient expressed an understanding of the purpose of the evaluation and consented to all procedures. We discussed the limits of confidentiality and discussed an emergency plan.    ASSESSMENT & PLAN:   Ms. Jessy Mayberry is an 52 y.o., female with 18 years of education and pertinent medical history including ADD, adrenal insufficiency, cognitive communication disorder, and adjustment disorder with anxiety who was referred for a neuropsychological evaluation in the setting of cognitive difficulties.     Compared to high average range premorbid estimates (based on both demographic information and a word reading test), results of the current evaluation reveal a pattern of mild executive  dysfunction, including reduced speeded verbal fluency performances, reduced single trial encoding/learning, and variable attention and processing speed. All other testing was within normal limits/at expectation.      This cognitive profile can be seen for a number of reasons, including when individuals have a history of ADHD as well as when psychological stress/distress impacts cognitive efficiency. Both are present for Ms. Mayberry, and both can exacerbate difficulties in daily functioning when not fully controlled/managed.     An in-depth measure of personality and psychopathology was revealing of a much larger than average number of somatic symptoms and memory complaints, often reflective of a pattern of over-reporting or can be seen in individuals with diagnoses of somatoform and/or conversion disorders. It is important to note that a diagnosis or impression of these conditions does not rule other medical symptoms in need of assessment/management, but rather highlights the importance of managing an individuals psychological response to their physical and cognitive symptoms in addition to treating the underlying symptoms.     I believe that with the following treatment plan, Ms. Mayberry should notice improvements in her thinking:     I believe Ms. Mayberry would benefit greatly from returning to talk therapy/counseling, but with finding the right fit for her therapist/counselor and right fit of therapy/counseling modality. She is encouraged to explore [email or web address] to help find the right provider.   Mindfulness can be very effective for individuals with a history of ADHD. She is encouraged to consider learning about this practice. Some tips and book recommendations are included at the end of this report.    She is encouraged to focus on brain training and strengthening her cognitive skills. Information included at the end of this report.   She is encouraged to utilize activity pacing as a way to manage her  "cognitive fatigue.   Additional cognitive tips and strategies are included at the end of this report.   Re-evaluation as needed in the future. This evaluation can serve as a baseline for comparison.     Problem List Items Addressed This Visit          Neuro    Cognitive communication disorder - Primary       Psychiatric    ADD (attention deficit disorder)    Overview     Diagnosed in her 20s. Prescribed Adderall.          Adjustment disorder with anxiety     Other Visit Diagnoses       Cyst of pineal gland        Somatization disorder            Thank you for allowing me to assist in Ms. Jessy Mayberry's care. If you have any questions, please contact me at [phone].      Anne Plasencia, PhD  Licensed Clinical Neuropsychologist  [facility]     CLINICAL INTERVIEW & RECORD REVIEW:     Cognitive Functioning   Cognitive screener: none  Previous evaluation(s): none  Onset & course of difficulty: At her cognitive baseline until suffering from COVID-19 in August 2021. She was hospitalized for 3 days (8/6/2021 - 8/9/2021) and remained on room air. Symptoms reportedly experienced include the following: "headaches, tinnitus, heat intolerance/AC on high blowing, excessive sweat when outside (abn. sweating, like dripping off elbows with just sitting), palpitations, neuropathies, weakness, balance issues, falls, memory loss, can't think/plan/ focus/sustain, extreme fatigue with or without activity, leg jerks/spasm." An LP was unremarkable and MRI brain and MRI spine were unrevealing of any acute processes. Following discharge, she attended ST, OT, and PT on an outpatient basis and reported significant benefit. She attended outpatient psychotherapy but stopped going because she did not find this beneficial. Her symptoms, unfortunately, have persisted over time. She reports that her cognitive symptoms have "gotten a little better," but she believes this is largely due to " "learning ways to cope with the changes as opposed to actual improvement. She has been placed on long-term disability.   Fluctuations: none  Severity of changes: "4/10" where 10/10 represents baseline level of cognitive functioning   Examples:   Attention/Working Memory/Executive Functioning: Mid-twenties diagnosed with ADD and has taken Adderall throughout her adult life with periodic stops in the interim. issues with time management. Hard time focusing her attention and keeping up with attention. Taking her "forever" to do a task. Took her much longer to organize a task. Planning much harder. Confusing details and mixing up, structuring. Multitasking problematic.   Processing Speed: harder time processing information, trying to focus on just a few things at a time. "Can't process on a dime anymore"  Language: word-finding difficulty, no comprehension difficulty. Difficulty retaining information she reads. Sometimes can't handle conversations so will go take a shower.   Visuospatial: was driving a much bigger car but now driving a much smaller car because she is having trouble judging distances. Driving makes her fatigued easily so can't go far, can't have radio on, is now driving too slowly.   Learning & Memory: forgetful of whole conversations. Leaves sticky notes and forgets why. Forgot her friend was coming to visit. Forgets to turn her car off. Misplacing items around her house.   Exacerbating factors: times when it's worse, like when there is another distraction present. When fatigue is really really bad, can't handle any stimulation.   Ameliorating factors: rest. Trying to get her thoughts right. Wraps herself up and it helps her simmer. I am not a tiffanie medication person, so I try to do my breathing and go into a quiet room and try to wrap up. If doesn't work, takes her medications.   Medication for cognition: Adderall         Daily Functioning   ADLs:    Bathing: Independent and without " "difficulty  Dressing: Independent and without difficulty  Grooming: Independent and without difficulty   Toileting: Independent and without difficulty  Transferring: Independent and without difficulty.  Eating: Independent and without difficulty.   IADLs:    Finances: paying some bills twice and others not paying. Happening all the time and her credit score has gone to shit now. Even with automatic bill pay.   Medication Mgmt: Independent and without difficulty. Doesn't take them all at the same time each day, but taking them.   Driving: was driving a much bigger car but now driving a much smaller car because she is having trouble judging distances. Driving makes her fatigued easily so can't go far, can't have radio on, is now driving too slowly.   Household Mgmt:  handles all of it. She has stacks of clothes piled up because it's a lot to put in the drawers.  Cooking/Meal Preparation: family managing. Very fatiguing for her, multitasking.   Shopping: family managing. Very fatiguing for her, multitasking.   Appointment Mgmt: puts reminders out and does group therapy every other Thursday. Will repeat this all week to try to remember. Then missed her appt in the end. Didn't even realize until 2 days later. Common.   Employment: out on long-term disability   Other: Can't help kids with their homework right now. Has to eliminate to process and that is something that she had to cut out. Her whole family has adapted but she feels it's terrible. Feels like she has an invisible disability.     Psychiatric/Neuropsychiatric Symptoms   Depression: no  Cassie/Hypomania: no  Anxiety: "I have been labeled as anxious but I am not." Everyone tries to tell her she has anxiety but "this is not anxiety."  Stress: not engaging in things that would make her stressed before so not getting stressed.   Social Withdrawal: yes. Used to be very social but now can't do all of that anymore. Overstimulating    Neurovegetative " "Sxs:  Appetite: varies. Starving yesterday and then started eating and got tired.   Sleep: varies. Sometimes it's hard to fall asleep & stay asleep, and then other times she will sleep a long time. No sleep apnea. No dream enactment. Sleeps with a weighted blanket and a papouse.   Energy: fatigue    Hallucinations: no  Delusional/Paranoid Thinking: no  Impulsivity: no  Obsessive/Compulsive Behaviors: no  Disinhibition: no  Irritability/Agitation: yes. No patience.   Aggression: no  Apathy/Indifference: yes  Other changes in personality: choices - she used to be very involved in family issues. She is the peace keeper. But now feels like she doesn't have time for that now. Avoids some conversations because she can't listen to it. So for those reasons she is withdrawn, that and not recalling conversations.     Physical Functioning   headaches, tinnitus, heat intolerance/AC on high blowing, excessive sweat when outside (abn. sweating, like dripping off elbows with just sitting), palpitations, neuropathies, weakness, balance issues, falls, memory loss, can't think/plan/ focus/sustain, extreme fatigue with or without activity, leg jerks/spasm."   Lightheadedness: yes  Urinary Urgency: no  Sensory Sxs: yes - feels like she gets overstimulated. She notes of new onset of vision changes and wears prescription glasses. Nerve endings in legs are problematic.   Pain: yes  Physical Exercise Routine:  wants her to go walking, but if does, then it will wipe her out      RELEVANT HISTORY  This patient has a past medical history of Adrenal insufficiency, Hypothyroidism, and Pituitary mass.    Past Surgical History:   Procedure Laterality Date    ANKLE SURGERY      CARDIAC ELECTROPHYSIOLOGY MAPPING AND ABLATION      DILATION AND CURETTAGE OF UTERUS      3 times    INGUINAL HERNIA REPAIR      twice    TONSILLECTOMY       Neurological History    Headaches/Migraines: yes  TBI: no  Seizures: no  Stroke: no  Tumor: no   Previous " Episodes of Delirium: no  Movement Disorder: no  CNS Infection: no  Other:   COVID-19 infection.   pineal cyst     Neurodiagnostics     Results for orders placed or performed during the hospital encounter of 07/12/22   MRI Brain Functional WO a physician    Narrative    EXAMINATION:  MRI BRAIN FUNCTIONAL WITHOUT A PHYSICIAN    CLINICAL HISTORY:  Cognitive communication deficit    TECHNIQUE:  Diffusion tensor imaging performed, with ADC, TRACE, FA, color FA maps generated.    Resting state BOLD fMRI sequence.    Volumetric T1 MP rage imaging of the brain following the administration of 8 mL Gadavist intravenous contrast.  Axial, sagittal coronal reconstructions were performed.    COMPARISON:  01/11/2022    FINDINGS:  Ventricles are stable in size.  No evidence of hydrocephalus.    Brain parenchyma unchanged on these limited pulse sequences.  No new mass, hemorrhage, edema or infarction.  No enhancing lesions.    No new extra-axial blood, mass or fluid collections are identified.    The vascular structures are unremarkable.      Impression    Omniscient protocol MR examination.    Resting state fMRI and DTI to be post processed and analyzed separately by proprietary third party software.    No evidence of acute intracranial pathology.    Images can be used purposes of procedure localization.      Electronically signed by: Jose Luis Rapp MD  Date:    07/12/2022  Time:    11:47   Results for orders placed or performed during the hospital encounter of 11/30/21   MRI Brain Without Contrast    Narrative    EXAMINATION:  MRI BRAIN WITHOUT CONTRAST    CLINICAL HISTORY:  Evaluate for MS;.  Personal history of COVID-19    TECHNIQUE:  Multiplanar multisequence MR imaging of the brain was performed without the administration of intravenous contrast.    COMPARISON:  None.    FINDINGS:  Ventricles and sulci are normal in size for age without evidence of hydrocephalus. No extra-axial blood or fluid collections.    Pineal gland  appears enlarged and measures 10 mm in AP diameter, more conspicuous on today's sagittal FLAIR sequence.  The T2 weighted imaging demonstrates a cystic lesion with internal septations, which does not appear significantly changed in size as compared to the prior study.  However, it now demonstrates incomplete suppression on FLAIR, representing a change since 08/06/2021, possibly reflecting interval hemorrhage or development of proteinaceous fluid.    Otherwise, the brain parenchyma appears within normal limits for the patient's age.  No white matter lesions or interval detrimental change as compared to the prior study.  Diffusion-weighted images demonstrate no evidence of an acute infarct.   Susceptibility weighted images demonstrate no evidence of acute or chronic hemorrhage. No mass effect or midline shift.    Normal vascular flow voids are preserved.    Bone marrow signal intensity is normal. Paranasal sinuses and mastoid air cells are essentially clear. Orbits are unremarkable.      Impression    1. Enlarged pituitary gland with nonspecific complex cystic lesion, not significantly changed in size as compared to the prior study on 08/06/2021.  However, there is new incomplete suppression on FLAIR, possibly reflecting interval hemorrhage or development of proteinaceous fluid.  Primary differential considerations include pineal cyst versus cystic pineal neoplasm, such as a pineocytoma.  Further evaluation with pituitary protocol MRI with and without contrast recommended.  2. Otherwise, no evidence of an acute intracranial abnormality or significant interval change in the appearance of the brain parenchyma as compared to the prior study.  This report was flagged in Epic as abnormal.      Electronically signed by: Chino Trent  Date:    12/01/2021  Time:    08:45   Results for orders placed or performed during the hospital encounter of 08/06/21   CT Head Without Contrast    Narrative    All CT scans at this facility  used dose modulation, iterative reconstruction and/or weight-based dosing when appropriate to reduce radiation doses  as low as reasonably achievable.    CLINICAL INFORMATION:  Bilateral leg numbness    FINDINGS:   The ventricles and sulci are normal in size and configuration for age.  There is no intraparenchymal hemorrhage, mass or midline shift.  There are no extra-axial fluid collections.  The paranasal sinus and mastoid air cells are clear.    IMPRESSION:   NO ACUTE INTRACRANIAL PROCESS.    Electronically signed by:  Shahrzad Felix MD  8/6/2021 4:46 PM CDT Workstation: [id number]       Had EMG performed. She was told she has carpal tunnel in her right upper extremity and no other etiology was found.    Pertinent Lab Work     Lab Results   Component Value Date    AVRIVEAR84 343 08/07/2021     No results found for: RPR  No results found for: FOLATE  Lab Results   Component Value Date    TSH 2.661 11/29/2022     Lab Results   Component Value Date    HGBA1C 5.1 11/29/2022     No results found for: HIV1X2, LSO84ZEJY    Medications     Current Outpatient Medications   Medication Instructions    ALPRAZolam (XANAX) 0.5 mg, Oral, Daily PRN    cyanocobalamin 1,000 mcg, Intramuscular, Every 28 days    dextroamphetamine-amphetamine (ADDERALL XR) 30 MG 24 hr capsule Oral, Every morning    estradioL (ESTRING) 2 mg, Vaginal, Once    FLUoxetine 20 mg, Oral, Daily    gabapentin (NEURONTIN) 300 MG capsule No dose, route, or frequency recorded.    hydrOXYzine HCL (ATARAX) 25 MG tablet No dose, route, or frequency recorded.    loratadine (CLARITIN) 10 mg tablet No dose, route, or frequency recorded.    meloxicam (MOBIC) 15 mg, Oral, Daily PRN    sulfamethoxazole-trimethoprim 800-160mg (BACTRIM DS) 800-160 mg Tab 1 tablet, Oral, 2 times daily    sumatriptan (IMITREX) 25 MG Tab 1 tablet at least 2 hours between doses as needed    SYNTHROID 75 mcg, Oral, Daily    triamcinolone acetonide 0.025% (KENALOG) 0.025 % cream Topical (Top),  2 times daily     Psychiatric History   Prior Diagnoses: ADD, adjustment disorder with anxiety   History of Trauma/Abuse: no  History of Suicide Attempts: no  Current Ideation, Intention, or Plan: no  Homicidal Ideation: no   Medication(s):   Adderall 30 mg extended release so she has 10 mg of non-extended release.   Xanax 0.5 mg   Fluoxetine 20 mg  Hospitalization(s): no  Psychotherapy/Counseling:   Marriage counseling (past)   Attempted virtual talk therapy through [facility]. Doesn't feel like it helped. It was actually making her feel worse. Felt like it was a constant reminder/reality check so didn't want to do it. 8 weeks and then stopped.   Other: no     Substance Use History     Social History     Tobacco Use    Smoking status: Never    Smokeless tobacco: Never   Substance and Sexual Activity    Alcohol use: Yes     Comment: occassionally    Drug use: Never    Sexual activity: Not Currently     History of abuse/overuse: drinks wine maybe once or twice weekly     Family Neurological & Psychiatric History       Family History   Problem Relation Age of Onset    Ovarian cancer Mother 28    Hypertension Mother     Coronary artery disease Mother     Hyperlipidemia Mother     Cancer Mother     Skin cancer Mother 75    Hypertension Father     Retinal detachment Father     Atrial fibrillation Sister     Rheum arthritis Daughter     Asthma Daughter     Breast cancer Maternal Aunt 42    Bone cancer Maternal Aunt     Cancer Maternal Aunt     Rheum arthritis Maternal Aunt     Hepatitis Maternal Aunt     Heart disease Maternal Grandmother     Hypertension Maternal Grandmother     Kidney failure Maternal Grandmother     Atrial fibrillation Maternal Grandmother     Hypertension Brother     Hypertension Son     Psoriasis Child     Melanoma Neg Hx     Lupus Neg Hx      Neurologic: mother is 79 and is very forgetful. Paternal grandfather had AD (later in age).   Psychiatric: Younger brother, 22, ASD. Other brother in his 50s has  OCD, ADD, but it's all drug abuse related.     Development  Education   Born & raised: [place] - just moved back home 2 years ago. 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 Mid-twenties diagnosed with ADD and has taken Adderall throughout her adult life with periodic stops in the interim  Repeated Grade(s): no  Typical Grades: C student        Occupation  Social    Service: no  Occupational Status: Long-term disability   Primary Occupation: FNP  Family Status: . 3 children (21 y/o son, 17 y/o daughter, 15 y/o daughter)  Support System: good - family   Hobbies/Activities: no longer participating in hobbies  Current Living Situation: lives at home with  and youngest child. Mother stays during the week to help out.        Legal History   Current: yes     OBJECTIVE:     MENTAL STATUS AND OBSERVATIONS:   Appearance: Appropriate to setting   Alertness: Tired easily with poor endurance seen over the testing session   Orientation:   O x 4 across both evaluation days   Gait:  Independent   Psychomotor:  Ms. Mayberry reported that she is right handed, but she has been trying to use her left hand more due to an impairment with her right hand. She used her right hand to write throughout testing. She had a poor  and held writing utensils awkwardly. At times, she wrapped all of her fingers around the writing utensil. Her markings were rushed and not very precise. This was seen on all written and drawing tasks.   Handedness:  Right   Vision & Hearing:  Adequate for session. She told the examiner that she was speaking too loudly.   Speech/language: Normal in rate, rhythm, tone, and volume. No significant word finding difficulty observed. Comprehension was normal during the interview  though she required some repetition and clarification of complex task instructions during testing.    Mood/Affect:  The patients mood and affect were frustrated and anxious. She was intermittently tearful during the clinical interview. She appeared anxious on the day of testing and at times was argumentative with the examiner.    Interpersonal Behavior:  Rapport was established during the interview.    Suicidality/Homicidality: Denied   Hallucinations/Delusions:  None evidenced or endorsed   Thought Content: Logical   Though Processes: Goal-directed   Insight & Judgment:  Appropriate   Participation in Interview:  Full     PROCEDURES/TESTS ADMINISTERED: In addition to performing a review of pertinent medical records, reviewing limits to confidentiality, conducting a clinical interview, and explaining procedures, the following measures were administered: MSVT; Test of Premorbid Functioning (TOPF); Wechsler Adult Intelligence Scale, Fourth Edition (WAIS-IV) [Digit Span, Arithmetic, Symbol Search, and Coding subtests]; Ruff 2 and 7 Selective Attention Test; Wechsler Memory Scale, Fourth Edition (WMS-IV) [Logical Memory subtest]; [place] Verbal Learning Test-Second Edition (CVLT-2; Standard Form); Brief Visuospatial Memory Test-Revised (BVMT-R, form 1); Neuropsychological Assessment Battery (NAB) [Naming subtest, form 1]; Verbal fluency tests (FAS & animal naming; Heladio et al., 2004 norms); Merlin Complex Figure Test (RCFT) [copy only]; Trail Making Test, parts A and B (Heladio et al., 2004 norms); [place] Card Sorting Test -64 card version (WCST-64); and [place] Multiphasic Personality Inventory-Second Edition, Restructured Format (MMPI-2-RF). Manual norms were used unless otherwise indicated.      TEST TAKING BEHAVIOR AND VALIDITY: Ms. Mayberry yawned, sighed, and took deep breaths throughout testing. She often held her head and closed her eyes. She frequently shifted in her seat and switched back and forth between  sitting on her legs.   She worked at a fast pace and times did not appear to pay close attention to details. She had a few impulsive starts and a few times that she veered off from the directions but was able to self-correct and complete the task as instructed (such as completing items in order on the page). She made a few self-deprecating comments throughout the testing session. As testing progressed, she reported that she had blurry vision and was getting a headache. The examiner offered her a break and some water, but she said that she wanted to continue and push through. Scores on stand-alone and embedded performance validity measures were within normal limits. The current results, therefore, are likely an accurate reflection of the patient's current functioning.    TEST RESULTS    Raw Score Type of Standardized Score Standardized Score Percentile/CP Descriptor   MSVT  - - - -   MSVT  - - - -   MSVT Cons 100 - - - -   MSVT  - - - -   MSVT  - - - -   ACS LM II Rec 24 - - - -   ACS RDS 9 - - - -   CVLT-II FC 16 - - - -   PREMORBID FUNCTIONING Raw Score Type of Standardized Score Standardized Score Percentile/CP Descriptor   TOPF simple dem. eFSIQ -  79 High Average   TOPF pred. eFSIQ -  82 High Average   TOPF simple + pred. eFSIQ -  81 High Average   LANGUAGE FUNCTIONING Raw Score Type of Standardized Score Standardized Score Percentile/CP Descriptor   TOPF Word Reading 60  87 High Average   NAB Naming 30 Tscore 52 58 Average   FAS (9, 13, 16) 38 Tscore 42 21 Low Average   Animal Naming 19 Tscore 42 21 Low Average   VISUOSPATIAL FUNCTIONING Raw Score Type of Standardized Score Standardized Score Percentile/CP Descriptor   RCFT Copy 31 - - 2-5 Below Average   RCFT Time to Copy 87 - - >16 WNL   BVMT-R Copy 10 - - - -   LEARNING & MEMORY Raw Score Type of Standardized Score Standardized Score Percentile/CP Descriptor   CVLT-II         Trials 1-5 (T-Score) 46 Tscore 46  34 Average   List A Trial 1 5 zscore -1 16 Low Average   List A Trial 5 8 zscore -0.5 31 Average   List B 3 zscore -1.5 7 Below Average   SDFR 9 zscore -0.5 31 Average   SDCR 10 zscore -1 16 Low Average   LDFR 11 zscore 0 50 Average   LDCR 12 zscore 0 50 Average   Semantic Clustering 0.8 zscore 0 50 Average   Learning [place] 1.5 zscore 0 50 Average   Repetitions 5 zscore 0 50 Average   Intrusions 2 zscore -0.5 31 Average   Recognition Hits 14 zscore -0.5 31 Average   False Positives 0 zscore -1 16 Low Average   Discriminability 3.3 zscore 0.5 69 Average   WMS-IV         Auditory Immediate (additional score) - SS 86 18 Low Average   Auditory Delayed (additional score) - SS 95 37 Average   Auditory Memory - SS 90 25 Average   WMS-IV Subtests         LM I 17 ss 6 9 Low Average   LM II 17 ss 8 25 Average   LM Recognition 24 - - 26-50 Average   (CVLT-II Trials 1-5) 46  9 37 Average   (CVLT-II Long Delay) 0  10 50 Average   BVMT-R         IR (4, 6, 5) 15 Tscore 32 4 Below Average   DR 5 Tscore 29 2 Below Average   Discrimination Index 6 - - >16 WNL   ATTENTION/WORKING MEMORY Raw Score Type of Standardized Score Standardized Score Percentile/CP Descriptor   WAIS-IV WMI - SS 95 37 Average   WAIS-IV Digit Span 25 ss 9 37 Average         DS Forward 10 ss 10 50 Average         DS Backward 7 ss 8 25 Average         DS Sequence 8 ss 9 37 Average         Longest Digit Forward 7 - - - -         Longest Digit Backward 4 - - - -         Longest Digit Sequence 6 - - - -   WAIS-IV Arithmetic 13 ss 9 37 Average   Ruff 2&7 Total Speed 109 Tscore 56 73 Average   Ruff 2&7 Total Accuracy 85 Tscore 42 21 Low Average   Ruff 2&7 Total Difference  14  0.01 - -   MENTAL PROCESSING SPEED Raw Score Type of Standardized Score Standardized Score Percentile/CP Descriptor   WAIS-IV PSI - SS 92 30 Average   WAIS-IV Symbol Search 30 ss 10 50 Average   WAIS-IV Coding 51 ss 7 16 Low Average   TMT A  25 Tscore 47 38 Average   TMT A errors 0 - - - -    EXECUTIVE FUNCTIONING Raw Score Type of Standardized Score Standardized Score Percentile/CP Descriptor   TMT B 68 Tscore 42 21 Low Average   TMT B errors 1 - - - -   WCST-64         Total Correct 52 - - - -   Total Errors 12  55 Average   Perseverative Resp. 6 SS 93 32 Average   Perseverative Err. 6 SS 93 32 Average   Nonperseverative Err. 6 SS 97 42 Average   Concept. Level Response 51 SS 99 47 Average   Categories Completed 5 - - >16 WNL   FMS 0 - - - -   Learning to Learn 0.00 - - >16 WNL   MOOD & PERSONALITY Raw Score Type of Standardized Score Standardized Score Percentile/CP Descriptor   MMPI-2 RF  -  - -   VRIN-r 2 - 43 - -   JAY-r 9 - 65 - -   F-r 6 - 70 - -   Fp-r 2 - 59 - -   Fs 6 - 91 - -   FBS-r 20 - 89 - -   RBS 16 - 97 - -   L-r 4 - 57 - -   K-r 8 - 52 - -   SURINDER 14 Tscore 56 - -   THD 1 Tscore 48 - -   BXD 1 Tscore 36 - -   RCd 13 Tscore 66 - -   RC1 18 Tscore 86 - -   RC2 10 Tscore 73 - -   RC3 4 Tscore 46 - -   RC4 1 Tscore 39 - -   RC6 0 Tscore 43 - -   RC7 4 Tscore 46 - -   RC8 3 Tscore 56 - -   RC9 5 Tscore 38 - -   ss = scaled score (mean = 10, SD = 3); SS = standard score (mean = 100, SD = 15); Tscore mean = 50, SD = 10; zscore (mean = 0.00, SD = 1)  It is important to note that scores/percentiles should only be interpreted by a neuropsychologist. It is common for healthy individuals to have 1-3 isolated low/unusual scores that are not indicative of any significant cognitive dysfunction.       BILLING  Code Description Minutes Units   53031 Psychiatric Interview 0    11049 Nubhvl xm phys/qhp 1st hr 0    42577 Nubhvl xm phy/qhp ea addl hr 0    53861 Psycl tst eval phys/qhp 1st 0    32928 Psycl tst eval phys/qhp ea 0    46930 Nrpsyc tst eval phys/qhp 1st 60 1   42765 Nrpsyc tst eval phys/qhp ea 97 2     Referral review/test selection 30      Tech consult/test review/modifications 10      Patient limitation management 0      Patient behavior management 0      Patient symptom monitoring 0       Record Review/Integration/Report Generation 86      Face-to-Face interpretive Feedback 31    58433 Psycl/nrpsyc tst phy/qhp 1st 0    23393 Psycl/nrpsyc tst phy/qhp ea 0    06960 Psycl/nrpsyc tech 1st 30 1   58057 Psycl/nrpsyc tst tech ea 190 6

## 2022-11-27 ENCOUNTER — PATIENT MESSAGE (OUTPATIENT)
Dept: FAMILY MEDICINE | Facility: CLINIC | Age: 52
End: 2022-11-27
Payer: OTHER GOVERNMENT

## 2022-11-29 ENCOUNTER — HOSPITAL ENCOUNTER (OUTPATIENT)
Dept: RADIOLOGY | Facility: CLINIC | Age: 52
Discharge: HOME | End: 2022-11-29
Attending: STUDENT IN AN ORGANIZED HEALTH CARE EDUCATION/TRAINING PROGRAM
Payer: OTHER GOVERNMENT

## 2022-11-29 ENCOUNTER — PATIENT MESSAGE (OUTPATIENT)
Dept: FAMILY MEDICINE | Facility: CLINIC | Age: 52
End: 2022-11-29

## 2022-11-29 ENCOUNTER — HOSPITAL ENCOUNTER (OUTPATIENT)
Dept: RADIOLOGY | Facility: HOSPITAL | Age: 52
Discharge: HOME | End: 2022-11-29
Attending: OBSTETRICS & GYNECOLOGY
Payer: OTHER GOVERNMENT

## 2022-11-29 ENCOUNTER — OFFICE VISIT (OUTPATIENT)
Dept: OBSTETRICS AND GYNECOLOGY | Facility: CLINIC | Age: 52
End: 2022-11-29
Payer: OTHER GOVERNMENT

## 2022-11-29 ENCOUNTER — OFFICE VISIT (OUTPATIENT)
Dept: FAMILY MEDICINE | Facility: CLINIC | Age: 52
End: 2022-11-29
Payer: OTHER GOVERNMENT

## 2022-11-29 VITALS
HEIGHT: 69 IN | SYSTOLIC BLOOD PRESSURE: 118 MMHG | WEIGHT: 190.25 LBS | DIASTOLIC BLOOD PRESSURE: 74 MMHG | BODY MASS INDEX: 28.18 KG/M2

## 2022-11-29 VITALS
DIASTOLIC BLOOD PRESSURE: 84 MMHG | RESPIRATION RATE: 18 BRPM | SYSTOLIC BLOOD PRESSURE: 126 MMHG | OXYGEN SATURATION: 95 % | HEART RATE: 100 BPM | BODY MASS INDEX: 27.69 KG/M2 | HEIGHT: 69 IN | WEIGHT: 186.94 LBS

## 2022-11-29 DIAGNOSIS — T14.8XXA PUNCTURE WOUND: Primary | ICD-10-CM

## 2022-11-29 DIAGNOSIS — Z12.31 ENCOUNTER FOR SCREENING MAMMOGRAM FOR BREAST CANCER: ICD-10-CM

## 2022-11-29 DIAGNOSIS — Z30.432 ENCOUNTER FOR IUD REMOVAL: ICD-10-CM

## 2022-11-29 DIAGNOSIS — R39.15 URINARY URGENCY: ICD-10-CM

## 2022-11-29 DIAGNOSIS — T14.8XXA PUNCTURE WOUND: ICD-10-CM

## 2022-11-29 DIAGNOSIS — Z01.411 ENCOUNTER FOR GYNECOLOGICAL EXAMINATION (GENERAL) (ROUTINE) WITH ABNORMAL FINDINGS: Primary | ICD-10-CM

## 2022-11-29 DIAGNOSIS — Z12.4 CERVICAL CANCER SCREENING: ICD-10-CM

## 2022-11-29 DIAGNOSIS — Z80.3 FAMILY HISTORY OF BREAST CANCER IN FIRST DEGREE RELATIVE: ICD-10-CM

## 2022-11-29 PROCEDURE — 88300 PR  SURG PATH,GROSS,LEVEL I: ICD-10-PCS | Mod: 26,,, | Performed by: PATHOLOGY

## 2022-11-29 PROCEDURE — 88300 SURGICAL PATH GROSS: CPT | Mod: 26,,, | Performed by: PATHOLOGY

## 2022-11-29 PROCEDURE — 99215 OFFICE O/P EST HI 40 MIN: CPT | Mod: PBBFAC,PO | Performed by: STUDENT IN AN ORGANIZED HEALTH CARE EDUCATION/TRAINING PROGRAM

## 2022-11-29 PROCEDURE — 99999 PR PBB SHADOW E&M-EST. PATIENT-LVL V: ICD-10-PCS | Mod: PBBFAC,,, | Performed by: STUDENT IN AN ORGANIZED HEALTH CARE EDUCATION/TRAINING PROGRAM

## 2022-11-29 PROCEDURE — 99213 OFFICE O/P EST LOW 20 MIN: CPT | Mod: PBBFAC,27,PN | Performed by: OBSTETRICS & GYNECOLOGY

## 2022-11-29 PROCEDURE — 99396 PREV VISIT EST AGE 40-64: CPT | Mod: S$PBB,,, | Performed by: OBSTETRICS & GYNECOLOGY

## 2022-11-29 PROCEDURE — 87624 HPV HI-RISK TYP POOLED RSLT: CPT | Performed by: OBSTETRICS & GYNECOLOGY

## 2022-11-29 PROCEDURE — 81001 URINALYSIS AUTO W/SCOPE: CPT | Performed by: OBSTETRICS & GYNECOLOGY

## 2022-11-29 PROCEDURE — 77067 MAMMO DIGITAL SCREENING BILAT WITH TOMO: ICD-10-PCS | Mod: 26,,, | Performed by: RADIOLOGY

## 2022-11-29 PROCEDURE — 99999 PR PBB SHADOW E&M-EST. PATIENT-LVL V: CPT | Mod: PBBFAC,,, | Performed by: STUDENT IN AN ORGANIZED HEALTH CARE EDUCATION/TRAINING PROGRAM

## 2022-11-29 PROCEDURE — 88300 SURGICAL PATH GROSS: CPT | Performed by: PATHOLOGY

## 2022-11-29 PROCEDURE — 88175 CYTOPATH C/V AUTO FLUID REDO: CPT | Performed by: STUDENT IN AN ORGANIZED HEALTH CARE EDUCATION/TRAINING PROGRAM

## 2022-11-29 PROCEDURE — 88141 PR  CYTOPATH CERV/VAG INTERPRET: ICD-10-PCS | Mod: ,,, | Performed by: STUDENT IN AN ORGANIZED HEALTH CARE EDUCATION/TRAINING PROGRAM

## 2022-11-29 PROCEDURE — 99999 PR PBB SHADOW E&M-EST. PATIENT-LVL III: ICD-10-PCS | Mod: PBBFAC,,, | Performed by: OBSTETRICS & GYNECOLOGY

## 2022-11-29 PROCEDURE — 77063 BREAST TOMOSYNTHESIS BI: CPT | Mod: TC,PN

## 2022-11-29 PROCEDURE — 77063 BREAST TOMOSYNTHESIS BI: CPT | Mod: 26,,, | Performed by: RADIOLOGY

## 2022-11-29 PROCEDURE — 88141 CYTOPATH C/V INTERPRET: CPT | Mod: ,,, | Performed by: STUDENT IN AN ORGANIZED HEALTH CARE EDUCATION/TRAINING PROGRAM

## 2022-11-29 PROCEDURE — 77067 SCR MAMMO BI INCL CAD: CPT | Mod: TC,PN

## 2022-11-29 PROCEDURE — 99396 PR PREVENTIVE VISIT,EST,40-64: ICD-10-PCS | Mod: S$PBB,,, | Performed by: OBSTETRICS & GYNECOLOGY

## 2022-11-29 PROCEDURE — 73660 XR TOE 2 OR MORE VIEWS RIGHT: ICD-10-PCS | Mod: 26,RT,S$GLB, | Performed by: RADIOLOGY

## 2022-11-29 PROCEDURE — 99213 OFFICE O/P EST LOW 20 MIN: CPT | Mod: S$PBB,,, | Performed by: STUDENT IN AN ORGANIZED HEALTH CARE EDUCATION/TRAINING PROGRAM

## 2022-11-29 PROCEDURE — 77063 MAMMO DIGITAL SCREENING BILAT WITH TOMO: ICD-10-PCS | Mod: 26,,, | Performed by: RADIOLOGY

## 2022-11-29 PROCEDURE — 99213 PR OFFICE/OUTPT VISIT, EST, LEVL III, 20-29 MIN: ICD-10-PCS | Mod: S$PBB,,, | Performed by: STUDENT IN AN ORGANIZED HEALTH CARE EDUCATION/TRAINING PROGRAM

## 2022-11-29 PROCEDURE — 77067 SCR MAMMO BI INCL CAD: CPT | Mod: 26,,, | Performed by: RADIOLOGY

## 2022-11-29 PROCEDURE — 99999 PR PBB SHADOW E&M-EST. PATIENT-LVL III: CPT | Mod: PBBFAC,,, | Performed by: OBSTETRICS & GYNECOLOGY

## 2022-11-29 PROCEDURE — 73660 X-RAY EXAM OF TOE(S): CPT | Mod: TC,FY,PO,RT

## 2022-11-29 PROCEDURE — 73660 X-RAY EXAM OF TOE(S): CPT | Mod: 26,RT,S$GLB, | Performed by: RADIOLOGY

## 2022-11-29 RX ORDER — SULFAMETHOXAZOLE AND TRIMETHOPRIM 800; 160 MG/1; MG/1
1 TABLET ORAL 2 TIMES DAILY
Qty: 14 TABLET | Refills: 0 | Status: SHIPPED | OUTPATIENT
Start: 2022-11-29 | End: 2022-12-06

## 2022-11-29 RX ORDER — SULFAMETHOXAZOLE AND TRIMETHOPRIM 800; 160 MG/1; MG/1
1 TABLET ORAL 2 TIMES DAILY
Qty: 14 TABLET | Refills: 0 | Status: SHIPPED | OUTPATIENT
Start: 2022-11-29 | End: 2022-11-29

## 2022-11-29 NOTE — PROGRESS NOTES
"[facility] MEDICINE CLINIC NOTE    Patient Name: Jessy Mayberry  YOB: 1970    PRESENTING HISTORY     History of Present Illness:  Ms. Jessy Mayberry is a 52 y.o. female here with right foot / great toe pain.     Stepped on palm frond/thorn about 4 weeks ago.   Went through the shoe into ball of great toe.     Hurt ever since. Worsening last 3-4 days with swelling.   1.5 weeks of increased pain.   No fevers.   Pain is actually located laterally and distally to the insertion site of her puncture wound.     She has noticed worsening bunion.   No hx gout.   Worse with movement of great toe.     ROS      OBJECTIVE:   Vital Signs:  Vitals:    11/29/22 1112   BP: 126/84   Pulse: 100   Resp: 18   SpO2: 95%   Weight: 84.8 kg (186 lb 15.2 oz)   Height: 5' 9" (1.753 m)          Physical Exam: No visible puncture site or foreign body. No sig tenderness around puncture site.    Mild tenderness lateral aspect of right MTP great toe.    Bunion formation to affected foot.    Able to flex and extend toe    Physical Exam    ASSESSMENT & PLAN:     Puncture wound  -     X-Ray Toe 2 or More Views Right; Future; Expected date: 11/29/2022  -     Discontinue: sulfamethoxazole-trimethoprim 800-160mg (BACTRIM DS) 800-160 mg Tab; Take 1 tablet by mouth 2 (two) times daily. for 7 days  Dispense: 14 tablet; Refill: 0  -     sulfamethoxazole-trimethoprim 800-160mg (BACTRIM DS) 800-160 mg Tab; Take 1 tablet by mouth 2 (two) times daily. for 7 days  Dispense: 14 tablet; Refill: 0  -     TSH; Future; Expected date: 11/29/2022  -     Comprehensive Metabolic Panel; Future; Expected date: 11/29/2022  -     CBC Auto Differential; Future; Expected date: 11/29/2022  -     C-REACTIVE PROTEIN; Future; Expected date: 11/29/2022  -     URIC ACID; Future; Expected date: 11/29/2022    Unclear etiology of pain.   Not obviously infected, lower suspicion of foreign body.   Treat with bactrim just in case.      Could be pain related to bunion " formation. Less likely gout.     Roger Nation MD   Internal Medicine

## 2022-11-29 NOTE — PROGRESS NOTES
Chief Complaint   Patient presents with    Well Woman     Needs mammo    Contraception     Remove Mirena       History and Physical:  Jessy Mayberry is a 52 y.o. F who presents today for her routine annual GYN exam. The patient has Gynecology complaints: urinary urgency  And desires IUD removal     Annual:   No LMP recorded. Patient has had an implant.  Contraception: IUD, plan for removal   Last Pap: 10/2021 NILM & HPV neg  History of abnormal pap: year over 25yr LEEP  Last Mammogram: 10/2021 BIRADS 1, Tyrer-Cuzick risk assessment model is Tyrer-Cuzick: 20.67 %. Family h/o Breast cancer  STD testing: declines  PCP: Roger Nation MD orders routine screening labs. 8/2021  Immunization status: stated as current, but no records available.  Body mass index is 28.1 kg/m².    Allergies:   Review of patient's allergies indicates:   Allergen Reactions    Oxycontin [oxycodone]     Promethazine Other (See Comments)     Past Medical History:   Diagnosis Date    Adrenal insufficiency     Hypothyroidism     Pituitary mass      Past Surgical History:   Procedure Laterality Date    ANKLE SURGERY      CARDIAC ELECTROPHYSIOLOGY MAPPING AND ABLATION      DILATION AND CURETTAGE OF UTERUS      3 times    INGUINAL HERNIA REPAIR      twice    TONSILLECTOMY       MEDS:   Current Outpatient Medications on File Prior to Visit   Medication Sig Dispense Refill    ALPRAZolam (XANAX) 0.5 MG tablet Take 0.5 mg by mouth daily as needed.      cyanocobalamin 1,000 mcg/mL injection Inject 1 mL (1,000 mcg total) into the muscle every 28 days. 10 mL 0    dextroamphetamine-amphetamine (ADDERALL XR) 30 MG 24 hr capsule Take by mouth every morning.      estradioL (ESTRING) 2 mg (7.5 mcg /24 hour) vaginal ring Place 2 mg vaginally once. for 1 dose 1 each 3    FLUoxetine 20 MG capsule Take 1 capsule (20 mg total) by mouth once daily. 30 capsule 2    gabapentin (NEURONTIN) 300 MG capsule       hydrOXYzine HCL (ATARAX) 25 MG tablet       loratadine  (CLARITIN) 10 mg tablet       meloxicam (MOBIC) 15 MG tablet Take 1 tablet (15 mg total) by mouth daily as needed for Pain. 30 tablet 2    sumatriptan (IMITREX) 25 MG Tab 1 tablet at least 2 hours between doses as needed      SYNTHROID 75 mcg tablet Take 1 tablet (75 mcg total) by mouth once daily. 90 tablet 3    triamcinolone acetonide 0.025% (KENALOG) 0.025 % cream Apply topically 2 (two) times daily. 80 g 0     No current facility-administered medications on file prior to visit.     OB History          4    Para   3    Term   3            AB   1    Living             SAB   1    IAB        Ectopic        Multiple        Live Births                   Social History     Socioeconomic History    Marital status:    Tobacco Use    Smoking status: Never    Smokeless tobacco: Never   Substance and Sexual Activity    Alcohol use: Yes     Comment: occassionally    Drug use: Never    Sexual activity: Not Currently     Social Determinants of Health     Financial Resource Strain: Medium Risk    Difficulty of Paying Living Expenses: Somewhat hard   Food Insecurity: No Food Insecurity    Worried About Running Out of Food in the Last Year: Never true    Ran Out of Food in the Last Year: Never true   Transportation Needs: No Transportation Needs    Lack of Transportation (Medical): No    Lack of Transportation (Non-Medical): No   Physical Activity: Unknown    Days of Exercise per Week: 0 days   Stress: Stress Concern Present    Feeling of Stress : Very much   Social Connections: Unknown    Frequency of Communication with Friends and Family: Three times a week    Frequency of Social Gatherings with Friends and Family: Once a week    Active Member of Clubs or Organizations: Yes    Attends Club or Organization Meetings: 1 to 4 times per year    Marital Status:    Housing Stability: Low Risk     Unable to Pay for Housing in the Last Year: No    Number of Places Lived in the Last Year: 1    Unstable Housing  "in the Last Year: No     Family History   Problem Relation Age of Onset    Breast cancer Maternal Aunt 42    Bone cancer Maternal Aunt     Cancer Maternal Aunt     Rheum arthritis Maternal Aunt     Hepatitis Maternal Aunt     Psoriasis Child     Hypertension Mother     Coronary artery disease Mother     Hyperlipidemia Mother     Cancer Mother     Hypertension Father     Retinal detachment Father     Atrial fibrillation Sister     Hypertension Brother     Rheum arthritis Daughter     Asthma Daughter     Hypertension Son     Heart disease Maternal Grandmother     Hypertension Maternal Grandmother     Kidney failure Maternal Grandmother     Atrial fibrillation Maternal Grandmother     Melanoma Neg Hx     Lupus Neg Hx        Past medical and surgical history reviewed.   I have reviewed the patient's medical history in detail and updated the computerized patient record.    Review of System:   General: no chills, fever, night sweats, weight gain or weight loss  Breasts: no new or changing breast lumps, nipple discharge or masses.  Gastrointestinal: no abdominal pain, change in bowel habits, or black or bloody stools  Genito-Urinary: no incontinence, urinary frequency/urgency or pelvic pain or abnormal vaginal bleeding.    Physical Exam:   /74   Ht 5' 9" (1.753 m)   Wt 86.3 kg (190 lb 4.1 oz)   BMI 28.10 kg/m²   Constitutional: She appears alert and responsive. She appears well-developed, well-groomed, and well-nourished. No distress.  Breasts: are symmetrical.  Right breast exhibits no inverted nipple, no mass, no nipple discharge, no skin change and no tenderness.  Left breast exhibits no inverted nipple, no mass, no nipple discharge, no skin change and no tenderness.  Abdominal: Soft. She exhibits no distension, hernias or masses. There is no tenderness. No enlargement of liver edge or spleen.  There is no rebound and no guarding.   Genitourinary:    External rectal exam shows no thrombosed external " hemorrhoids, no lesions.     Pelvic exam was performed with patient supine.   No labial fusion, and symmetrical.    There is no rash, lesion or injury on the right labia.   There is no rash, lesion or injury on the left labia.   No bleeding and no signs of injury around the vaginal introitus, urethral meatus is normal size and without prolapse or lesions, urethra well supported. The cervix is visualized with no discharge, lesions or friability.   No vaginal discharge found.    No significant Cystocele, Enterocele or rectocele, and cervix and uterus well supported.   Bimanual exam:   The urethra is normal to palpation and there are no palpable vaginal wall masses.   Uterus is not deviated, not enlarged, not fixed, normal shape and not tender.   Cervix exhibits no motion tenderness.    Right adnexum displays no mass or nodularity and no tenderness.   Left adnexum displays no mass or nodularity and no tenderness.    Assessment/Plan:   Encounter for gynecological examination (general) (routine) with abnormal findings    Cervical cancer screening  -     Liquid-Based Pap Smear, Screening  -     HPV High Risk Genotypes, PCR    Encounter for screening mammogram for breast cancer  -     Mammo Digital Screening Bilat w/ Al; Future; Expected date: 11/29/2022    Urinary urgency  -     Urinalysis, Reflex to Urine Culture Urine, Clean Catch  -     Urinalysis Microscopic    Encounter for IUD removal  -     Specimen to Pathology, Ob/Gyn    Family history of breast cancer in first degree relative  -     Cancel: Comprehensive BRCA 1/2 Analysis; Future; Expected date: 11/29/2022

## 2022-11-30 ENCOUNTER — PATIENT MESSAGE (OUTPATIENT)
Dept: NEUROLOGY | Facility: CLINIC | Age: 52
End: 2022-11-30
Payer: OTHER GOVERNMENT

## 2022-11-30 ENCOUNTER — PATIENT MESSAGE (OUTPATIENT)
Dept: FAMILY MEDICINE | Facility: CLINIC | Age: 52
End: 2022-11-30
Payer: OTHER GOVERNMENT

## 2022-11-30 DIAGNOSIS — M79.674 PAIN OF TOE OF RIGHT FOOT: Primary | ICD-10-CM

## 2022-11-30 PROBLEM — F98.8 ADD (ATTENTION DEFICIT DISORDER): Status: ACTIVE | Noted: 2022-11-30

## 2022-11-30 LAB
BACTERIA #/AREA URNS AUTO: NORMAL /HPF
BILIRUB UR QL STRIP: NEGATIVE
CLARITY UR REFRACT.AUTO: CLEAR
COLOR UR AUTO: YELLOW
GLUCOSE UR QL STRIP: NEGATIVE
HGB UR QL STRIP: ABNORMAL
KETONES UR QL STRIP: NEGATIVE
LEUKOCYTE ESTERASE UR QL STRIP: ABNORMAL
MICROSCOPIC COMMENT: NORMAL
NITRITE UR QL STRIP: NEGATIVE
PH UR STRIP: 6 [PH] (ref 5–8)
PROT UR QL STRIP: NEGATIVE
RBC #/AREA URNS AUTO: 4 /HPF (ref 0–4)
SP GR UR STRIP: 1.01 (ref 1–1.03)
SQUAMOUS #/AREA URNS AUTO: 5 /HPF
URN SPEC COLLECT METH UR: ABNORMAL
WBC #/AREA URNS AUTO: 2 /HPF (ref 0–5)

## 2022-11-30 RX ORDER — INDOMETHACIN 25 MG/1
25 CAPSULE ORAL 3 TIMES DAILY
Qty: 15 CAPSULE | Refills: 0 | Status: SHIPPED | OUTPATIENT
Start: 2022-11-30 | End: 2022-12-05

## 2022-11-30 NOTE — PATIENT INSTRUCTIONS
PRACTICE GOOD COGNITIVE HYGIENE  Engage in regular exercise, which increases alertness and arousal and can improve attention and focus.  Consider lower impact exercises, such as yoga or light walking. Try to exercise for at least 150 minutes per week  Get a good night's sleep, as this can enhance alertness and cognition.  Eat healthy foods and balanced meals. It is notable that research indicates certain nutrients may aid in brain function, such as B vitamins (especially B6, B12, and folic acid), antioxidants (such as vitamins C and E, and beta carotene), and Omega-3 fatty acids. Here are some common tips for diet (Adopted from Nichelle et al, [facility], 2018):  Eat primarily plant-based foods, such as fruits and vegetables, whole grains, legumes   (beans) and nuts.  Limit refined carbohydrates (white pasta, bread, rice).  Replace butter with healthy fats such as olive oil.  Use herbs and spices instead of salt to flavor foods.  Limit red meat and processed meats to no more than a few times a month.  Avoid sugary sodas, bakery goods, and sweets.  Eat fish and poultry at least twice a week.  Keep your brain active. Find activities to stay mentally active, such as reading, games (cards, checkers), puzzles (crosswords, Sudoku, jig saw), crafts (models, woodworking), gardening, or participating in activities in the community.  Stay socially engaged. Continue staying active with your family and friends.    BRAIN TRAINING APPS  · BrainHQ (https://www.brainBeijing [employer]/) - BrainHQ has more than two dozen brain-training exercises organized into six categories: Attention, Brain Speed, Memory, People Skills, Intelligence, and Navigation. It allows you to fit brain exercises into your busy life, and access brain training on most internet-connected devices. Plus, each exercise continuously adapts to your unique performance. So you train at the right level for you.     · Mind Games (https://www.mindgames.com/) - Mind Games is a great  collection of games based in part on principles derived from cognitive tasks to help you practice different mental skills. This includes a handful of free games. Additionally, there are a number of trial games included that can be played 3 times. All games include your score history and a graph of your progress. Using some principles of standardized testing, your scores are also converted to a standard scale so that you can see where you need work and excel. The training center does the work for you by picking the perfect mix of exercises to keep you engaged.     · Elevate (https://[employer].com/) - Elevate was selected by [employer] as Armando of the Year! Elevate is a brain training program designed to improve focus, speaking abilities, processing speed, memory, math skills, and more. Each person is provided with a personalized training program that adjusts over time to maximize results. Theoretically, the more you train with Elevate, the more you'll improve critical cognitive skills that are proven to boost productivity, earning power, and self-confidence. Users who train at least 3 times per week have reported dramatic gains and increased confidence. In-armando purchases.     · Peak (https://www.Artomatix.net/) - Reach Peak performance with over 40 unique games, each one developed by neuroscientists and game experts to challenge your cognitive skills and push you further. Use , the  for your brain, to find the right workout for you at the right time. Choose from 's best recommendations to push your skills to the max. Or take contextual workouts like Coffee Break if you're short on time.  will help you track your progress using in-depth insights and keep you going when you need it most. Play for free or upgrade to Pro and get the best brain training experience available. In-armando purchases.     OTHER SUGGESTIONS  Games and Apps like Sudoku; Crossword Puzzles; Word Find; Memory Games; Logic Games;  Solitaire; and Hidden Object Mysteries. Find some you like and play them. It will exercise many of the skills necessary to improve function.  RESOURCE BOOKS  Consider purchasing the book, High-Octane Brain: 5 Science-Based Steps to Sharpen Your Memory and Reduce Your Risk of Alzheimer's by Dr. Tiny Pop.    Consider purchasing the book, Bouncing Back: Skills for Adaptation to Injury, Aging, Illness, and Pain by Adriel Concepcion, PhD    Consider purchasing the book, Smart but Scattered Guide to Success: How to Use Your Brain's Executive Skills to Keep Up, Stay Calm, and Get Organized at Work and at Home by Bere Rivera EdD and Adriel Hernandez, PhD    COGNITIVE TIPS AND STRATEGIES  The following tips and strategies are provided to help assist in daily activities:      Attention: Remember that inattention and lack of focus are major culprits to forgetting information so be sure and practice paying attention for adequate learning of information. If you rely on passive attention to remembering something (e.g., yeah, uh-huh approach), you'll find you cannot recall it later. I recommend the following to improve attention, which may aid in later recall:  1. Reduce distractions in the area as much as possible  2. Look at the person as they are speaking to you.   3. Paraphrase as they are speaking  4. Write down important pieces of information   5. Ask them to repeat if you zone out.  6. Have them simplify and reduce information that you need to attend to during conversation.  7. Have visual cues to remind you if you need to do something later.     Processing Speed:  1. Using multiple modalities (e.g., listening, writing notes, asking questions, recording) to learn new information is likely to allow additional time for processing, thus improving memory for the material.   2. Allowing sufficient time to complete tasks will reduce frustration and help to ensure completion.    Executive Functionin. Don't attempt to  multi-task.  Separate tasks so that each can be completed one at a time  2. Consider using a calendar/day planner, as that may be effective to help you plan and stay on track.  Color-coding specific tasks by importance may add additional benefit to your planner  3. Break down large projects into smaller tasks and write down the steps to completing the task.  Taking notes while reading can help with recall.     Storing Information: Use the below strategies to help you further enhance how information is stored  1. Rehearse - Immediately after seeing/hearing something, try to recall it.  Wait a few minutes, then check again.  Gradually lengthen the intervals between rehearsals.  2. Repetition of learned material is critical to ensure storage of information to be learned. Self-test at home to ensure learning.  3. Write down important information to improve your attention and focus and to have something to look back on when you need to recall it.  4. Make sure the person doesn't rattle off, but presents in a clear, logical, and unhurried manner.      Recalling Information:  1. Jog your memory - Lose something?  Think back to when you last had it.  What did you do next?  And after that?  Mentally walk yourself through each activity that followed.  Prodding your memory this way may enable you to recall the location of the missing item.  2. Use a cue - Symbolic reminders (the proverbial string around the finger) are helpful.  So too are memos, timers, calendar notes, etc.--keep them in visible, appropriate place  3. Get organized - Have fixed locations for all important papers, key phone numbers, medications, keys, wallet, glasses, tools, etc.  4. Develop routines - Routines can anchor memories so they do not drift away.       Word Finding:   Not being able to find a word when you need it is a common and very annoying problem. It is not strictly a memory issue, but more a filing and retrieval issue. You know the word or  name you want, but it cannot be found in your brain file. It is like having all the files in your file cabinet emptied on the floor. Every piece of information is there but finding it can be a challenge.   One strategy that may help is working with a speech pathologist/therapist to learn techniques to decrease word finding problems. Some of those strategies/techniques include the following:  Use circumlocutions. Describe the object you're trying to name instead of naming it.  Recite you're A, B, Cs. Go through the alphabet to see if a letter triggers finding the word.  Picture it. Try to visualize the spelling or writing of the word.  Relax. The harder you try to force yourself to come up with the word, the more frustrated you get and the worse you function.   Write it down. In situations where using correct words is critical, such as communicating on the radio while flying, write down the key words so that they are in front of you if needed.  Use word association. For words or names you continually misfile and can't find, try to come up with a word association, something that reminds you of the word or name.  Write a script. Try scripting something important that you want to say. Either write it out or practice it beforehand, so that you get it right.  Play word games. Doing crossword puzzles and playing word finding games may help your brain become more efficient at filing and retrieving words.     Mental Fatigue Managing Fatigue and Energy  Compensatory strategies for managing fatigue in your daily life include consciously attempting to conserve energy by planning ahead, scheduling rest, and frequent breaks from physical/mental activity.   One central strategy in managing mental fatigue is the idea of pacing activities and taking planned breaks for rest, even if you do not feel you need to do so. Taking just 5-20 minutes to lie down or relax without mental stimulation a few times a day (even without  sleeping) can be extremely helpful in maintaining energy levels.  Further, pacing (How long can I keep going?) is heavily dependent on: the individual, the particular task (e.g., typing for a long time versus reading a book), symptom variation (e.g., Am I in a flare up?), and time of day. Being aware of this will help to set realistic deadlines, adequately communicate about your needs, and help you to see that not all tasks are created equal as you will be able to do some quicker/longer than others.    Plan more difficult tasks for times when your cognitive energy is better. More mundane tasks should be planned for other time periods. If this can't be a regular strategy, then engaging in compensatory strategies will be important (e.g., having planned breaks, use cognitive strategies) to maintain mental efficiency.     MINDFULNESS  Mindfulness is the basic human ability to be fully present, aware of where we are and what were doing, and not overly reactive or overwhelmed by whats going on around us.    While mindfulness is something we all naturally possess, its more readily available to us when we practice on a daily basis.    Whenever you bring awareness to what youre directly experiencing via your senses, or to your state of mind via your thoughts and emotions, youre being mindful. And theres growing research showing that when you train your brain to be mindful, youre actually remodeling the physical structure of your brain.    What is meditation?  Meditation is exploring. Its not a fixed destination. Your head doesnt become vacuumed free of thought, utterly undistracted. When we meditate we venture into the workings of our minds: our sensations (air blowing on our skin or a harsh smell wafting into the room), our emotions (love this, hate that, crave this, loathe that) and thoughts (wouldnt it be weird to see an elephant playing a trumpet).    Mindfulness meditation asks us to suspend judgment and  "unleash our natural curiosity about the workings of the mind, approaching our experience with warmth and kindness, to ourselves and others.    How do I practice mindfulness and meditation?  Mindfulness is available to us in every moment, whether through meditations and body scans, or mindful moment practices like taking time to pause and breathe when the phone rings instead of rushing to answer it. Reminding yourself to take notice of your thoughts, feelings, body sensations and the world around you is the first step to mindfulness.    Notice the everyday  "Even as we go about our daily lives, we can notice the sensations of things, the food we eat, the air moving past the body as we walk," says Professor Chang. "All this may sound very small, but it has huge power to interrupt the 'autopilot' mode we often engage day to day, and to give us new perspectives on life."    Keep it regular  It can be helpful to pick a regular time - the morning journey to work or a walk at lunchtime - during which you decide to be aware of the sensations created by the world around you.    Try something new  Trying new things, such as sitting in a different seat in meetings or going somewhere new for lunch, can also help you notice the world in a new way.    Watch your thoughts  "Some people find it very difficult to practice mindfulness. As soon as they stop what they're doing, lots of thoughts and worries crowd in," says Professor Chang.    "It might be useful to remember that mindfulness isn't about making these thoughts go away, but rather about seeing them as mental events.    "Imagine standing at a bus station and seeing 'thought buses' coming and going without having to get on them and be taken away. This can be very hard at first, but with gentle persistence it is possible.    "Some people find that it is easier to cope with an over-busy mind if they are doing gentle yoga or walking."    Name thoughts and feelings  To " "develop an awareness of thoughts and feelings, some people find it helpful to silently name them: "Here's the thought that I might fail that exam". Or, "This is anxiety".    Free yourself from the past and future  You can practise mindfulness anywhere, but it can be especially helpful to take a mindful approach if you realise that, for several minutes, you have been "trapped" in reliving past problems or "pre-living" future worries.    Different mindfulness practices  As well as practising mindfulness in daily life, it can be helpful to set aside time for a more formal mindfulness practice.    Mindfulness meditation involves sitting silently and paying attention to thoughts, sounds, the sensations of breathing or parts of the body, bringing your attention back whenever the mind starts to wander.    Yoga and yessi-chi can also help with developing awareness of your breathing.    Mindfulness Resources:  www.mindful.org  HeadSOxiCool Armando (free access to HeadSpace Plus in 2020 for healthcare providers with an NPI number)  Calm Armando    Mindfulness Books:  Mindfulness for Beginners, by Delfino Gupta  The Mindful Way Through Anxiety, by Chantale Hadley and Nancy Rae  The Little Book of Mindfulness, by Wolfgang Johnson    Material adapted from: https://www.nhs.uk/conditions/stress-anxiety-depression/mindfulness/ and mindful.org    "

## 2022-12-01 ENCOUNTER — PATIENT MESSAGE (OUTPATIENT)
Dept: FAMILY MEDICINE | Facility: CLINIC | Age: 52
End: 2022-12-01
Payer: OTHER GOVERNMENT

## 2022-12-02 ENCOUNTER — OFFICE VISIT (OUTPATIENT)
Dept: NEUROLOGY | Facility: CLINIC | Age: 52
End: 2022-12-02
Payer: OTHER GOVERNMENT

## 2022-12-02 DIAGNOSIS — F43.22 ADJUSTMENT DISORDER WITH ANXIETY: ICD-10-CM

## 2022-12-02 DIAGNOSIS — F98.8 ATTENTION DEFICIT DISORDER, UNSPECIFIED HYPERACTIVITY PRESENCE: ICD-10-CM

## 2022-12-02 DIAGNOSIS — R41.841 COGNITIVE COMMUNICATION DISORDER: Primary | ICD-10-CM

## 2022-12-02 LAB
HPV HR 12 DNA SPEC QL NAA+PROBE: NEGATIVE
HPV16 AG SPEC QL: NEGATIVE
HPV18 DNA SPEC QL NAA+PROBE: NEGATIVE

## 2022-12-02 PROCEDURE — 99212 OFFICE O/P EST SF 10 MIN: CPT | Mod: PBBFAC | Performed by: CLINICAL NEUROPSYCHOLOGIST

## 2022-12-02 PROCEDURE — 99499 UNLISTED E&M SERVICE: CPT | Mod: S$PBB,,, | Performed by: CLINICAL NEUROPSYCHOLOGIST

## 2022-12-02 PROCEDURE — 99999 PR PBB SHADOW E&M-EST. PATIENT-LVL II: CPT | Mod: PBBFAC,,, | Performed by: CLINICAL NEUROPSYCHOLOGIST

## 2022-12-02 PROCEDURE — 99499 NO LOS: ICD-10-PCS | Mod: S$PBB,,, | Performed by: CLINICAL NEUROPSYCHOLOGIST

## 2022-12-02 PROCEDURE — 99999 PR PBB SHADOW E&M-EST. PATIENT-LVL II: ICD-10-PCS | Mod: PBBFAC,,, | Performed by: CLINICAL NEUROPSYCHOLOGIST

## 2022-12-02 NOTE — PROGRESS NOTES
NEUROPSYCHOLOGICAL EVALUATION FEEDBACK    Jessy Mayberry attended a feedback session today. We discussed the results of the neuropsychological evaluation and I gave time to discuss questions and concerns. For full evaluation details, please see the note from this provider dated 11/15/2022. A copy of the report was provided via Inaura. We discussed switching to [facility] for management of her Adderall prescription. Placing referral today.     Problem List Items Addressed This Visit          Neuro    Cognitive communication disorder - Primary       Psychiatric    ADD (attention deficit disorder)    Overview     Diagnosed in her 20s. Prescribed Adderall.          Adjustment disorder with anxiety         Anne Plasencia, PhD  Licensed Clinical Neuropsychologist  [facility] - Department of Neurology

## 2022-12-05 ENCOUNTER — PATIENT MESSAGE (OUTPATIENT)
Dept: NEUROLOGY | Facility: CLINIC | Age: 52
End: 2022-12-05
Payer: OTHER GOVERNMENT

## 2022-12-06 LAB
FINAL PATHOLOGIC DIAGNOSIS: NORMAL
Lab: NORMAL

## 2022-12-09 ENCOUNTER — PATIENT MESSAGE (OUTPATIENT)
Dept: OBSTETRICS AND GYNECOLOGY | Facility: CLINIC | Age: 52
End: 2022-12-09
Payer: OTHER GOVERNMENT

## 2022-12-09 ENCOUNTER — PATIENT MESSAGE (OUTPATIENT)
Dept: DERMATOLOGY | Facility: CLINIC | Age: 52
End: 2022-12-09
Payer: OTHER GOVERNMENT

## 2022-12-11 ENCOUNTER — PATIENT MESSAGE (OUTPATIENT)
Dept: NEUROLOGY | Facility: CLINIC | Age: 52
End: 2022-12-11
Payer: OTHER GOVERNMENT

## 2022-12-12 ENCOUNTER — PATIENT MESSAGE (OUTPATIENT)
Dept: FAMILY MEDICINE | Facility: CLINIC | Age: 52
End: 2022-12-12
Payer: OTHER GOVERNMENT

## 2022-12-12 DIAGNOSIS — Z80.3 FAMILY HISTORY OF BREAST CANCER: Primary | ICD-10-CM

## 2022-12-14 ENCOUNTER — PATIENT MESSAGE (OUTPATIENT)
Dept: FAMILY MEDICINE | Facility: CLINIC | Age: 52
End: 2022-12-14
Payer: OTHER GOVERNMENT

## 2022-12-20 LAB
FINAL PATHOLOGIC DIAGNOSIS: NORMAL
GROSS: NORMAL
Lab: NORMAL

## 2023-01-05 DIAGNOSIS — N95.2 VAGINAL ATROPHY: ICD-10-CM

## 2023-01-11 ENCOUNTER — PATIENT MESSAGE (OUTPATIENT)
Dept: FAMILY MEDICINE | Facility: CLINIC | Age: 53
End: 2023-01-11
Payer: OTHER GOVERNMENT

## 2023-01-11 DIAGNOSIS — M79.674 PAIN OF TOE OF RIGHT FOOT: Primary | ICD-10-CM

## 2023-01-12 ENCOUNTER — TELEPHONE (OUTPATIENT)
Dept: FAMILY MEDICINE | Facility: CLINIC | Age: 53
End: 2023-01-12
Payer: OTHER GOVERNMENT

## 2023-01-13 ENCOUNTER — TELEPHONE (OUTPATIENT)
Dept: FAMILY MEDICINE | Facility: CLINIC | Age: 53
End: 2023-01-13
Payer: OTHER GOVERNMENT

## 2023-01-19 ENCOUNTER — OFFICE VISIT (OUTPATIENT)
Dept: PODIATRY | Facility: CLINIC | Age: 53
End: 2023-01-19
Payer: OTHER GOVERNMENT

## 2023-01-19 VITALS — RESPIRATION RATE: 18 BRPM | BODY MASS INDEX: 27.25 KG/M2 | WEIGHT: 184 LBS | HEIGHT: 69 IN

## 2023-01-19 DIAGNOSIS — M20.11 HALLUX VALGUS OF RIGHT FOOT: ICD-10-CM

## 2023-01-19 DIAGNOSIS — R22.41 LOCALIZED SWELLING, MASS, OR LUMP OF RIGHT LOWER EXTREMITY: Primary | ICD-10-CM

## 2023-01-19 DIAGNOSIS — M25.371 ANKLE INSTABILITY, RIGHT: ICD-10-CM

## 2023-01-19 DIAGNOSIS — M79.674 PAIN OF TOE OF RIGHT FOOT: ICD-10-CM

## 2023-01-19 PROCEDURE — 99203 OFFICE O/P NEW LOW 30 MIN: CPT | Mod: S$GLB,,, | Performed by: PODIATRIST

## 2023-01-19 PROCEDURE — 99203 PR OFFICE/OUTPT VISIT, NEW, LEVL III, 30-44 MIN: ICD-10-PCS | Mod: S$GLB,,, | Performed by: PODIATRIST

## 2023-01-19 RX ORDER — DEXTROAMPHETAMINE SACCHARATE, AMPHETAMINE ASPARTATE, DEXTROAMPHETAMINE SULFATE AND AMPHETAMINE SULFATE 2.5; 2.5; 2.5; 2.5 MG/1; MG/1; MG/1; MG/1
1 TABLET ORAL
COMMUNITY
Start: 2022-12-22 | End: 2023-02-06 | Stop reason: DRUGHIGH

## 2023-01-24 ENCOUNTER — OFFICE VISIT (OUTPATIENT)
Dept: FAMILY MEDICINE | Facility: CLINIC | Age: 53
End: 2023-01-24
Payer: OTHER GOVERNMENT

## 2023-01-24 VITALS
SYSTOLIC BLOOD PRESSURE: 124 MMHG | HEART RATE: 109 BPM | OXYGEN SATURATION: 96 % | WEIGHT: 188.69 LBS | BODY MASS INDEX: 27.95 KG/M2 | RESPIRATION RATE: 14 BRPM | DIASTOLIC BLOOD PRESSURE: 82 MMHG | HEIGHT: 69 IN | TEMPERATURE: 98 F

## 2023-01-24 DIAGNOSIS — S46.811A STRAIN OF RIGHT TRAPEZIUS MUSCLE, INITIAL ENCOUNTER: Primary | ICD-10-CM

## 2023-01-24 PROCEDURE — 99213 PR OFFICE/OUTPT VISIT, EST, LEVL III, 20-29 MIN: ICD-10-PCS | Mod: S$PBB,,, | Performed by: FAMILY MEDICINE

## 2023-01-24 PROCEDURE — 99999 PR PBB SHADOW E&M-EST. PATIENT-LVL V: ICD-10-PCS | Mod: PBBFAC,,, | Performed by: FAMILY MEDICINE

## 2023-01-24 PROCEDURE — 99215 OFFICE O/P EST HI 40 MIN: CPT | Mod: PBBFAC,PO | Performed by: FAMILY MEDICINE

## 2023-01-24 PROCEDURE — 99999 PR PBB SHADOW E&M-EST. PATIENT-LVL V: CPT | Mod: PBBFAC,,, | Performed by: FAMILY MEDICINE

## 2023-01-24 PROCEDURE — 99213 OFFICE O/P EST LOW 20 MIN: CPT | Mod: S$PBB,,, | Performed by: FAMILY MEDICINE

## 2023-01-24 RX ORDER — FAMOTIDINE 20 MG/1
20 TABLET, FILM COATED ORAL 2 TIMES DAILY
COMMUNITY

## 2023-01-24 RX ORDER — METHOCARBAMOL 500 MG/1
500 TABLET, FILM COATED ORAL 4 TIMES DAILY
Qty: 40 TABLET | Refills: 0 | Status: SHIPPED | OUTPATIENT
Start: 2023-01-24 | End: 2023-02-03

## 2023-01-24 RX ORDER — METHYLPREDNISOLONE 4 MG/1
TABLET ORAL
Qty: 1 EACH | Refills: 0 | Status: SHIPPED | OUTPATIENT
Start: 2023-01-24 | End: 2023-04-26 | Stop reason: ALTCHOICE

## 2023-01-24 RX ORDER — PANTOPRAZOLE SODIUM 40 MG/1
40 TABLET, DELAYED RELEASE ORAL DAILY
COMMUNITY
End: 2023-09-27

## 2023-01-24 NOTE — PROGRESS NOTES
Subjective:       Patient ID: Jessy Mayberry is a 52 y.o. female.    Chief Complaint: No chief complaint on file.    New to me patient here for UC visit.  CC- pain in right upper back/trap area.  Onset about 2 weeks ago.  Sore to touch and move and has spasms.  She reports pre-existing numbness/weakness in right hand which may be more prominent now.  Has tried frozen water bottle; Mobic; Ibuprofen; 7 yo Rx of Robaxin and no relief.    Review of Systems   Respiratory:  Negative for shortness of breath (can hurt to breath sometimes).    Neurological:  Positive for weakness and numbness.   All other systems reviewed and are negative.    Objective:      Physical Exam  Vitals reviewed.   Constitutional:       General: She is not in acute distress.     Appearance: She is well-developed.   Cardiovascular:      Rate and Rhythm: Normal rate and regular rhythm.      Heart sounds: No murmur heard.  Pulmonary:      Effort: Pulmonary effort is normal.      Breath sounds: Normal breath sounds.   Musculoskeletal:      Cervical back: Spasms and tenderness present.        Back:       Comments: Active mm spasms noted during exam.   Neurological:      Mental Status: She is alert.      Comments: Full  b/l except right finger #3 - pt reports has trigger finger       Assessment:       1. Strain of right trapezius muscle, initial encounter        Plan:       Strain of right trapezius muscle, initial encounter  -     methocarbamoL (ROBAXIN) 500 MG Tab; Take 1 tablet (500 mg total) by mouth 4 (four) times daily. for 10 days  Dispense: 40 tablet; Refill: 0  -     methylPREDNISolone (MEDROL DOSEPACK) 4 mg tablet; use as directed  Dispense: 1 each; Refill: 0      Patient Instructions   Moist heat to area of pain three times a day.

## 2023-02-02 ENCOUNTER — PATIENT MESSAGE (OUTPATIENT)
Dept: FAMILY MEDICINE | Facility: CLINIC | Age: 53
End: 2023-02-02
Payer: OTHER GOVERNMENT

## 2023-02-02 DIAGNOSIS — F98.8 ATTENTION DEFICIT DISORDER, UNSPECIFIED HYPERACTIVITY PRESENCE: Primary | ICD-10-CM

## 2023-02-04 ENCOUNTER — PATIENT MESSAGE (OUTPATIENT)
Dept: FAMILY MEDICINE | Facility: CLINIC | Age: 53
End: 2023-02-04
Payer: OTHER GOVERNMENT

## 2023-02-04 DIAGNOSIS — F98.8 ATTENTION DEFICIT DISORDER, UNSPECIFIED HYPERACTIVITY PRESENCE: Primary | ICD-10-CM

## 2023-02-06 ENCOUNTER — TELEPHONE (OUTPATIENT)
Dept: PSYCHIATRY | Facility: CLINIC | Age: 53
End: 2023-02-06
Payer: OTHER GOVERNMENT

## 2023-02-06 ENCOUNTER — PATIENT MESSAGE (OUTPATIENT)
Dept: FAMILY MEDICINE | Facility: CLINIC | Age: 53
End: 2023-02-06
Payer: OTHER GOVERNMENT

## 2023-02-06 RX ORDER — DEXTROAMPHETAMINE SACCHARATE, AMPHETAMINE ASPARTATE MONOHYDRATE, DEXTROAMPHETAMINE SULFATE AND AMPHETAMINE SULFATE 7.5; 7.5; 7.5; 7.5 MG/1; MG/1; MG/1; MG/1
30 CAPSULE, EXTENDED RELEASE ORAL EVERY MORNING
Qty: 30 CAPSULE | Refills: 0 | Status: SHIPPED | OUTPATIENT
Start: 2023-02-06 | End: 2023-03-28 | Stop reason: SDUPTHER

## 2023-02-06 RX ORDER — DEXTROAMPHETAMINE SACCHARATE, AMPHETAMINE ASPARTATE, DEXTROAMPHETAMINE SULFATE AND AMPHETAMINE SULFATE 2.5; 2.5; 2.5; 2.5 MG/1; MG/1; MG/1; MG/1
1 TABLET ORAL DAILY
Qty: 30 TABLET | Refills: 0 | Status: SHIPPED | OUTPATIENT
Start: 2023-02-06 | End: 2023-03-28 | Stop reason: SDUPTHER

## 2023-02-06 RX ORDER — DEXTROAMPHETAMINE SACCHARATE, AMPHETAMINE ASPARTATE MONOHYDRATE, DEXTROAMPHETAMINE SULFATE AND AMPHETAMINE SULFATE 7.5; 7.5; 7.5; 7.5 MG/1; MG/1; MG/1; MG/1
30 CAPSULE, EXTENDED RELEASE ORAL EVERY MORNING
Qty: 30 CAPSULE | Refills: 0 | Status: SHIPPED | OUTPATIENT
Start: 2023-02-06 | End: 2023-02-06

## 2023-02-06 RX ORDER — DEXTROAMPHETAMINE SACCHARATE, AMPHETAMINE ASPARTATE, DEXTROAMPHETAMINE SULFATE AND AMPHETAMINE SULFATE 2.5; 2.5; 2.5; 2.5 MG/1; MG/1; MG/1; MG/1
1 TABLET ORAL DAILY
Qty: 30 TABLET | Refills: 0 | Status: SHIPPED | OUTPATIENT
Start: 2023-02-06 | End: 2023-02-06

## 2023-02-06 NOTE — TELEPHONE ENCOUNTER
[facility] called clinic requesting to schedule NP appt. Advised patient she does have proper referral to our department which automatically puts her on the wait list and once we get to her we will reach out to schedule. Patient advised that at this time we are scheduling into Mar-Apr. No further questions or concerns at this time.

## 2023-02-27 ENCOUNTER — HOSPITAL ENCOUNTER (OUTPATIENT)
Dept: RADIOLOGY | Facility: HOSPITAL | Age: 53
Discharge: HOME | End: 2023-02-27
Attending: PODIATRIST
Payer: OTHER GOVERNMENT

## 2023-02-27 DIAGNOSIS — M25.371 ANKLE INSTABILITY, RIGHT: ICD-10-CM

## 2023-02-27 DIAGNOSIS — R22.41 LOCALIZED SWELLING, MASS, OR LUMP OF RIGHT LOWER EXTREMITY: ICD-10-CM

## 2023-02-27 PROCEDURE — 73718 MRI LOWER EXTREMITY W/O DYE: CPT | Mod: TC,PO,RT

## 2023-03-02 ENCOUNTER — PATIENT MESSAGE (OUTPATIENT)
Dept: FAMILY MEDICINE | Facility: CLINIC | Age: 53
End: 2023-03-02
Payer: OTHER GOVERNMENT

## 2023-03-02 DIAGNOSIS — R21 RASH: ICD-10-CM

## 2023-03-02 DIAGNOSIS — H91.90 DECREASED HEARING, UNSPECIFIED LATERALITY: Primary | ICD-10-CM

## 2023-03-06 ENCOUNTER — PATIENT MESSAGE (OUTPATIENT)
Dept: FAMILY MEDICINE | Facility: CLINIC | Age: 53
End: 2023-03-06
Payer: OTHER GOVERNMENT

## 2023-03-06 ENCOUNTER — PATIENT MESSAGE (OUTPATIENT)
Dept: PODIATRY | Facility: CLINIC | Age: 53
End: 2023-03-06
Payer: OTHER GOVERNMENT

## 2023-03-06 ENCOUNTER — TELEPHONE (OUTPATIENT)
Dept: FAMILY MEDICINE | Facility: CLINIC | Age: 53
End: 2023-03-06
Payer: OTHER GOVERNMENT

## 2023-03-06 NOTE — TELEPHONE ENCOUNTER
3 referrals placed by Dr. Nation. Can you please assist with getting authorization. Thank you!      ENT referral pt will see a provider at the [facility] location. Unsure of the providers name.

## 2023-03-07 ENCOUNTER — TELEPHONE (OUTPATIENT)
Dept: PODIATRY | Facility: CLINIC | Age: 53
End: 2023-03-07
Payer: OTHER GOVERNMENT

## 2023-03-07 ENCOUNTER — TELEPHONE (OUTPATIENT)
Dept: FAMILY MEDICINE | Facility: CLINIC | Age: 53
End: 2023-03-07
Payer: OTHER GOVERNMENT

## 2023-03-07 NOTE — TELEPHONE ENCOUNTER
Spoke to patient to let her know Dr. Chandler would like to see her to go over her MRI with her to go over her options. Patient was transferred for an appointment.

## 2023-03-08 NOTE — TELEPHONE ENCOUNTER
Telephone encounter created by ASHELY Hood who s/w patient and transferred to FD for scheduling new appointment.

## 2023-03-14 ENCOUNTER — OFFICE VISIT (OUTPATIENT)
Dept: FAMILY MEDICINE | Facility: CLINIC | Age: 53
End: 2023-03-14
Payer: OTHER GOVERNMENT

## 2023-03-14 ENCOUNTER — PATIENT MESSAGE (OUTPATIENT)
Dept: FAMILY MEDICINE | Facility: CLINIC | Age: 53
End: 2023-03-14
Payer: OTHER GOVERNMENT

## 2023-03-14 ENCOUNTER — LAB VISIT (OUTPATIENT)
Dept: LAB | Facility: HOSPITAL | Age: 53
End: 2023-03-14
Attending: STUDENT IN AN ORGANIZED HEALTH CARE EDUCATION/TRAINING PROGRAM
Payer: OTHER GOVERNMENT

## 2023-03-14 ENCOUNTER — HOSPITAL ENCOUNTER (OUTPATIENT)
Dept: RADIOLOGY | Facility: HOSPITAL | Age: 53
Discharge: HOME | End: 2023-03-14
Attending: STUDENT IN AN ORGANIZED HEALTH CARE EDUCATION/TRAINING PROGRAM
Payer: OTHER GOVERNMENT

## 2023-03-14 VITALS
OXYGEN SATURATION: 98 % | HEART RATE: 115 BPM | BODY MASS INDEX: 26.87 KG/M2 | HEIGHT: 69 IN | RESPIRATION RATE: 18 BRPM | DIASTOLIC BLOOD PRESSURE: 86 MMHG | SYSTOLIC BLOOD PRESSURE: 120 MMHG | WEIGHT: 181.44 LBS

## 2023-03-14 DIAGNOSIS — N39.0 COMPLICATED UTI (URINARY TRACT INFECTION): Primary | ICD-10-CM

## 2023-03-14 DIAGNOSIS — R10.31 RIGHT LOWER QUADRANT PAIN: ICD-10-CM

## 2023-03-14 DIAGNOSIS — N39.0 COMPLICATED UTI (URINARY TRACT INFECTION): ICD-10-CM

## 2023-03-14 PROCEDURE — 87088 URINE BACTERIA CULTURE: CPT | Performed by: STUDENT IN AN ORGANIZED HEALTH CARE EDUCATION/TRAINING PROGRAM

## 2023-03-14 PROCEDURE — 87186 SC STD MICRODIL/AGAR DIL: CPT | Performed by: STUDENT IN AN ORGANIZED HEALTH CARE EDUCATION/TRAINING PROGRAM

## 2023-03-14 PROCEDURE — 99999 PR PBB SHADOW E&M-EST. PATIENT-LVL V: CPT | Mod: PBBFAC,,, | Performed by: STUDENT IN AN ORGANIZED HEALTH CARE EDUCATION/TRAINING PROGRAM

## 2023-03-14 PROCEDURE — 74176 CT ABD & PELVIS W/O CONTRAST: CPT | Mod: TC

## 2023-03-14 PROCEDURE — 87086 URINE CULTURE/COLONY COUNT: CPT | Performed by: STUDENT IN AN ORGANIZED HEALTH CARE EDUCATION/TRAINING PROGRAM

## 2023-03-14 PROCEDURE — 87077 CULTURE AEROBIC IDENTIFY: CPT | Performed by: STUDENT IN AN ORGANIZED HEALTH CARE EDUCATION/TRAINING PROGRAM

## 2023-03-14 PROCEDURE — 99215 OFFICE O/P EST HI 40 MIN: CPT | Mod: PBBFAC,PO | Performed by: STUDENT IN AN ORGANIZED HEALTH CARE EDUCATION/TRAINING PROGRAM

## 2023-03-14 PROCEDURE — 96372 THER/PROPH/DIAG INJ SC/IM: CPT | Mod: PBBFAC,PO

## 2023-03-14 PROCEDURE — 99999 PR PBB SHADOW E&M-EST. PATIENT-LVL V: ICD-10-PCS | Mod: PBBFAC,,, | Performed by: STUDENT IN AN ORGANIZED HEALTH CARE EDUCATION/TRAINING PROGRAM

## 2023-03-14 PROCEDURE — 81001 URINALYSIS AUTO W/SCOPE: CPT | Performed by: STUDENT IN AN ORGANIZED HEALTH CARE EDUCATION/TRAINING PROGRAM

## 2023-03-14 PROCEDURE — 74176 CT ABD & PELVIS W/O CONTRAST: CPT | Mod: 26,,, | Performed by: RADIOLOGY

## 2023-03-14 PROCEDURE — 74176 CT ABDOMEN PELVIS WITHOUT CONTRAST: ICD-10-PCS | Mod: 26,,, | Performed by: RADIOLOGY

## 2023-03-14 RX ORDER — AMOXICILLIN AND CLAVULANATE POTASSIUM 875; 125 MG/1; MG/1
1 TABLET, FILM COATED ORAL EVERY 12 HOURS
Qty: 12 TABLET | Refills: 0 | Status: SHIPPED | OUTPATIENT
Start: 2023-03-15 | End: 2023-04-26 | Stop reason: DRUGHIGH

## 2023-03-14 RX ORDER — CEFTRIAXONE 1 G/1
1 INJECTION, POWDER, FOR SOLUTION INTRAMUSCULAR; INTRAVENOUS
Status: COMPLETED | OUTPATIENT
Start: 2023-03-14 | End: 2023-03-14

## 2023-03-14 RX ADMIN — CEFTRIAXONE SODIUM 1 G: 1 INJECTION, POWDER, FOR SOLUTION INTRAMUSCULAR; INTRAVENOUS at 11:03

## 2023-03-14 NOTE — PROGRESS NOTES
"[facility] MEDICINE CLINIC NOTE    Patient Name: Jessy Mayberry  YOB: 1970    PRESENTING HISTORY     History of Present Illness:  Ms. Jessy Mayberry is a 52 y.o. female here with nausea, dysuria and abdomianl p[ain.     Started last Wed  Initial symptom was nausea.     Burning with urination.   Dark urine.   Urine trickles, increased frequency.   Low back pain.   Abdominal pain, suprapubic.   No fevers.   +n,  Has taken zofran, phenergan, famotidine.   No diarrhea. Has not had BM in a few days.   +anorexia for a few days.       Chronic cough, chokes in middle of the night choking on saliva last few nights.   +body aches, unchanged from baseline.   No congestion, runny nose. But she has itchy eyes     Chronic fatigue. Unchanged from baseline.     She has had C-sections and hernia repair in past.   Still has gallbladder, appendix.         ROS      OBJECTIVE:   Vital Signs:  Vitals:    03/14/23 1127   BP: 120/86   Pulse: (!) 115   Resp: 18   SpO2: 98%   Weight: 82.3 kg (181 lb 7 oz)   Height: 5' 9" (1.753 m)         Physical Exam  Vitals and nursing note reviewed.   Constitutional:       Appearance: She is not toxic-appearing or diaphoretic.   HENT:      Head: Normocephalic and atraumatic.      Right Ear: External ear normal.      Left Ear: External ear normal.   Eyes:      General: No scleral icterus.     Conjunctiva/sclera: Conjunctivae normal.      Pupils: Pupils are equal, round, and reactive to light.   Neck:      Thyroid: No thyromegaly.   Cardiovascular:      Rate and Rhythm: Regular rhythm. Tachycardia present.      Heart sounds: Normal heart sounds. No murmur heard.  Pulmonary:      Effort: Pulmonary effort is normal. No respiratory distress.      Breath sounds: Normal breath sounds. No wheezing or rales.   Abdominal:      Tenderness: There is abdominal tenderness (diffuse, worst in RLQ). There is right CVA tenderness. There is no left CVA tenderness, guarding or rebound.   Musculoskeletal:   "       General: No tenderness or deformity. Normal range of motion.      Cervical back: Normal range of motion and neck supple.   Lymphadenopathy:      Cervical: No cervical adenopathy.   Skin:     General: Skin is warm and dry.      Findings: No erythema or rash.   Neurological:      Mental Status: She is alert and oriented to person, place, and time.      Gait: Gait is intact.   Psychiatric:         Mood and Affect: Mood and affect normal.         Cognition and Memory: Memory normal.         Judgment: Judgment normal.       ASSESSMENT & PLAN:     Complicated UTI (urinary tract infection)  -     Urinalysis; Future; Expected date: 03/14/2023  -     CULTURE, URINE; Future; Expected date: 03/14/2023  -     cefTRIAXone injection 1 g  -     amoxicillin-clavulanate 875-125mg (AUGMENTIN) 875-125 mg per tablet; Take 1 tablet by mouth every 12 (twelve) hours.  Dispense: 12 tablet; Refill: 0    Right lower quadrant pain  -     CT Abdomen Pelvis  Without Contrast; Future; Expected date: 03/14/2023      Ddx includes pyelo, septic nephrolithiasis, appendicitis. I think she is stable for outpatient workup at this time.     For now, check urine, treat with rocephin injection and augmentin.   Stat CT abdomen to evaluate.  Management pending findings.         Roger Nation MD   Internal Medicine

## 2023-03-14 NOTE — PROGRESS NOTES
2 patient identifiers used (name and ). Administered 1 gram Rocephin IM.  Patient tolerated well. No bleeding at insertion site noted. Pain 0 on scale of 0/10. Aseptic technique maintained. Patient remained in clinic 15 minutes post injection for monitoring. No adverse reaction noted.

## 2023-03-14 NOTE — TELEPHONE ENCOUNTER
I spoke with pt via phone, per Dr. Nation in office visit would be better. Pt will come in at the same time. No further questions.

## 2023-03-15 LAB
BACTERIA #/AREA URNS AUTO: ABNORMAL /HPF
BILIRUB UR QL STRIP: NEGATIVE
CLARITY UR REFRACT.AUTO: ABNORMAL
COLOR UR AUTO: YELLOW
GLUCOSE UR QL STRIP: NEGATIVE
HGB UR QL STRIP: ABNORMAL
HYALINE CASTS UR QL AUTO: 0 /LPF
KETONES UR QL STRIP: NEGATIVE
LEUKOCYTE ESTERASE UR QL STRIP: ABNORMAL
MICROSCOPIC COMMENT: ABNORMAL
NITRITE UR QL STRIP: POSITIVE
NON-SQ EPI CELLS #/AREA URNS AUTO: 3 /HPF
PH UR STRIP: 7 [PH] (ref 5–8)
PROT UR QL STRIP: ABNORMAL
RBC #/AREA URNS AUTO: 21 /HPF (ref 0–4)
SP GR UR STRIP: 1.01 (ref 1–1.03)
URN SPEC COLLECT METH UR: ABNORMAL
WBC #/AREA URNS AUTO: >100 /HPF (ref 0–5)
WBC CLUMPS UR QL AUTO: ABNORMAL

## 2023-03-16 LAB — BACTERIA UR CULT: ABNORMAL

## 2023-03-17 ENCOUNTER — PATIENT MESSAGE (OUTPATIENT)
Dept: FAMILY MEDICINE | Facility: CLINIC | Age: 53
End: 2023-03-17
Payer: OTHER GOVERNMENT

## 2023-03-21 ENCOUNTER — HOSPITAL ENCOUNTER (OUTPATIENT)
Dept: RADIOLOGY | Facility: HOSPITAL | Age: 53
Discharge: HOME | End: 2023-03-21
Attending: FAMILY MEDICINE
Payer: OTHER GOVERNMENT

## 2023-03-21 ENCOUNTER — OFFICE VISIT (OUTPATIENT)
Dept: ORTHOPEDICS | Facility: CLINIC | Age: 53
End: 2023-03-21
Payer: OTHER GOVERNMENT

## 2023-03-21 DIAGNOSIS — M25.511 RIGHT SHOULDER PAIN, UNSPECIFIED CHRONICITY: ICD-10-CM

## 2023-03-21 DIAGNOSIS — M25.511 CHRONIC RIGHT SHOULDER PAIN: ICD-10-CM

## 2023-03-21 DIAGNOSIS — G89.29 CHRONIC RIGHT SHOULDER PAIN: ICD-10-CM

## 2023-03-21 DIAGNOSIS — M25.511 RIGHT SHOULDER PAIN, UNSPECIFIED CHRONICITY: Primary | ICD-10-CM

## 2023-03-21 DIAGNOSIS — M75.41 IMPINGEMENT SYNDROME OF RIGHT SHOULDER: Primary | ICD-10-CM

## 2023-03-21 DIAGNOSIS — M75.51 BURSITIS OF RIGHT SHOULDER: ICD-10-CM

## 2023-03-21 PROCEDURE — 20610 DRAIN/INJ JOINT/BURSA W/O US: CPT | Mod: PBBFAC,PN | Performed by: FAMILY MEDICINE

## 2023-03-21 PROCEDURE — 99999 PR PBB SHADOW E&M-EST. PATIENT-LVL III: ICD-10-PCS | Mod: PBBFAC,,, | Performed by: FAMILY MEDICINE

## 2023-03-21 PROCEDURE — 99999 PR PBB SHADOW E&M-EST. PATIENT-LVL III: CPT | Mod: PBBFAC,,, | Performed by: FAMILY MEDICINE

## 2023-03-21 PROCEDURE — 20610 LARGE JOINT ASPIRATION/INJECTION: R SUBACROMIAL BURSA: ICD-10-PCS | Mod: S$PBB,RT,, | Performed by: FAMILY MEDICINE

## 2023-03-21 PROCEDURE — 73030 X-RAY EXAM OF SHOULDER: CPT | Mod: TC,PN,RT

## 2023-03-21 PROCEDURE — 73030 X-RAY EXAM OF SHOULDER: CPT | Mod: 26,RT,, | Performed by: RADIOLOGY

## 2023-03-21 PROCEDURE — 73030 XR SHOULDER TRAUMA 3 VIEW RIGHT: ICD-10-PCS | Mod: 26,RT,, | Performed by: RADIOLOGY

## 2023-03-21 PROCEDURE — 99214 OFFICE O/P EST MOD 30 MIN: CPT | Mod: 25,S$PBB,, | Performed by: FAMILY MEDICINE

## 2023-03-21 PROCEDURE — 99213 OFFICE O/P EST LOW 20 MIN: CPT | Mod: PBBFAC,PN,25 | Performed by: FAMILY MEDICINE

## 2023-03-21 PROCEDURE — 99214 PR OFFICE/OUTPT VISIT, EST, LEVL IV, 30-39 MIN: ICD-10-PCS | Mod: 25,S$PBB,, | Performed by: FAMILY MEDICINE

## 2023-03-21 RX ORDER — METHYLPREDNISOLONE ACETATE 80 MG/ML
80 INJECTION, SUSPENSION INTRA-ARTICULAR; INTRALESIONAL; INTRAMUSCULAR; SOFT TISSUE
Status: DISCONTINUED | OUTPATIENT
Start: 2023-03-21 | End: 2023-03-21 | Stop reason: HOSPADM

## 2023-03-21 RX ADMIN — METHYLPREDNISOLONE ACETATE 80 MG: 80 INJECTION, SUSPENSION INTRA-ARTICULAR; INTRALESIONAL; INTRAMUSCULAR; SOFT TISSUE at 01:03

## 2023-03-21 NOTE — PROGRESS NOTES
Subjective:      Patient ID: Jessy Mayberry is a 52 y.o. female.    Chief Complaint: No chief complaint on file.    Presents today with right shoulder pain.  This been going on off and on for the last year.  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  She is now having pain with certain movements specifically reaching behind her.  She notes it is painful to put on and take off her bra.  She is unable to reach things off high shelves upon her seatbelt.  Pain is localized mainly to the superior anterior portion of the shoulder.  She denies any numbness or tingling of the upper extremity.  She is tried taking ibuprofen with some relief.  She is also had some relief with heating pad and rest.      Review of Systems   Unable to perform ROS: Other       Objective:            General    Nursing note reviewed.  Constitutional: She is oriented to person, place, and time. She appears well-developed and well-nourished.   HENT:   Nose: Nose normal.   Eyes: EOM are normal. Pupils are equal, round, and reactive to light.   Neck: Neck supple.   Pulmonary/Chest: Effort normal.   Neurological: She is alert and oriented to person, place, and time. She has normal reflexes.         Right Shoulder Exam     Inspection/Observation   Swelling: absent  Bruising: absent  Scars: absent  Deformity: absent  Scapular Winging: absent  Scapular Dyskinesia: negative    Tenderness   The patient is tender to palpation of the acromioclavicular joint and supraspinatus.    Range of Motion   Active abduction:  150   Passive abduction:  normal   Extension:  normal   Forward Flexion:  150   Forward Elevation: normal  Adduction: 90     Tests & Signs   Cochran test: positive  Impingement: positive  Active Compression Test (Lasara's Sign): negative  Speed's Test: negative  Anterior Drawer Test: 0   Posterior Drawer Test: 0    Left Shoulder Exam   Left shoulder exam is  normal.       Muscle Strength   Right Upper Extremity   Shoulder Abduction: 5/5   Shoulder Internal Rotation: 5/5   Shoulder External Rotation: 5/5   Supraspinatus: 5/5   Subscapularis: 5/5   Biceps: 5/5     Vascular Exam     Right Pulses      Radial:                    2+    X-ray images ordered obtained interpreted by me.  They show well-preserved joint spacing of the glenohumeral joint.  Some mild degenerative changes of the AC joint.  Otherwise no bony abnormalities and no acute fractures present.        Assessment:       Encounter Diagnoses   Name Primary?    Chronic right shoulder pain     Impingement syndrome of right shoulder Yes    Bursitis of right shoulder           Plan:       Diagnoses and all orders for this visit:    Impingement syndrome of right shoulder    Chronic right shoulder pain    Bursitis of right shoulder    No concern in findings today for possible rotator cuff tear.  She has symptoms of either bursitis or subacromial impingement.  Offered a steroid injection of the shoulder today and she agreed with this.  Should this fail to improve or playing we could consider physical therapy or MRI.  She may follow up here as needed.    Large Joint Aspiration/Injection: R subacromial bursa    Date/Time: 3/21/2023 1:40 PM  Performed by: Chino Hernandez MD  Authorized by: Chino Hernandez MD     Consent Done?:  Yes (Verbal)  Indications:  Arthritis and pain  Site marked: the procedure site was marked    Timeout: prior to procedure the correct patient, procedure, and site was verified    Prep: patient was prepped and draped in usual sterile fashion      Local anesthesia used?: Yes    Local anesthetic:  Lidocaine 1% without epinephrine and topical anesthetic  Anesthetic total (ml):  2      Details:  Needle Size:  22 G  Ultrasonic Guidance for needle placement?: No    Approach:  Posterior  Location:  Shoulder  Site:  R subacromial bursa  Medications:  80 mg methylPREDNISolone acetate 80 mg/mL  Patient  tolerance:  Patient tolerated the procedure well with no immediate complications

## 2023-03-22 ENCOUNTER — PATIENT MESSAGE (OUTPATIENT)
Dept: ORTHOPEDICS | Facility: CLINIC | Age: 53
End: 2023-03-22
Payer: OTHER GOVERNMENT

## 2023-03-28 ENCOUNTER — PATIENT MESSAGE (OUTPATIENT)
Dept: FAMILY MEDICINE | Facility: CLINIC | Age: 53
End: 2023-03-28
Payer: OTHER GOVERNMENT

## 2023-03-28 ENCOUNTER — OFFICE VISIT (OUTPATIENT)
Dept: PODIATRY | Facility: CLINIC | Age: 53
End: 2023-03-28
Payer: OTHER GOVERNMENT

## 2023-03-28 ENCOUNTER — PATIENT MESSAGE (OUTPATIENT)
Dept: PSYCHIATRY | Facility: CLINIC | Age: 53
End: 2023-03-28
Payer: OTHER GOVERNMENT

## 2023-03-28 VITALS — BODY MASS INDEX: 26.81 KG/M2 | WEIGHT: 181 LBS | HEIGHT: 69 IN

## 2023-03-28 DIAGNOSIS — S86.311D PERONEAL TENDON RUPTURE, RIGHT, SUBSEQUENT ENCOUNTER: Primary | ICD-10-CM

## 2023-03-28 DIAGNOSIS — M20.11 HALLUX VALGUS OF RIGHT FOOT: ICD-10-CM

## 2023-03-28 DIAGNOSIS — M25.371 ANKLE INSTABILITY, RIGHT: ICD-10-CM

## 2023-03-28 DIAGNOSIS — M79.674 PAIN OF TOE OF RIGHT FOOT: ICD-10-CM

## 2023-03-28 DIAGNOSIS — R22.41 LOCALIZED SWELLING, MASS, OR LUMP OF RIGHT LOWER EXTREMITY: ICD-10-CM

## 2023-03-28 PROCEDURE — 99214 OFFICE O/P EST MOD 30 MIN: CPT | Mod: S$GLB,,, | Performed by: PODIATRIST

## 2023-03-28 PROCEDURE — 99214 PR OFFICE/OUTPT VISIT, EST, LEVL IV, 30-39 MIN: ICD-10-PCS | Mod: S$GLB,,, | Performed by: PODIATRIST

## 2023-03-28 NOTE — PROGRESS NOTES
"  [address] [facility] [address] [place] [address]  Phone: [phone]   [phone]    Patient's PCP:Roger Nation MD  Referring Provider: No ref. provider found    Subjective:      Chief Complaint:: Results    HPI  Jessy Mayberry is a 52 y.o. female who presents today for follow up MRI test results.     Vitals:    03/28/23 1134   Weight: 82.1 kg (181 lb)   Height: 5' 9" (1.753 m)   PainSc:   2      Shoe Size:     Past Surgical History:   Procedure Laterality Date    ANKLE SURGERY      CARDIAC ELECTROPHYSIOLOGY MAPPING AND ABLATION      DILATION AND CURETTAGE OF UTERUS      3 times    INGUINAL HERNIA REPAIR      twice    TONSILLECTOMY       Past Medical History:   Diagnosis Date    Adrenal insufficiency     Hypothyroidism     Pituitary mass      Family History   Problem Relation Age of Onset    Ovarian cancer Mother 28    Hypertension Mother     Coronary artery disease Mother     Hyperlipidemia Mother     Cancer Mother     Skin cancer Mother 75    Hypertension Father     Retinal detachment Father     Atrial fibrillation Sister     Rheum arthritis Daughter     Asthma Daughter     Breast cancer Maternal Aunt 42    Bone cancer Maternal Aunt     Cancer Maternal Aunt     Rheum arthritis Maternal Aunt     Hepatitis Maternal Aunt     Heart disease Maternal Grandmother     Hypertension Maternal Grandmother     Kidney failure Maternal Grandmother     Atrial fibrillation Maternal Grandmother     Hypertension Brother     Hypertension Son     Psoriasis Child     Melanoma Neg Hx     Lupus Neg Hx         Social History:   Marital Status:   Alcohol History:  reports current alcohol use.  Tobacco History:  reports that she has never smoked. She has never used smokeless tobacco.  Drug History:  reports no history of drug use.    Review of patient's allergies indicates:   Allergen Reactions    Oxycodone      Other reaction(s): Unknown    Promethazine Other (See Comments)       Current Outpatient Medications "   Medication Sig Dispense Refill    ALPRAZolam (XANAX) 0.5 MG tablet Take 0.5 mg by mouth daily as needed.      amoxicillin-clavulanate 875-125mg (AUGMENTIN) 875-125 mg per tablet Take 1 tablet by mouth every 12 (twelve) hours. 12 tablet 0    calcium carbonate (TUMS ORAL) Take by mouth.      cyanocobalamin 1,000 mcg/mL injection Inject 1 mL (1,000 mcg total) into the muscle every 28 days. 10 mL 0    dextroamphetamine-amphetamine (ADDERALL XR) 30 MG 24 hr capsule Take 1 capsule (30 mg total) by mouth every morning. 30 capsule 0    dextroamphetamine-amphetamine (ADDERALL) 10 mg Tab Take 1 tablet (10 mg total) by mouth once daily. 30 tablet 0    estradioL (ESTRING) 2 mg (7.5 mcg /24 hour) vaginal ring Place 2 mg vaginally every 3 (three) months. 1 each 3    famotidine (PEPCID) 20 MG tablet Take 20 mg by mouth 2 (two) times daily.      FLUoxetine 20 MG capsule Take 1 capsule (20 mg total) by mouth once daily. 30 capsule 2    gabapentin (NEURONTIN) 300 MG capsule       hydrOXYzine HCL (ATARAX) 25 MG tablet       loratadine (CLARITIN) 10 mg tablet       methylPREDNISolone (MEDROL DOSEPACK) 4 mg tablet use as directed 1 each 0    pantoprazole (PROTONIX) 40 MG tablet Take 40 mg by mouth once daily.      sumatriptan (IMITREX) 25 MG Tab 1 tablet at least 2 hours between doses as needed      SYNTHROID 75 mcg tablet Take 1 tablet (75 mcg total) by mouth once daily. 90 tablet 3    triamcinolone acetonide 0.025% (KENALOG) 0.025 % cream Apply topically 2 (two) times daily. 80 g 0     No current facility-administered medications for this visit.       Review of Systems   Constitutional:  Negative for chills, fatigue, fever and unexpected weight change.   HENT:  Negative for hearing loss and trouble swallowing.    Eyes:  Negative for photophobia and visual disturbance.   Respiratory:  Negative for cough, shortness of breath and wheezing.    Cardiovascular:  Negative for chest pain, palpitations and leg swelling.   Gastrointestinal:   Negative for abdominal pain and nausea.   Genitourinary:  Negative for dysuria and frequency.   Musculoskeletal:  Negative for arthralgias, back pain, gait problem, joint swelling and myalgias.   Skin:  Negative for rash and wound.   Neurological:  Negative for tremors, seizures, weakness, numbness and headaches.   Hematological:  Does not bruise/bleed easily.       Objective:        Physical Exam:   Foot Exam    General  General Appearance: appears stated age and healthy   Orientation: alert and oriented to person, place, and time   Affect: appropriate   Gait: antalgic       Right Foot/Ankle     Inspection and Palpation  Ecchymosis: none  Tenderness: great toe metatarsophalangeal joint and ankle (Pain along the peroneus brevis tendon by ankle where longitudinal tear exists.  Pain by anterior talofibular ligament.  Pain plantar 1st metatarsal head by site of previous puncture wound)  Swelling: ankle (Lateral ankle)  Arch: pes planus  Hallux valgus: yes  Hallux limitus: yes  Skin Exam: skin intact;   Neurovascular  Dorsalis pedis: 2+  Posterior tibial: 2+  Capillary Refill: 2+  Saphenous nerve sensation: normal  Tibial nerve sensation: normal  Superficial peroneal nerve sensation: normal  Deep peroneal nerve sensation: normal  Sural nerve sensation: normal    Muscle Strength  Ankle dorsiflexion: 5  Ankle plantar flexion: 5  Ankle inversion: 5  Ankle eversion: 5  Great toe extension: 5  Great toe flexion: 5    Range of Motion    Normal right ankle ROM  Passive  Ankle inversion: pain    Active  Ankle inversion: pain    Tests  Anterior drawer: postive   Varus tilt: positive   Talar tilt: positive       Physical Exam  Cardiovascular:      Pulses:           Dorsalis pedis pulses are 2+ on the right side.        Posterior tibial pulses are 2+ on the right side.   Musculoskeletal:      Right ankle: Swelling present. Tenderness present. Anterior drawer test positive.      Right foot: Bunion present.         Feet:              Right Ankle/Foot Exam     Tenderness   The patient is tender to palpation of the great toe metatarsophalangeal joint.    Range of Motion   The patient has normal right ankle ROM.        Muscle Strength   Right Lower Extremity   Ankle Dorsiflexion:  5   Plantar flexion:  5/5    Vascular Exam     Right Pulses  Dorsalis Pedis:      2+  Posterior Tibial:      2+         Imaging:   Reviewed her MRI of the forefoot and hindfoot of the right foot.  Explained to her in the forefoot there is no signs of any retained foreign object plantar 1st metatarsal head and no foreign body cyst.  There is some degenerative changes of the plantar aspect of the head which may be related to degenerative arthritis because the bunion and or could be from injury to the bone from the nail that she stepped on in the past history.  She also has a bunion deformity.  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  Assessment:       1. Peroneal tendon rupture, right, subsequent encounter    2. Pain of toe of right foot    3. Localized swelling, mass, or lump of right lower extremity    4. Ankle instability, right    5. Hallux valgus of right foot      Plan:   Peroneal tendon rupture, right, subsequent encounter    Pain of toe of right foot    Localized swelling, mass, or lump of right lower extremity    Ankle instability, right    Hallux valgus of right foot    Evaluated patient long discussion with her about the MRI findings discussed with her that the forefoot shows no abnormalities other than previous trauma that may have caused some arthritis plantar aspect of the 1st metatarsal head by the sesamoids as well as the plantar distal aspect of the metatarsal head.  Explained her there is no surgery that needs to be done for any foreign body in his area.  If she has had surgery in the future would be to correct  her bunion deformity and see if that helps with the pain in the 1st metatarsal head.  Today we are going to place a Spenco insert in the shoe with some felt cut-out to take load off the 1st metatarsal head to see if this helps relieve her pain.  If it does then she can get custom-made orthotics or she can do over-the-counter products.    I evaluated her ankle review the MRI with her which showed a longitudinal tear of the peroneus brevis tendon.  We discussed treatment all options consisting of no surgery but doing ankle brace range of motion strengthening exercises and living with the problem which is what she is decided to do.  I discussed with her surgery but she would like to hold off doing any surgery this time.  She will continue ankle brace range of motion strengthening return as needed see Dr. HARISH Carias if she decides to have surgery on the tear in the tendon.      Procedures          Counseling:     I provided patient education verbally regarding:   Patient diagnosis, treatment options, as well as alternatives, risks, and benefits.     I discussed conservative treatment of minor tendon tear with cast for 6 to 8 weeks, MRI, ultrasound if needed for evaluation of the rupture and possible need for surgical repair.     I discussed the conservative treatent of arthritis consisiting of shoe modification, OTC NSAID, cortisone shots, a series of supartz injections, avoiding painful activities and common sense.  I explained that the arthritis may become more painful causng more disability and the possibility of further treatment.  Also discussed may need evaluation by Rheumatologist for possible inflammatory arthritis.     I provided patient education regarding: Bunion deformity and causes. I discussed conservative treatment with shoe modification, pads, treating symptoms with OTC NSAIDs, pads between toes, inserts and pads over the bunion. I explained that conservative treatment is non-curative but may help with  pain and slow down the progression of the deformity.    This note was created using Dragon voice recognition software that occasionally misinterpreted phrases or words.

## 2023-03-31 DIAGNOSIS — G43.909 MIGRAINE WITHOUT STATUS MIGRAINOSUS, NOT INTRACTABLE, UNSPECIFIED MIGRAINE TYPE: Primary | ICD-10-CM

## 2023-03-31 RX ORDER — SUMATRIPTAN SUCCINATE 25 MG/1
25 TABLET ORAL
Qty: 10 TABLET | Refills: 5 | Status: SHIPPED | OUTPATIENT
Start: 2023-03-31

## 2023-04-11 ENCOUNTER — PATIENT MESSAGE (OUTPATIENT)
Dept: FAMILY MEDICINE | Facility: CLINIC | Age: 53
End: 2023-04-11
Payer: OTHER GOVERNMENT

## 2023-04-11 DIAGNOSIS — N39.0 URINARY TRACT INFECTION WITHOUT HEMATURIA, SITE UNSPECIFIED: Primary | ICD-10-CM

## 2023-04-11 NOTE — TELEPHONE ENCOUNTER
Please see attached portal message from patient. Order pended for UA C&S. Please advise. Thank you.       122

## 2023-04-11 NOTE — TELEPHONE ENCOUNTER
Call placed for patient for notification order for urinalysis and urine culture has been placed. Offered to assist patient with scheduling lab appointment. Patient states she will call back to schedule. Advised patient she can send a portal message with preferred date and time of lab appointment, and office will assist with scheduling. Patient verbalized understanding.

## 2023-04-19 ENCOUNTER — PATIENT MESSAGE (OUTPATIENT)
Dept: FAMILY MEDICINE | Facility: CLINIC | Age: 53
End: 2023-04-19
Payer: OTHER GOVERNMENT

## 2023-04-19 ENCOUNTER — PATIENT MESSAGE (OUTPATIENT)
Dept: NEUROLOGY | Facility: CLINIC | Age: 53
End: 2023-04-19
Payer: OTHER GOVERNMENT

## 2023-04-19 NOTE — TELEPHONE ENCOUNTER
I spoke with pt via phone, will see Dr. Nation  on 4/26/2023 at 2:30. No further questions at this time.

## 2023-04-26 ENCOUNTER — PATIENT MESSAGE (OUTPATIENT)
Dept: FAMILY MEDICINE | Facility: CLINIC | Age: 53
End: 2023-04-26
Payer: OTHER GOVERNMENT

## 2023-04-26 ENCOUNTER — LAB VISIT (OUTPATIENT)
Dept: LAB | Facility: HOSPITAL | Age: 53
End: 2023-04-26
Attending: STUDENT IN AN ORGANIZED HEALTH CARE EDUCATION/TRAINING PROGRAM
Payer: OTHER GOVERNMENT

## 2023-04-26 ENCOUNTER — OFFICE VISIT (OUTPATIENT)
Dept: FAMILY MEDICINE | Facility: CLINIC | Age: 53
End: 2023-04-26
Payer: OTHER GOVERNMENT

## 2023-04-26 ENCOUNTER — TELEPHONE (OUTPATIENT)
Dept: FAMILY MEDICINE | Facility: CLINIC | Age: 53
End: 2023-04-26
Payer: OTHER GOVERNMENT

## 2023-04-26 VITALS
BODY MASS INDEX: 26.55 KG/M2 | HEART RATE: 132 BPM | SYSTOLIC BLOOD PRESSURE: 130 MMHG | WEIGHT: 179.25 LBS | OXYGEN SATURATION: 98 % | DIASTOLIC BLOOD PRESSURE: 84 MMHG | RESPIRATION RATE: 18 BRPM | HEIGHT: 69 IN

## 2023-04-26 DIAGNOSIS — R00.0 TACHYCARDIA: ICD-10-CM

## 2023-04-26 DIAGNOSIS — I47.10 SVT (SUPRAVENTRICULAR TACHYCARDIA): Primary | ICD-10-CM

## 2023-04-26 DIAGNOSIS — N39.0 URINARY TRACT INFECTION WITHOUT HEMATURIA, SITE UNSPECIFIED: ICD-10-CM

## 2023-04-26 DIAGNOSIS — I47.10 SVT (SUPRAVENTRICULAR TACHYCARDIA): ICD-10-CM

## 2023-04-26 DIAGNOSIS — G47.00 INSOMNIA, UNSPECIFIED TYPE: ICD-10-CM

## 2023-04-26 LAB
ALBUMIN SERPL BCP-MCNC: 4.2 G/DL (ref 3.5–5.2)
ALP SERPL-CCNC: 68 U/L (ref 55–135)
ALT SERPL W/O P-5'-P-CCNC: 23 U/L (ref 10–44)
ANION GAP SERPL CALC-SCNC: 9 MMOL/L (ref 8–16)
AST SERPL-CCNC: 23 U/L (ref 10–40)
BACTERIA #/AREA URNS HPF: NEGATIVE /HPF
BASOPHILS # BLD AUTO: 0.04 K/UL (ref 0–0.2)
BASOPHILS NFR BLD: 0.5 % (ref 0–1.9)
BILIRUB SERPL-MCNC: 0.6 MG/DL (ref 0.1–1)
BILIRUB UR QL STRIP: NEGATIVE
BUN SERPL-MCNC: 12 MG/DL (ref 6–20)
CALCIUM SERPL-MCNC: 9.5 MG/DL (ref 8.7–10.5)
CHLORIDE SERPL-SCNC: 102 MMOL/L (ref 95–110)
CLARITY UR: CLEAR
CO2 SERPL-SCNC: 26 MMOL/L (ref 23–29)
COLOR UR: YELLOW
CREAT SERPL-MCNC: 0.8 MG/DL (ref 0.5–1.4)
D DIMER PPP IA.FEU-MCNC: 0.19 MG/L FEU
DIFFERENTIAL METHOD: NORMAL
EOSINOPHIL # BLD AUTO: 0 K/UL (ref 0–0.5)
EOSINOPHIL NFR BLD: 0.5 % (ref 0–8)
ERYTHROCYTE [DISTWIDTH] IN BLOOD BY AUTOMATED COUNT: 12.7 % (ref 11.5–14.5)
EST. GFR  (NO RACE VARIABLE): >60 ML/MIN/1.73 M^2
GLUCOSE SERPL-MCNC: 85 MG/DL (ref 70–110)
GLUCOSE UR QL STRIP: NEGATIVE
HCT VFR BLD AUTO: 39.4 % (ref 37–48.5)
HGB BLD-MCNC: 13.3 G/DL (ref 12–16)
HGB UR QL STRIP: ABNORMAL
HYALINE CASTS #/AREA URNS LPF: 1 /LPF
IMM GRANULOCYTES # BLD AUTO: 0.01 K/UL (ref 0–0.04)
IMM GRANULOCYTES NFR BLD AUTO: 0.1 % (ref 0–0.5)
KETONES UR QL STRIP: NEGATIVE
LEUKOCYTE ESTERASE UR QL STRIP: NEGATIVE
LYMPHOCYTES # BLD AUTO: 1.9 K/UL (ref 1–4.8)
LYMPHOCYTES NFR BLD: 25.1 % (ref 18–48)
MAGNESIUM SERPL-MCNC: 1.9 MG/DL (ref 1.6–2.6)
MCH RBC QN AUTO: 30.5 PG (ref 27–31)
MCHC RBC AUTO-ENTMCNC: 33.8 G/DL (ref 32–36)
MCV RBC AUTO: 90 FL (ref 82–98)
MICROSCOPIC COMMENT: ABNORMAL
MONOCYTES # BLD AUTO: 0.6 K/UL (ref 0.3–1)
MONOCYTES NFR BLD: 8.4 % (ref 4–15)
NEUTROPHILS # BLD AUTO: 4.8 K/UL (ref 1.8–7.7)
NEUTROPHILS NFR BLD: 65.4 % (ref 38–73)
NITRITE UR QL STRIP: NEGATIVE
NRBC BLD-RTO: 0 /100 WBC
PH UR STRIP: 8 [PH] (ref 5–8)
PLATELET # BLD AUTO: 380 K/UL (ref 150–450)
PMV BLD AUTO: 9.6 FL (ref 9.2–12.9)
POTASSIUM SERPL-SCNC: 3.8 MMOL/L (ref 3.5–5.1)
PROT SERPL-MCNC: 7.6 G/DL (ref 6–8.4)
PROT UR QL STRIP: NEGATIVE
RBC # BLD AUTO: 4.36 M/UL (ref 4–5.4)
RBC #/AREA URNS HPF: 5 /HPF (ref 0–4)
SODIUM SERPL-SCNC: 137 MMOL/L (ref 136–145)
SP GR UR STRIP: 1.01 (ref 1–1.03)
SQUAMOUS #/AREA URNS HPF: 1 /HPF
TSH SERPL DL<=0.005 MIU/L-ACNC: 0.96 UIU/ML (ref 0.34–5.6)
URN SPEC COLLECT METH UR: ABNORMAL
UROBILINOGEN UR STRIP-ACNC: NEGATIVE EU/DL
WBC # BLD AUTO: 7.36 K/UL (ref 3.9–12.7)
WBC #/AREA URNS HPF: 1 /HPF (ref 0–5)

## 2023-04-26 PROCEDURE — 36415 COLL VENOUS BLD VENIPUNCTURE: CPT | Performed by: STUDENT IN AN ORGANIZED HEALTH CARE EDUCATION/TRAINING PROGRAM

## 2023-04-26 PROCEDURE — 81001 URINALYSIS AUTO W/SCOPE: CPT | Performed by: STUDENT IN AN ORGANIZED HEALTH CARE EDUCATION/TRAINING PROGRAM

## 2023-04-26 PROCEDURE — 83735 ASSAY OF MAGNESIUM: CPT | Performed by: STUDENT IN AN ORGANIZED HEALTH CARE EDUCATION/TRAINING PROGRAM

## 2023-04-26 PROCEDURE — 93005 ELECTROCARDIOGRAM TRACING: CPT | Mod: PBBFAC,PO | Performed by: INTERNAL MEDICINE

## 2023-04-26 PROCEDURE — 99215 OFFICE O/P EST HI 40 MIN: CPT | Mod: PBBFAC,PO | Performed by: STUDENT IN AN ORGANIZED HEALTH CARE EDUCATION/TRAINING PROGRAM

## 2023-04-26 PROCEDURE — 80053 COMPREHEN METABOLIC PANEL: CPT | Performed by: STUDENT IN AN ORGANIZED HEALTH CARE EDUCATION/TRAINING PROGRAM

## 2023-04-26 PROCEDURE — 85025 COMPLETE CBC W/AUTO DIFF WBC: CPT | Performed by: STUDENT IN AN ORGANIZED HEALTH CARE EDUCATION/TRAINING PROGRAM

## 2023-04-26 PROCEDURE — 85379 FIBRIN DEGRADATION QUANT: CPT | Performed by: STUDENT IN AN ORGANIZED HEALTH CARE EDUCATION/TRAINING PROGRAM

## 2023-04-26 PROCEDURE — 84443 ASSAY THYROID STIM HORMONE: CPT | Performed by: STUDENT IN AN ORGANIZED HEALTH CARE EDUCATION/TRAINING PROGRAM

## 2023-04-26 RX ORDER — VERAPAMIL HYDROCHLORIDE 120 MG/1
120 CAPSULE, EXTENDED RELEASE ORAL DAILY
Qty: 30 CAPSULE | Refills: 11 | Status: SHIPPED | OUTPATIENT
Start: 2023-04-26 | End: 2023-06-21

## 2023-04-26 RX ORDER — PHENAZOPYRIDINE HYDROCHLORIDE 100 MG/1
100 TABLET, FILM COATED ORAL 3 TIMES DAILY
COMMUNITY
Start: 2023-04-11 | End: 2023-04-26

## 2023-04-26 RX ORDER — NITROFURANTOIN 25; 75 MG/1; MG/1
100 CAPSULE ORAL 2 TIMES DAILY
COMMUNITY
Start: 2023-04-11 | End: 2023-04-26

## 2023-04-26 NOTE — PROGRESS NOTES
"[facility] CLINIC NOTE    Patient Name: Jessy Mayberry  YOB: 1970    PRESENTING HISTORY     History of Present Illness:  Ms. Jessy Mayberry is a 52 y.o. female. Appointment was to "F/U chronic conditions" but she came in with 1 hour of palpitations and shortness of breath.     Palpitations, sob.   Happening about 2-3 times weekly.   Will happen spontaneously. Can break with vagal maneuvers, deep breathing.     Hx catheter ablation in 2008 or 2011 for SVT. No hx Afib.     Had sweet tea today, thinks set it off.   Drinks coke zero in morning.   Has been taking Adderall for ADD. We will stop this now. She does not feel this is a precipitant since she has been on it for many years     Episodes started a few months ago after UTI.   Treated x 2 for UTIs.       Separately:  Issues with falling and staying asleep.   Tries to go to bed around 930-10. No tv in bed.   Watches movie before bed.   White noise, noise cancelling. Weighted blanket.   Cold in bedroom.   Completely dark.   If wakes up, lays in bed "forever"  Will take phenergan occ for insomnia.   No napping during the day.     ROS      OBJECTIVE:   Vital Signs:  Vitals:    04/26/23 1436 04/26/23 1513   BP: (!) 144/84 130/84   Pulse: (!) 132    Resp: 18    SpO2: 98%    Weight: 81.3 kg (179 lb 3.7 oz)    Height: 5' 9" (1.753 m)             Physical Exam  Vitals and nursing note reviewed.   Constitutional:       Appearance: She is not diaphoretic.   HENT:      Head: Normocephalic and atraumatic.      Right Ear: External ear normal.      Left Ear: External ear normal.   Eyes:      General: No scleral icterus.     Conjunctiva/sclera: Conjunctivae normal.      Pupils: Pupils are equal, round, and reactive to light.   Neck:      Thyroid: No thyromegaly.   Cardiovascular:      Rate and Rhythm: Regular rhythm. Tachycardia present.      Heart sounds: Normal heart sounds. No murmur heard.  Pulmonary:      Effort: Pulmonary effort is normal. No " respiratory distress.      Breath sounds: Normal breath sounds. No wheezing or rales.   Musculoskeletal:         General: No tenderness or deformity. Normal range of motion.      Cervical back: Normal range of motion and neck supple.      Right lower leg: No edema.      Left lower leg: No edema.      Comments: Negative Lamar's sign   Lymphadenopathy:      Cervical: No cervical adenopathy.   Skin:     General: Skin is warm and dry.      Findings: No erythema or rash.   Neurological:      Mental Status: She is alert and oriented to person, place, and time.      Gait: Gait is intact.   Psychiatric:         Mood and Affect: Mood and affect normal.         Cognition and Memory: Memory normal.         Judgment: Judgment normal.       ASSESSMENT & PLAN:     SVT (supraventricular tachycardia)  -     CBC Auto Differential; Future; Expected date: 04/26/2023  -     Comprehensive Metabolic Panel; Future; Expected date: 04/26/2023  -     Magnesium; Future; Expected date: 04/26/2023  -     TSH; Future; Expected date: 04/26/2023  -     verapamiL (VERELAN) 120 MG C24P; Take 1 capsule (120 mg total) by mouth once daily.  Dispense: 30 capsule; Refill: 11  -     Cancel: Ambulatory referral/consult to Cardiology; Future; Expected date: 05/03/2023  -     D-DIMER, QUANTITATIVE; Future; Expected date: 04/26/2023  -     Ambulatory referral/consult to Cardiology; Future; Expected date: 05/03/2023  -     Echo; Future    Tachycardia  -     IN OFFICE EKG 12-LEAD (to Muse)    Insomnia, unspecified type      Sinus tach on EKG.   D dimer to exclude VTE (low risk per [facility]).   Stop offending agents.   Check electrolytes.     Start on rate control.     Have her see Cardiology.          Insomnia- will try some sleep hygiene measures. Consider CBT.       Roger Nation MD   Internal Medicine

## 2023-04-26 NOTE — PATIENT INSTRUCTIONS
Limit use of screens, especially before bed. No television, computer or phone 2 hours before bed.     Reduce or stop drinking caffeine, especially at night. This can worsen sleep.     No alcohol.     Bed is for sleeping. Don't get in bed and do other activities like watch tv, listen to music or anything else.     Don't keep a clock right by your bed. If you wake up and look at the clock it can be stimulating.     Keep room dark, quiet and cool. You will sleep better in a cool dark room.     If you wake up, get out of bed. Don't lay in bed awake. Do a relaxing activity without screens like reading. Try to minimize the number of lights you turn on.     Avoid exercise within 2 hours of bed time.     Try using meditation or relaxation techniques. Can try CBT-I megha (free megha available in usual megha stores) for ideas.             León Jiménez,     If you are due for any health screening(s) below please notify me so we can arrange them to be ordered and scheduled to maintain your health. Most healthy patients complete it. Don't lose out on improving your health.     All of your core healthy metrics are met.

## 2023-04-28 ENCOUNTER — PATIENT MESSAGE (OUTPATIENT)
Dept: FAMILY MEDICINE | Facility: CLINIC | Age: 53
End: 2023-04-28
Payer: OTHER GOVERNMENT

## 2023-04-28 DIAGNOSIS — R00.0 TACHYCARDIA: Primary | ICD-10-CM

## 2023-05-01 ENCOUNTER — HOSPITAL ENCOUNTER (OUTPATIENT)
Dept: RADIOLOGY | Facility: HOSPITAL | Age: 53
Discharge: HOME | End: 2023-05-01
Attending: STUDENT IN AN ORGANIZED HEALTH CARE EDUCATION/TRAINING PROGRAM
Payer: OTHER GOVERNMENT

## 2023-05-01 ENCOUNTER — PATIENT MESSAGE (OUTPATIENT)
Dept: FAMILY MEDICINE | Facility: CLINIC | Age: 53
End: 2023-05-01
Payer: OTHER GOVERNMENT

## 2023-05-01 DIAGNOSIS — R06.02 SHORTNESS OF BREATH: Primary | ICD-10-CM

## 2023-05-01 DIAGNOSIS — R00.0 TACHYCARDIA: ICD-10-CM

## 2023-05-01 PROCEDURE — 71046 XR CHEST PA AND LATERAL: ICD-10-PCS | Mod: 26,,, | Performed by: RADIOLOGY

## 2023-05-01 PROCEDURE — 71046 X-RAY EXAM CHEST 2 VIEWS: CPT | Mod: TC,FY

## 2023-05-01 PROCEDURE — 71046 X-RAY EXAM CHEST 2 VIEWS: CPT | Mod: 26,,, | Performed by: RADIOLOGY

## 2023-05-01 NOTE — TELEPHONE ENCOUNTER
Call placed to patient. No answer received. Left message requesting return call to office. Also sent [facility] portal message to patient regarding this matter. Awaiting patient's response.       MD Chapis Barnard Staff 48 minutes ago (7:21 AM)       Can you schedule her for an xray?

## 2023-05-02 ENCOUNTER — PATIENT MESSAGE (OUTPATIENT)
Dept: FAMILY MEDICINE | Facility: CLINIC | Age: 53
End: 2023-05-02
Payer: OTHER GOVERNMENT

## 2023-05-08 ENCOUNTER — PATIENT MESSAGE (OUTPATIENT)
Dept: FAMILY MEDICINE | Facility: CLINIC | Age: 53
End: 2023-05-08
Payer: OTHER GOVERNMENT

## 2023-05-16 ENCOUNTER — PATIENT MESSAGE (OUTPATIENT)
Dept: FAMILY MEDICINE | Facility: CLINIC | Age: 53
End: 2023-05-16
Payer: OTHER GOVERNMENT

## 2023-05-22 DIAGNOSIS — E53.8 B12 DEFICIENCY: ICD-10-CM

## 2023-05-22 RX ORDER — CYANOCOBALAMIN 1000 UG/ML
INJECTION, SOLUTION INTRAMUSCULAR; SUBCUTANEOUS
Qty: 10 ML | Refills: 3 | Status: SHIPPED | OUTPATIENT
Start: 2023-05-22

## 2023-05-23 ENCOUNTER — PATIENT MESSAGE (OUTPATIENT)
Dept: FAMILY MEDICINE | Facility: CLINIC | Age: 53
End: 2023-05-23
Payer: OTHER GOVERNMENT

## 2023-05-23 NOTE — TELEPHONE ENCOUNTER
Copy of document placed at front check in desk on FP [address] by RIP Aguilar. Patient notified via e-mail due to this being initial point of contact from patient regarding this matter.

## 2023-06-02 ENCOUNTER — TELEPHONE (OUTPATIENT)
Dept: FAMILY MEDICINE | Facility: CLINIC | Age: 53
End: 2023-06-02
Payer: OTHER GOVERNMENT

## 2023-06-02 NOTE — TELEPHONE ENCOUNTER
----- Message from Sharita Strickland sent at 6/1/2023  2:04 PM CDT -----  Contact: [employer]  Type:  Patient Needs Advice    Who Called:  [employer]   What is this regarding?:  Checking if a condition specific fax was received.  Best Call Back Number:  [phone], ext [phone]  Additional Information:  Please call back at the phone number listed above to advise. Thank you!

## 2023-06-10 ENCOUNTER — HOSPITAL ENCOUNTER (EMERGENCY)
Facility: HOSPITAL | Age: 53
Discharge: HOME | End: 2023-06-10
Attending: EMERGENCY MEDICINE
Payer: OTHER GOVERNMENT

## 2023-06-10 ENCOUNTER — OFFICE VISIT (OUTPATIENT)
Dept: URGENT CARE | Facility: CLINIC | Age: 53
End: 2023-06-10
Payer: OTHER GOVERNMENT

## 2023-06-10 VITALS
DIASTOLIC BLOOD PRESSURE: 78 MMHG | SYSTOLIC BLOOD PRESSURE: 146 MMHG | RESPIRATION RATE: 18 BRPM | BODY MASS INDEX: 27.11 KG/M2 | HEART RATE: 95 BPM | OXYGEN SATURATION: 100 % | TEMPERATURE: 98 F | HEIGHT: 69 IN | WEIGHT: 183 LBS

## 2023-06-10 VITALS
DIASTOLIC BLOOD PRESSURE: 83 MMHG | RESPIRATION RATE: 18 BRPM | WEIGHT: 182 LBS | BODY MASS INDEX: 26.96 KG/M2 | OXYGEN SATURATION: 100 % | TEMPERATURE: 98 F | SYSTOLIC BLOOD PRESSURE: 121 MMHG | HEART RATE: 99 BPM | HEIGHT: 69 IN

## 2023-06-10 DIAGNOSIS — R10.9 ACUTE RIGHT FLANK PAIN: Primary | ICD-10-CM

## 2023-06-10 DIAGNOSIS — N12 PYELONEPHRITIS: ICD-10-CM

## 2023-06-10 DIAGNOSIS — N12 PYELONEPHRITIS: Primary | ICD-10-CM

## 2023-06-10 LAB
ALBUMIN SERPL BCP-MCNC: 4.1 G/DL (ref 3.5–5.2)
ALP SERPL-CCNC: 81 U/L (ref 55–135)
ALT SERPL W/O P-5'-P-CCNC: 17 U/L (ref 10–44)
ANION GAP SERPL CALC-SCNC: 12 MMOL/L (ref 8–16)
AST SERPL-CCNC: 22 U/L (ref 10–40)
B-HCG UR QL: NEGATIVE
BACTERIA #/AREA URNS HPF: ABNORMAL /HPF
BASOPHILS # BLD AUTO: 0.03 K/UL (ref 0–0.2)
BASOPHILS NFR BLD: 0.3 % (ref 0–1.9)
BILIRUB SERPL-MCNC: 0.5 MG/DL (ref 0.1–1)
BILIRUB UR QL STRIP: ABNORMAL
BILIRUB UR QL STRIP: NEGATIVE
BUN SERPL-MCNC: 11 MG/DL (ref 6–20)
CALCIUM SERPL-MCNC: 9.1 MG/DL (ref 8.7–10.5)
CHLORIDE SERPL-SCNC: 107 MMOL/L (ref 95–110)
CLARITY UR: ABNORMAL
CO2 SERPL-SCNC: 24 MMOL/L (ref 23–29)
COLOR UR: YELLOW
CREAT SERPL-MCNC: 0.9 MG/DL (ref 0.5–1.4)
DIFFERENTIAL METHOD: NORMAL
EOSINOPHIL # BLD AUTO: 0.1 K/UL (ref 0–0.5)
EOSINOPHIL NFR BLD: 1.2 % (ref 0–8)
ERYTHROCYTE [DISTWIDTH] IN BLOOD BY AUTOMATED COUNT: 12.8 % (ref 11.5–14.5)
EST. GFR  (NO RACE VARIABLE): >60 ML/MIN/1.73 M^2
GLUCOSE SERPL-MCNC: 86 MG/DL (ref 70–110)
GLUCOSE UR QL STRIP: NEGATIVE
GLUCOSE UR QL STRIP: NEGATIVE
HCT VFR BLD AUTO: 41.5 % (ref 37–48.5)
HCV AB SERPL QL IA: NORMAL
HGB BLD-MCNC: 13.4 G/DL (ref 12–16)
HGB UR QL STRIP: ABNORMAL
HIV 1+2 AB+HIV1 P24 AG SERPL QL IA: NORMAL
HYALINE CASTS #/AREA URNS LPF: 0 /LPF
IMM GRANULOCYTES # BLD AUTO: 0.03 K/UL (ref 0–0.04)
IMM GRANULOCYTES NFR BLD AUTO: 0.3 % (ref 0–0.5)
KETONES UR QL STRIP: ABNORMAL
KETONES UR QL STRIP: NEGATIVE
LEUKOCYTE ESTERASE UR QL STRIP: ABNORMAL
LEUKOCYTE ESTERASE UR QL STRIP: POSITIVE
LYMPHOCYTES # BLD AUTO: 2.2 K/UL (ref 1–4.8)
LYMPHOCYTES NFR BLD: 22.4 % (ref 18–48)
MCH RBC QN AUTO: 29.7 PG (ref 27–31)
MCHC RBC AUTO-ENTMCNC: 32.3 G/DL (ref 32–36)
MCV RBC AUTO: 92 FL (ref 82–98)
MICROSCOPIC COMMENT: ABNORMAL
MONOCYTES # BLD AUTO: 0.8 K/UL (ref 0.3–1)
MONOCYTES NFR BLD: 8 % (ref 4–15)
NEUTROPHILS # BLD AUTO: 6.6 K/UL (ref 1.8–7.7)
NEUTROPHILS NFR BLD: 67.8 % (ref 38–73)
NITRITE UR QL STRIP: NEGATIVE
NRBC BLD-RTO: 0 /100 WBC
PH UR STRIP: 7 [PH] (ref 5–8)
PH, POC UA: 8
PLATELET # BLD AUTO: 352 K/UL (ref 150–450)
PMV BLD AUTO: 10 FL (ref 9.2–12.9)
POC BLOOD, URINE: POSITIVE
POC NITRATES, URINE: NEGATIVE
POTASSIUM SERPL-SCNC: 3.8 MMOL/L (ref 3.5–5.1)
PROT SERPL-MCNC: 7.4 G/DL (ref 6–8.4)
PROT UR QL STRIP: ABNORMAL
PROT UR QL STRIP: POSITIVE
RBC # BLD AUTO: 4.51 M/UL (ref 4–5.4)
RBC #/AREA URNS HPF: >100 /HPF (ref 0–4)
SODIUM SERPL-SCNC: 143 MMOL/L (ref 136–145)
SP GR UR STRIP: 1.01 (ref 1–1.03)
SP GR UR STRIP: 1.02 (ref 1–1.03)
URN SPEC COLLECT METH UR: ABNORMAL
UROBILINOGEN UR STRIP-ACNC: 0.2 (ref 0.1–1.1)
UROBILINOGEN UR STRIP-ACNC: NEGATIVE EU/DL
WBC # BLD AUTO: 9.79 K/UL (ref 3.9–12.7)
WBC #/AREA URNS HPF: >100 /HPF (ref 0–5)
YEAST URNS QL MICRO: ABNORMAL

## 2023-06-10 PROCEDURE — 96365 THER/PROPH/DIAG IV INF INIT: CPT

## 2023-06-10 PROCEDURE — 80053 COMPREHEN METABOLIC PANEL: CPT | Performed by: EMERGENCY MEDICINE

## 2023-06-10 PROCEDURE — 81003 POCT URINALYSIS, DIPSTICK, AUTOMATED, W/O SCOPE: ICD-10-PCS | Mod: QW,S$GLB,, | Performed by: NURSE PRACTITIONER

## 2023-06-10 PROCEDURE — 96375 TX/PRO/DX INJ NEW DRUG ADDON: CPT

## 2023-06-10 PROCEDURE — 87086 URINE CULTURE/COLONY COUNT: CPT | Performed by: EMERGENCY MEDICINE

## 2023-06-10 PROCEDURE — 96361 HYDRATE IV INFUSION ADD-ON: CPT

## 2023-06-10 PROCEDURE — 86803 HEPATITIS C AB TEST: CPT | Performed by: EMERGENCY MEDICINE

## 2023-06-10 PROCEDURE — 87088 URINE BACTERIA CULTURE: CPT | Performed by: EMERGENCY MEDICINE

## 2023-06-10 PROCEDURE — 81000 URINALYSIS NONAUTO W/SCOPE: CPT | Performed by: EMERGENCY MEDICINE

## 2023-06-10 PROCEDURE — 87389 HIV-1 AG W/HIV-1&-2 AB AG IA: CPT | Performed by: EMERGENCY MEDICINE

## 2023-06-10 PROCEDURE — 99213 OFFICE O/P EST LOW 20 MIN: CPT | Mod: S$GLB,,, | Performed by: NURSE PRACTITIONER

## 2023-06-10 PROCEDURE — 87186 SC STD MICRODIL/AGAR DIL: CPT | Performed by: EMERGENCY MEDICINE

## 2023-06-10 PROCEDURE — 81003 URINALYSIS AUTO W/O SCOPE: CPT | Mod: QW,S$GLB,, | Performed by: NURSE PRACTITIONER

## 2023-06-10 PROCEDURE — 87077 CULTURE AEROBIC IDENTIFY: CPT | Performed by: EMERGENCY MEDICINE

## 2023-06-10 PROCEDURE — 25000003 PHARM REV CODE 250: Performed by: EMERGENCY MEDICINE

## 2023-06-10 PROCEDURE — 85025 COMPLETE CBC W/AUTO DIFF WBC: CPT | Performed by: EMERGENCY MEDICINE

## 2023-06-10 PROCEDURE — 63600175 PHARM REV CODE 636 W HCPCS: Performed by: EMERGENCY MEDICINE

## 2023-06-10 PROCEDURE — 99285 EMERGENCY DEPT VISIT HI MDM: CPT | Mod: 25

## 2023-06-10 PROCEDURE — 36415 COLL VENOUS BLD VENIPUNCTURE: CPT | Performed by: EMERGENCY MEDICINE

## 2023-06-10 PROCEDURE — 81025 URINE PREGNANCY TEST: CPT | Performed by: EMERGENCY MEDICINE

## 2023-06-10 PROCEDURE — 99213 PR OFFICE/OUTPT VISIT, EST, LEVL III, 20-29 MIN: ICD-10-PCS | Mod: S$GLB,,, | Performed by: NURSE PRACTITIONER

## 2023-06-10 RX ORDER — CIPROFLOXACIN 500 MG/1
500 TABLET ORAL 2 TIMES DAILY
Qty: 20 TABLET | Refills: 0 | Status: SHIPPED | OUTPATIENT
Start: 2023-06-10 | End: 2023-06-22

## 2023-06-10 RX ORDER — KETOROLAC TROMETHAMINE 30 MG/ML
15 INJECTION, SOLUTION INTRAMUSCULAR; INTRAVENOUS
Status: COMPLETED | OUTPATIENT
Start: 2023-06-10 | End: 2023-06-10

## 2023-06-10 RX ORDER — ONDANSETRON 2 MG/ML
4 INJECTION INTRAMUSCULAR; INTRAVENOUS
Status: COMPLETED | OUTPATIENT
Start: 2023-06-10 | End: 2023-06-10

## 2023-06-10 RX ADMIN — SODIUM CHLORIDE 1000 ML: 9 INJECTION, SOLUTION INTRAVENOUS at 12:06

## 2023-06-10 RX ADMIN — ONDANSETRON 4 MG: 2 INJECTION INTRAMUSCULAR; INTRAVENOUS at 01:06

## 2023-06-10 RX ADMIN — KETOROLAC TROMETHAMINE 15 MG: 30 INJECTION, SOLUTION INTRAMUSCULAR; INTRAVENOUS at 12:06

## 2023-06-10 RX ADMIN — CEFTRIAXONE SODIUM 2 G: 2 INJECTION, POWDER, FOR SOLUTION INTRAMUSCULAR; INTRAVENOUS at 02:06

## 2023-06-10 NOTE — ED PROVIDER NOTES
Encounter Date: 6/10/2023    SCRIBE #1 NOTE: Alma PACK, am scribing for, and in the presence of,  Mau Scott MD.     History     Chief Complaint   Patient presents with    Dysuria     With lower back and suprapubic pain x 2 days      Time seen by provider: 12:23 PM on 06/10/2023    Jessy Mayberry is a 52 y.o. female with a PMHx of a complicated UTI (was on Augmentin and Macrobid previously) who presents to the ED for evaluation of persistent urinary frequency that onset 4 days ago with associated dysuria, suprapubic abdominal pain, and lower back pain since yesterday.  She also reports subjective fever and chills.        Review of external records performed:  Patient was evaluated earlier today by [facility] where she had a POCT UA done confirming blood, protein, and leukocytes in the urine.  She was diagnosed with possible pyelonephritis and referred to the ED for further evaluation and Tx.  The patient denies N/V, a rash, or any other symptoms at this time.  She does not follow with a urologist currently.  PSHx includes D&C and inguinal hernia repair.  Patient has allergies to Oxycodone and Promethazine.      The history is provided by the patient and medical records.   Review of patient's allergies indicates:   Allergen Reactions    Oxycodone      Other reaction(s): Unknown    Promethazine Other (See Comments)     Past Medical History:   Diagnosis Date    Adrenal insufficiency     Hypothyroidism     Pituitary mass      Past Surgical History:   Procedure Laterality Date    ANKLE SURGERY      CARDIAC ELECTROPHYSIOLOGY MAPPING AND ABLATION      DILATION AND CURETTAGE OF UTERUS      3 times    INGUINAL HERNIA REPAIR      twice    TONSILLECTOMY       Family History   Problem Relation Age of Onset    Ovarian cancer Mother 28    Hypertension Mother     Coronary artery disease Mother     Hyperlipidemia Mother     Cancer Mother     Skin cancer Mother 75    Hypertension Father     Retinal detachment  Father     Atrial fibrillation Sister     Rheum arthritis Daughter     Asthma Daughter     Breast cancer Maternal Aunt 42    Bone cancer Maternal Aunt     Cancer Maternal Aunt     Rheum arthritis Maternal Aunt     Hepatitis Maternal Aunt     Heart disease Maternal Grandmother     Hypertension Maternal Grandmother     Kidney failure Maternal Grandmother     Atrial fibrillation Maternal Grandmother     Hypertension Brother     Hypertension Son     Psoriasis Child     Melanoma Neg Hx     Lupus Neg Hx      Social History     Tobacco Use    Smoking status: Never    Smokeless tobacco: Never   Substance Use Topics    Alcohol use: Yes     Comment: occassionally    Drug use: Never     Review of Systems   Constitutional:  Positive for chills and fever.   Gastrointestinal:  Positive for abdominal pain. Negative for nausea and vomiting.   Genitourinary:  Positive for dysuria, frequency and hematuria.   Musculoskeletal:  Positive for back pain.   Skin:  Negative for rash.   All other systems reviewed and are negative.    Physical Exam     Initial Vitals [06/10/23 1210]   BP Pulse Resp Temp SpO2   (!) 146/78 95 18 97.6 °F (36.4 °C) 100 %      MAP       --         Physical Exam    Nursing note and vitals reviewed.  Constitutional: She appears well-developed and well-nourished. She is not diaphoretic.  Non-toxic appearance. She does not have a sickly appearance. She does not appear ill. No distress.   HENT:   Head: Normocephalic and atraumatic.   Eyes: EOM are normal.   Neck: Neck supple.   Normal range of motion.  Cardiovascular:  Normal rate, regular rhythm and normal heart sounds.     Exam reveals no gallop and no friction rub.       No murmur heard.  Pulmonary/Chest: Breath sounds normal. No respiratory distress. She has no wheezes. She has no rhonchi. She has no rales.   Abdominal: Abdomen is soft. She exhibits no distension. There is generalized abdominal tenderness (mild). There is no rebound and no guarding.    Musculoskeletal:         General: Normal range of motion.      Cervical back: Normal range of motion and neck supple.     Neurological: She is alert and oriented to person, place, and time.   Skin: Skin is warm and dry.   Psychiatric: She has a normal mood and affect. Her behavior is normal. Judgment and thought content normal.       ED Course   Procedures  Labs Reviewed   URINALYSIS, REFLEX TO URINE CULTURE - Abnormal; Notable for the following components:       Result Value    Appearance, UA Cloudy (*)     Protein, UA 3+ (*)     Ketones, UA 1+ (*)     Bilirubin (UA) 1+ (*)     Occult Blood UA 3+ (*)     Leukocytes, UA 3+ (*)     All other components within normal limits    Narrative:     Specimen Source->Urine   URINALYSIS MICROSCOPIC - Abnormal; Notable for the following components:    RBC, UA >100 (*)     WBC, UA >100 (*)     Bacteria Few (*)     All other components within normal limits    Narrative:     Specimen Source->Urine   CULTURE, URINE   CBC W/ AUTO DIFFERENTIAL    Narrative:     Release to patient->Immediate   COMPREHENSIVE METABOLIC PANEL    Narrative:     Release to patient->Immediate   PREGNANCY TEST, URINE RAPID    Narrative:     Specimen Source->Urine   HIV 1 / 2 ANTIBODY   HEPATITIS C ANTIBODY          Imaging Results              CT Renal Stone Study ABD Pelvis WO (Final result)  Result time 06/10/23 13:40:11      Final result by Adriel Kennedy MD (06/10/23 13:40:11)                   Impression:      Nonobstructive left nephrolithiasis.    Prominent extrarenal pelvis, left greater right, without significant change.  Component of UPJ obstruction not excluded.    Features suggesting cystitis.      Electronically signed by: Adriel Kennedy MD  Date:    06/10/2023  Time:    13:40               Narrative:    EXAMINATION:  CT RENAL STONE STUDY ABD PELVIS WO    CLINICAL HISTORY:  Flank pain, kidney stone suspected;    TECHNIQUE:  Low dose axial images, sagittal and coronal reformations were  obtained from the lung bases to the pubic symphysis.  Contrast was not administered.    COMPARISON:  03/14/2023    FINDINGS:  The liver, spleen, pancreas, and adrenal glands are unremarkable.    There are approximately 4 stones of the left kidney measuring up to 4 mm.  Bilateral extrarenal pelves are present, more pronounced on the left than the right and without significant change.  There is no ureterolithiasis or hydronephrosis.    A small hiatal hernia is present.  The bowel is nondilated.  The appendix is normal.  The colon is unremarkable.    There is hazy attenuation about the urinary bladder for which cystitis is a consideration.    There has been a right inguinal hernia repair.  No significant bony abnormality.                                       Medications   cefTRIAXone (ROCEPHIN) 2 g in dextrose 5 % in water (D5W) 5 % 100 mL IVPB (MB+) (2 g Intravenous New Bag 6/10/23 1423)   sodium chloride 0.9% bolus 1,000 mL 1,000 mL (0 mLs Intravenous Stopped 6/10/23 1421)   ketorolac injection 15 mg (15 mg Intravenous Given 6/10/23 1236)   ondansetron injection 4 mg (4 mg Intravenous Given 6/10/23 1314)     Medical Decision Making:   History:   Old Medical Records: I decided to obtain old medical records.  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  Sent by urgent care.  This is her 3rd bladder infection in the last 3 months.  Prior to this she had no history of bladder infections.  Very well-appearing in the emergency room.  No evidence of sepsis, urinalysis is consistent with infection and likely  early pyelonephritis.  She does have some dilation of the bilateral renal pelvises, she is referred to Urology after 2 g IV Rocephin here.  She will be discharged with p.o. quinolone.  No indication for admission or further testing.  Follow-up Urology, return to the ER for any worsening symptoms or concerns.        Scribe Attestation:   Scribe #1: I performed the above scribed service and the documentation accurately describes the services I performed. I attest to the accuracy of the note.      ED Course as of 06/10/23 1452   Sat Darin 10, 2023   1215 BP(!): 146/78 [EF]   1215 Temp: 97.6 °F (36.4 °C) [EF]   1215 Temp Source: Oral [EF]   1215 Pulse: 95 [EF]   1215 Resp: 18 [EF]   1215 SpO2: 100 % [EF]   1304 Leukocytes, UA(!): 3+ [EF]   1304 Occult Blood UA(!): 3+ [EF]   1304 Bilirubin (UA)(!): 1+ [EF]   1304 Ketones, UA(!): 1+ [EF]   1312 WBC, UA(!): >100 [EF]   1312 RBC, UA(!): >100 [EF]   1312 Bacteria, UA(!): Few [EF]   1313 Do not suspect sepsis at this time [EF]   1318 Preg Test, Ur: Negative [EF]   1342 CT Renal Stone Study ABD Pelvis WO [EF]      ED Course User Index  [EF] Mau Scott MD               I, Dr. Scott, personally performed the services described in this documentation. All medical record entries made by the scribe were at my direction and in my presence.  I have reviewed the chart and agree that the record reflects my personal performance and is accurate and complete.2:52 PM 06/10/2023      Clinical Impression:   Final diagnoses:  [N12] Pyelonephritis (Primary)        ED Disposition Condition    Discharge Stable          ED Prescriptions       Medication Sig Dispense Start Date End Date Auth. Provider    ciprofloxacin HCl (CIPRO) 500 MG tablet Take 1 tablet (500 mg total) by mouth 2 (two) times daily. for 7 days 20 tablet 6/10/2023 6/17/2023 Mau Scott MD          Follow-up Information       Follow up With Specialties Details Why Contact Info    [facility] - Emergency Dept Emergency  Medicine  As needed, If symptoms worsen [address] [place] [id number]  [phone]    Shyanne Avery MD Urology Schedule an appointment as soon as possible for a visit   [address] [place] [address]  [address]  [facility] [address]  [phone]               Mau Scott MD  06/10/23 1457

## 2023-06-10 NOTE — ED NOTES
C/o super pubic and bilat flank pain that started yesterday and got worse. States urinary frequency started 2 days ago along with burning that has gotten worse. States she felt feverish yesterday and started taking 600mg motrin around the clock. Denies n/v/d. States has had frequent UTIs the last 3 months.

## 2023-06-10 NOTE — PROGRESS NOTES
"Subjective:      Patient ID: Jessy Mayberry is a 52 y.o. female.    Vitals:  height is 5' 9" (1.753 m) and weight is 82.6 kg (182 lb). Her axillary temperature is 98 °F (36.7 °C). Her blood pressure is 121/83 and her pulse is 99. Her respiration is 18 and oxygen saturation is 100%.     Chief Complaint: Back Pain    52-year-old female presents with back pain, dysuria, and increased frequency and urgency x 2 days. She reports that she does have a history of renal stones and recurrent UTIs over the last several months. She denies any vomiting, but states she is in a lot of pain. States that she has not yet been referred to Urology.    .    Back Pain  The current episode started in the past 7 days (2 days ago). Associated symptoms include dysuria. Pertinent negatives include no abdominal pain, chest pain or fever. Treatments tried: ibuprofen.     Constitution: Negative for fatigue and fever.   HENT: Negative.     Cardiovascular:  Negative for chest trauma, chest pain, leg swelling, palpitations, sob on exertion and passing out.   Gastrointestinal:  Negative for abdominal pain, nausea, vomiting, constipation and diarrhea.   Genitourinary:  Positive for dysuria, frequency, urgency, flank pain, hematuria and history of kidney stones.   Musculoskeletal:  Positive for back pain.   Skin:  Negative for rash.   Neurological:  Negative for dizziness.    Objective:     Physical Exam   Constitutional: She is oriented to person, place, and time. She appears well-developed.   HENT:   Head: Normocephalic and atraumatic.   Ears:   Right Ear: External ear normal.   Left Ear: External ear normal.   Nose: Nose normal.   Mouth/Throat: Mucous membranes are normal.   Eyes: Conjunctivae and lids are normal.   Neck: Trachea normal. Neck supple.   Cardiovascular: Normal rate, regular rhythm and normal heart sounds.   Pulmonary/Chest: Effort normal and breath sounds normal. No respiratory distress.   Abdominal: Normal appearance and bowel sounds " are normal. She exhibits no distension and no mass. Soft. There is abdominal tenderness (Suprapubic tenderness). There is right CVA tenderness (Severe right sided CVS tenderness).   Musculoskeletal: Normal range of motion.         General: Normal range of motion.   Neurological: She is alert and oriented to person, place, and time. She has normal strength.   Skin: Skin is warm, dry, intact, not diaphoretic and not pale.   Psychiatric: Her speech is normal and behavior is normal. Judgment and thought content normal.   Nursing note and vitals reviewed.    Assessment:     1. Acute right flank pain    2. Pyelonephritis        Plan:       Acute right flank pain  -     POCT Urinalysis, Dipstick, Automated, W/O Scope  -     CULTURE, URINE  -     Ambulatory referral/consult to Urology    Pyelonephritis  -     CULTURE, URINE          Medical Decision Making:   Urgent Care Management:  52-year-old female presents with right flank pain and dysuria. She reports that she does have a history of kidney stones and that she has been having recurrent UTIs over the last 2-3 months. UA had significant RBC and Leuks. Due to history of stones and severe right-sided CVA tenderness on exam, advised the patient to have further evaluation in the ED. Patient denied EMS transportation and understands the risk involved in driving herself. All questions answered. Patient verbalized that she would go to ED for further evaluation.

## 2023-06-12 LAB — BACTERIA UR CULT: ABNORMAL

## 2023-06-14 ENCOUNTER — PATIENT MESSAGE (OUTPATIENT)
Dept: FAMILY MEDICINE | Facility: CLINIC | Age: 53
End: 2023-06-14
Payer: OTHER GOVERNMENT

## 2023-06-14 ENCOUNTER — HOSPITAL ENCOUNTER (OUTPATIENT)
Dept: CARDIOLOGY | Facility: HOSPITAL | Age: 53
Discharge: HOME | End: 2023-06-14
Attending: STUDENT IN AN ORGANIZED HEALTH CARE EDUCATION/TRAINING PROGRAM
Payer: OTHER GOVERNMENT

## 2023-06-14 VITALS — WEIGHT: 183 LBS | BODY MASS INDEX: 27.11 KG/M2 | HEIGHT: 69 IN

## 2023-06-14 DIAGNOSIS — I47.10 SVT (SUPRAVENTRICULAR TACHYCARDIA): ICD-10-CM

## 2023-06-14 DIAGNOSIS — N39.0 FREQUENT UTI: Primary | ICD-10-CM

## 2023-06-14 PROCEDURE — 93306 ECHO (CUPID ONLY): ICD-10-PCS | Mod: 26,,, | Performed by: SPECIALIST

## 2023-06-14 PROCEDURE — 93306 TTE W/DOPPLER COMPLETE: CPT

## 2023-06-14 PROCEDURE — 93306 TTE W/DOPPLER COMPLETE: CPT | Mod: 26,,, | Performed by: SPECIALIST

## 2023-06-16 ENCOUNTER — TELEPHONE (OUTPATIENT)
Dept: FAMILY MEDICINE | Facility: CLINIC | Age: 53
End: 2023-06-16
Payer: OTHER GOVERNMENT

## 2023-06-16 ENCOUNTER — PATIENT MESSAGE (OUTPATIENT)
Dept: FAMILY MEDICINE | Facility: CLINIC | Age: 53
End: 2023-06-16
Payer: OTHER GOVERNMENT

## 2023-06-16 DIAGNOSIS — N39.0 FREQUENT UTI: Primary | ICD-10-CM

## 2023-06-16 LAB
AORTIC ROOT ANNULUS: 2.9 CM
AORTIC VALVE CUSP SEPERATION: 2.1 CM
AV INDEX (PROSTH): 0.98
AV MEAN GRADIENT: 5 MMHG
AV PEAK GRADIENT: 10 MMHG
AV VALVE AREA: 3.08 CM2
AV VELOCITY RATIO: 0.89
BSA FOR ECHO PROCEDURE: 2.01 M2
CV ECHO LV RWT: 0.42 CM
DOP CALC AO PEAK VEL: 1.59 M/S
DOP CALC AO VTI: 27.7 CM
DOP CALC LVOT AREA: 3.1 CM2
DOP CALC LVOT DIAMETER: 2 CM
DOP CALC LVOT PEAK VEL: 1.41 M/S
DOP CALC LVOT STROKE VOLUME: 85.41 CM3
DOP CALCLVOT PEAK VEL VTI: 27.2 CM
E WAVE DECELERATION TIME: 225 MSEC
E/A RATIO: 0.78
E/E' RATIO: 5.39 M/S
ECHO LV POSTERIOR WALL: 0.87 CM (ref 0.6–1.1)
EJECTION FRACTION: 61 %
FRACTIONAL SHORTENING: 32 % (ref 28–44)
INTERVENTRICULAR SEPTUM: 0.82 CM (ref 0.6–1.1)
IVRT: 116 MSEC
LEFT INTERNAL DIMENSION IN SYSTOLE: 2.82 CM (ref 2.1–4)
LEFT VENTRICLE DIASTOLIC VOLUME INDEX: 38.14 ML/M2
LEFT VENTRICLE DIASTOLIC VOLUME: 75.9 ML
LEFT VENTRICLE MASS INDEX: 53 G/M2
LEFT VENTRICLE SYSTOLIC VOLUME INDEX: 15.1 ML/M2
LEFT VENTRICLE SYSTOLIC VOLUME: 30.1 ML
LEFT VENTRICULAR INTERNAL DIMENSION IN DIASTOLE: 4.14 CM (ref 3.5–6)
LEFT VENTRICULAR MASS: 106.43 G
LV LATERAL E/E' RATIO: 5.17 M/S
LV SEPTAL E/E' RATIO: 5.64 M/S
LVOT MG: 4 MMHG
LVOT MV: 0.95 CM/S
MV PEAK A VEL: 0.8 M/S
MV PEAK E VEL: 0.62 M/S
MV STENOSIS PRESSURE HALF TIME: 58 MS
MV VALVE AREA P 1/2 METHOD: 3.79 CM2
PISA TR MAX VEL: 2.31 M/S
RA PRESSURE: 3 MMHG
RIGHT VENTRICULAR END-DIASTOLIC DIMENSION: 2.13 CM
TDI LATERAL: 0.12 M/S
TDI SEPTAL: 0.11 M/S
TDI: 0.12 M/S
TR MAX PG: 21 MMHG
TV REST PULMONARY ARTERY PRESSURE: 24 MMHG

## 2023-06-16 NOTE — TELEPHONE ENCOUNTER
Urology referral placed by Dr. Nation, can you please assist with getting approval. Pt has [facility]. Thank you !

## 2023-06-21 ENCOUNTER — OFFICE VISIT (OUTPATIENT)
Dept: CARDIOLOGY | Facility: CLINIC | Age: 53
End: 2023-06-21
Payer: OTHER GOVERNMENT

## 2023-06-21 VITALS
HEART RATE: 80 BPM | BODY MASS INDEX: 27.11 KG/M2 | DIASTOLIC BLOOD PRESSURE: 80 MMHG | SYSTOLIC BLOOD PRESSURE: 122 MMHG | WEIGHT: 183 LBS | HEIGHT: 69 IN

## 2023-06-21 DIAGNOSIS — R06.09 DOE (DYSPNEA ON EXERTION): Primary | ICD-10-CM

## 2023-06-21 DIAGNOSIS — I47.10 SVT (SUPRAVENTRICULAR TACHYCARDIA): ICD-10-CM

## 2023-06-21 DIAGNOSIS — M79.669 CALF PAIN, UNSPECIFIED LATERALITY: ICD-10-CM

## 2023-06-21 PROCEDURE — 93010 EKG 12-LEAD: ICD-10-PCS | Mod: S$PBB,,, | Performed by: INTERNAL MEDICINE

## 2023-06-21 PROCEDURE — 93005 ELECTROCARDIOGRAM TRACING: CPT | Mod: PBBFAC,PN | Performed by: INTERNAL MEDICINE

## 2023-06-21 PROCEDURE — 99215 OFFICE O/P EST HI 40 MIN: CPT | Mod: PBBFAC,PN | Performed by: INTERNAL MEDICINE

## 2023-06-21 PROCEDURE — 93010 ELECTROCARDIOGRAM REPORT: CPT | Mod: S$PBB,,, | Performed by: INTERNAL MEDICINE

## 2023-06-21 PROCEDURE — 99999 PR PBB SHADOW E&M-EST. PATIENT-LVL V: CPT | Mod: PBBFAC,,, | Performed by: INTERNAL MEDICINE

## 2023-06-21 PROCEDURE — 99204 OFFICE O/P NEW MOD 45 MIN: CPT | Mod: S$PBB,,, | Performed by: INTERNAL MEDICINE

## 2023-06-21 PROCEDURE — 99204 PR OFFICE/OUTPT VISIT, NEW, LEVL IV, 45-59 MIN: ICD-10-PCS | Mod: S$PBB,,, | Performed by: INTERNAL MEDICINE

## 2023-06-21 PROCEDURE — 99999 PR PBB SHADOW E&M-EST. PATIENT-LVL V: ICD-10-PCS | Mod: PBBFAC,,, | Performed by: INTERNAL MEDICINE

## 2023-06-21 RX ORDER — METOPROLOL SUCCINATE 25 MG/1
25 TABLET, EXTENDED RELEASE ORAL DAILY
Qty: 30 TABLET | Refills: 4 | Status: SHIPPED | OUTPATIENT
Start: 2023-06-21 | End: 2023-06-27

## 2023-06-21 NOTE — PROGRESS NOTES
Subjective:    Patient ID:  Jessy Mayberry is a 53 y.o. female patient here for evaluation Establish Care      History of Present Illness:     53-year-old female with past medical history of adrenal insufficiency, hypothyroidism, SVT status post ablation in 2013 in [place] came here for establishment of care with Cardiology.  She states she has palpitations about 3 times a week and recently had an SVT episode while she was in PCP office 2 weeks ago.  She was prescribed verapamil however she did not start the medication yet.  She complains of dyspnea on exertion with intermittent brief episodes of chest pain.  Also has mild pain the left popliteal/ calf.         Review of patient's allergies indicates:   Allergen Reactions    Oxycodone      Other reaction(s): Unknown    Promethazine Other (See Comments)       Past Medical History:   Diagnosis Date    Adrenal insufficiency     Hypothyroidism     Pituitary mass      Past Surgical History:   Procedure Laterality Date    ANKLE SURGERY      CARDIAC ELECTROPHYSIOLOGY MAPPING AND ABLATION      DILATION AND CURETTAGE OF UTERUS      3 times    INGUINAL HERNIA REPAIR      twice    TONSILLECTOMY       Social History     Tobacco Use    Smoking status: Never    Smokeless tobacco: Never   Substance Use Topics    Alcohol use: Yes     Comment: occassionally    Drug use: Never        Review of Systems   Negative except as mentioned in HPI         Objective        Vitals:    06/21/23 1520   BP: 122/80   Pulse: 80       LIPIDS - LAST 2   Lab Results   Component Value Date    CHOL 173 08/07/2021    HDL 49 08/07/2021    LDLCALC 111.2 08/07/2021    TRIG 64 08/07/2021    CHOLHDL 28.3 08/07/2021       CBC - LAST 2  Lab Results   Component Value Date    WBC 9.79 06/10/2023    WBC 7.36 04/26/2023    RBC 4.51 06/10/2023    RBC 4.36 04/26/2023    HGB 13.4 06/10/2023    HGB 13.3 04/26/2023    HCT 41.5 06/10/2023    HCT 39.4 04/26/2023    MCV 92 06/10/2023    MCV 90 04/26/2023    MCH 29.7  06/10/2023    MCH 30.5 04/26/2023    MCHC 32.3 06/10/2023    MCHC 33.8 04/26/2023    RDW 12.8 06/10/2023    RDW 12.7 04/26/2023     06/10/2023     04/26/2023    MPV 10.0 06/10/2023    MPV 9.6 04/26/2023    GRAN 6.6 06/10/2023    GRAN 67.8 06/10/2023    LYMPH 2.2 06/10/2023    LYMPH 22.4 06/10/2023    MONO 0.8 06/10/2023    MONO 8.0 06/10/2023    BASO 0.03 06/10/2023    BASO 0.04 04/26/2023    NRBC 0 06/10/2023    NRBC 0 04/26/2023       CHEMISTRY & LIVER FUNCTION - LAST 2  Lab Results   Component Value Date     06/10/2023     04/26/2023    K 3.8 06/10/2023    K 3.8 04/26/2023     06/10/2023     04/26/2023    CO2 24 06/10/2023    CO2 26 04/26/2023    ANIONGAP 12 06/10/2023    ANIONGAP 9 04/26/2023    BUN 11 06/10/2023    BUN 12 04/26/2023    CREATININE 0.9 06/10/2023    CREATININE 0.8 04/26/2023    GLU 86 06/10/2023    GLU 85 04/26/2023    CALCIUM 9.1 06/10/2023    CALCIUM 9.5 04/26/2023    MG 1.9 04/26/2023    MG 2.2 08/07/2021    ALBUMIN 4.1 06/10/2023    ALBUMIN 4.2 04/26/2023    PROT 7.4 06/10/2023    PROT 7.6 04/26/2023    ALKPHOS 81 06/10/2023    ALKPHOS 68 04/26/2023    ALT 17 06/10/2023    ALT 23 04/26/2023    AST 22 06/10/2023    AST 23 04/26/2023    BILITOT 0.5 06/10/2023    BILITOT 0.6 04/26/2023        CARDIAC PROFILE - LAST 2  Lab Results   Component Value Date    CPK 78 08/06/2021     08/06/2021    TROPONINI <0.030 08/06/2021        COAGULATION - LAST 2  Lab Results   Component Value Date    INR 1.1 09/16/2021    APTT 29.6 09/16/2021       ENDOCRINE & PSA - LAST 2  Lab Results   Component Value Date    HGBA1C 5.1 11/29/2022    HGBA1C 5.4 08/07/2021    TSH 0.960 04/26/2023    TSH 2.661 11/29/2022    PROCAL <0.05 08/06/2021        ECHOCARDIOGRAM RESULTS  Results for orders placed during the hospital encounter of 06/14/23    Echo    Interpretation Summary  · The left ventricle is normal in size with normal systolic function.  · Normal left ventricular diastolic  function.  · The estimated ejection fraction is 61%.  · Atrial fibrillation not observed.  · Normal right ventricular size with normal right ventricular systolic function.  · Normal central venous pressure (3 mmHg).  · The estimated PA systolic pressure is 24 mmHg.      CURRENT/PREVIOUS VISIT EKG  Results for orders placed or performed in visit on 04/26/23   IN OFFICE EKG 12-LEAD (to [place])    Collection Time: 04/26/23  2:41 PM    Narrative    Test Reason : R00.0,    Vent. Rate : 117 BPM     Atrial Rate : 117 BPM     P-R Int : 132 ms          QRS Dur : 088 ms      QT Int : 340 ms       P-R-T Axes : 072 079 062 degrees     QTc Int : 474 ms    Sinus tachycardia  Nonspecific ST abnormality  Abnormal ECG  When compared with ECG of 06-AUG-2021 14:29,  Vent. rate has increased BY  46 BPM  Confirmed by Sidney Walter MD ([id number]) on 4/26/2023 5:02:01 PM    Referred By: Roger Nation           Confirmed By:Sidney Walter MD     No valid procedures specified.   No results found for this or any previous visit.    No valid procedures specified.          PREVIOUS STRESS TEST              PREVIOUS ANGIOGRAM        PHYSICAL EXAM    CONSTITUTIONAL: Well built, well nourished in no apparent distress  HEENT: No pallor  NECK: no JVD  LUNGS: CTA b/l  HEART: regular rate and rhythm, S1, S2 normal, no murmur   ABDOMEN: soft, non-tender; bowel sounds normal  EXTREMITIES: No edema.  Mild left popliteal tenderness  NEURO: AAO X 3   SKIN:  No rash  Psych:  Normal affect    I HAVE REVIEWED :    The vital signs, nurses notes, and all the pertinent radiology and labs.        Current Outpatient Medications   Medication Instructions    ALPRAZolam (XANAX) 0.5 mg, Oral, Daily PRN    calcium carbonate (TUMS ORAL) Oral    cyanocobalamin 1,000 mcg/mL injection INJECT 1 ML ( 1,000 MCG TOTAL ) INTO THE MUSCLE EVERY 28 DAYS.    estradioL (ESTRING) 2 mg, Vaginal, Every 3 months    famotidine (PEPCID) 20 mg, Oral, 2 times daily    FLUoxetine 20 mg, Oral, Daily     gabapentin (NEURONTIN) 300 MG capsule No dose, route, or frequency recorded.    hydrOXYzine HCL (ATARAX) 25 MG tablet No dose, route, or frequency recorded.    loratadine (CLARITIN) 10 mg tablet No dose, route, or frequency recorded.    metoprolol succinate (TOPROL-XL) 25 mg, Oral, Daily    pantoprazole (PROTONIX) 40 mg, Oral, Daily    sumatriptan (IMITREX) 25 mg, Oral, Every 2 hours PRN    SYNTHROID 75 mcg, Oral, Daily    triamcinolone acetonide 0.025% (KENALOG) 0.025 % cream Topical (Top), 2 times daily        ECG reviewed by me:  Normal sinus rhythm  Assessment & Plan     53-year-old female with past medical history of adrenal insufficiency, hypothyroidism, SVT status post ablation in 2013 in [place] came here for establishment of care with Cardiology.  She states she has palpitations about 3 times a week and recently had an SVT episode while she was in PCP office 2 weeks ago.  She was prescribed verapamil however she did not start the medication yet since she had low blood pressure with the medications in the past.  She complains of dyspnea on exertion with intermittent brief episodes of chest pain.  Also has mild pain the left popliteal/ calf.     Palpitations/ history of SVT status post ablation:  Start metoprolol succinate 25 mg daily.  Recent TSH normal. event monitor for evaluation of palpitations.  Referred to EP.     Dyspnea on exertion and intermittent chest pain- exercise nuclear stress test for further evaluation.  Normal LV function on recent echo.     Mild pain in the left popliteal/calf:  Ordered venous Doppler to rule out DVT      Follow up in about 4 weeks (around 7/19/2023).

## 2023-06-22 ENCOUNTER — OFFICE VISIT (OUTPATIENT)
Dept: UROLOGY | Facility: CLINIC | Age: 53
End: 2023-06-22
Payer: OTHER GOVERNMENT

## 2023-06-22 VITALS
HEIGHT: 69 IN | SYSTOLIC BLOOD PRESSURE: 125 MMHG | DIASTOLIC BLOOD PRESSURE: 81 MMHG | HEART RATE: 80 BPM | BODY MASS INDEX: 26.66 KG/M2 | WEIGHT: 180 LBS

## 2023-06-22 DIAGNOSIS — N39.0 FREQUENT UTI: ICD-10-CM

## 2023-06-22 DIAGNOSIS — N20.0 NEPHROLITHIASIS: ICD-10-CM

## 2023-06-22 DIAGNOSIS — R31.29 MICROSCOPIC HEMATURIA: ICD-10-CM

## 2023-06-22 DIAGNOSIS — N13.5 UPJ OBSTRUCTION, ACQUIRED: Primary | ICD-10-CM

## 2023-06-22 LAB
BACTERIA #/AREA URNS HPF: ABNORMAL /HPF
BILIRUBIN, UA POC OHS: NEGATIVE
BLOOD, UA POC OHS: ABNORMAL
CLARITY, UA POC OHS: CLEAR
COLOR, UA POC OHS: YELLOW
GLUCOSE, UA POC OHS: NEGATIVE
KETONES, UA POC OHS: NEGATIVE
LEUKOCYTES, UA POC OHS: NEGATIVE
MICROSCOPIC COMMENT: ABNORMAL
NITRITE, UA POC OHS: NEGATIVE
PH, UA POC OHS: 7
POC RESIDUAL URINE VOLUME: 0 ML (ref 0–100)
PROTEIN, UA POC OHS: NEGATIVE
RBC #/AREA URNS HPF: 5 /HPF (ref 0–4)
SPECIFIC GRAVITY, UA POC OHS: 1.01
UROBILINOGEN, UA POC OHS: 0.2
WBC #/AREA URNS HPF: 1 /HPF (ref 0–5)

## 2023-06-22 PROCEDURE — 99999 PR PBB SHADOW E&M-EST. PATIENT-LVL V: CPT | Mod: PBBFAC,,, | Performed by: UROLOGY

## 2023-06-22 PROCEDURE — 99204 PR OFFICE/OUTPT VISIT, NEW, LEVL IV, 45-59 MIN: ICD-10-PCS | Mod: S$PBB,,, | Performed by: UROLOGY

## 2023-06-22 PROCEDURE — 99215 OFFICE O/P EST HI 40 MIN: CPT | Mod: PBBFAC,PO | Performed by: UROLOGY

## 2023-06-22 PROCEDURE — 88112 CYTOPATH CELL ENHANCE TECH: CPT | Mod: 26,,, | Performed by: PATHOLOGY

## 2023-06-22 PROCEDURE — 51798 US URINE CAPACITY MEASURE: CPT | Mod: PBBFAC,PO | Performed by: UROLOGY

## 2023-06-22 PROCEDURE — 81000 URINALYSIS NONAUTO W/SCOPE: CPT | Performed by: UROLOGY

## 2023-06-22 PROCEDURE — 99999 PR PBB SHADOW E&M-EST. PATIENT-LVL V: ICD-10-PCS | Mod: PBBFAC,,, | Performed by: UROLOGY

## 2023-06-22 PROCEDURE — 88112 CYTOPATH CELL ENHANCE TECH: CPT | Performed by: PATHOLOGY

## 2023-06-22 PROCEDURE — 99204 OFFICE O/P NEW MOD 45 MIN: CPT | Mod: S$PBB,,, | Performed by: UROLOGY

## 2023-06-22 PROCEDURE — 81003 URINALYSIS AUTO W/O SCOPE: CPT | Mod: PBBFAC,PO | Performed by: UROLOGY

## 2023-06-22 PROCEDURE — 88112 PR  CYTOPATH, CELL ENHANCE TECH: ICD-10-PCS | Mod: 26,,, | Performed by: PATHOLOGY

## 2023-06-22 RX ORDER — ESTRADIOL 0.1 MG/G
1 CREAM VAGINAL DAILY
Qty: 42.5 G | Refills: 3 | Status: SHIPPED | OUTPATIENT
Start: 2023-06-22 | End: 2024-06-21

## 2023-06-22 NOTE — PROGRESS NOTES
[facility] Note - [place]  Staff: MD Gena  PCP: Roger Nation MD  Date of Service: 06/22/2023      Subjective:     HPI: Jessy Mayberry is a 53 y.o. female     Initial consult by me in clinic on 6/22/23 for recurrent pyelo since march:  She states that she started having UTI/pyelo in March.  Symptoms would be flank pain not isolated to the right or left side, foul-smelling urine, frequency and urgency with associated fever up to 101.  Since March she is had at least 3 episodes.  Two of them are culture proven, E coli.  She received antibiotics for 10-14 days with each episode.  Prior to this she denies any recurrent UTIs.  She does however state that as a teen she had a cystoscopy but she is not sure why  She also states that she has had blood in the urine on occasion for years found on her gynecology appointments.  Review of her urines in our system show that she has blood in the urine sometimes associated with UTI and sometimes not dating back to November 2022.  She had a CT in March 2023 when she had a UTI/pyelo and it showed normal right kidney and left hydro to UPJ with for left renal stones.  She has no history of kidney stones.  She does not think the pain is more on her left versus her right whenever she does have these episodes of pyelo.  She had another CT in June 2023 when she had UTI and it actually showed that her right pelvis was larger than it was in March 2023.  High density area in the right UPJ ?  No previous CT urogram.  Still no ureteral stones.  5 days ago.  UTI risk factors:  Denies any pad usage but does state that she has some overactive bladder with urgency occasionally present over the last 4-5 months.  She is postmenopausal but postmenopausal since age 44.  Uses Estring vaginal suppository but infrequently.  Otherwise no other prevention for UTI taken.  Tends to urinate every 1-2 hours.  No diabetes.  No constipation.  No diarrhea.  PVR: 0. No  divetirculitis. No previous vaginal surgeries other than leep.   Stone risk factors:  Left UPJ.  No history of Topamax.  Microhematuria risk factors:  Kidney stones can explain it, no smoking history.  Denies any gross hematuria.  On no blood thinners.  Still has uterus.  History of leep.  No vaginal bleeding.  Voided urine today with small blood.  Just finish antibiotics about                       Urine history: family history of kidney, bladder or prostate cancer:No, personal or family history of kidney stones: No,tobacco use: No, anticoagulation: No  6/22/23 Void: small bld- send for urine cytology and ua marina (finished abx 5d ago)  6/10/23 E.coli, void: 3+leuk/3+bld, >100 rbc/<100 wbc/few bact  4/26/23 1+bld, 5 rbc/1 wbc  3/14/23 E.coli, void: 3+bld/1+bld/nit+,21 rbc/>100 wbc/few wbc  11/29/22 Void: 1+bld, 4 rbc/ 2 wbc/rare bact    REVIEW OF SYSTEMS:  Negative except for as stated above    Past Medical History:   Diagnosis Date    Adrenal insufficiency     Hypothyroidism     Pituitary mass        Past Surgical History:   Procedure Laterality Date    ANKLE SURGERY      CARDIAC ELECTROPHYSIOLOGY MAPPING AND ABLATION      DILATION AND CURETTAGE OF UTERUS      3 times    INGUINAL HERNIA REPAIR      twice    TONSILLECTOMY            Objective:     Vitals:    06/22/23 1417   BP: 125/81   Pulse: 80         Assessment:     Jessy Mayberry is a 53 y.o. female with     1. UPJ obstruction, acquired    2. Frequent UTI    3. Microscopic hematuria    4. Nephrolithiasis      She has what looks like left UPJ obstruction and has 4 stones in that left kidney, all measuring less than 4 mm.  It could explain recurrent UTI and kidney stones except for she just says that her flank pain is never just isolated to the left.  It could also explain blood in the urine.    However me to evaluate this further with a renal scan.  This will tell us if she has delayed drainage.  Will also get a CT urogram even though she is already had CT scans  x2 to evaluate for any filling defects and then the crossing vessels.  If she is found to have delayed drainage in crossing vessel may need a procedure in the future to allow the kidney to drain better.    Ultimately need to monitor her kidney stones if we decide to hold off on any types of procedures    In the meantime should practice UTI prevention with supplements as described below    Plan:     Microscopic hematuria, kidney stones and recurrent uti  Send urine today for ua marina and cytology   Cysto on Monday august 7th  Ctu to eval microscpic hematuria (especially R UPJ- confirm no filling defect) and eval L UPJ for crossing vessel  Renal scan to eval drainage  Ultimately kub and rbus every 6 months to a year to monitor stones on left.   May need treatment/surgery if left kidney doesn't drain and has lots of stones      Uti prevention  D mannose 1000mg twice a day especially for her with h/o pyelo  Utiva once daily and twice a day for 2 days if she has symptomatic uti  Womens probiotic daily   Estrace cream nightly and then every other night     Follow up for virtual visit/phone visit after her tests to review the tests    Shyanne Avery MD

## 2023-06-22 NOTE — PATIENT INSTRUCTIONS
She has what looks like left UPJ obstruction and has 4 stones in that left kidney, all measuring less than 4 mm.  It could explain recurrent UTI and kidney stones except for she just says that her flank pain is never just isolated to the left.  It could also explain blood in the urine.    However me to evaluate this further with a renal scan.  This will tell us if she has delayed drainage.  Will also get a CT urogram even though she is already had CT scans x2 to evaluate for any filling defects and then the crossing vessels.  If she is found to have delayed drainage in crossing vessel may need a procedure in the future to allow the kidney to drain better.    Ultimately need to monitor her kidney stones if we decide to hold off on any types of procedures    In the meantime should practice UTI prevention with supplements as described below    Plan:     Microscopic hematuria, kidney stones and recurrent uti  Send urine today for ua marina and cytology   Cysto on Monday august 7th  Ctu to eval microscpic hematuria (especially R UPJ- confirm no filling defect) and eval L UPJ for crossing vessel  Renal scan to eval drainage  Ultimately kub and rbus every 6 months to a year to monitor stones on left.   May need treatment/surgery if left kidney doesn't drain and has lots of stones      Uti prevention  D mannose 1000mg twice a day especially for her with h/o pyelo  Utiva once daily and twice a day for 2 days if she has symptomatic uti  Womens probiotic daily   Estrace cream nightly and then every other night     Follow up for virtual visit/phone visit after her tests to review the tests      Preventative supplements: CRANBERRY (UTIVA BRAND), D MANNOSE AND PROBIOTIC WITH LACTOBACILLUS and possibly ESTRACE  Cranberry Supplementation (If you choose one to start, choose this)  BUY UTIVA CRANBERRY CAPSULES DIRECTLY FROM [employer] online or by calling.  42.82 A MONTH. If not available then check [employer] Brand.   There is NOTHING AVAILABLE  OVER THE COUNTER THAT IS SIMILAR TO THIS.   Cranberry supplement you choose must contain 36mg PACS.  UTIVA BRAND Cranberry Supplement (Utiva Cranberry 36mg PACs Supplement Pills $42.99 for 30 pills)- their particular tablet is the equivalent of 9 cranberry tablets that you buy over the counter.   [employer] (phone 1-275.761.6552 or online https://www.Betaspring/products/utiva-uti-control) but also available on Amazon  [employer] - https://uriexo.com/Yumit/ [employer] Brand Cranberry Supplement (36 mg PACS cranberry) and ALSO CONTAINTS D mannose 2000mg (39.99 month)- probably a better deal. 37.99 with subscribe and save on amazon.   What are PACs and why do you need a CRANBERRY supplement that has 36mg PACS  1 utiva = 9 over the counter cranberry talets.   PACs are a type of polyphenol, a plant-based micronutrient full of antioxidants. It can be found in many fruits, but cranberries have an especially high concentration of PACs.  PACs work to block UTI-causing bacteria, such as E-Coli, the most common causing UTI bacteria, from attaching to the bladder wall and causing an infection. PACs flush these harmful invaders out of your system to help maintain urinary tract health.  Probiotics and D mannose  TO SAVE MONEY you can buy the PROBIOTIC AND D-MANNOSE Over the Counter at [facility] (ask pharmacist for help) although the one from [employer] is preferable.   If you buy over the counter look for the following:  Probiotic with LACTOBACILLUS ($15-30 month supply) womens probiotics should have lactobacillus- take once a day.   This helps populate the vagina with good bacteria (lactobacillus bacteria) instead of bad bacteria- you can buy this over the counter or buy from the company [employer].   Look for LACTOBACILLUS ON THE LABEL.   D-mannose supplement (500mg capsules, 120 capsules per bottle/month supply)  $12-20/month supply.   Dose: 1000mg twice a day.   This helps keep the bad bacteria from sticking to  your bladder wall. You can buy this over the counter or buy from [employer] by visiting [employer].   If you buy [employer] nolvia cranberry supplement it already has the D mannose.   Vaginal Hormone cream (Estrace, Premarin or Compounded) you place vaginally 2x a week using your finger(if no history of heart attack, blood clots, cervical, breast, uterine or ovarian cancer). This will help the good bacteria replenish in the vagina. Similar to a probiotic. Lack of hormones in the vagina in post-menopausal women is a common cause of UTIs in women.   **    UTI prevention recommendations  Drink more water (2 to 4 bottles) to dilute the bacteria that are replicating. This will also help you urinate more often if you tend to hold your bladder.   Timed voiding (which means- Urinate every 2 hours during the day) to rid the bladder of bacteria before they multiply and start to stick to your bladder walls and cause more symptoms and problems  Avoid pads if possible or wear thinner pads and change them more often      When you have a uti or feel like you  have a uti you can double up on the probiotic, utiva and D mannose for 2 to 3 days      Hormone cream- #1 prevention for RECURRENT UTI in post menopausal women  Why hormone cream is recommended:  We all have bacteria that cover our bodies, however we want good bacteria, not bad bacteria. When you lack hormones,  your vagina starts to let the bad bacteria take over because there is an imbalance. The hormone cream allows for good bacteria to take over again and this can help decrease the risks of UTIs. It can also help with vaginal dryness.   Who should not use it: patients with breast cancer or history of breast cancer.   Please let  know if you have a personal  history of breast cancer, uterine cancer, cervical cancer or a history of blood clots or heart attack.  Pricing: around $40-70/tube which should last 6 months if using finger to apply  If it costs more  than $70 we can write you for a compounded less expensive form of estrace cream from [employer]. Please let us know. They will mail it to you for around $8 or you can pick it up in [place]. They will call you for payment first and you can ask the address. If you haven't heard from them in 2 days, please call them to confirm they received the prescription.   You can also see if it is covered by [employer] card instead of insurance. Check with your pharmacy or go to [employer]. I can send the prescription to the pharmacy with the cheapest prices.   How to apply:  Do not use the applicator, just your finger. This will make it last longer. Apply small smear to 2nd knuckle. Apply around urethra and into vagina to 2nd knuckle. Prescription should last around 6 months.   Apply inside vagina with finger before bedtime x 2 weeks and then 2 to 3x per week after this indefinitely.  You will only see improvement if you use on a regular basis in about a month. (less dryness, maybe some less overactive bladder, less UTIs possibly if having UTIs).   It is not a medicine to use as needed. Typically a lifelong medication.

## 2023-06-27 ENCOUNTER — HOSPITAL ENCOUNTER (OUTPATIENT)
Dept: RADIOLOGY | Facility: HOSPITAL | Age: 53
Discharge: HOME | End: 2023-06-27
Attending: INTERNAL MEDICINE
Payer: OTHER GOVERNMENT

## 2023-06-27 ENCOUNTER — PATIENT MESSAGE (OUTPATIENT)
Dept: PSYCHIATRY | Facility: CLINIC | Age: 53
End: 2023-06-27
Payer: OTHER GOVERNMENT

## 2023-06-27 ENCOUNTER — OFFICE VISIT (OUTPATIENT)
Dept: PSYCHIATRY | Facility: CLINIC | Age: 53
End: 2023-06-27
Payer: OTHER GOVERNMENT

## 2023-06-27 ENCOUNTER — PATIENT MESSAGE (OUTPATIENT)
Dept: CARDIOLOGY | Facility: CLINIC | Age: 53
End: 2023-06-27
Payer: OTHER GOVERNMENT

## 2023-06-27 VITALS
HEART RATE: 72 BPM | SYSTOLIC BLOOD PRESSURE: 116 MMHG | HEIGHT: 69 IN | DIASTOLIC BLOOD PRESSURE: 75 MMHG | WEIGHT: 183.44 LBS | BODY MASS INDEX: 27.17 KG/M2

## 2023-06-27 DIAGNOSIS — M79.669 CALF PAIN, UNSPECIFIED LATERALITY: ICD-10-CM

## 2023-06-27 DIAGNOSIS — F41.8 OTHER SPECIFIED ANXIETY DISORDERS: Primary | ICD-10-CM

## 2023-06-27 DIAGNOSIS — F43.23 ADJUSTMENT DISORDER WITH MIXED ANXIETY AND DEPRESSED MOOD: ICD-10-CM

## 2023-06-27 DIAGNOSIS — F90.0 ATTENTION DEFICIT HYPERACTIVITY DISORDER (ADHD), PREDOMINANTLY INATTENTIVE TYPE: ICD-10-CM

## 2023-06-27 LAB
FINAL PATHOLOGIC DIAGNOSIS: ABNORMAL
Lab: ABNORMAL

## 2023-06-27 PROCEDURE — 99999 PR PBB SHADOW E&M-EST. PATIENT-LVL IV: CPT | Mod: PBBFAC,,, | Performed by: PSYCHOLOGIST

## 2023-06-27 PROCEDURE — 93970 EXTREMITY STUDY: CPT | Mod: TC

## 2023-06-27 PROCEDURE — 99214 OFFICE O/P EST MOD 30 MIN: CPT | Mod: PBBFAC,PO | Performed by: PSYCHOLOGIST

## 2023-06-27 PROCEDURE — 99999 PR PBB SHADOW E&M-EST. PATIENT-LVL IV: ICD-10-PCS | Mod: PBBFAC,,, | Performed by: PSYCHOLOGIST

## 2023-06-27 PROCEDURE — 90792 PR PSYCHIATRIC DIAGNOSTIC EVALUATION W/MEDICAL SERVICES: ICD-10-PCS | Mod: ,,, | Performed by: PSYCHOLOGIST

## 2023-06-27 PROCEDURE — 90792 PSYCH DIAG EVAL W/MED SRVCS: CPT | Mod: ,,, | Performed by: PSYCHOLOGIST

## 2023-06-27 RX ORDER — FLUOXETINE HYDROCHLORIDE 20 MG/1
20 CAPSULE ORAL DAILY
Qty: 30 CAPSULE | Refills: 1 | Status: SHIPPED | OUTPATIENT
Start: 2023-06-27 | End: 2023-10-20 | Stop reason: SDUPTHER

## 2023-06-27 NOTE — LETTER
June 27, 2023        Roger Nation MD  [address]  [facility] [address]             [facility]  [address]  [facility] [address]  Phone: [phone]   Patient: Jessy Mayberry   MR Number: [id number]   YOB: 1970   Date of Visit: 6/27/2023       Dear Dr. Nation:    Thank you for referring Jessy Mayberry to me for evaluation. Below are the relevant portions of my assessment and plan of care.    Pt is to restart Prozac 20mg once daily and seek Cardiology clearance for restarting stimulant treatment.    If you have questions, please do not hesitate to call me. I look forward to following Jessy along with you.    Sincerely,      Kemar Maravilla, PhD, MP           CC    No Recipients

## 2023-06-27 NOTE — PROGRESS NOTES
Outpatient Psychiatric Initial Visit  06/27/2023     ID:   Patient presents for an initial evaluation.      Reason for encounter: Referral from Dr. Nation     Chief Complaint: ADHD, anxiety, long COVID related cognitive issues     History of Presenting Illness:  Pt described an idyllic childhood raised by biological,  mother and father. Pt said that the household was happy and pt was well cared for. Pt denied any trauma or abuse in or outside of the household. Pt said that she did fair in school academically but did well socially. Pt struggled with feeling down in 10th grade when she moved to a new school but denied any treatment or severity of these symptoms. Pt went to college and was diagnosed and treated with Adderall when she was 22 years old. 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 Pt worked for two years but got COVID and explained that she struggles with long COVID. Pt indicated that she has been in psychotherapy, occupational and physical therapy, and has only received partial improvement in her symptoms. Pt is on long-term disability now and complains of feeling overwhelmed, multiple diffuse medical ailments, fatigue, and significant cognitive issues. Pt was having cardiac issues and so her Adderall was stopped and she was referred to me. Pt stopped Prozac a month ago to attempt to improve her fatigue.    Depression symptoms: fatigue, isolation, feeling sad, crying, poor functioning, impaired cognition     Anxiety symptoms: Pt endorses significant worry and rumination, as well as social isolation due to anxiety. Pt denied symptoms consistent with SRUTHI, OCD, panic, phobias, or social anxiety.     Cassie/Hypomania Symptoms: Pt denied current or history of related symptoms.    Psychosis Symptoms: Pt denied current or history of related symptoms.    Attention/Concentration Symptoms: Pt reports attention/concentration concerns  consistent with ADHD, predominantly inattentive type. Pt explained that her symptoms were present before the age of 14 and are cause impairment in more than one setting. A full assessment was not complete due to sufficient records in her chart documenting this diagnosis.    Disordered Eating/Body Image Concerns: Pt denied current or history of related symptoms.    Suicidal Ideation and Risk: Pt denied current or history of related symptoms.    Homicidal/Violent Ideation and Risk: Pt denied current or history of related symptoms.    Criminal History: Pt denied.    Prior Psychiatric Treatment/Hospitalizations: Pt denied.     Current psychiatric medication: Xanax 0.5mg Q D PRN    Prior psychiatric medication trials: Adderall, Prozac, Lexapro, Buspar    Current Medical Conditions Per Chart Review:   Patient Active Problem List   Diagnosis    Impaired function of upper extremity    Right arm pain    Impaired instrumental activities of daily living (IADL)    Decreased functional activity tolerance    Cognitive communication disorder    Pineal gland cyst    Hypothyroidism    Adjustment disorder with anxiety    ADD (attention deficit disorder)      Family Psychiatric History:  brother - addict; paternal grandfather - schizophrenia    Alcohol Use: Pt reported minimal, infrequent alcohol use and denied a history of problematic drinking.    Tobacco and Drug Use: Pt denied tobacco or drug use.     Social History:  Pt is  with three children (21, 19, 15). Pt is not working and on disability. Pt is not exercising due to fatigue and other medical problems. Pt eats well and drinks minimally and infrequently. Pt denied tobacco and drug use.     Trauma history:  pt denied     Mental Status Exam      Physical Exam  Psychiatric:         Attention and Perception: Attention normal.         Mood and Affect: Mood is anxious and depressed.         Speech: Speech is tangential.         Behavior: Behavior normal. Behavior is  cooperative.         Thought Content: Thought content normal. Thought content is not paranoid or delusional. Thought content does not include homicidal or suicidal ideation. Thought content does not include homicidal or suicidal plan.         Cognition and Memory: Memory normal. Cognition is impaired.         Judgment: Judgment normal.      Comments: General appearance:  casually groomed, casually dressed    Behavior:  calm, engaged    Demeanor:  pleasant, cooperative    Mood:  anxious, depressed    Affect:  nervous, sad, tearful    Speech:  regular rate, tone and volume    Thought Process:  linear and goal directed    Thought Content:  appropriate - absent of aggressive or self injurious thoughts, feelings or impulses    Insight into Current Situation:  fair    Judgement: fair   Expected Ability to Adhere to Treatment plan: good        Current Evaluation:  Nutritional Screening:  Considering the patient's height and weight, medications, medical history and preferences, should a referral be made to the dietitian? No  Vitals: most recent vitals signs, dated greater than 90 days prior to this appointment, were reviewed  General: age appropriate, well nourished, casually dressed, neatly groomed  MSK: muscle strength/tone : no tremor or abnormal movements. Gait/Station: no ataxic, steady    Clinical Assessment :     Pt has a clear history of being diagnosed and treated with ADHD. Pt has had cardiac issues and so will need cardiology clearance prior to restarting treatment. Pt also notes high anxiety and fatigue. This has been exacerbated by long-COVID, per pt. Pt should restart SSRI treatment and seek cardiology's approval to restart stimulant treatment.     Diagnosis(es):   1) Anxiety Disorder, other  2) ADHD, predominately inattentive type    Plan      Goal #1: Improve mood  Goal #2: Improve attention/concentration    Pt is to restart Prozac 20mg once daily and seek Cardiology clearance for restarting stimulant  treatment.    Treatment plan and medication changes will be coordinated with PCP, Dr. Nation    This author reviewed limits to confidentiality and this author's collaboration with pt's physician. Pt indicated understanding and denied any questions.    Return to Clinic: TBD    -Call to report any worsening of symptoms or problems associated with medication  - Pt instructed to go to ER if thoughts of harming self or others arise   Spent 45 minutes face-to-face with patient during evaluation.    -Supportive therapy and psychoeducation provided  -R/B/SE's of medications discussed with the pt who expresses understanding and chooses to take medications as prescribed.   -Pt instructed to call clinic, 911 or go to nearest emergency room if sxs worsen or pt is in   crisis. The pt expresses understanding.    Antidepressant/Antianxiety Medication Initiation:  Patient informed of risks, benefits, and potential side effects of medication and accepts informed consent.  Common side effects include nausea, fatigue, headache, insomnia., Specifically discussed the possibility of new or worsening suicidal thoughts/depression.  Patient instructed to stop the medication immediately and seek urgent treatment if this occurs. Patient instructed not to abruptly discontinue medication without physician guidance except in cases of sudden onset or worsening of SI.

## 2023-06-29 ENCOUNTER — HOSPITAL ENCOUNTER (OUTPATIENT)
Dept: RADIOLOGY | Facility: HOSPITAL | Age: 53
Discharge: HOME | End: 2023-06-29
Attending: UROLOGY
Payer: OTHER GOVERNMENT

## 2023-06-29 ENCOUNTER — PATIENT MESSAGE (OUTPATIENT)
Dept: UROLOGY | Facility: CLINIC | Age: 53
End: 2023-06-29
Payer: OTHER GOVERNMENT

## 2023-06-29 DIAGNOSIS — N13.5 UPJ OBSTRUCTION, ACQUIRED: ICD-10-CM

## 2023-06-29 PROCEDURE — 63600175 PHARM REV CODE 636 W HCPCS: Performed by: UROLOGY

## 2023-06-29 PROCEDURE — 78708 K FLOW/FUNCT IMAGE W/DRUG: CPT | Mod: TC

## 2023-06-29 RX ORDER — FUROSEMIDE 10 MG/ML
20 INJECTION INTRAMUSCULAR; INTRAVENOUS ONCE
Status: COMPLETED | OUTPATIENT
Start: 2023-06-29 | End: 2023-06-29

## 2023-06-29 RX ADMIN — FUROSEMIDE 20 MG: 10 INJECTION, SOLUTION INTRAMUSCULAR; INTRAVENOUS at 10:06

## 2023-06-29 NOTE — PROGRESS NOTES
Let her know renal scan shows the kidney is full but it drains normally on the left.   Proceed with cysto

## 2023-07-03 ENCOUNTER — TELEPHONE (OUTPATIENT)
Dept: NEUROLOGY | Facility: CLINIC | Age: 53
End: 2023-07-03
Payer: OTHER GOVERNMENT

## 2023-07-03 ENCOUNTER — PATIENT MESSAGE (OUTPATIENT)
Dept: FAMILY MEDICINE | Facility: CLINIC | Age: 53
End: 2023-07-03
Payer: OTHER GOVERNMENT

## 2023-07-03 DIAGNOSIS — Z78.9 IMPAIRED INSTRUMENTAL ACTIVITIES OF DAILY LIVING (IADL): Primary | ICD-10-CM

## 2023-07-03 NOTE — TELEPHONE ENCOUNTER
----- Message from Judy Good sent at 7/3/2023 12:30 PM CDT -----  Regarding: Pt advice  Contact: Pt  Type: Needs Medical Advice  Who Called:  Pt  Symptoms (please be specific):  Neuropathy, neuromuscular, fatigue, tingling in extremities  How long has patient had these symptoms:  2 years  Pharmacy name and phone #:    Best Call Back Number: [phone] (home)     Additional Information: Please call patient to advise if doctor sees these symptoms. Patient will need a referral. Thanks!

## 2023-07-03 NOTE — TELEPHONE ENCOUNTER
Spoke with patient. Informed patient that Dr. Taylor is a neuromuscular specialist. Patient v/u and states that she will have a referral faxed to Neurology.

## 2023-07-06 ENCOUNTER — TELEPHONE (OUTPATIENT)
Dept: NEUROLOGY | Facility: CLINIC | Age: 53
End: 2023-07-06
Payer: OTHER GOVERNMENT

## 2023-07-06 ENCOUNTER — PATIENT MESSAGE (OUTPATIENT)
Dept: NEUROLOGY | Facility: CLINIC | Age: 53
End: 2023-07-06
Payer: OTHER GOVERNMENT

## 2023-07-06 ENCOUNTER — PATIENT MESSAGE (OUTPATIENT)
Dept: ADMINISTRATIVE | Facility: HOSPITAL | Age: 53
End: 2023-07-06
Payer: OTHER GOVERNMENT

## 2023-07-06 NOTE — TELEPHONE ENCOUNTER
Spoke with the pt, in regards to a referral for impaired instrumental activities of daily living.  She c/o balance issues, falls with injury (5 in the past yr), ext weakness, brain fog, numbness and tingling and h/a's.  All the symptoms started 2 yrs ago following Covid.  Informed the pt that [facility]  Dr. Gerald Torres ph: [phone] fax:[phone].

## 2023-07-14 ENCOUNTER — TELEPHONE (OUTPATIENT)
Dept: CARDIOLOGY | Facility: HOSPITAL | Age: 53
End: 2023-07-14

## 2023-07-14 NOTE — TELEPHONE ENCOUNTER
Left message on voicemail.     Patient advised, test will be at [facility] ([address]   Will need to register on the first floor at the main entrance.   Patient advised that arrival time is 6:30am.  Patient advised that she may be here about 3.5-4 hours, and may want to bring something to occupy their time, as there will be periods of waiting.    Patient advised, may take her medications prior to testing if you need to.  Patient should HOLD Metoprolol. Advised if she needs to eat to take her medications, please keep it light, like toast and juice.    Patient advised to avoid all caffeine 12 hours prior to testing.  This includes decaf tea and coffee.    Will provide peanut butter crackers for a snack after stress test.  If patient would prefer something else, please bring a snack from home.    Wear comfortable clothing.   No lotions, oils, or powders to the upper chest area. May wear deodorant.    No metal jewelry, buttons, or zippers to the upper body.  Advised to call the office if any questions.     Will send instructions via [employer] as well.

## 2023-07-20 ENCOUNTER — OFFICE VISIT (OUTPATIENT)
Dept: UROLOGY | Facility: CLINIC | Age: 53
End: 2023-07-20
Payer: OTHER GOVERNMENT

## 2023-07-20 ENCOUNTER — PATIENT MESSAGE (OUTPATIENT)
Dept: UROLOGY | Facility: CLINIC | Age: 53
End: 2023-07-20

## 2023-07-20 DIAGNOSIS — R31.9 HEMATURIA: ICD-10-CM

## 2023-07-20 DIAGNOSIS — N39.0 RECURRENT UTI: Primary | ICD-10-CM

## 2023-07-20 DIAGNOSIS — N20.0 NEPHROLITHIASIS: ICD-10-CM

## 2023-07-20 PROCEDURE — 99443 PR PHYSICIAN TELEPHONE EVALUATION 21-30 MIN: CPT | Mod: 95,,, | Performed by: UROLOGY

## 2023-07-20 PROCEDURE — 99443 PR PHYSICIAN TELEPHONE EVALUATION 21-30 MIN: ICD-10-PCS | Mod: 95,,, | Performed by: UROLOGY

## 2023-07-20 RX ORDER — LIDOCAINE HYDROCHLORIDE 20 MG/ML
JELLY TOPICAL ONCE
Status: CANCELLED | OUTPATIENT
Start: 2023-07-20 | End: 2023-07-20

## 2023-07-20 NOTE — PATIENT INSTRUCTIONS
1. Recurrent UTI    2. Hematuria    3. Nephrolithiasis        She has what looks like left UPJ obstruction and has 4 stones in that left kidney, all measuring less than 4 mm.  It could explain recurrent UTI and kidney stones except for she just says that her flank pain is never just isolated to the left.  It could also explain blood in the urine.        Plan:       Left kidney appears to be a upj but functionally drains and actually provides more function than the right. Further w/u including another renal scan indicated if she has fevers, left flank pain, more stones then I would refer to my partner for further evaluation for pyeloplasty.     If she has recurrent pyelo with L flank pain then she can try to get an appt with us but if she can't go to urgent came. She said occurred 4x in the last 4 months. She really needs to take utiva not the cranberry gummies.      Uti prevention- sent her a message with this info again.   D mannose 1000mg twice a day especially for her with h/o pyelo  Utiva once daily and twice a day for 2 days if she has symptomatic uti  Womens probiotic daily   Estrace cream nightly and then every other night      Left kidney stones  Stone workup eventually.  Xray and rbus to monitor the stone in left kidney in 6 months. If she passes stone call us or if she has fever and passing stone then go to er.     For microscopic hematuria which she's had for year which is likely from stones complete w/u  Cysto on Monday august 21st- I put orders in   Ctu to eval microscpic hematuria (especially R UPJ- confirm no filling defect) and eval L UPJ for crossing vessel - still should have done to r/o any filling defects bc she had blood in urine)    Xray and rbus in 7 months and f/u after  Ctu soon  Cysto on Monday august 21st  Order the uti stuff        Shyanne Avery MD

## 2023-07-20 NOTE — Clinical Note
Xray and rbus in 7 months and f/u after Ctu soon Cysto on Monday august 21st- I put orders. Order the uti stuff

## 2023-07-20 NOTE — PROGRESS NOTES
[id number] Telephone E/M services 0.48-1.5     The patient location is:[place]  Date of Service: 07/20/2023  The chief complaint leading to consultation is: see hpi and visit diagnosis  Visit type: Virtual visit with REAL TIME AUDIO only     The reason for the audio only service rather than synchronous audio and video virtual visit was related to technical difficulties or patient preference/necessity.    Total time spent with patient:30. More than half the time was spent counseling or coordinating care.   Date of Service: 07/20/2023      Charges:   81400 5-10 min, 96151 11-20min, 11075 21-30+ min    Each patient to whom he or she provides medical services by telemedicine is:    (1) informed of the relationship between the physician and patient and the respective role of any other health care provider with respect to management of the patient; and   (2) notified that he or she may decline to receive medical services by telemedicine and may withdraw from such care at any time.  (3) Patient verbally consented to treatment via phone, according to the clinic consent to treat policies outlined by the registrar    This service was not originating from a related E/M service provided within the previous 7 days nor will  to an E/M service or procedure within the next 24 hours or my soonest available appointment.  Prevailing standard of care was able to be met in this audio-only visit.            [facility] Note - [place]  Staff: MD Gena  PCP: Roger Nation MD  Date of Service: 07/20/2023      Subjective:     HPI: Jessy Mayberry is a 53 y.o. female     Initial consult by me in clinic on 6/22/23 for recurrent pyelo since march:  She states that she started having UTI/pyelo in March.  Symptoms would be flank pain not isolated to the right or left side, foul-smelling urine, frequency and urgency with associated fever up to 101.  Since March she is had at least 3 episodes.  Two of them  are culture proven, E coli.  She received antibiotics for 10-14 days with each episode.  Prior to this she denies any recurrent UTIs.  She does however state that as a teen she had a cystoscopy but she is not sure why  She also states that she has had blood in the urine on occasion for years found on her gynecology appointments.  Review of her urines in our system show that she has blood in the urine sometimes associated with UTI and sometimes not dating back to November 2022.  She had a CT in March 2023 when she had a UTI/pyelo and it showed normal right kidney and left hydro to UPJ with for left renal stones.  She has no history of kidney stones.  She does not think the pain is more on her left versus her right whenever she does have these episodes of pyelo.  She had another CT in June 2023 when she had UTI and it actually showed that her right pelvis was larger than it was in March 2023.  High density area in the right UPJ ?  No previous CT urogram.  Still no ureteral stones.  5 days ago.  UTI risk factors:  Denies any pad usage but does state that she has some overactive bladder with urgency occasionally present over the last 4-5 months.  She is postmenopausal but postmenopausal since age 44.  Uses Estring vaginal suppository but infrequently.  Otherwise no other prevention for UTI taken.  Tends to urinate every 1-2 hours.  No diabetes.  No constipation.  No diarrhea.  PVR: 0. No divetirculitis. No previous vaginal surgeries other than leep.   Stone risk factors:  Left UPJ.  No history of Topamax.  Microhematuria risk factors:  Kidney stones can explain it, no smoking history.  Denies any gross hematuria.  On no blood thinners.  Still has uterus.  History of leep.  No vaginal bleeding.  Voided urine today with small blood.  Just finish antibiotics about                     Interval history 7/20/23:  6/29/23 renal scan: Findings characteristic of dilated nonobstructed left renal collecting system. This can be  seen with UPJ obstruction. Normal right nephrogram. Function: 54% left, 46% right.   She does however still has L flank pain that's most noticeable in the morning.  Also when she did the renal scan had pain.     Started her on uti prevention- however she hasn't been consistent about it. Using cranberry but not UTIVA. She hasn't had any uti's.   Sent her urine for cytology and ua [place]. It showed 5 rbc and negative cytology. Not sure why ctu and cysto were cancelled or not scheduled.       Urine history: family history of kidney, bladder or prostate cancer:No, personal or family history of kidney stones: No,tobacco use: No, anticoagulation: No  6/22/23 Void: small bld- 5 rbc/1 wbc/occ bact, ctyology neg  6/10/23 E.coli, void: 3+leuk/3+bld, >100 rbc/<100 wbc/few bact  4/26/23 1+bld, 5 rbc/1 wbc  3/14/23 E.coli, void: 3+bld/1+bld/nit+,21 rbc/>100 wbc/few wbc  11/29/22 Void: 1+bld, 4 rbc/ 2 wbc/rare bact    REVIEW OF SYSTEMS:  Negative except for as stated above    Past Medical History:   Diagnosis Date    Adrenal insufficiency     Hypothyroidism     Pituitary mass        Past Surgical History:   Procedure Laterality Date    ANKLE SURGERY      CARDIAC ELECTROPHYSIOLOGY MAPPING AND ABLATION      DILATION AND CURETTAGE OF UTERUS      3 times    INGUINAL HERNIA REPAIR      twice    TONSILLECTOMY            Objective:     There were no vitals filed for this visit.        Assessment:     Jessy Mayberry is a 53 y.o. female with     1. Recurrent UTI    2. Hematuria    3. Nephrolithiasis        She has what looks like left UPJ obstruction and has 4 stones in that left kidney, all measuring less than 4 mm.  It could explain recurrent UTI and kidney stones except for she just says that her flank pain is never just isolated to the left.  It could also explain blood in the urine.        Plan:       Left kidney appears to be a upj but functionally drains and actually provides more function than the right. Further w/u including another  renal scan indicated if she has fevers, left flank pain, more stones then I would refer to my partner for further evaluation for pyeloplasty.     If she has recurrent pyelo with L flank pain then she can try to get an appt with us but if she can't go to urgent came. She said occurred 4x in the last 4 months. She really needs to take utiva not the cranberry gummies.      Uti prevention- sent her a message with this info again.   D mannose 1000mg twice a day especially for her with h/o pyelo  Utiva once daily and twice a day for 2 days if she has symptomatic uti  Womens probiotic daily   Estrace cream nightly and then every other night      Left kidney stones  Stone workup eventually.  Xray and rbus to monitor the stone in left kidney in 6 months. If she passes stone call us or if she has fever and passing stone then go to er.     For microscopic hematuria which she's had for year which is likely from stones complete w/u  Cysto on Monday august 21st- I put orders in   Ctu to eval microscpic hematuria (especially R UPJ- confirm no filling defect) and eval L UPJ for crossing vessel - still should have done to r/o any filling defects bc she had blood in urine)    Xray and rbus in 7 months and f/u after  Ctu soon  Cysto on Monday august 21st  Order the uti stuff        MD Shyanne Boyd MD

## 2023-07-22 LAB
BACTERIA UR CULT: ABNORMAL
BACTERIA UR CULT: ABNORMAL
OTHER ANTIBIOTIC SUSC ISLT: ABNORMAL

## 2023-07-25 DIAGNOSIS — R06.09 DOE (DYSPNEA ON EXERTION): ICD-10-CM

## 2023-07-25 DIAGNOSIS — I47.10 SVT (SUPRAVENTRICULAR TACHYCARDIA): Primary | ICD-10-CM

## 2023-07-26 ENCOUNTER — OFFICE VISIT (OUTPATIENT)
Dept: NEUROLOGY | Facility: CLINIC | Age: 53
End: 2023-07-26
Payer: OTHER GOVERNMENT

## 2023-07-26 ENCOUNTER — LAB VISIT (OUTPATIENT)
Dept: LAB | Facility: HOSPITAL | Age: 53
End: 2023-07-26
Attending: PSYCHIATRY & NEUROLOGY
Payer: OTHER GOVERNMENT

## 2023-07-26 VITALS
SYSTOLIC BLOOD PRESSURE: 121 MMHG | HEART RATE: 78 BPM | DIASTOLIC BLOOD PRESSURE: 76 MMHG | WEIGHT: 186.38 LBS | BODY MASS INDEX: 27.53 KG/M2

## 2023-07-26 DIAGNOSIS — R53.1 WEAKNESS: Primary | ICD-10-CM

## 2023-07-26 DIAGNOSIS — R20.2 PARESTHESIAS: ICD-10-CM

## 2023-07-26 DIAGNOSIS — R53.1 WEAKNESS: ICD-10-CM

## 2023-07-26 DIAGNOSIS — R41.9 COGNITIVE COMPLAINTS WITH NORMAL EXAM: ICD-10-CM

## 2023-07-26 DIAGNOSIS — H93.19 TINNITUS, UNSPECIFIED LATERALITY: ICD-10-CM

## 2023-07-26 LAB — CERULOPLASMIN SERPL-MCNC: 24 MG/DL (ref 15–45)

## 2023-07-26 PROCEDURE — 86334 IMMUNOFIX E-PHORESIS SERUM: CPT | Mod: 26,,, | Performed by: PATHOLOGY

## 2023-07-26 PROCEDURE — 84630 ASSAY OF ZINC: CPT | Performed by: PSYCHIATRY & NEUROLOGY

## 2023-07-26 PROCEDURE — 84165 PATHOLOGIST INTERPRETATION SPE: ICD-10-PCS | Mod: 26,,, | Performed by: PATHOLOGY

## 2023-07-26 PROCEDURE — 99214 OFFICE O/P EST MOD 30 MIN: CPT | Mod: PBBFAC,PO | Performed by: PSYCHIATRY & NEUROLOGY

## 2023-07-26 PROCEDURE — 86255 FLUORESCENT ANTIBODY SCREEN: CPT | Mod: 59 | Performed by: PSYCHIATRY & NEUROLOGY

## 2023-07-26 PROCEDURE — 84165 PROTEIN E-PHORESIS SERUM: CPT | Mod: 26,,, | Performed by: PATHOLOGY

## 2023-07-26 PROCEDURE — 99205 PR OFFICE/OUTPT VISIT, NEW, LEVL V, 60-74 MIN: ICD-10-PCS | Mod: S$PBB,,, | Performed by: PSYCHIATRY & NEUROLOGY

## 2023-07-26 PROCEDURE — 36415 COLL VENOUS BLD VENIPUNCTURE: CPT | Mod: PO | Performed by: PSYCHIATRY & NEUROLOGY

## 2023-07-26 PROCEDURE — 99999 PR PBB SHADOW E&M-EST. PATIENT-LVL IV: ICD-10-PCS | Mod: PBBFAC,,, | Performed by: PSYCHIATRY & NEUROLOGY

## 2023-07-26 PROCEDURE — 82390 ASSAY OF CERULOPLASMIN: CPT | Performed by: PSYCHIATRY & NEUROLOGY

## 2023-07-26 PROCEDURE — 99205 OFFICE O/P NEW HI 60 MIN: CPT | Mod: S$PBB,,, | Performed by: PSYCHIATRY & NEUROLOGY

## 2023-07-26 PROCEDURE — 86334 IMMUNOFIX E-PHORESIS SERUM: CPT | Performed by: PSYCHIATRY & NEUROLOGY

## 2023-07-26 PROCEDURE — 86789 WEST NILE VIRUS ANTIBODY: CPT | Performed by: PSYCHIATRY & NEUROLOGY

## 2023-07-26 PROCEDURE — 84165 PROTEIN E-PHORESIS SERUM: CPT | Performed by: PSYCHIATRY & NEUROLOGY

## 2023-07-26 PROCEDURE — 86592 SYPHILIS TEST NON-TREP QUAL: CPT | Performed by: PSYCHIATRY & NEUROLOGY

## 2023-07-26 PROCEDURE — 99999 PR PBB SHADOW E&M-EST. PATIENT-LVL IV: CPT | Mod: PBBFAC,,, | Performed by: PSYCHIATRY & NEUROLOGY

## 2023-07-26 PROCEDURE — 82525 ASSAY OF COPPER: CPT | Performed by: PSYCHIATRY & NEUROLOGY

## 2023-07-26 PROCEDURE — 86618 LYME DISEASE ANTIBODY: CPT | Performed by: PSYCHIATRY & NEUROLOGY

## 2023-07-26 PROCEDURE — 86334 PATHOLOGIST INTERPRETATION IFE: ICD-10-PCS | Mod: 26,,, | Performed by: PATHOLOGY

## 2023-07-26 NOTE — PROGRESS NOTES
NEUROLOGY  Outpatient CONSULT  [facility]  [address] [facility], [place] [address]  [phone] (office) / [phone] (fax)    Patient Name:  Jessy Mayberry  :  1970  MR #:  [id number]  Acct #:  [id number]    Date of Neurology Consult: 2023  Name of Neurologist: Ruthann Taylor D.O, ABPN, AOBNP, ABEM    Other Physicians:  Roger Nation MD (Primary Care Physician); Roger Nation MD (Referring)      Chief Complaint: Extremity Weakness      History of Present Illness (HPI):  Jessy Mayberry is a 53 y.o. female referred by her PCP for consultation regarding impaired iADLs.    Patient states she had COVID Aug 2021. She was treating at home.  Developed numbness and weakness on her right leg and over a few days could no longer walk unassisted. She was not paralyzed.  Her right arm was also weak, but she could use the arm.  She was also having shortness of breath.      She went to the ER at [place].  She was cleared for stroke.  She was discharged to outpatient rehab after 5 days.  PT/OT/ST.  She needed ST for cognition.  She couldn't find her words or pick the right words.  She was in these therapies for 7 months.    She struggles with fatigue.  So she has to practice energy conservation. Sometimes she is so tired she can't drive.  She states she can't feel pressure under her feet.    She states within a year of getting COVID she had moved into a house that was full of mold.  She wonders if she has mold toxicity.      Current symptoms:  Weakness - right leg and right arm, trouble with hand grasping, can't reach over head, drags leg walking, she is using a shower chair, walks with assistance, poor fine motor skills, can't type  Numbness and tingling -  in both legs, worse on the right, present in the whole leg up to the thigh, she states even cognitive fatigue will make her tingling worse; This is from the bicep down in the arms, mainly the right.  It's not painful.  She can feel  like she has bugs crawling all over her.  Anesthesia - she states there are times when she has no feeling in her arms or legs.    Balance - she can only wear 2 types of shoes to have good balance.  Worse on uneven surfaces. She can have some dizziness, but she states it is intermittent - lightheaded, several falls with injuries: tendon tears, right ankle fracture, shoulder injuries  Lack of stamina and endurance, lots of fatigue. She has intermittent sleep at night.  She is negative for SHARON on testing.  She has tinnitus, but hearing test with mild hearing loss.  She gets dizzy with eye movements.  This is newer within the last 5-6 months.  She states she has excessive sweating, she is always hot.  She has had migraines since COVID.  These are 2-3 times a week. They are pounding and sudden, behind her eye, often frontal.  She takes Imitrex.    She has cognitive issues.    Her body is very sensitive.  Hugs and squeezing is painful.  She finds bumps and smacks are painful.      She was seeing [facility].  She has had an EMG which showed carpal tunnel syndrome and nothing else.      She had cognitive testing which she states showed mild cognitive impairment.  This was done at [facility].    She saw an eye doctor and that was normal.    She was never seen at [facility].    She struggles with work and at home due to cognition.      Treatment to date: N/A    Review of Systems:   See HPI    Past Medical, Surgical, Family & Social History:   Past Medical History:   Diagnosis Date    Adrenal insufficiency     Hypothyroidism     Pituitary mass      Past Surgical History:   Procedure Laterality Date    ANKLE SURGERY      CARDIAC ELECTROPHYSIOLOGY MAPPING AND ABLATION      DILATION AND CURETTAGE OF UTERUS      3 times    INGUINAL HERNIA REPAIR      twice    TONSILLECTOMY       Family History   Problem Relation Age of Onset    Ovarian cancer Mother 28    Hypertension Mother     Coronary artery  disease Mother     Hyperlipidemia Mother     Cancer Mother     Skin cancer Mother 75    Hypertension Father     Retinal detachment Father     Atrial fibrillation Sister     Rheum arthritis Daughter     Asthma Daughter     Breast cancer Maternal Aunt 42    Bone cancer Maternal Aunt     Cancer Maternal Aunt     Rheum arthritis Maternal Aunt     Hepatitis Maternal Aunt     Heart disease Maternal Grandmother     Hypertension Maternal Grandmother     Kidney failure Maternal Grandmother     Atrial fibrillation Maternal Grandmother     Hypertension Brother     Hypertension Son     Psoriasis Child     Melanoma Neg Hx     Lupus Neg Hx      Alcohol use:  reports current alcohol use.   (Of note, 0.6 oz = 1 beer or 6 oz = 10 beers).  Tobacco use:  reports that she has never smoked. She has never used smokeless tobacco.  Street drug use:  reports no history of drug use.  Allergies: Oxycodone and Promethazine.    Home Medications:     Current Outpatient Medications:     ALPRAZolam (XANAX) 0.5 MG tablet, Take 0.5 mg by mouth daily as needed., Disp: , Rfl:     calcium carbonate (TUMS ORAL), Take by mouth., Disp: , Rfl:     cyanocobalamin 1,000 mcg/mL injection, INJECT 1 ML ( 1,000 MCG TOTAL ) INTO THE MUSCLE EVERY 28 DAYS., Disp: 10 mL, Rfl: 3    estradioL (ESTRACE) 0.01 % (0.1 mg/gram) vaginal cream, Place 1 g vaginally once daily. Apply pea sized amount up to second knuckle. Apply nightly for 2 weeks then every other night., Disp: 42.5 g, Rfl: 3    famotidine (PEPCID) 20 MG tablet, Take 20 mg by mouth 2 (two) times daily., Disp: , Rfl:     FLUoxetine 20 MG capsule, Take 1 capsule (20 mg total) by mouth once daily., Disp: 30 capsule, Rfl: 1    gabapentin (NEURONTIN) 300 MG capsule, , Disp: , Rfl:     loratadine (CLARITIN) 10 mg tablet, , Disp: , Rfl:     sumatriptan (IMITREX) 25 MG Tab, Take 1 tablet (25 mg total) by mouth every 2 (two) hours as needed., Disp: 10 tablet, Rfl: 5    SYNTHROID 75 mcg tablet, Take 1 tablet (75 mcg  total) by mouth once daily., Disp: 90 tablet, Rfl: 3    triamcinolone acetonide 0.025% (KENALOG) 0.025 % cream, Apply topically 2 (two) times daily., Disp: 80 g, Rfl: 0    estradioL (ESTRING) 2 mg (7.5 mcg /24 hour) vaginal ring, Place 2 mg vaginally every 3 (three) months. (Patient not taking: Reported on 7/26/2023), Disp: 1 each, Rfl: 3    hydrOXYzine HCL (ATARAX) 25 MG tablet, , Disp: , Rfl:     metoprolol succinate (TOPROL-XL) 25 MG 24 hr tablet, Take 1 tablet (25 mg total) by mouth once daily. (Patient not taking: Reported on 7/26/2023), Disp: 30 tablet, Rfl: 6    pantoprazole (PROTONIX) 40 MG tablet, Take 40 mg by mouth once daily., Disp: , Rfl:     Physical Examination:  /76 (BP Location: Left arm, Patient Position: Sitting, BP Method: Medium (Automatic))   Pulse 78   Wt 84.5 kg (186 lb 6.4 oz)   LMP  (LMP Unknown) Comment: implant removed 6 months  BMI 27.53 kg/m²     GENERAL:  General appearance: Well, non-toxic appearing.  No apparent distress.  Extremities: normal.    MENTAL STATUS:  Alertness, attention span & concentration: normal.  Language: normal.  Orientation to self, place & time:  normal.  Memory, recent & remote: normal.  Fund of knowledge: normal.    SPEECH:  Clear and fluent.  Follows complex commands.    CRANIAL NERVES:  Cranial Nerves II-XII were examined.  II - Visual fields: normal.  III, IV, VI: PERRL, EOMI, No ptosis, No nystagmus.  V - Facial sensation: normal.  VII - Face symmetry & mobility: normal.  VIII - Hearing: normal.  IX, X - Palate: mobile & midline.  XI - Shoulder shrug: normal.  XII - Tongue protrusion: normal.    GROSS MOTOR:  Gait & station: normal, some balance issues but has small base with normal stride, no circumduction, waddle, foot drop or drag.  Tone: normal.  Abnormal movements: none.  Finger-nose & Heel-knee-shin: F-N patient has deviation just before touching examiner finger, not nose on right, left is normal.  H-S effortful on right   Rapid alternating  movements & drift: no drift, Norm normal.    MUSCLE STRENGTH:     Give way weakness throughout right upper and lower extremity.    Fascics Atrophy RIGHT    LEFT Atrophy Fascics     5 Neck Ext. 5       5 Neck Flex 5       5 Deltoids 5       5 Sh.Ext.Rot. 5       5 Sh.Int.Rot. 5       5 Biceps 5       5 Triceps 5       5 Forearm.Pr. 5       5 Wrist Ext. 5       5 Wrist Flex 5       5 Finger Ext. 5       5 Finger Flex 5       5 FPL 5       5 Inteross. 5                         5 Iliopsoas 5       5 Hip Abduct 5       5 Hip Adduct 5       5 Quads 5       5 Hams 5       5 Dorsiflex 5       5 Plantar Flex 5       5 Ankle Eric 5       5 Ankle Invert 5       5 Toe Ext. 5       5 Toe Flex 5                         REFLEXES:    RIGHT Reflex   LEFT   2+ Biceps 2+   2+ Brachiorad. 2+   2+ Triceps 2+        2+ Patellar 2+   2+ Ankle 2+        Down PLANTAR Down     SENSORY:  Sharp touch: decreased in right distal fingertips in median pattern, decreased in patch on right forearm; patchy loss in BLE, not confluent, no radicular pattern.  Vibration: Normal throughout.      Diagnostic Data Reviewed:     Component      Latest Ref Rng & Units 6/10/2023 11/29/2022 8/8/2021 8/7/2021   Heme Aliquot      mL    3.0   Appearance, CSF      Clear    Clear   COLOR CSF      Colorless    Colorless   WBC, CSF      0 - 5 /cu mm    1   RBC, CSF      0 /cu mm    0   Lymphs, CSF      40 - 80 %    100 (H)   Hemoglobin A1C External      4.0 - 5.6 %  5.1  5.4   Estimated Avg Glucose      68 - 131 mg/dL  100  108   Vitamin E      7.0 - 25.1 mg/L    8.1   Vitamin E (Gamma-Tocopherol)      0.5 - 5.5 mg/L    1.3   CPK      20 - 180 U/L       Glucose, CSF      40 - 70 mg/dL    65   Protein, CSF      15 - 40 mg/dL    23   Thiamine      66.5 - 200.0 nmol/L    119.6   Vitamin B-12      210 - 950 pg/mL    343   Vitamin B6      2.0 - 32.8 ug/L    7.6   GIULIANA         Negative    Sed Rate      0 - 20 mm/Hr   15    HIV 1/2 Ag/Ab      Non-reactive Non-reactive       Hepatitis C Ab      Non-reactive Non-reactive        Component      Latest Ref Rng & Units 8/6/2021   Heme Aliquot      mL    Appearance, CSF      Clear    COLOR CSF      Colorless    WBC, CSF      0 - 5 /cu mm    RBC, CSF      0 /cu mm    Lymphs, CSF      40 - 80 %    Hemoglobin A1C External      4.0 - 5.6 %    Estimated Avg Glucose      68 - 131 mg/dL    Vitamin E      7.0 - 25.1 mg/L    Vitamin E (Gamma-Tocopherol)      0.5 - 5.5 mg/L    CPK      20 - 180 U/L 78   Glucose, CSF      40 - 70 mg/dL    Protein, CSF      15 - 40 mg/dL    Thiamine      66.5 - 200.0 nmol/L    Vitamin B-12      210 - 950 pg/mL    Vitamin B6      2.0 - 32.8 ug/L    GIULIANA          Sed Rate      0 - 20 mm/Hr    HIV 1/2 Ag/Ab      Non-reactive    Hepatitis C Ab      Non-reactive      fMRI 7/12/22  Omniscient protocol MR examination.  Resting state fMRI and DTI to be post processed and analyzed separately by proprietary third party software.  No evidence of acute intracranial pathology.  Images can be used purposes of procedure localization.    MRI brain 1/11/22  Synaptive and stealth protocol MR examination performed for purposes of procedure localization.  Stable exam, without evidence of acute intracranial pathology, as above.  No appreciable change from recent MRI 12/02/2021.    MRI brain 11/30/21  1. Enlarged pituitary gland with nonspecific complex cystic lesion, not significantly changed in size as compared to the prior study on 08/06/2021.  However, there is new incomplete suppression on FLAIR, possibly reflecting interval hemorrhage or development of proteinaceous fluid.  Primary differential considerations include pineal cyst versus cystic pineal neoplasm, such as a pineocytoma.  Further evaluation with pituitary protocol MRI with and without contrast recommended.  2. Otherwise, no evidence of an acute intracranial abnormality or significant interval change in the appearance of the brain parenchyma as compared to the prior  study.  This report was flagged in Epic as abnormal.    MRI lumbar spine 8/6/21  1.  No canal or foraminal stenosis of lumbar spine.  2.  Minimal retrolisthesis L5 upon S1 and mild discogenic degenerative disc disease L5-S1.  3.  Otherwise, negative noncontrast MR examination of lumbar spine.  4.  Once the post gadolinium enhanced images are submitted, an addendum report will be issued.    MRI cervical spine 8/6/21  Mild multilevel cervical spondylosis with a small posterior osteophyte complex with the mild broad-based bulge producing mild to moderate spinal canal narrowing at C5-C6 and C4-C5.  Mild right neural foraminal narrowing at C5-C6.  No evidence for abnormal enhancement after the administration of gadolinium allowing for motion artifact especially postcontrast axial images.    MRI thoracic spine 8/6/21  Some of the images are compromised by motion artifact.  Otherwise unremarkable MRI of the thoracic spine. No evidence for abnormal enhancement after the administration of gadolinium.    Neuro care records pending    Assessment and Plan:  Jessy Mayberry is a 53 y.o. right handed woman with multiple complaints including paresthesias, weakness, imbalance, dizziness, fatigue, tinnitus, hyperhidrosis, cognitive concerns, migraine and hypersensitivity.      Based on the information available today, her history and clinical exam, I do not find evidence of an underlying neurological condition.  Some of her symptoms are suggestive of fibromyalgia syndrome including the non-specific weakness, paresthesias, fatigue, cognitive concerns, headaches and hypersensitivity.    She feels this started after COVID.  Long haulers syndrome is a reasonable possibility as well.  It may benefit her to speak with individuals who have more experience with this such as the clinic at [facility] where I believe they have been doing some research on this issue.    She is on medication that can have side effects such as cognitive concerns,  fatigue, paresthesias, weakness so changing medication might also be something for her doctors to consider.    Will review records when available, but based on information provided it does not seem any further testing will be helpful from a neurological perspective.  Will get labs to check for any other metabolic, infectious or autoimmune issues.    Somehow motor neuron disease was in question.  She does not have a presentation concerning for motor neuron disease.    Information on patient AVS:  [facility]  Will consider repeating this depending on what all was studied.    Will check labs to look for abnormalities that could provoke weakness, imbalance and abnormal sensation.    It would be reasonable for you to consider being seen at the [facility] given the timeline of the onset of symptoms.    There are much better options than Gabapentin given all your symptoms.  In addition, Gabapentin side effects might be aggravating things that bother you:  For sleep, headaches neuropathic pain try:  Amitriptyline  Nortriptyline  Cymbalta  Celexa  (Already on Lexapro - still with symptoms)      Important to note, also  has a past medical history of Adrenal insufficiency, Hypothyroidism, and Pituitary mass.    Time Spent: I spent a total of 81 minutes on the day of the visit.This includes face to face time and non-face to face time preparing to see the patient (eg, review of tests), Obtaining and/or reviewing separately obtained history, Documenting clinical information in the electronic or other health record, Independently interpreting resultsand communicating results to the patient/family/caregiver, or Care coordination.         Ruthann Taylor D.O, ABPN, AOBNP, ABEM    This note was created with voice recognition software.  Grammatical, syntax and spelling errors may be inevitable.

## 2023-07-26 NOTE — PATIENT INSTRUCTIONS
[facility]  Will consider repeating this depending on what all was studied.    Will check labs to look for abnormalities that could provoke weakness, imbalance and abnormal sensation.    It would be reasonable for you to consider being seen at the [facility] given the timeline of the onset of symptoms.    There are much better options than Gabapentin given all your symptoms.  In addition, Gabapentin side effects might be aggravating things that bother you:  For sleep, headaches neuropathic pain try:  Amitriptyline  Nortriptyline  Cymbalta  Celexa  (Already on Lexapro - still with symptoms)

## 2023-07-27 LAB
ALBUMIN SERPL ELPH-MCNC: 4.27 G/DL (ref 3.35–5.55)
ALPHA1 GLOB SERPL ELPH-MCNC: 0.19 G/DL (ref 0.17–0.41)
ALPHA2 GLOB SERPL ELPH-MCNC: 0.78 G/DL (ref 0.43–0.99)
B-GLOBULIN SERPL ELPH-MCNC: 0.86 G/DL (ref 0.5–1.1)
GAMMA GLOB SERPL ELPH-MCNC: 1.1 G/DL (ref 0.67–1.58)
INTERPRETATION SERPL IFE-IMP: NORMAL
PATHOLOGIST INTERPRETATION IFE: NORMAL
PATHOLOGIST INTERPRETATION SPE: NORMAL
PROT SERPL-MCNC: 7.2 G/DL (ref 6–8.4)
RPR SER QL: NORMAL

## 2023-07-28 LAB
WEST NILE VIRUS INTERPRETATION: NORMAL
WNV IGG SER QL IA: NEGATIVE
WNV IGM SER QL IA: NEGATIVE

## 2023-07-29 ENCOUNTER — HOSPITAL ENCOUNTER (OUTPATIENT)
Dept: RADIOLOGY | Facility: HOSPITAL | Age: 53
Discharge: HOME | End: 2023-07-29
Attending: UROLOGY
Payer: OTHER GOVERNMENT

## 2023-07-29 DIAGNOSIS — R31.29 MICROSCOPIC HEMATURIA: ICD-10-CM

## 2023-07-29 LAB — B BURGDOR AB SER IA-ACNC: 0.3 INDEX VALUE

## 2023-07-29 PROCEDURE — 74178 CT UROGRAM ABD PELVIS W WO: ICD-10-PCS | Mod: 26,,, | Performed by: RADIOLOGY

## 2023-07-29 PROCEDURE — 74178 CT ABD&PLV WO CNTR FLWD CNTR: CPT | Mod: 26,,, | Performed by: RADIOLOGY

## 2023-07-29 PROCEDURE — 74178 CT ABD&PLV WO CNTR FLWD CNTR: CPT | Mod: TC

## 2023-07-29 PROCEDURE — 25500020 PHARM REV CODE 255

## 2023-07-29 RX ADMIN — IOHEXOL 125 ML: 350 INJECTION, SOLUTION INTRAVENOUS at 08:07

## 2023-07-31 LAB — ZINC SERPL-MCNC: 78 UG/DL (ref 60–130)

## 2023-08-01 LAB — COPPER SERPL-MCNC: 832 UG/L (ref 810–1990)

## 2023-08-03 ENCOUNTER — TELEPHONE (OUTPATIENT)
Dept: NEUROLOGY | Facility: CLINIC | Age: 53
End: 2023-08-03
Payer: OTHER GOVERNMENT

## 2023-08-03 LAB
AMPHIPHYSIN AB TITR SER: NEGATIVE {TITER}
ANNOTATION COMMENT IMP: ABNORMAL
CV2 IGG TITR SER: NEGATIVE {TITER}
GLIAL NUC TYPE 1 AB TITR SER: NEGATIVE {TITER}
HU1 AB TITR SER: NEGATIVE {TITER}
HU2 AB TITR SER IF: NEGATIVE {TITER}
HU3 AB TITR SER: NEGATIVE {TITER}
IMMUNOLOGIST REVIEW: ABNORMAL
PCA-1 AB TITR SER: NEGATIVE {TITER}
PCA-TR AB TITR SER: NEGATIVE {TITER}
PURKINJE CELL CYTOPLASMIC AB TYPE2: NEGATIVE
VGCC-P/Q BIND AB SER-SCNC: 0.08 NMOL/L
VGKC AB SER-SCNC: 0 NMOL/L

## 2023-08-07 PROBLEM — I47.10 SVT (SUPRAVENTRICULAR TACHYCARDIA): Status: ACTIVE | Noted: 2023-08-07

## 2023-08-07 NOTE — PROGRESS NOTES
Subjective:     HPI    I had the pleasure of seeing Jessy Mayberry in consultation at your request for the evaluation of SVT. She is a 53F with adrenal insufficiency, hypothyroidism, post-COVID neurologic symptoms requiring significant rehab, SVT status post ablation in 2013 in [place]. Her palpitations have recurred. She states she has palpitations about 3 times a week (lasting about 30 minutes) and recently had an SVT episode while she was in PCP office 2 weeks ago.  She says her HR was in the 130s bpm range. An ECG was done but she was back in sinus rhythm at that point. She was prescribed verapamil however she did not start the medication yet.  She complains of dyspnea on exertion with intermittent brief episodes of chest pain.  Also has mild pain the left popliteal/ calf.     A 7 day event monitor done in 7/2023 showed sinus rhythm Hr 81 bpm (range ), <1% PAC/PVC burden, single 6b run nonsustained AT.    Echo in 6/2023 showed EF 61%.    Was prescribed toprol but can't take it 2/2 fatigue.    Review of Systems   Constitutional: Positive for malaise/fatigue. Negative for decreased appetite, weight gain and weight loss.   HENT:  Negative for sore throat.    Eyes:  Negative for blurred vision.   Cardiovascular:  Positive for dyspnea on exertion. Negative for chest pain, irregular heartbeat, leg swelling, near-syncope, orthopnea, palpitations, paroxysmal nocturnal dyspnea and syncope.   Respiratory:  Negative for shortness of breath.    Skin:  Negative for rash.   Musculoskeletal:  Negative for arthritis.   Gastrointestinal:  Negative for abdominal pain.   Neurological:  Positive for paresthesias and weakness. Negative for focal weakness.   Psychiatric/Behavioral:  Negative for altered mental status.        Objective:   Physical Exam  Vitals and nursing note reviewed.   Constitutional:       General: She is not in acute distress.     Appearance: She is well-developed.   HENT:      Head: Normocephalic and  atraumatic.   Eyes:      General: No scleral icterus.     Conjunctiva/sclera: Conjunctivae normal.      Pupils: Pupils are equal, round, and reactive to light.   Neck:      Thyroid: No thyromegaly.      Vascular: No JVD.   Cardiovascular:      Rate and Rhythm: Normal rate and regular rhythm.      Pulses: Intact distal pulses.      Heart sounds: Normal heart sounds. No murmur heard.     No friction rub. No gallop.   Pulmonary:      Effort: Pulmonary effort is normal. No respiratory distress.      Breath sounds: Normal breath sounds.   Abdominal:      General: Bowel sounds are normal. There is no distension.      Palpations: Abdomen is soft.   Musculoskeletal:      Cervical back: Normal range of motion and neck supple.   Skin:     General: Skin is warm and dry.   Neurological:      Mental Status: She is alert and oriented to person, place, and time.   Psychiatric:         Behavior: Behavior normal.         Assessment:      1. Acquired hypothyroidism    2. SVT (supraventricular tachycardia)        Plan:     In summary, Jessy Chamberlain is  53F with a history of SVT who underwent SVT ablation in [place] 10 years ago. Now with recurrent palpitations. Negative event monitor. 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    Reassurance provided. Plan is PRN follow-up.    Thank you for allowing me to participate in the care of this patient. Please do not hesitate to call me with any questions or concerns.

## 2023-08-09 ENCOUNTER — OFFICE VISIT (OUTPATIENT)
Dept: CARDIOLOGY | Facility: CLINIC | Age: 53
End: 2023-08-09
Payer: OTHER GOVERNMENT

## 2023-08-09 VITALS
HEIGHT: 69 IN | RESPIRATION RATE: 16 BRPM | SYSTOLIC BLOOD PRESSURE: 127 MMHG | HEART RATE: 83 BPM | DIASTOLIC BLOOD PRESSURE: 76 MMHG | OXYGEN SATURATION: 98 % | WEIGHT: 190.81 LBS | BODY MASS INDEX: 28.26 KG/M2

## 2023-08-09 DIAGNOSIS — E03.9 ACQUIRED HYPOTHYROIDISM: Primary | ICD-10-CM

## 2023-08-09 DIAGNOSIS — I47.10 SVT (SUPRAVENTRICULAR TACHYCARDIA): ICD-10-CM

## 2023-08-09 PROCEDURE — 99205 PR OFFICE/OUTPT VISIT, NEW, LEVL V, 60-74 MIN: ICD-10-PCS | Mod: S$GLB,,, | Performed by: INTERNAL MEDICINE

## 2023-08-09 PROCEDURE — 99205 OFFICE O/P NEW HI 60 MIN: CPT | Mod: S$GLB,,, | Performed by: INTERNAL MEDICINE

## 2023-08-15 ENCOUNTER — TELEPHONE (OUTPATIENT)
Dept: CARDIOLOGY | Facility: HOSPITAL | Age: 53
End: 2023-08-15

## 2023-08-15 NOTE — TELEPHONE ENCOUNTER
Patient advised, test will be at [facility] ([address]  Will need to register on the first floor at the main entrance.  Patient advised that arrival time is 7:10.  Patient advised that she may be here about 3.5-4 hours, and may want to bring something to occupy their time, as there will be periods of waiting.    Patient advised, may take her medications prior to testing if you need to.  Patient should HOLD Metoprolol. Advised if she needs to eat to take her medications, please keep it light, like toast and juice.    Patient advised to avoid all caffeine 12 hours prior to testing.  This includes decaf tea and coffee.    Will provide peanut butter crackers for a snack after stress test.  If patient would prefer something else, please bring a snack from home.    Wear comfortable clothing.  No lotions, oils, or powders to the upper chest area. May wear deodorant.    No metal jewelry, buttons, or zippers to the upper body.  Patient verbalizes understanding of instructions.

## 2023-08-16 ENCOUNTER — HOSPITAL ENCOUNTER (OUTPATIENT)
Dept: CARDIOLOGY | Facility: HOSPITAL | Age: 53
Discharge: HOME | End: 2023-08-16
Attending: INTERNAL MEDICINE
Payer: OTHER GOVERNMENT

## 2023-08-16 ENCOUNTER — HOSPITAL ENCOUNTER (OUTPATIENT)
Dept: RADIOLOGY | Facility: HOSPITAL | Age: 53
Discharge: HOME | End: 2023-08-16
Attending: INTERNAL MEDICINE
Payer: OTHER GOVERNMENT

## 2023-08-16 DIAGNOSIS — R06.09 DOE (DYSPNEA ON EXERTION): ICD-10-CM

## 2023-08-16 PROCEDURE — 93016 CV STRESS TEST SUPVJ ONLY: CPT | Mod: ,,, | Performed by: NURSE PRACTITIONER

## 2023-08-16 PROCEDURE — 78452 HT MUSCLE IMAGE SPECT MULT: CPT | Mod: 26,,, | Performed by: INTERNAL MEDICINE

## 2023-08-16 PROCEDURE — 93016 NUCLEAR STRESS - CARDIOLOGY INTERPRETED (CUPID ONLY): ICD-10-PCS | Mod: ,,, | Performed by: NURSE PRACTITIONER

## 2023-08-16 PROCEDURE — 93018 NUCLEAR STRESS - CARDIOLOGY INTERPRETED (CUPID ONLY): ICD-10-PCS | Mod: ,,, | Performed by: INTERNAL MEDICINE

## 2023-08-16 PROCEDURE — 78452 NUCLEAR STRESS - CARDIOLOGY INTERPRETED (CUPID ONLY): ICD-10-PCS | Mod: 26,,, | Performed by: INTERNAL MEDICINE

## 2023-08-16 PROCEDURE — 78452 HT MUSCLE IMAGE SPECT MULT: CPT

## 2023-08-16 PROCEDURE — 93018 CV STRESS TEST I&R ONLY: CPT | Mod: ,,, | Performed by: INTERNAL MEDICINE

## 2023-08-16 PROCEDURE — A9502 TC99M TETROFOSMIN: HCPCS

## 2023-08-19 LAB
CV STRESS BASE HR: 58 BPM
DIASTOLIC BLOOD PRESSURE: 89 MMHG
EJECTION FRACTION- HIGH: 65 %
END DIASTOLIC INDEX-HIGH: 153 ML/M2
END DIASTOLIC INDEX-LOW: 93 ML/M2
END SYSTOLIC INDEX-HIGH: 71 ML/M2
END SYSTOLIC INDEX-LOW: 31 ML/M2
NUC REST DIASTOLIC VOLUME INDEX: 65
NUC REST EJECTION FRACTION: 65
NUC REST SYSTOLIC VOLUME INDEX: 23
NUC STRESS DIASTOLIC VOLUME INDEX: 65
NUC STRESS EJECTION FRACTION: 66 %
NUC STRESS SYSTOLIC VOLUME INDEX: 22
OHS CV CPX 1 MINUTE RECOVERY HEART RATE: 123 BPM
OHS CV CPX 85 PERCENT MAX PREDICTED HEART RATE MALE: 137
OHS CV CPX ESTIMATED METS: 7
OHS CV CPX MAX PREDICTED HEART RATE: 161
OHS CV CPX PATIENT IS FEMALE: 1
OHS CV CPX PATIENT IS MALE: 0
OHS CV CPX PEAK DIASTOLIC BLOOD PRESSURE: 71 MMHG
OHS CV CPX PEAK HEAR RATE: 144 BPM
OHS CV CPX PEAK RATE PRESSURE PRODUCT: NORMAL
OHS CV CPX PEAK SYSTOLIC BLOOD PRESSURE: 173 MMHG
OHS CV CPX PERCENT MAX PREDICTED HEART RATE ACHIEVED: 89
OHS CV CPX RATE PRESSURE PRODUCT PRESENTING: 7888
RETIRED EF AND QEF - SEE NOTES: 53 %
STRESS ECHO POST EXERCISE DUR MIN: 4 MINUTES
STRESS ECHO POST EXERCISE DUR SEC: 48 SECONDS
SYSTOLIC BLOOD PRESSURE: 136 MMHG

## 2023-08-21 ENCOUNTER — HOSPITAL ENCOUNTER (OUTPATIENT)
Facility: HOSPITAL | Age: 53
Discharge: HOME | End: 2023-08-21
Attending: UROLOGY | Admitting: UROLOGY
Payer: OTHER GOVERNMENT

## 2023-08-21 ENCOUNTER — PATIENT MESSAGE (OUTPATIENT)
Dept: CARDIOLOGY | Facility: CLINIC | Age: 53
End: 2023-08-21
Payer: OTHER GOVERNMENT

## 2023-08-21 ENCOUNTER — TELEPHONE (OUTPATIENT)
Dept: UROLOGY | Facility: CLINIC | Age: 53
End: 2023-08-21
Payer: OTHER GOVERNMENT

## 2023-08-21 DIAGNOSIS — N39.0 RECURRENT UTI: ICD-10-CM

## 2023-08-21 DIAGNOSIS — N95.2 VAGINAL ATROPHY: ICD-10-CM

## 2023-08-21 DIAGNOSIS — N32.81 OAB (OVERACTIVE BLADDER): Primary | ICD-10-CM

## 2023-08-21 DIAGNOSIS — R31.9 HEMATURIA: ICD-10-CM

## 2023-08-21 LAB
BILIRUBIN, UA POC OHS: NEGATIVE
BLOOD, UA POC OHS: ABNORMAL
CLARITY, UA POC OHS: CLEAR
COLOR, UA POC OHS: YELLOW
GLUCOSE, UA POC OHS: NEGATIVE
KETONES, UA POC OHS: NEGATIVE
LEUKOCYTES, UA POC OHS: NEGATIVE
NITRITE, UA POC OHS: NEGATIVE
PH, UA POC OHS: 7
PROTEIN, UA POC OHS: NEGATIVE
SPECIFIC GRAVITY, UA POC OHS: 1.01
UROBILINOGEN, UA POC OHS: 0.2

## 2023-08-21 PROCEDURE — 52000 CYSTOURETHROSCOPY: CPT | Mod: ,,, | Performed by: UROLOGY

## 2023-08-21 PROCEDURE — A4217 STERILE WATER/SALINE, 500 ML: HCPCS | Performed by: UROLOGY

## 2023-08-21 PROCEDURE — 52000 PR CYSTOURETHROSCOPY: ICD-10-PCS | Mod: ,,, | Performed by: UROLOGY

## 2023-08-21 PROCEDURE — 25000003 PHARM REV CODE 250: Performed by: UROLOGY

## 2023-08-21 PROCEDURE — 52000 CYSTOURETHROSCOPY: CPT | Performed by: UROLOGY

## 2023-08-21 PROCEDURE — 63600175 PHARM REV CODE 636 W HCPCS: Performed by: UROLOGY

## 2023-08-21 RX ORDER — CIPROFLOXACIN 500 MG/1
500 TABLET ORAL ONCE
Status: DISCONTINUED | OUTPATIENT
Start: 2023-08-21 | End: 2023-08-21 | Stop reason: HOSPADM

## 2023-08-21 RX ORDER — WATER 1000 ML/1000ML
INJECTION, SOLUTION INTRAVENOUS
Status: DISCONTINUED | OUTPATIENT
Start: 2023-08-21 | End: 2023-08-21 | Stop reason: HOSPADM

## 2023-08-21 RX ORDER — MIRABEGRON 50 MG/1
50 TABLET, FILM COATED, EXTENDED RELEASE ORAL EVERY MORNING
Qty: 90 TABLET | Refills: 0 | Status: SHIPPED | OUTPATIENT
Start: 2023-08-21 | End: 2023-09-27

## 2023-08-21 RX ORDER — GENTAMICIN SULFATE 40 MG/ML
80 INJECTION, SOLUTION INTRAMUSCULAR; INTRAVENOUS
Status: DISCONTINUED | OUTPATIENT
Start: 2023-08-21 | End: 2023-08-21 | Stop reason: HOSPADM

## 2023-08-21 RX ORDER — LIDOCAINE HYDROCHLORIDE 20 MG/ML
JELLY TOPICAL
Status: DISCONTINUED | OUTPATIENT
Start: 2023-08-21 | End: 2023-08-21 | Stop reason: HOSPADM

## 2023-08-21 RX ADMIN — GENTAMICIN SULFATE 80 MG: 40 INJECTION, SOLUTION INTRAMUSCULAR; INTRAVENOUS at 03:08

## 2023-08-21 NOTE — TELEPHONE ENCOUNTER
----- Message from Shyanne Avery MD sent at 8/21/2023  3:26 PM CDT -----    · F/u with blanca in 6-8 weeks to see if oab sx improved with myrbetriq  · F/u with me in 1 year with rbus and kub to monitor LUP stones x 2 (2mm). Sooner if she has left flank pain or fever  · Needs repeat renal scan if she has recurrent uti's despite above or if stones enlarging quickly.

## 2023-08-21 NOTE — PATIENT INSTRUCTIONS
Post Cystoscopy Instructions    What to Expect After a Cystoscopy  For the next 24-48 hours, you may feel a mild burning when you urinate. This burning is normal and expected. Drink plenty of water to dilute the urine to help relieve the burning sensation. You may also see a small amount of blood in your urine after the procedure.    Unless you are already taking antibiotics, you may be given an antibiotic after the test to prevent infection.    Signs and Symptoms to Report  Call the [facility] at [phone] if you develop any of the following   Fever of 101 degrees or higher  Chills or persistent bleeding  Inability to urinate          Assesment: Jessy Mayberry is a 53 y.o. female with   1. Recurrent UTI    2. Hematuria      MH for years. 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 Small LUP stones.     Plan:   Continue utiva, dmannose, probiotic daily and estrace every other night ot help preven uti's.  Ciprox 1 now to help prevent uti's.   Start myrbetriq 50mg once daily to see if it helps with urgency frequency  F/u with blanca in 6-8 weeks to see if oab sx improved with myrbetriq  F/u with me in 1 year with rbus and kub to monitor LUP stones x 2 (2mm). Sooner if she has left flank pain or fever  Needs repeat renal scan if she has recurrent uti's despite above or if stones enlarging quickly.       If your overactive bladder medication is too expensive, do the following:   Please call your  pharmacy or insurance and find out exactly what your  copay is for each overactive bladder medicine (list below) and call us or more preferablly write us back with which medicine you would like to try (that she has not tried already) and where you want it sent     List of Available Overactive Bladder medications- find out which is cheapest from pharmacist or your insurance    BEST for older patients but expensive usually (no constipation or confusion)  Myrbetriq/Mirbegron 25mg/50mg - takes 4 weeks to see any  changes  Gemtesa/Vibegron 75mg- works quickly. Newest drug.     Better for older patients - less chance of causing confusion they have chemical properties that make them unlikely to reach the brain, and therefore less likely to cause cognitive side effect/confusion but can cause dry mouth or constipation still.  May take a month to see any changes in frequency and urgency or leakage    Detrol/Tolteridine 2mg/4mg  Sanctura/trospium   Enablex/darifenacin     These are Cheaper but more side effects (dry mouth/constipation.confusion/increased risk of ementia):    Works the quickes (within a few days)  Ditropan/oxybutynin Immediate release 5mg/10mg/15mg- this will be the cheapest but have the most side effects. Also works the fastest (Within a few days)  Ditropan/oxbutynin Extended release  5mg/10mg/15mg-  this will be the second cheapest but have the most side effects. Also works the fastest.     May take a month to see any changes in frequency and urgency or leakage  Toviaz/Fesoterodine   Vesicare/solfenacin 5mg or 10mg      Important information about the medications:  Myrbetriq and Vibegron are the two that should not cause dry mouth and constipation. The other ones listed may or may not cause dry mouth or constipation.  Immediate release (IR) formulations also have more side effects than the extended release (ER) formulations. '  Gemtesa/Vibegron and Ditropan/oxybutynin are the only medications which should work fairly quickly (within a week or so). The rest can take 4 weeks to see effects  Some medications require that you fail the generic versions first- for example, no improvement of symptoms after 4 weeks or side effects resulting in discontinuation.     Goal of medication: decrease frequency of urination, decrease urgency associated with urination and decrease urinary incontinence episodes associated with urge (ie less pads)    When to try another medication or discontinue medication: If you see no improvement  in your symptoms after 4 weeks or if you have side effects that prevent you from take medicine, we can try another medicine.     Contact us if no follow-up appointment has been made.      If still no improvement after that 2nd medicine then can discuss other treatment options for overactive bladder.

## 2023-08-21 NOTE — PLAN OF CARE
Pt given discharge instructions verbally and via handout. Pt said she will reschedule her future appointment with Dr. Avery from January 2024 to a year from now using [facility]. Pt driving self home.

## 2023-08-21 NOTE — OP NOTE
[facility] [place] Procedure Note- [facility]  Date: 08/21/2023    Procedure:   1. Flexible cysto-uretheroscopy     Pre Procedure Diagnosis:   1. Recurrent UTI    2. Hematuria        Post Procedure Diagnosis: same, see below for findings    Surgeon: Shyanne Avery MD    Specimen: none    Anesthesia: 2% uro-jet lidocaine jelly for local analgesia    Indications: Jessy Mayberry is a 53 y.o. female  With above pre-procedure diagnosis. Urine reviewed. H&P reviewed.     Procedure in detail:   Flexible cysto-urethroscopy was performed after consent was obtained.  The risks and benefits were explained.    2% lidocaine urojet was used for local analgesia.  The genitalia was prepped and draped in the sterile fashion with betadine.    The flexible scope was advanced into the urethra and into the bladder.  Bilateral ureteral orifice were evaluated and noted to be normal with clear efflux.  The bladder was completely surveyed in a systematic fashion and the scope was retroflexed.    Cystoscopy findings as below in findings.   No strictures were noted.       The patient tolerated the procedure well without complication.    Findings: (pictures were uploaded into media)  No tumors. No lesions. Nothing to explain MH.     Assesment: Jessy Mayberry is a 53 y.o. female with   1. Recurrent UTI    2. Hematuria      MH for years. 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 Small LUP stones.     Plan:   Continue utiva, dmannose, probiotic daily and estrace every other night ot help preven uti's.  Gentx  1 now to help prevent uti's. Res to cipro  Start myrbetriq 50mg once daily to see if it helps with urgency frequency  F/u with blanca in 6-8 weeks to see if oab sx improved with myrbetriq  F/u with me in 1 year with rbus and kub to monitor LUP stones x 2 (2mm). Sooner if she has left flank pain or fever  Needs repeat renal scan if she has recurrent uti's despite above or if stones enlarging quickly.     Shyanne Avery,  MD

## 2023-08-21 NOTE — H&P
[facility] Note - [place]  Staff: MD Gena  PCP: Roger Nation MD  Date of Service: 08/20/2023      Subjective:     HPI: Jessy Mayberry is a 53 y.o. female     Initial consult by me in clinic on 6/22/23 for recurrent pyelo since march:  She states that she started having UTI/pyelo in March.  Symptoms would be flank pain not isolated to the right or left side, foul-smelling urine, frequency and urgency with associated fever up to 101.  Since March she is had at least 3 episodes.  Two of them are culture proven, E coli.  She received antibiotics for 10-14 days with each episode.  Prior to this she denies any recurrent UTIs.  She does however state that as a teen she had a cystoscopy but she is not sure why  She also states that she has had blood in the urine on occasion for years found on her gynecology appointments.  Review of her urines in our system show that she has blood in the urine sometimes associated with UTI and sometimes not dating back to November 2022.  She had a CT in March 2023 when she had a UTI/pyelo and it showed normal right kidney and left hydro to UPJ with for left renal stones.  She has no history of kidney stones.  She does not think the pain is more on her left versus her right whenever she does have these episodes of pyelo.  She had another CT in June 2023 when she had UTI and it actually showed that her right pelvis was larger than it was in March 2023.  High density area in the right UPJ ?  No previous CT urogram.  Still no ureteral stones.  5 days ago.  UTI risk factors:  Denies any pad usage but does state that she has some overactive bladder with urgency occasionally present over the last 4-5 months.  She is postmenopausal but postmenopausal since age 44.  Uses Estring vaginal suppository but infrequently.  Otherwise no other prevention for UTI taken.  Tends to urinate every 1-2 hours.  No diabetes.  No constipation.  No diarrhea.  PVR: 0. No  divetirculitis. No previous vaginal surgeries other than leep.   Stone risk factors:  Left UPJ.  No history of Topamax.  Microhematuria risk factors:  Kidney stones can explain it, no smoking history.  Denies any gross hematuria.  On no blood thinners.  Still has uterus.  History of leep.  No vaginal bleeding.  Voided urine today with small blood.  Just finish antibiotics about                     Interval history 7/20/23:  6/29/23 renal scan: Findings characteristic of dilated nonobstructed left renal collecting system. This can be seen with UPJ obstruction. Normal right nephrogram. Function: 54% left, 46% right.   She does however still has L flank pain that's most noticeable in the morning.  Also when she did the renal scan had pain.     Started her on uti prevention- however she hasn't been consistent about it. Using cranberry but not UTIVA. She hasn't had any uti's.   Sent her urine for cytology and ua [place]. It showed 5 rbc and negative cytology. Not sure why ctu and cysto were cancelled or not scheduled.  Xray and rbus in 7 months and f/u afte       Interval history 8/21/23:  Sent her for Ctu to eval microscpic hematuria (especially R UPJ- confirm no filling defect) and eval L UPJ for crossing vessel - still should have done to r/o any filling defects bc she had blood in urine)7/29/23: The kidneys enhance symmetrically.  Bilateral extrarenal pelvises unchanged.  1 cm left renal cyst.  No hydronephrosis.  Contrast is seen throughout the right renal collecting system and right ureter without filling defect.  Contrast is seen throughout the left renal collecting system without filling defect.  Contrast reaches the proximal left ureter on 19 minute delayed images.  There is abrupt narrowing at the left UPJ (series 606, image 71).  There is additionally cutoff of ureteral contrast at the level of the left gonadal vein (series 6, image 87)      She says voids 2x an hour. Somewhat bothersome. On no oab meds.  Says has  back pain if she holds too long. Taking uti prevention. Last uti 2 months ago.   Here today for csyto to complete w/u  Ua today: small blood      Urine history: family history of kidney, bladder or prostate cancer:No, personal or family history of kidney stones: No,tobacco use: No, anticoagulation: No  8/21/23 Void: small bld  6/22/23 Void: small bld- 5 rbc/1 wbc/occ bact, ctyology neg  6/10/23 E.coli, void: 3+leuk/3+bld, >100 rbc/<100 wbc/few bact  4/26/23 1+bld, 5 rbc/1 wbc  3/14/23 E.coli, void: 3+bld/1+bld/nit+,21 rbc/>100 wbc/few wbc  11/29/22 Void: 1+bld, 4 rbc/ 2 wbc/rare bact    REVIEW OF SYSTEMS:  Negative except for as stated above    Past Medical History:   Diagnosis Date    Adrenal insufficiency     Hypothyroidism     Pituitary mass        Past Surgical History:   Procedure Laterality Date    ANKLE SURGERY      CARDIAC ELECTROPHYSIOLOGY MAPPING AND ABLATION      DILATION AND CURETTAGE OF UTERUS      3 times    INGUINAL HERNIA REPAIR      twice    TONSILLECTOMY            Objective:     Vitals:    08/21/23 1337   BP: 127/84   Pulse: 67   Resp: 20   Temp: 98.6 °F (37 °C)         General:wdwn  in NAD  Neurologic: CN grossly normal. Normal sensation.   Psychiatric: awake, alert and oriented x 3. Mood and affect Normal. Cooperative.  Eyes: PERRLA, normal conjunctiva  Respiratory: no increased work on breathing. No wheezing.   Cardiovascular: No obvious extremity edema. Warm and well perfused.  GI: no obvious stomach distension  Musculoskeletal: normal  range of motion of bilateral upper extremities. Normal muscle strength and tone.  Skin: no obvious rashes or lesions. No tightening of skin noted.    Pertinent  exam in HPI    Recent Labs   Lab 11/29/22  1149 04/26/23  1605 06/10/23  1237   WBC 4.76 7.36 9.79   Hemoglobin 13.9 13.3 13.4   Hematocrit 43.1 39.4 41.5   Platelets 350 380 352   ]  Recent Labs   Lab 08/07/21  0505 08/08/21  0436 11/29/22  1149 04/26/23  1605 06/10/23  1237   Sodium 142   < > 139  137 143   Potassium 3.7   < > 4.0 3.8 3.8   Chloride 105   < > 104 102 107   CO2 27   < > 27 26 24   BUN 19   < > 12 12 11   Creatinine 0.8   < > 0.8 0.8 0.9   Glucose 81   < > 85 85 86   Calcium 9.1   < > 10.2 9.5 9.1   Magnesium 2.2  --   --  1.9  --    Phosphorus 3.9  --   --   --   --    Alkaline Phosphatase 64   < > 82 68 81   Total Protein 7.4   < > 7.8 7.6 7.4   Albumin 3.9   < > 4.2 4.2 4.1   Total Bilirubin 0.8   < > 0.6 0.6 0.5   AST 21   < > 23 23 22   ALT 24   < > 21 23 17    < > = values in this interval not displayed.   ]    Lab Results   Component Value Date    HGBA1C 5.1 11/29/2022         Assessment:     Jessy Mayberry is a 53 y.o. female with     No diagnosis found.      She has what looks like left UPJ obstruction and has 4 stones in that left kidney, all measuring less than 4 mm.  It could explain recurrent UTI and kidney stones except for she just says that her flank pain is never just isolated to the left.  It could also explain blood in the urine.        Plan:       Left kidney appears to be a upj but functionally drains and actually provides more function than the right. Further w/u including another renal scan indicated if she has fevers, left flank pain, more stones then I would refer to my partner for further evaluation for pyeloplasty.     If she has recurrent pyelo with L flank pain then she can try to get an appt with us but if she can't go to urgent came. She said occurred 4x in the last 4 months. She really needs to take utiva not the cranberry gummies.      Uti prevention- sent her a message with this info again.   D mannose 1000mg twice a day especially for her with h/o pyelo  Utiva once daily and twice a day for 2 days if she has symptomatic uti  Womens probiotic daily   Estrace cream nightly and then every other night      Left kidney stones  Stone workup eventually.  Xray and rbus to monitor the stone in left kidney in 6 months. If she passes stone call us or if she has fever and  passing stone then go to er.     For microscopic hematuria which she's had for year which is likely from stones complete w/u  Cysto on Monday august 21st-- her for this todya  Ctu was neg for fililng defect. Likely sotnes    Still needs:  Xray and rbus in 7 months and f/u after       This patient has been cleared for surgery in ambulatory surgical facility.        MD Shyanne Boyd MD

## 2023-08-21 NOTE — DISCHARGE SUMMARY
[facility] - Periop Services  Urology  Discharge Note - Short Stay      Patient Name: Jessy Mayberry  MRN: [id number]  Discharge Date and Time:  08/21/2023 3:25 PM  Attending Physician: Shyanne Avery.*   Discharging Provider: Shyanne Avery MD  Primary Care Physician: Roger Nation MD    There are no hospital problems to display for this patient.      Final Diagnoses: Same as principal problem.    Hospital Course: Patient was admitted for an outpatient procedure and tolerated the procedure well with no complications.*    Procedure(s) (LRB):  CYSTOSCOPY (N/A)     Indwelling Lines/Drains at time of discharge:   Lines/Drains/Airways       None                   Discharged Condition: good    Disposition: home    Follow Up:      Patient Instructions:   No discharge procedures on file.    Medications:  Reconciled Home Medications:      Medication List        START taking these medications      MYRBETRIQ 50 mg Tb24  Generic drug: mirabegron  Take 1 tablet (50 mg total) by mouth every morning. Take one every morning for overactive bladder            CONTINUE taking these medications      ALPRAZolam 0.5 MG tablet  Commonly known as: XANAX  Take 0.5 mg by mouth daily as needed.     cyanocobalamin 1,000 mcg/mL injection  INJECT 1 ML ( 1,000 MCG TOTAL ) INTO THE MUSCLE EVERY 28 DAYS.     * estradioL 0.01 % (0.1 mg/gram) vaginal cream  Commonly known as: ESTRACE  Place 1 g vaginally once daily. Apply pea sized amount up to second knuckle. Apply nightly for 2 weeks then every other night.     famotidine 20 MG tablet  Commonly known as: PEPCID  Take 20 mg by mouth 2 (two) times daily.     FLUoxetine 20 MG capsule  Take 1 capsule (20 mg total) by mouth once daily.     gabapentin 300 MG capsule  Commonly known as: NEURONTIN     hydrOXYzine HCL 25 MG tablet  Commonly known as: ATARAX     loratadine 10 mg tablet  Commonly known as: CLARITIN     pantoprazole 40 MG tablet  Commonly known as:  PROTONIX  Take 40 mg by mouth once daily.     sumatriptan 25 MG Tab  Commonly known as: IMITREX  Take 1 tablet (25 mg total) by mouth every 2 (two) hours as needed.     SYNTHROID 75 MCG tablet  Generic drug: levothyroxine  Take 1 tablet (75 mcg total) by mouth once daily.     triamcinolone acetonide 0.025% 0.025 % cream  Commonly known as: KENALOG  Apply topically 2 (two) times daily.     TUMS ORAL  Take by mouth.           * This list has 1 medication(s) that are the same as other medications prescribed for you. Read the directions carefully, and ask your doctor or other care provider to review them with you.                ASK your doctor about these medications      * estradioL 2 mg (7.5 mcg /24 hour) vaginal ring  Commonly known as: ESTRING  Place 2 mg vaginally every 3 (three) months.     metoprolol succinate 25 MG 24 hr tablet  Commonly known as: TOPROL-XL  Take 1 tablet (25 mg total) by mouth once daily.           * This list has 1 medication(s) that are the same as other medications prescribed for you. Read the directions carefully, and ask your doctor or other care provider to review them with you.                  Discharge Procedure Orders (must include Diet, Follow-up, Activity):  No discharge procedures on file.       Assesment: Jessy Mayberry is a 53 y.o. female with   1. Recurrent UTI    2. Hematuria      MH for years. 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 Small LUP stones.     Plan:   Continue utiva, dmannose, probiotic daily and estrace every other night ot help preven uti's.  Ciprox 1 now to help prevent uti's.   Start myrbetriq 50mg once daily to see if it helps with urgency frequency  F/u with blanca in 6-8 weeks to see if oab sx improved with myrbetriq  F/u with me in 1 year with rbus and kub to monitor LUP stones x 2 (2mm). Sooner if she has left flank pain or fever  Needs repeat renal scan if she has recurrent uti's despite above or if stones enlarging quickly.     Shyanne DUGGAN  MD Gena  Urology  [facility] - Periop Services

## 2023-08-22 VITALS
RESPIRATION RATE: 18 BRPM | HEART RATE: 59 BPM | DIASTOLIC BLOOD PRESSURE: 76 MMHG | BODY MASS INDEX: 28.14 KG/M2 | SYSTOLIC BLOOD PRESSURE: 145 MMHG | WEIGHT: 190 LBS | HEIGHT: 69 IN | TEMPERATURE: 99 F | OXYGEN SATURATION: 97 %

## 2023-08-29 ENCOUNTER — PATIENT MESSAGE (OUTPATIENT)
Dept: CARDIOLOGY | Facility: CLINIC | Age: 53
End: 2023-08-29

## 2023-09-18 ENCOUNTER — TELEPHONE (OUTPATIENT)
Dept: CARDIOLOGY | Facility: CLINIC | Age: 53
End: 2023-09-18
Payer: OTHER GOVERNMENT

## 2023-09-18 NOTE — TELEPHONE ENCOUNTER
----- Message from Cassandra Dominique NP sent at 8/21/2023  4:19 PM CDT -----  Your stress test is negative for reversible ischemia. The test is about 90% accurate. This means there are no blockages that are causing a problem, ( meaning over 70% blocked.) According to this test you are getting enough blood flow to the coronary arteries. If you are not having any symptoms we can push back your appointment, and save a copay. If you are having symptoms we will be happy to see you.

## 2023-09-21 ENCOUNTER — PATIENT MESSAGE (OUTPATIENT)
Dept: CARDIOLOGY | Facility: CLINIC | Age: 53
End: 2023-09-21
Payer: OTHER GOVERNMENT

## 2023-09-27 ENCOUNTER — OFFICE VISIT (OUTPATIENT)
Dept: FAMILY MEDICINE | Facility: CLINIC | Age: 53
End: 2023-09-27
Payer: COMMERCIAL

## 2023-09-27 DIAGNOSIS — E06.3 HASHIMOTO'S THYROIDITIS: ICD-10-CM

## 2023-09-27 DIAGNOSIS — Z00.00 ROUTINE GENERAL MEDICAL EXAMINATION AT A HEALTH CARE FACILITY: Primary | ICD-10-CM

## 2023-09-27 PROBLEM — M79.601 RIGHT ARM PAIN: Status: RESOLVED | Noted: 2021-10-07 | Resolved: 2023-09-27

## 2023-09-27 PROCEDURE — 99214 OFFICE O/P EST MOD 30 MIN: CPT | Mod: 95,,, | Performed by: STUDENT IN AN ORGANIZED HEALTH CARE EDUCATION/TRAINING PROGRAM

## 2023-09-27 PROCEDURE — 99214 PR OFFICE/OUTPT VISIT, EST, LEVL IV, 30-39 MIN: ICD-10-PCS | Mod: 95,,, | Performed by: STUDENT IN AN ORGANIZED HEALTH CARE EDUCATION/TRAINING PROGRAM

## 2023-09-27 NOTE — PROGRESS NOTES
The patient location is: [place], [place]  The chief complaint leading to consultation is: Follow up    Visit type: audiovisual    Face to Face time with patient: 25 minutes  31 minutes of total time spent on the encounter, which includes face to face time and non-face to face time preparing to see the patient (eg, review of tests), Obtaining and/or reviewing separately obtained history, Documenting clinical information in the electronic or other health record, Independently interpreting results (not separately reported) and communicating results to the patient/family/caregiver, or Care coordination (not separately reported).         Each patient to whom he or she provides medical services by telemedicine is:  (1) informed of the relationship between the physician and patient and the respective role of any other health care provider with respect to management of the patient; and (2) notified that he or she may decline to receive medical services by telemedicine and may withdraw from such care at any time.    Notes:      [facility] CLINIC NOTE    Patient Name: Jessy Mayberry  YOB: 1970    PRESENTING HISTORY     History of Present Illness:  Ms. Jessy Mayberry is a 53 y.o. female here for routine f/u.     No sig changes in health.     Continues to have issues with HAs, weakness, fals, neuropathy.     Much of the visit today was spent discussing her disability evaluation.   I have filled out numerous disability forms for her over the past year (s). More recently, some of these were rejected on the basis of conflict with the notes from her neurologist and therapy evaluations. Specifically, I recommended complete disability due to cognitive dysfunction, but this was contradicted by Neurology notes and quantifiable testing.   She feels that she is completely unable to work in any job for any period of time.   I will defer any further disability evaluations to a  and  recommend she seek out occupational physician to do this for her.       She has a hx of Hashimoto's for which she has been on synthroid.   Off thyroid medicine since April.   Requests us of her thyroid.     Cardiac evaluation unrevealing.     Recent neurology evaluation at my request to evaluate for motor neuron disease.   ROS      OBJECTIVE:   Vital Signs:  There were no vitals filed for this visit.       Physical Exam: No change from previous.     Physical Exam    ASSESSMENT & PLAN:     Routine general medical examination at a health care facility    Hashimoto's thyroiditis  -     TSH; Future; Expected date: 09/27/2023  -     US Thyroid; Future; Expected date: 09/27/2023  -     THYROID PEROXIDASE ANTIBODY; Future; Expected date: 09/27/2023       Roger Nation MD   Internal Medicine      I spent a total of 31 minutes on the day of the visit.  This includes face-to-face time and non face-to-face time preparing to see the patient (e.g., review of tests) , obtaining and/or reviewing separately obtain history, documenting clinical information in the electronic or other health record, independently interpreting results and communicating results to the patient/family/caregiver, or care coordinator.

## 2023-10-02 ENCOUNTER — LAB VISIT (OUTPATIENT)
Dept: LAB | Facility: HOSPITAL | Age: 53
End: 2023-10-02
Attending: STUDENT IN AN ORGANIZED HEALTH CARE EDUCATION/TRAINING PROGRAM
Payer: OTHER GOVERNMENT

## 2023-10-02 DIAGNOSIS — E06.3 HASHIMOTO'S THYROIDITIS: ICD-10-CM

## 2023-10-02 LAB — TSH SERPL DL<=0.005 MIU/L-ACNC: 3.58 UIU/ML (ref 0.4–4)

## 2023-10-02 PROCEDURE — 36415 COLL VENOUS BLD VENIPUNCTURE: CPT | Mod: PO | Performed by: STUDENT IN AN ORGANIZED HEALTH CARE EDUCATION/TRAINING PROGRAM

## 2023-10-02 PROCEDURE — 86376 MICROSOMAL ANTIBODY EACH: CPT | Performed by: STUDENT IN AN ORGANIZED HEALTH CARE EDUCATION/TRAINING PROGRAM

## 2023-10-02 PROCEDURE — 84443 ASSAY THYROID STIM HORMONE: CPT | Performed by: STUDENT IN AN ORGANIZED HEALTH CARE EDUCATION/TRAINING PROGRAM

## 2023-10-03 LAB — THYROPEROXIDASE IGG SERPL-ACNC: 1536.5 IU/ML

## 2023-10-04 ENCOUNTER — TELEPHONE (OUTPATIENT)
Dept: NEUROLOGY | Facility: CLINIC | Age: 53
End: 2023-10-04
Payer: OTHER GOVERNMENT

## 2023-10-04 ENCOUNTER — PATIENT MESSAGE (OUTPATIENT)
Dept: PSYCHIATRY | Facility: CLINIC | Age: 53
End: 2023-10-04
Payer: OTHER GOVERNMENT

## 2023-10-05 ENCOUNTER — PATIENT MESSAGE (OUTPATIENT)
Dept: PSYCHIATRY | Facility: CLINIC | Age: 53
End: 2023-10-05
Payer: OTHER GOVERNMENT

## 2023-10-11 ENCOUNTER — PATIENT MESSAGE (OUTPATIENT)
Dept: PSYCHIATRY | Facility: CLINIC | Age: 53
End: 2023-10-11
Payer: OTHER GOVERNMENT

## 2023-10-20 ENCOUNTER — PATIENT MESSAGE (OUTPATIENT)
Dept: PSYCHIATRY | Facility: CLINIC | Age: 53
End: 2023-10-20
Payer: OTHER GOVERNMENT

## 2023-10-20 ENCOUNTER — OFFICE VISIT (OUTPATIENT)
Dept: PSYCHIATRY | Facility: CLINIC | Age: 53
End: 2023-10-20
Payer: OTHER GOVERNMENT

## 2023-10-20 DIAGNOSIS — F43.23 ADJUSTMENT DISORDER WITH MIXED ANXIETY AND DEPRESSED MOOD: ICD-10-CM

## 2023-10-20 DIAGNOSIS — F90.0 ATTENTION DEFICIT HYPERACTIVITY DISORDER (ADHD), PREDOMINANTLY INATTENTIVE TYPE: ICD-10-CM

## 2023-10-20 DIAGNOSIS — F41.8 OTHER SPECIFIED ANXIETY DISORDERS: Primary | ICD-10-CM

## 2023-10-20 PROCEDURE — 90833 PR PSYCHOTHERAPY W/PATIENT W/E&M, 30 MIN (ADD ON): ICD-10-PCS | Mod: 95,,, | Performed by: PSYCHOLOGIST

## 2023-10-20 PROCEDURE — 90833 PSYTX W PT W E/M 30 MIN: CPT | Mod: 95,,, | Performed by: PSYCHOLOGIST

## 2023-10-20 PROCEDURE — 99214 OFFICE O/P EST MOD 30 MIN: CPT | Mod: 95,,, | Performed by: PSYCHOLOGIST

## 2023-10-20 PROCEDURE — 99214 PR OFFICE/OUTPT VISIT, EST, LEVL IV, 30-39 MIN: ICD-10-PCS | Mod: 95,,, | Performed by: PSYCHOLOGIST

## 2023-10-20 RX ORDER — BUPROPION HYDROCHLORIDE 150 MG/1
150 TABLET ORAL DAILY
Qty: 30 TABLET | Refills: 1 | Status: SHIPPED | OUTPATIENT
Start: 2023-10-20 | End: 2023-11-07

## 2023-10-20 RX ORDER — FLUOXETINE HYDROCHLORIDE 20 MG/1
20 CAPSULE ORAL DAILY
Qty: 30 CAPSULE | Refills: 1 | Status: SHIPPED | OUTPATIENT
Start: 2023-10-20 | End: 2023-12-01 | Stop reason: SDUPTHER

## 2023-10-20 NOTE — PROGRESS NOTES
The patient location is: mother's home - [place], [place]  The chief complaint leading to consultation is: anxiety, ADHD  Visit type: Virtual visit with synchronous audio and video  Each patient to whom he or she provides medical services by telemedicine is:  (1) informed of the relationship between the physician and patient and the respective role of any other health care provider with respect to management of the patient; and (2) notified that he or she may decline to receive medical services by telemedicine and may withdraw from such care at any time.  Face-to-Face time: 21 minutes    Notes:      Outpatient Psychiatry Follow-Up Visit    Clinical Status of Patient: Outpatient (Ambulatory)  10/20/2023     Chief Complaint: 53 year old female presenting today for a follow-up.       Interval History and Content of Current Session:  Interim Events/Subjective Report/Content of Current Session:  follow-up appointment.    Pt is a 53 year old female with past psychiatric hx of anxiety and ADHD who presents for follow-up treatment. We discussed the fact that Cardiology would not clear restarting stimulant use. Pt discussed her cognitive impairment that she said pre-dated long-COVID but is much worse now. Pt also reported significant fatigue but difficulty sleeping at night. Pt doesn't feel she can work again if her cognition doesn't improve. I referred her to a private practitioner if she want support for long-term disability. Will try Wellbutrin for attention but also fatigue. Pt said that she notes no improvement since starting Prozac. Pt denied any other major changes or new stressors.    Past Psychiatric hx: Adderall, Prozac, Lexapro, Buspar    Past Medical hx:   Past Medical History:   Diagnosis Date    Adrenal insufficiency     Hypothyroidism     Neuropathy     Pituitary mass         Interim hx:  Medication changes last visit:   Prozac 20mg Q D and Xanax 0.5mg Q D PRN  Anxiety: moderate - unchanged  Depression:  moderate - unchanged     Denies suicidal/homicidal ideations.  Denies hopelessness/worthlessness.    Denies auditory/visual hallucinations      Alcohol: minimal, infrequent  Drug: pt denied  Caffeine: minimal use  Tobacco: pt denied      Review of Systems   PSYCHIATRIC: Pertinent items are noted in the narrative.        CONSTITUTIONAL: weight stable    Past Medical, Family and Social History: The patient's past medical, family and social history have been reviewed and updated as appropriate within the electronic medical record. See encounter notes.     Current Psychiatric Medication:  Prozac 20mg Q D and Xanax 0.5mg Q D PRN     Compliance: yes      Side effects: pt denied     Risk Parameters:  Patient reports no suicidal ideation  Patient reports no homicidal ideation  Patient reports no self-injurious behavior  Patient reports no violent behavior     Exam (detailed: at least 9 elements; comprehensive: all 15 elements)   Constitutional  Vitals:  Most recent vital signs, dated less than 90 days prior to this appointment, were reviewed.        General:  unremarkable, age appropriate, casual attire, good eye contact, good rapport       Musculoskeletal  Muscle Strength/Tone:  no flaccidity, no tremor    Gait & Station:  normal      Psychiatric                       Speech:  normal tone, normal rate, rhythm, and volume   Mood & Affect:   Depressed, anxious         Thought Process:   Goal directed; Linear    Associations:   intact   Thought Content:   No SI/HI, delusions, or paranoia, no AV/VH   Insight & Judgement:   Good, adequate to circumstances   Orientation:   grossly intact; alert and oriented x 4    Memory:  intact for content of interview    Language:  grossly intact, can repeat    Attention Span  : Grossly intact for content of interview   Fund of Knowledge:   intact and appropriate to age and level of education        Assessment and Diagnosis   Status/Progress: increasing distress     Impression: Pt has a clear  history of being diagnosed and treated with ADHD. Pt has had cardiac issues and so will need cardiology clearance prior to restarting treatment. Pt also notes high anxiety and fatigue. This has been exacerbated by long-COVID, per pt. Pt should restart SSRI treatment and seek cardiology's approval to restart stimulant treatment.     Diagnosis(es):   1) Anxiety Disorder, other  2) ADHD, predominately inattentive type    Intervention/Counseling/Treatment Plan   Medication Management:      1. Continue Prozac 20mg Q D and Xanax 0.5mg Q D PRN     2. Start Wellbutrin XL 150mg Q AM     3. Call to report any worsening of symptoms or problems with the medication. Pt instructed to go to ER with thoughts of harming self, others    Psychotherapy:   Target symptoms: anxiety, attention/concentration  Why chosen therapy is appropriate versus another modality: CBT used; relevant to diagnosis, patient responds to this modality  Outcome monitoring methods: self-report, observation  Therapeutic intervention type: Cognitive Behavioral Therapy  Topics discussed/themes: building skills sets for symptom management, symptom recognition, nutrition, exercise  The patient's response to the intervention is poor  Patient's response to treatment is: good.   The patient's progress toward treatment goals: increasing distress  16 minutes spent with pt in psychotherapy and 5 minutes in medication management     Return to clinic: 6 weeks    -Cognitive-Behavioral/Supportive therapy and psychoeducation provided  -R/B/SE's of medications discussed with the pt who expresses understanding and chooses to take medications as prescribed.   -Pt instructed to call clinic, 911 or go to nearest emergency room if sxs worsen or pt is in   crisis. The pt expresses understanding.    Kemar Maravilla, PhD, MP     Antidepressant/Antianxiety Medication Initiation:  Patient informed of risks, benefits, and potential side effects of medication and accepts informed  consent.  Common side effects include nausea, fatigue, headache, insomnia., Specifically discussed the possibility of new or worsening suicidal thoughts/depression.  Patient instructed to stop the medication immediately and seek urgent treatment if this occurs. Patient instructed not to abruptly discontinue medication without physician guidance except in cases of sudden onset or worsening of SI.

## 2023-11-07 RX ORDER — BUPROPION HYDROCHLORIDE 150 MG/1
150 TABLET ORAL DAILY
Qty: 90 TABLET | Refills: 1 | Status: SHIPPED | OUTPATIENT
Start: 2023-11-07 | End: 2023-12-01

## 2023-11-20 ENCOUNTER — PATIENT MESSAGE (OUTPATIENT)
Dept: FAMILY MEDICINE | Facility: CLINIC | Age: 53
End: 2023-11-20

## 2023-11-20 ENCOUNTER — CLINICAL SUPPORT (OUTPATIENT)
Dept: FAMILY MEDICINE | Facility: CLINIC | Age: 53
End: 2023-11-20
Payer: OTHER GOVERNMENT

## 2023-11-20 ENCOUNTER — HOSPITAL ENCOUNTER (OUTPATIENT)
Dept: RADIOLOGY | Facility: CLINIC | Age: 53
Discharge: HOME | End: 2023-11-20
Attending: STUDENT IN AN ORGANIZED HEALTH CARE EDUCATION/TRAINING PROGRAM
Payer: OTHER GOVERNMENT

## 2023-11-20 ENCOUNTER — OFFICE VISIT (OUTPATIENT)
Dept: FAMILY MEDICINE | Facility: CLINIC | Age: 53
End: 2023-11-20
Payer: OTHER GOVERNMENT

## 2023-11-20 DIAGNOSIS — S81.819D LACERATION OF LOWER EXTREMITY, UNSPECIFIED LATERALITY, SUBSEQUENT ENCOUNTER: ICD-10-CM

## 2023-11-20 DIAGNOSIS — S99.911A INJURY OF RIGHT ANKLE, INITIAL ENCOUNTER: ICD-10-CM

## 2023-11-20 DIAGNOSIS — S99.911A INJURY OF RIGHT ANKLE, INITIAL ENCOUNTER: Primary | ICD-10-CM

## 2023-11-20 PROCEDURE — 99213 PR OFFICE/OUTPT VISIT, EST, LEVL III, 20-29 MIN: ICD-10-PCS | Mod: 95,,, | Performed by: STUDENT IN AN ORGANIZED HEALTH CARE EDUCATION/TRAINING PROGRAM

## 2023-11-20 PROCEDURE — 99211 OFF/OP EST MAY X REQ PHY/QHP: CPT | Mod: PBBFAC,PO

## 2023-11-20 PROCEDURE — 73610 XR ANKLE COMPLETE 3 VIEW RIGHT: ICD-10-PCS | Mod: 26,RT,S$GLB, | Performed by: RADIOLOGY

## 2023-11-20 PROCEDURE — 73610 X-RAY EXAM OF ANKLE: CPT | Mod: TC,FY,PO,RT

## 2023-11-20 PROCEDURE — 90714 TD VACC NO PRESV 7 YRS+ IM: CPT | Mod: PBBFAC,PO

## 2023-11-20 PROCEDURE — 99999PBSHW TD VACCINE GREATER THAN OR EQUAL TO 7YO PRESERVATIVE FREE IM: ICD-10-PCS | Mod: PBBFAC,,,

## 2023-11-20 PROCEDURE — 73610 X-RAY EXAM OF ANKLE: CPT | Mod: 26,RT,S$GLB, | Performed by: RADIOLOGY

## 2023-11-20 PROCEDURE — 99999PBSHW TD VACCINE GREATER THAN OR EQUAL TO 7YO PRESERVATIVE FREE IM: Mod: PBBFAC,,,

## 2023-11-20 PROCEDURE — 99999 PR PBB SHADOW E&M-EST. PATIENT-LVL I: CPT | Mod: PBBFAC,,,

## 2023-11-20 PROCEDURE — 99213 OFFICE O/P EST LOW 20 MIN: CPT | Mod: 95,,, | Performed by: STUDENT IN AN ORGANIZED HEALTH CARE EDUCATION/TRAINING PROGRAM

## 2023-11-20 PROCEDURE — 99999 PR PBB SHADOW E&M-EST. PATIENT-LVL I: ICD-10-PCS | Mod: PBBFAC,,,

## 2023-11-20 NOTE — PROGRESS NOTES
2 patient identifiers used (name and ). Administered TD vaccine IM ( LD) . Patient tolerated well, no bleeding at insertion site noted. Pain 0 on scale 0/10. Aseptic technique maintained. Immunization information given to patient. Advised patient to remain in clinic for 15 minutes to monitor for reaction. No AR noted.

## 2023-11-20 NOTE — PROGRESS NOTES
The patient location is: [place], [place]  The chief complaint leading to consultation is: Fall    Visit type: audiovisual    Face to Face time with patient: 11 minutes  14 minutes of total time spent on the encounter, which includes face to face time and non-face to face time preparing to see the patient (eg, review of tests), Obtaining and/or reviewing separately obtained history, Documenting clinical information in the electronic or other health record, Independently interpreting results (not separately reported) and communicating results to the patient/family/caregiver, or Care coordination (not separately reported).         Each patient to whom he or she provides medical services by telemedicine is:  (1) informed of the relationship between the physician and patient and the respective role of any other health care provider with respect to management of the patient; and (2) notified that he or she may decline to receive medical services by telemedicine and may withdraw from such care at any time.    Notes:      [facility] MEDICINE CLINIC NOTE    Patient Name: Jessy Mayberry  YOB: 1970    PRESENTING HISTORY     History of Present Illness:  Ms. Jessy Mayberry is a 53 y.o. female here after a fall.     Sat walking yard, uneven. Stepping backwards, tripped on pavestones.     Fell onto anchor which had not been in water for 1 year, went into left upper thigh.   Hematoma, lac.   Went to dermatologist. Underwent suturing.   Given doxycycline.   Last tetanus shot in 1997  Was instructed to leave in sutures for 21 days.   She has been using compression bandage. Just removed.       Fell again, rolled right ankle Sat night on uneven surface.   RICE, in walking boot. Ibuprofen.       ROS      OBJECTIVE:   Vital Signs:  There were no vitals filed for this visit.       Physical Exam: Reviewed photos of laceration. CDI. SOme surrounding ecchymosis. Right ankle markedly edematous. Some dependent bruising  developing around base of foot.     Physical Exam    ASSESSMENT & PLAN:     Injury of right ankle, initial encounter  -     X-Ray Ankle Complete Right; Future; Expected date: 11/20/2023    Laceration of lower extremity, unspecified laterality, subsequent encounter  -     (In Office Administered) Td Vaccine - Preservative Free; Future; Expected date: 11/20/2023      Tetanus shot, antimicrobial coverage seems adequate.     Xray ankle. No changes to management at this time.          Roger Nation  Internal Medicine

## 2023-11-27 ENCOUNTER — OFFICE VISIT (OUTPATIENT)
Dept: ORTHOPEDICS | Facility: CLINIC | Age: 53
End: 2023-11-27
Payer: OTHER GOVERNMENT

## 2023-11-27 DIAGNOSIS — S99.911A INJURY OF RIGHT ANKLE, INITIAL ENCOUNTER: ICD-10-CM

## 2023-11-27 DIAGNOSIS — S82.61XA CLOSED AVULSION FRACTURE OF LATERAL MALLEOLUS OF RIGHT FIBULA, INITIAL ENCOUNTER: ICD-10-CM

## 2023-11-27 DIAGNOSIS — S93.491A SPRAIN OF ANTERIOR TALOFIBULAR LIGAMENT OF RIGHT ANKLE, INITIAL ENCOUNTER: Primary | ICD-10-CM

## 2023-11-27 PROCEDURE — 99999 PR PBB SHADOW E&M-EST. PATIENT-LVL III: CPT | Mod: PBBFAC,,, | Performed by: FAMILY MEDICINE

## 2023-11-27 PROCEDURE — 99999 PR PBB SHADOW E&M-EST. PATIENT-LVL III: ICD-10-PCS | Mod: PBBFAC,,, | Performed by: FAMILY MEDICINE

## 2023-11-27 PROCEDURE — 99214 PR OFFICE/OUTPT VISIT, EST, LEVL IV, 30-39 MIN: ICD-10-PCS | Mod: S$PBB,,, | Performed by: FAMILY MEDICINE

## 2023-11-27 PROCEDURE — 99213 OFFICE O/P EST LOW 20 MIN: CPT | Mod: PBBFAC,PO | Performed by: FAMILY MEDICINE

## 2023-11-27 PROCEDURE — 99214 OFFICE O/P EST MOD 30 MIN: CPT | Mod: S$PBB,,, | Performed by: FAMILY MEDICINE

## 2023-11-27 RX ORDER — TRAMADOL HYDROCHLORIDE 50 MG/1
50 TABLET ORAL EVERY 6 HOURS PRN
Qty: 20 TABLET | Refills: 0 | Status: SHIPPED | OUTPATIENT
Start: 2023-11-27

## 2023-11-27 RX ORDER — MELOXICAM 15 MG/1
15 TABLET ORAL DAILY PRN
Qty: 30 TABLET | Refills: 2 | Status: SHIPPED | OUTPATIENT
Start: 2023-11-27

## 2023-11-27 NOTE — PROGRESS NOTES
Subjective:     Patient ID: Jessy Mayberry is a 53 y.o. female.    Chief Complaint: Injury of the Right Foot (Rolled Ankle, foot is in boot)    53-year-old female here today for evaluation of a right ankle injury.  Injury occurred last Saturday on November 18th.  States that she slipped and rolled her ankle twice causing her to also fall backwards and landed on a anchor that was in her yd which caused a significant laceration to her left thigh.  The laceration was repaired with stitches.  Following this though she was having difficulty weight-bearing on the right ankle when she noticed some significant swelling and bruising in the lateral aspect of the ankle.  Was very very tender to the touch especially over the lateral malleolus.  I have seen her previously for a similar injury in the right ankle last year.  Has a history of frequent falls as well as injuries to her ankle and foot.  She has a walking boot from previous ankle injury that she began wearing and has been wearing since.  Also has a scooter to aid in ambulation as she still finds weight-bearing difficulty even in the boot.  She made a virtual appointment with her PCP on November 20th who ordered an x-ray.  Was not available for review today.  Has been taking ibuprofen for pain relief but has not helped much.    Injury  Pertinent negatives include no chest pain, chills, congestion, coughing, headaches, rash or sore throat.       Review of Systems   Constitutional: Negative for chills and decreased appetite.   HENT:  Negative for congestion and sore throat.    Eyes:  Negative for blurred vision.   Cardiovascular:  Negative for chest pain, dyspnea on exertion and palpitations.   Respiratory:  Negative for cough and shortness of breath.    Skin:  Negative for rash.   Neurological:  Negative for difficulty with concentration, disturbances in coordination and headaches.   Psychiatric/Behavioral:  Negative for altered mental status, depression, hallucinations,  memory loss and suicidal ideas.        Objective:       General    Nursing note and vitals reviewed.  Constitutional: She is oriented to person, place, and time. She appears well-developed and well-nourished.   HENT:   Nose: Nose normal.   Eyes: EOM are normal. Pupils are equal, round, and reactive to light.   Neck: Neck supple.   Cardiovascular:  Normal rate.            Pulmonary/Chest: Effort normal.   Abdominal: Soft.   Neurological: She is alert and oriented to person, place, and time. She has normal reflexes.   Psychiatric: She has a normal mood and affect. Her behavior is normal. Judgment and thought content normal.         Right Ankle/Foot Exam     Inspection   Bruising: Ankle - present Foot - present  Effusion: Ankle - present     Swelling   The patient is swollen on the anterior talofibular ligament and lateral malleolus.    Tenderness   The patient is tender to palpation of the ATF and lateral malleolus.    Pain   The patient exhibits pain of the anterior talofibular ligament and lateral malleolus.    Range of Motion   Ankle Joint   Dorsiflexion:  20   Plantar flexion:  30   Subtalar Joint   Inversion:  10   Eversion:  normal   Morrissey Test:  negative    Muscle Strength   The patient has normal right ankle strength.    Tests   Anterior drawer: 2+  Varus tilt: positive  Squeeze Test: negative    Other   Ankle Crepitus: absent  Foot Crepitus:  absent  Sensation: normal  Peroneal Subluxation: negative    Left Ankle/Foot Exam   Left ankle exam is normal.      Vascular Exam     Right Pulses  Dorsalis Pedis:      2+  Posterior Tibial:      2+          Physical Exam  Vitals and nursing note reviewed.   Constitutional:       Appearance: She is well-developed and well-nourished.   HENT:      Nose: Nose normal.   Eyes:      Extraocular Movements: EOM normal.      Pupils: Pupils are equal, round, and reactive to light.   Cardiovascular:      Rate and Rhythm: Normal rate.      Pulses:           Dorsalis pedis pulses  are 2+ on the right side.        Posterior tibial pulses are 2+ on the right side.   Pulmonary:      Effort: Pulmonary effort is normal.   Abdominal:      Palpations: Abdomen is soft.   Musculoskeletal:      Cervical back: Neck supple.      Right ankle: Tenderness present over the lateral malleolus and ATF ligament.      Right Achilles Tendon: Morrissey's test negative.      Right foot: No crepitus.   Neurological:      Mental Status: She is alert and oriented to person, place, and time.      Deep Tendon Reflexes: Reflexes are normal and symmetric.   Psychiatric:         Mood and Affect: Mood and affect normal.         Behavior: Behavior normal.         Thought Content: Thought content normal.         Judgment: Judgment normal.     I reviewed the x-ray images that were taken on November 20th.  There is an irregularity at the tip of the lateral malleolus which is consistent with an avulsion fracture.  Significant soft tissue swelling.  No other abnormalities noted.    Assessment:     Encounter Diagnoses   Name Primary?    Injury of right ankle, initial encounter     Sprain of anterior talofibular ligament of right ankle, initial encounter Yes    Closed avulsion fracture of lateral malleolus of right fibula, initial encounter        Plan:      Jessy was seen today for injury.    Diagnoses and all orders for this visit:    Sprain of anterior talofibular ligament of right ankle, initial encounter    Injury of right ankle, initial encounter    Closed avulsion fracture of lateral malleolus of right fibula, initial encounter    Other orders  -     meloxicam (MOBIC) 15 MG tablet; Take 1 tablet (15 mg total) by mouth daily as needed for Pain.  -     traMADoL (ULTRAM) 50 mg tablet; Take 1 tablet (50 mg total) by mouth every 6 (six) hours as needed for Pain.      Sprain of the ATFL as well as an avulsion fracture of the lateral malleolus.  Advised her to continue protective weight-bearing as tolerated in the walking boot.   Will prescribe her meloxicam and tramadol as needed for pain and inflammation.  Would like her to follow up in six weeks for re-evaluation of her ankle.

## 2023-12-01 ENCOUNTER — OFFICE VISIT (OUTPATIENT)
Dept: PSYCHIATRY | Facility: CLINIC | Age: 53
End: 2023-12-01
Payer: OTHER GOVERNMENT

## 2023-12-01 DIAGNOSIS — F43.23 ADJUSTMENT DISORDER WITH MIXED ANXIETY AND DEPRESSED MOOD: ICD-10-CM

## 2023-12-01 DIAGNOSIS — F41.8 OTHER SPECIFIED ANXIETY DISORDERS: ICD-10-CM

## 2023-12-01 DIAGNOSIS — F90.0 ATTENTION DEFICIT HYPERACTIVITY DISORDER (ADHD), PREDOMINANTLY INATTENTIVE TYPE: Primary | ICD-10-CM

## 2023-12-01 DIAGNOSIS — Z12.31 OTHER SCREENING MAMMOGRAM: ICD-10-CM

## 2023-12-01 PROCEDURE — 99214 OFFICE O/P EST MOD 30 MIN: CPT | Mod: 95,,, | Performed by: PSYCHOLOGIST

## 2023-12-01 PROCEDURE — 99214 PR OFFICE/OUTPT VISIT, EST, LEVL IV, 30-39 MIN: ICD-10-PCS | Mod: 95,,, | Performed by: PSYCHOLOGIST

## 2023-12-01 RX ORDER — BUPROPION HYDROCHLORIDE 300 MG/1
300 TABLET ORAL DAILY
Qty: 30 TABLET | Refills: 1 | Status: SHIPPED | OUTPATIENT
Start: 2023-12-01 | End: 2024-11-30

## 2023-12-01 RX ORDER — FLUOXETINE HYDROCHLORIDE 20 MG/1
20 CAPSULE ORAL DAILY
Qty: 30 CAPSULE | Refills: 1 | Status: SHIPPED | OUTPATIENT
Start: 2023-12-01

## 2023-12-01 NOTE — PROGRESS NOTES
The patient location is: mother's home - [place], [place]  The chief complaint leading to consultation is: anxiety, ADHD  Visit type: Virtual visit with synchronous audio and video  Each patient to whom he or she provides medical services by telemedicine is:  (1) informed of the relationship between the physician and patient and the respective role of any other health care provider with respect to management of the patient; and (2) notified that he or she may decline to receive medical services by telemedicine and may withdraw from such care at any time.  Face-to-Face time: 11 minutes    Notes:      Outpatient Psychiatry Follow-Up Visit    Clinical Status of Patient: Outpatient (Ambulatory)  12/01/2023     Chief Complaint: 53 year old female presenting today for a follow-up.       Interval History and Content of Current Session:  Interim Events/Subjective Report/Content of Current Session:  follow-up appointment.    Pt is a 53 year old female with past psychiatric hx of anxiety and ADHD who presents for follow-up treatment. Pt notes no major difference since starting Wellbutrin. Pt is still not organized, focused, or motivated. Pt had a bad fall and so her mood is difficult to evaluate given these circumstances. Pt has noted increased migraines but this has abated recently. Pt denied any other major changes or new stressors.    Past Psychiatric hx: Adderall, Prozac, Lexapro, Buspar    Past Medical hx:   Past Medical History:   Diagnosis Date    Adrenal insufficiency     Hypothyroidism     Neuropathy     Pituitary mass         Interim hx:  Medication changes last visit:   Wellbutrin XL 150mg Q AM  Anxiety: moderate - unchanged  Depression: moderate - unchanged     Denies suicidal/homicidal ideations.  Denies hopelessness/worthlessness.    Denies auditory/visual hallucinations      Alcohol: minimal, infrequent  Drug: pt denied  Caffeine: minimal use  Tobacco: pt denied      Review of Systems   PSYCHIATRIC: Pertinent  items are noted in the narrative.        CONSTITUTIONAL: weight stable    Past Medical, Family and Social History: The patient's past medical, family and social history have been reviewed and updated as appropriate within the electronic medical record. See encounter notes.     Current Psychiatric Medication:  Prozac 20mg Q D, Wellbutrin XL 150mg Q AM, and Xanax 0.5mg Q D PRN     Compliance: yes      Side effects: pt denied     Risk Parameters:  Patient reports no suicidal ideation  Patient reports no homicidal ideation  Patient reports no self-injurious behavior  Patient reports no violent behavior     Exam (detailed: at least 9 elements; comprehensive: all 15 elements)   Constitutional  Vitals:  Most recent vital signs, dated less than 90 days prior to this appointment, were reviewed. BP: ()/()   Arterial Line BP: ()/()       General:  unremarkable, age appropriate, casual attire, good eye contact, good rapport       Musculoskeletal  Muscle Strength/Tone:  no flaccidity, no tremor    Gait & Station:  normal      Psychiatric                       Speech:  normal tone, normal rate, rhythm, and volume   Mood & Affect:   Depressed, anxious         Thought Process:   Goal directed; Linear    Associations:   intact   Thought Content:   No SI/HI, delusions, or paranoia, no AV/VH   Insight & Judgement:   Good, adequate to circumstances   Orientation:   grossly intact; alert and oriented x 4    Memory:  intact for content of interview    Language:  grossly intact, can repeat    Attention Span  : Grossly intact for content of interview   Fund of Knowledge:   intact and appropriate to age and level of education        Assessment and Diagnosis   Status/Progress: unchanged     Impression: Pt has a clear history of being diagnosed and treated with ADHD. Pt has had cardiac issues and so will need cardiology clearance prior to restarting treatment. Pt also notes high anxiety and fatigue. This has been exacerbated by long-COVID,  per pt. Pt should restart SSRI treatment and seek cardiology's approval to restart stimulant treatment.     Diagnosis(es):   1) Anxiety Disorder, other  2) ADHD, predominately inattentive type    Intervention/Counseling/Treatment Plan   Medication Management:      1. Continue Prozac 20mg Q D and Xanax 0.5mg Q D PRN     2. Increase Wellbutrin XL dosage to 300mg Q AM     3. Call to report any worsening of symptoms or problems with the medication. Pt instructed to go to ER with thoughts of harming self, others    Psychotherapy:   Target symptoms: anxiety, attention/concentration  Why chosen therapy is appropriate versus another modality: CBT used; relevant to diagnosis, patient responds to this modality  Outcome monitoring methods: self-report, observation  Therapeutic intervention type: Cognitive Behavioral Therapy  Topics discussed/themes: building skills sets for symptom management, symptom recognition, nutrition, exercise  The patient's response to the intervention is poor  Patient's response to treatment is: good.   The patient's progress toward treatment goals: increasing distress     Return to clinic: 6 weeks    -Cognitive-Behavioral/Supportive therapy and psychoeducation provided  -R/B/SE's of medications discussed with the pt who expresses understanding and chooses to take medications as prescribed.   -Pt instructed to call clinic, 911 or go to nearest emergency room if sxs worsen or pt is in   crisis. The pt expresses understanding.    Kemar Maravilla, PhD, MP

## 2023-12-08 ENCOUNTER — PATIENT MESSAGE (OUTPATIENT)
Dept: PSYCHIATRY | Facility: CLINIC | Age: 53
End: 2023-12-08
Payer: OTHER GOVERNMENT

## 2024-01-05 ENCOUNTER — PATIENT MESSAGE (OUTPATIENT)
Dept: ADMINISTRATIVE | Facility: HOSPITAL | Age: 54
End: 2024-01-05
Payer: OTHER GOVERNMENT

## 2024-01-08 ENCOUNTER — OFFICE VISIT (OUTPATIENT)
Dept: ORTHOPEDICS | Facility: CLINIC | Age: 54
End: 2024-01-08
Payer: OTHER GOVERNMENT

## 2024-01-08 VITALS — WEIGHT: 190 LBS | HEIGHT: 69 IN | BODY MASS INDEX: 28.14 KG/M2 | RESPIRATION RATE: 16 BRPM

## 2024-01-08 DIAGNOSIS — S93.491D SPRAIN OF ANTERIOR TALOFIBULAR LIGAMENT OF RIGHT ANKLE, SUBSEQUENT ENCOUNTER: Primary | ICD-10-CM

## 2024-01-08 DIAGNOSIS — S99.911A INJURY OF RIGHT ANKLE, INITIAL ENCOUNTER: ICD-10-CM

## 2024-01-08 PROCEDURE — 99213 OFFICE O/P EST LOW 20 MIN: CPT | Mod: PBBFAC,PO | Performed by: FAMILY MEDICINE

## 2024-01-08 PROCEDURE — 99999 PR PBB SHADOW E&M-EST. PATIENT-LVL III: CPT | Mod: PBBFAC,,, | Performed by: FAMILY MEDICINE

## 2024-01-08 PROCEDURE — 99213 OFFICE O/P EST LOW 20 MIN: CPT | Mod: S$PBB,,, | Performed by: FAMILY MEDICINE

## 2024-01-08 NOTE — PROGRESS NOTES
Subjective:     Patient ID: Jessy Mayberry is a 53 y.o. female.    Chief Complaint: Pain and Injury of the Right Ankle    Patient here for follow-up of right ankle pain secondary to ATFL sprain with small avulsion fracture.  Has been compliant wearing walking boot for the last six weeks.  Reports pain has improved significantly but is still present in the lateral aspect of her ankle over the ATFL as well as some trace pain in the peroneals.  Notes the pain is not nearly as bad as it was initially.  She is able to bear weight but notes the boot is making her ankle stiff.  Has been taking 600 mg ibuprofen with some good relief.    Pain  Pertinent negatives include no chest pain, chills, congestion, coughing, headaches, rash or sore throat.   Injury  Pertinent negatives include no chest pain, chills, congestion, coughing, headaches, rash or sore throat.       Review of Systems   Constitutional: Negative for chills and decreased appetite.   HENT:  Negative for congestion and sore throat.    Eyes:  Negative for blurred vision.   Cardiovascular:  Negative for chest pain, dyspnea on exertion and palpitations.   Respiratory:  Negative for cough and shortness of breath.    Skin:  Negative for rash.   Neurological:  Negative for difficulty with concentration, disturbances in coordination and headaches.   Psychiatric/Behavioral:  Negative for altered mental status, depression, hallucinations, memory loss and suicidal ideas.        Objective:       General    Nursing note and vitals reviewed.  Constitutional: She is oriented to person, place, and time. She appears well-developed and well-nourished.   HENT:   Nose: Nose normal.   Eyes: EOM are normal. Pupils are equal, round, and reactive to light.   Neck: Neck supple.   Cardiovascular:  Normal rate.            Pulmonary/Chest: Effort normal.   Abdominal: Soft.   Neurological: She is alert and oriented to person, place, and time. She has normal reflexes.   Psychiatric: She has a  normal mood and affect. Her behavior is normal. Judgment and thought content normal.         Right Ankle/Foot Exam     Inspection   Bruising: Ankle - absent Foot - absent  Effusion: Ankle - absent     Tenderness   The patient is tender to palpation of the ATF.    Pain   The patient exhibits pain of the anterior talofibular ligament and peroneals.    Range of Motion   Ankle Joint   Dorsiflexion:  30   Plantar flexion:  45   Subtalar Joint   Inversion:  40   Eversion:  normal   Morrissey Test:  negative    Muscle Strength   The patient has normal right ankle strength.    Tests   Anterior drawer: trace  Varus tilt: trace  Squeeze Test: negative    Other   Ankle Crepitus: absent  Foot Crepitus:  absent  Sensation: normal  Peroneal Subluxation: negative    Left Ankle/Foot Exam   Left ankle exam is normal.      Vascular Exam     Right Pulses  Dorsalis Pedis:      2+  Posterior Tibial:      2+          Physical Exam  Vitals and nursing note reviewed.   Constitutional:       Appearance: She is well-developed and well-nourished.   HENT:      Nose: Nose normal.   Eyes:      Extraocular Movements: EOM normal.      Pupils: Pupils are equal, round, and reactive to light.   Cardiovascular:      Rate and Rhythm: Normal rate.      Pulses:           Dorsalis pedis pulses are 2+ on the right side.        Posterior tibial pulses are 2+ on the right side.   Pulmonary:      Effort: Pulmonary effort is normal.   Abdominal:      Palpations: Abdomen is soft.   Musculoskeletal:      Cervical back: Neck supple.      Right ankle: Tenderness present over the ATF ligament.      Right Achilles Tendon: Morrissey's test negative.      Right foot: No crepitus.   Neurological:      Mental Status: She is alert and oriented to person, place, and time.      Deep Tendon Reflexes: Reflexes are normal and symmetric.   Psychiatric:         Mood and Affect: Mood and affect normal.         Behavior: Behavior normal.         Thought Content: Thought content  normal.         Judgment: Judgment normal.         Assessment:     Encounter Diagnoses   Name Primary?    Injury of right ankle, initial encounter     Sprain of anterior talofibular ligament of right ankle, subsequent encounter Yes       Plan:      Jessy was seen today for pain and injury.    Diagnoses and all orders for this visit:    Sprain of anterior talofibular ligament of right ankle, subsequent encounter    Injury of right ankle, initial encounter      Discussed additional treatment options with her today.  Those would include physical therapy as well as injection for further pain relief.  She does not desire injections or physical therapy at this time.  She has home exercise program for ankle mobility.  States she will try that and observe her ankle.  Can discontinue use of the walking boot and weight bear as tolerated with use of a soft ASO if needed.  May follow up here as needed if she fails to improve.

## 2024-01-19 NOTE — TELEPHONE ENCOUNTER
Unable to reach representative boots at the [facility]. VM left. Paper work was received and placed on Dr. Nation's desk for review .       ----- Message from Clary Altamirano sent at 2/7/2022 11:01 AM CST -----  Contact: Grant  Type:  Needs Medical Advice    Who Called:  [employer] from the [facility] [email or web address]     Would the patient rather a call back or a response via [facility]?  Call    Best Call Back Number: [phone] Ext [phone]     Additional Information:   [employer]s from the [facility] [employer] is checking on a fax that was just faxed to office for patient, just checking to see if office received it                   
[phone]

## 2024-01-21 ENCOUNTER — ON-DEMAND VIRTUAL (OUTPATIENT)
Dept: URGENT CARE | Facility: CLINIC | Age: 54
End: 2024-01-21
Payer: OTHER GOVERNMENT

## 2024-01-21 DIAGNOSIS — B96.89 BACTERIAL UPPER RESPIRATORY INFECTION: Primary | ICD-10-CM

## 2024-01-21 DIAGNOSIS — J06.9 BACTERIAL UPPER RESPIRATORY INFECTION: Primary | ICD-10-CM

## 2024-01-21 PROCEDURE — 99213 OFFICE O/P EST LOW 20 MIN: CPT | Mod: 95,,,

## 2024-01-21 RX ORDER — METHYLPREDNISOLONE 4 MG/1
TABLET ORAL
Qty: 21 EACH | Refills: 0 | Status: SHIPPED | OUTPATIENT
Start: 2024-01-21 | End: 2024-02-11

## 2024-01-21 RX ORDER — AZITHROMYCIN 250 MG/1
TABLET, FILM COATED ORAL
Qty: 6 TABLET | Refills: 0 | Status: SHIPPED | OUTPATIENT
Start: 2024-01-21 | End: 2024-01-26

## 2024-01-21 NOTE — PROGRESS NOTES
Subjective:      Patient ID: Jessy Mayberry is a 53 y.o. female.    Vitals:  vitals were not taken for this visit.     Chief Complaint: Sinus Problem      Visit Type: TELE AUDIOVISUAL    Present with the patient at the time of consultation: TELEMED PRESENT WITH PATIENT: None    Past Medical History:   Diagnosis Date    Adrenal insufficiency     Hypothyroidism     Neuropathy     Pituitary mass      Past Surgical History:   Procedure Laterality Date    ANKLE SURGERY      CARDIAC ELECTROPHYSIOLOGY MAPPING AND ABLATION      CYSTOSCOPY N/A 8/21/2023    Procedure: CYSTOSCOPY;  Surgeon: Shyanne Avery MD;  [facility];  Service: Urology;  Laterality: N/A;    DILATION AND CURETTAGE OF UTERUS      3 times    INGUINAL HERNIA REPAIR      twice    TONSILLECTOMY       Review of patient's allergies indicates:   Allergen Reactions    Oxycodone      Other reaction(s): Unknown    Promethazine Other (See Comments)     Current Outpatient Medications on File Prior to Visit   Medication Sig Dispense Refill    ALPRAZolam (XANAX) 0.5 MG tablet Take 0.5 mg by mouth daily as needed.      buPROPion (WELLBUTRIN XL) 300 MG 24 hr tablet Take 1 tablet (300 mg total) by mouth once daily. 30 tablet 1    calcium carbonate (TUMS ORAL) Take by mouth.      cyanocobalamin 1,000 mcg/mL injection INJECT 1 ML ( 1,000 MCG TOTAL ) INTO THE MUSCLE EVERY 28 DAYS. 10 mL 3    estradioL (ESTRACE) 0.01 % (0.1 mg/gram) vaginal cream Place 1 g vaginally once daily. Apply pea sized amount up to second knuckle. Apply nightly for 2 weeks then every other night. 42.5 g 3    famotidine (PEPCID) 20 MG tablet Take 20 mg by mouth 2 (two) times daily.      FLUoxetine 20 MG capsule Take 1 capsule (20 mg total) by mouth once daily. 30 capsule 1    gabapentin (NEURONTIN) 300 MG capsule       loratadine (CLARITIN) 10 mg tablet       meloxicam (MOBIC) 15 MG tablet Take 1 tablet (15 mg total) by mouth daily as needed for Pain. 30 tablet 2    sumatriptan  (IMITREX) 25 MG Tab Take 1 tablet (25 mg total) by mouth every 2 (two) hours as needed. 10 tablet 5    traMADoL (ULTRAM) 50 mg tablet Take 1 tablet (50 mg total) by mouth every 6 (six) hours as needed for Pain. 20 tablet 0    triamcinolone acetonide 0.025% (KENALOG) 0.025 % cream Apply topically 2 (two) times daily. 80 g 0     No current facility-administered medications on file prior to visit.     Family History   Problem Relation Age of Onset    Ovarian cancer Mother 28    Hypertension Mother     Coronary artery disease Mother     Hyperlipidemia Mother     Cancer Mother     Skin cancer Mother 75    Hypertension Father     Retinal detachment Father     Atrial fibrillation Sister     Rheum arthritis Daughter     Asthma Daughter     Breast cancer Maternal Aunt 42    Bone cancer Maternal Aunt     Cancer Maternal Aunt     Rheum arthritis Maternal Aunt     Hepatitis Maternal Aunt     Heart disease Maternal Grandmother     Hypertension Maternal Grandmother     Kidney failure Maternal Grandmother     Atrial fibrillation Maternal Grandmother     Hypertension Brother     Hypertension Son     Psoriasis Child     Melanoma Neg Hx     Lupus Neg Hx        Medications Ordered                [id number] - [place] - [address] AT [facility] [address]   [facility] [address] [address]    Telephone: [phone]   Fax: [phone]   Hours: Not open 24 hours                         E-Prescribed (2 of 2)              azithromycin (Z-ZAY) 250 MG tablet    Sig: Take 2 tablets by mouth on day 1; Take 1 tablet by mouth on days 2-5       Start: 1/21/24     Quantity: 6 tablet Refills: 0                         methylPREDNISolone (MEDROL DOSEPACK) 4 mg tablet    Sig: use as directed       Start: 1/21/24     Quantity: 21 each Refills: 0                           Oh[facility] Peq Odvv Intake    1/21/2024  6:29 AM CST - Filed by Patient   What is your current physical address in the event of a medical  emergency? [address], [place], [place]   Are you able to take your vital signs? No   Please attach any relevant images or files          Patient states that about one week ago she began to have a cough, sore throat and pain in her left lower rib are due to cough. Patient states that she did have a low grade fever or 10.4 to 100.6. patient states that she did take a covid test on Saturday of last week and it was negative. Patient states that she is also having nasal congestion and drainage but states that this has gotten better. Patient states that her cough is productive with yellow sputum. Patient states that she is having pain in her chest area when she coughs.     Sinus Problem  Associated symptoms include congestion, coughing and a sore throat.       HENT:  Positive for congestion, postnasal drip and sore throat.    Neck: neck negative.   Cardiovascular: Negative.    Eyes: Negative.    Respiratory:  Positive for cough.    Gastrointestinal: Negative.    Endocrine: negative.   Genitourinary: Negative.    Musculoskeletal: Negative.    Skin: Negative.    Allergic/Immunologic: Negative.    Neurological: Negative.    Hematologic/Lymphatic: Negative.    Psychiatric/Behavioral: Negative.          Objective:   The physical exam was conducted virtually.  Physical Exam   Constitutional: She is oriented to person, place, and time.   HENT:   Head: Normocephalic and atraumatic.   Eyes: Conjunctivae are normal. Pupils are equal, round, and reactive to light. Extraocular movement intact   Neck: Neck supple.   Pulmonary/Chest: Effort normal.   Abdominal: Normal appearance.   Musculoskeletal: Normal range of motion.         General: Normal range of motion.   Neurological: no focal deficit. She is alert, oriented to person, place, and time and at baseline.   Skin: Skin is warm.   Psychiatric: Her behavior is normal. Mood and thought content normal.       Assessment:     1. Bacterial upper respiratory infection        Plan:        Bacterial upper respiratory infection  -     azithromycin (Z-ZAY) 250 MG tablet; Take 2 tablets by mouth on day 1; Take 1 tablet by mouth on days 2-5  Dispense: 6 tablet; Refill: 0  -     methylPREDNISolone (MEDROL DOSEPACK) 4 mg tablet; use as directed  Dispense: 21 each; Refill: 0

## 2024-02-06 DIAGNOSIS — Z12.11 COLON CANCER SCREENING: ICD-10-CM

## 2024-03-01 ENCOUNTER — HOSPITAL ENCOUNTER (OUTPATIENT)
Dept: RADIOLOGY | Facility: CLINIC | Age: 54
Discharge: HOME | End: 2024-03-01
Attending: STUDENT IN AN ORGANIZED HEALTH CARE EDUCATION/TRAINING PROGRAM
Payer: OTHER GOVERNMENT

## 2024-03-01 DIAGNOSIS — Z12.31 OTHER SCREENING MAMMOGRAM: ICD-10-CM

## 2024-03-01 PROCEDURE — 77067 SCR MAMMO BI INCL CAD: CPT | Mod: 26,,, | Performed by: RADIOLOGY

## 2024-03-01 PROCEDURE — 77063 BREAST TOMOSYNTHESIS BI: CPT | Mod: 26,,, | Performed by: RADIOLOGY

## 2024-03-01 PROCEDURE — 77067 SCR MAMMO BI INCL CAD: CPT | Mod: TC,PO

## 2024-04-22 ENCOUNTER — TELEPHONE (OUTPATIENT)
Dept: FAMILY MEDICINE | Facility: CLINIC | Age: 54
End: 2024-04-22
Payer: OTHER GOVERNMENT

## 2024-04-22 ENCOUNTER — PATIENT MESSAGE (OUTPATIENT)
Dept: FAMILY MEDICINE | Facility: CLINIC | Age: 54
End: 2024-04-22
Payer: OTHER GOVERNMENT

## 2024-04-22 ENCOUNTER — OFFICE VISIT (OUTPATIENT)
Dept: FAMILY MEDICINE | Facility: CLINIC | Age: 54
End: 2024-04-22
Payer: OTHER GOVERNMENT

## 2024-04-22 ENCOUNTER — PATIENT MESSAGE (OUTPATIENT)
Dept: UROLOGY | Facility: CLINIC | Age: 54
End: 2024-04-22
Payer: OTHER GOVERNMENT

## 2024-04-22 ENCOUNTER — LAB VISIT (OUTPATIENT)
Dept: LAB | Facility: HOSPITAL | Age: 54
End: 2024-04-22
Attending: STUDENT IN AN ORGANIZED HEALTH CARE EDUCATION/TRAINING PROGRAM
Payer: OTHER GOVERNMENT

## 2024-04-22 DIAGNOSIS — N39.0 URINARY TRACT INFECTION WITHOUT HEMATURIA, SITE UNSPECIFIED: ICD-10-CM

## 2024-04-22 DIAGNOSIS — R30.0 DYSURIA: ICD-10-CM

## 2024-04-22 DIAGNOSIS — R30.0 DYSURIA: Primary | ICD-10-CM

## 2024-04-22 LAB
BACTERIA #/AREA URNS AUTO: ABNORMAL /HPF
BILIRUB UR QL STRIP: NEGATIVE
CLARITY UR REFRACT.AUTO: ABNORMAL
COLOR UR AUTO: YELLOW
GLUCOSE UR QL STRIP: NEGATIVE
HGB UR QL STRIP: ABNORMAL
KETONES UR QL STRIP: NEGATIVE
LEUKOCYTE ESTERASE UR QL STRIP: ABNORMAL
MICROSCOPIC COMMENT: ABNORMAL
NITRITE UR QL STRIP: POSITIVE
PH UR STRIP: 6 [PH] (ref 5–8)
PROT UR QL STRIP: ABNORMAL
RBC #/AREA URNS AUTO: 2 /HPF (ref 0–4)
SP GR UR STRIP: 1.01 (ref 1–1.03)
SQUAMOUS #/AREA URNS AUTO: 0 /HPF
URN SPEC COLLECT METH UR: ABNORMAL
WBC #/AREA URNS AUTO: >100 /HPF (ref 0–5)
WBC CLUMPS UR QL AUTO: ABNORMAL

## 2024-04-22 PROCEDURE — 81001 URINALYSIS AUTO W/SCOPE: CPT | Performed by: STUDENT IN AN ORGANIZED HEALTH CARE EDUCATION/TRAINING PROGRAM

## 2024-04-22 PROCEDURE — 87086 URINE CULTURE/COLONY COUNT: CPT | Performed by: STUDENT IN AN ORGANIZED HEALTH CARE EDUCATION/TRAINING PROGRAM

## 2024-04-22 PROCEDURE — 87088 URINE BACTERIA CULTURE: CPT | Performed by: STUDENT IN AN ORGANIZED HEALTH CARE EDUCATION/TRAINING PROGRAM

## 2024-04-22 PROCEDURE — 99213 OFFICE O/P EST LOW 20 MIN: CPT | Mod: 95,,, | Performed by: STUDENT IN AN ORGANIZED HEALTH CARE EDUCATION/TRAINING PROGRAM

## 2024-04-22 PROCEDURE — 87186 SC STD MICRODIL/AGAR DIL: CPT | Performed by: STUDENT IN AN ORGANIZED HEALTH CARE EDUCATION/TRAINING PROGRAM

## 2024-04-22 PROCEDURE — 87077 CULTURE AEROBIC IDENTIFY: CPT | Performed by: STUDENT IN AN ORGANIZED HEALTH CARE EDUCATION/TRAINING PROGRAM

## 2024-04-22 RX ORDER — NITROFURANTOIN 25; 75 MG/1; MG/1
100 CAPSULE ORAL 2 TIMES DAILY
Qty: 10 CAPSULE | Refills: 0 | Status: SHIPPED | OUTPATIENT
Start: 2024-04-22 | End: 2024-04-27

## 2024-04-22 RX ORDER — PHENAZOPYRIDINE HYDROCHLORIDE 100 MG/1
100 TABLET, FILM COATED ORAL 3 TIMES DAILY PRN
Qty: 30 TABLET | Refills: 0 | Status: SHIPPED | OUTPATIENT
Start: 2024-04-22 | End: 2024-05-02

## 2024-04-22 NOTE — PROGRESS NOTES
The patient location is: [place], [place]  The chief complaint leading to consultation is: dysuria    Visit type: audiovisual    Face to Face time with patient: 5  7 minutes of total time spent on the encounter, which includes face to face time and non-face to face time preparing to see the patient (eg, review of tests), Obtaining and/or reviewing separately obtained history, Documenting clinical information in the electronic or other health record, Independently interpreting results (not separately reported) and communicating results to the patient/family/caregiver, or Care coordination (not separately reported).         Each patient to whom he or she provides medical services by telemedicine is:  (1) informed of the relationship between the physician and patient and the respective role of any other health care provider with respect to management of the patient; and (2) notified that he or she may decline to receive medical services by telemedicine and may withdraw from such care at any time.    Notes:      [facility] MEDICINE CLINIC NOTE    Patient Name: Jessy Mayberry  YOB: 1970    PRESENTING HISTORY     History of Present Illness:  Ms. Jessy Mayberry is a 53 y.o. female     Onset Sat  Dysuria, increased frequency.   Suprapubic pain.   No fevers.   Feels fatigued.   Taking ibuprofen.     Last urine culture last Summer. E/.coli. Some resistance. Previous culture pan sensitive E.coli.     ROS      OBJECTIVE:   Vital Signs:  There were no vitals filed for this visit.       Physical Exam: Nontoxic appearance.     Physical Exam    ASSESSMENT & PLAN:     Dysuria  -     phenazopyridine (PYRIDIUM) 100 MG tablet; Take 1 tablet (100 mg total) by mouth 3 (three) times daily as needed for Pain.  Dispense: 30 tablet; Refill: 0  -     Urinalysis; Future; Expected date: 04/22/2024  -     CULTURE, URINE; Future; Expected date: 04/22/2024  -     nitrofurantoin, macrocrystal-monohydrate, (MACROBID) 100 MG capsule;  Take 1 capsule (100 mg total) by mouth 2 (two) times daily. for 5 days  Dispense: 10 capsule; Refill: 0        Roger Nation  Internal Medicine

## 2024-04-22 NOTE — TELEPHONE ENCOUNTER
----- Message from Roger Nation MD sent at 4/22/2024  2:44 PM CDT -----  She's going to drop off urine today.

## 2024-04-25 LAB — BACTERIA UR CULT: ABNORMAL

## 2024-05-02 ENCOUNTER — PATIENT MESSAGE (OUTPATIENT)
Dept: FAMILY MEDICINE | Facility: CLINIC | Age: 54
End: 2024-05-02
Payer: OTHER GOVERNMENT

## 2024-05-02 ENCOUNTER — OFFICE VISIT (OUTPATIENT)
Dept: UROLOGY | Facility: CLINIC | Age: 54
End: 2024-05-02
Payer: OTHER GOVERNMENT

## 2024-05-02 VITALS — HEIGHT: 69 IN | WEIGHT: 190.06 LBS | BODY MASS INDEX: 28.15 KG/M2

## 2024-05-02 DIAGNOSIS — N39.0 RECURRENT UTI: Primary | ICD-10-CM

## 2024-05-02 DIAGNOSIS — N20.0 KIDNEY STONES: ICD-10-CM

## 2024-05-02 DIAGNOSIS — R30.0 DYSURIA: ICD-10-CM

## 2024-05-02 DIAGNOSIS — Z00.00 ROUTINE GENERAL MEDICAL EXAMINATION AT A HEALTH CARE FACILITY: Primary | ICD-10-CM

## 2024-05-02 LAB
BACTERIA #/AREA URNS AUTO: ABNORMAL /HPF
BILIRUB UR QL STRIP: NEGATIVE
BILIRUBIN, UA POC OHS: NEGATIVE
BLOOD, UA POC OHS: ABNORMAL
CLARITY UR REFRACT.AUTO: ABNORMAL
CLARITY, UA POC OHS: ABNORMAL
COLOR UR AUTO: YELLOW
COLOR, UA POC OHS: YELLOW
GLUCOSE UR QL STRIP: NEGATIVE
GLUCOSE, UA POC OHS: NEGATIVE
HGB UR QL STRIP: ABNORMAL
HYALINE CASTS UR QL AUTO: 0 /LPF
KETONES UR QL STRIP: NEGATIVE
KETONES, UA POC OHS: NEGATIVE
LEUKOCYTE ESTERASE UR QL STRIP: ABNORMAL
LEUKOCYTES, UA POC OHS: ABNORMAL
MICROSCOPIC COMMENT: ABNORMAL
NITRITE UR QL STRIP: NEGATIVE
NITRITE, UA POC OHS: NEGATIVE
PH UR STRIP: 5 [PH] (ref 5–8)
PH, UA POC OHS: 5.5
PROT UR QL STRIP: ABNORMAL
PROTEIN, UA POC OHS: >=300
RBC #/AREA URNS AUTO: >100 /HPF (ref 0–4)
SP GR UR STRIP: 1.01 (ref 1–1.03)
SPECIFIC GRAVITY, UA POC OHS: >=1.03
SQUAMOUS #/AREA URNS AUTO: 1 /HPF
URN SPEC COLLECT METH UR: ABNORMAL
UROBILINOGEN, UA POC OHS: 0.2
WBC #/AREA URNS AUTO: >100 /HPF (ref 0–5)

## 2024-05-02 PROCEDURE — 81003 URINALYSIS AUTO W/O SCOPE: CPT | Mod: PBBFAC,59,PO | Performed by: NURSE PRACTITIONER

## 2024-05-02 PROCEDURE — 81001 URINALYSIS AUTO W/SCOPE: CPT | Performed by: NURSE PRACTITIONER

## 2024-05-02 PROCEDURE — 99999 PR PBB SHADOW E&M-EST. PATIENT-LVL IV: CPT | Mod: PBBFAC,,, | Performed by: NURSE PRACTITIONER

## 2024-05-02 PROCEDURE — 87077 CULTURE AEROBIC IDENTIFY: CPT | Performed by: NURSE PRACTITIONER

## 2024-05-02 PROCEDURE — 87088 URINE BACTERIA CULTURE: CPT | Performed by: NURSE PRACTITIONER

## 2024-05-02 PROCEDURE — 99214 OFFICE O/P EST MOD 30 MIN: CPT | Mod: PBBFAC,PO | Performed by: NURSE PRACTITIONER

## 2024-05-02 PROCEDURE — 99999PBSHW POCT URINALYSIS(INSTRUMENT): Mod: PBBFAC,,,

## 2024-05-02 PROCEDURE — 87086 URINE CULTURE/COLONY COUNT: CPT | Performed by: NURSE PRACTITIONER

## 2024-05-02 PROCEDURE — 87186 SC STD MICRODIL/AGAR DIL: CPT | Performed by: NURSE PRACTITIONER

## 2024-05-02 PROCEDURE — 99214 OFFICE O/P EST MOD 30 MIN: CPT | Mod: S$PBB,,, | Performed by: NURSE PRACTITIONER

## 2024-05-02 RX ORDER — AMOXICILLIN AND CLAVULANATE POTASSIUM 500; 125 MG/1; MG/1
1 TABLET, FILM COATED ORAL 2 TIMES DAILY
Qty: 14 TABLET | Refills: 0 | Status: SHIPPED | OUTPATIENT
Start: 2024-05-02 | End: 2024-05-09

## 2024-05-02 RX ORDER — FLUCONAZOLE 150 MG/1
150 TABLET ORAL DAILY
Qty: 1 TABLET | Refills: 0 | Status: SHIPPED | OUTPATIENT
Start: 2024-05-02 | End: 2024-05-03

## 2024-05-03 ENCOUNTER — HOSPITAL ENCOUNTER (OUTPATIENT)
Dept: RADIOLOGY | Facility: HOSPITAL | Age: 54
Discharge: HOME | End: 2024-05-03
Attending: NURSE PRACTITIONER
Payer: OTHER GOVERNMENT

## 2024-05-03 ENCOUNTER — LAB VISIT (OUTPATIENT)
Dept: LAB | Facility: HOSPITAL | Age: 54
End: 2024-05-03
Attending: NURSE PRACTITIONER
Payer: OTHER GOVERNMENT

## 2024-05-03 DIAGNOSIS — N20.0 KIDNEY STONES: ICD-10-CM

## 2024-05-03 DIAGNOSIS — N39.0 RECURRENT UTI: ICD-10-CM

## 2024-05-03 LAB
CREAT SERPL-MCNC: 0.9 MG/DL (ref 0.5–1.4)
EST. GFR  (NO RACE VARIABLE): >60 ML/MIN/1.73 M^2

## 2024-05-03 PROCEDURE — 74176 CT ABD & PELVIS W/O CONTRAST: CPT | Mod: 26,,, | Performed by: RADIOLOGY

## 2024-05-03 PROCEDURE — 82565 ASSAY OF CREATININE: CPT | Performed by: NURSE PRACTITIONER

## 2024-05-03 PROCEDURE — 74176 CT ABD & PELVIS W/O CONTRAST: CPT | Mod: TC

## 2024-05-03 PROCEDURE — 36415 COLL VENOUS BLD VENIPUNCTURE: CPT | Performed by: NURSE PRACTITIONER

## 2024-05-03 NOTE — TELEPHONE ENCOUNTER
Please see portal message.     Multiple requests.  Last labs- 06/10/23//No recent A1C or lipid panel     Synthroid D/C 09/27/23  Normal TSH 10/02/23

## 2024-05-05 LAB — BACTERIA UR CULT: ABNORMAL

## 2024-05-06 ENCOUNTER — TELEPHONE (OUTPATIENT)
Dept: UROLOGY | Facility: CLINIC | Age: 54
End: 2024-05-06
Payer: OTHER GOVERNMENT

## 2024-05-06 DIAGNOSIS — N13.5 URETEROPELVIC JUNCTION (UPJ) OBSTRUCTION, LEFT: Primary | ICD-10-CM

## 2024-05-06 DIAGNOSIS — N13.30 HYDRONEPHROSIS, UNSPECIFIED HYDRONEPHROSIS TYPE: ICD-10-CM

## 2024-05-06 NOTE — TELEPHONE ENCOUNTER
Spoke with pt regarding CT results  Dr Avery reviewed CT and would like pt to f/u with Dr Brown, appt scheduled  Will obtain mag 3 scan prior to appt with Dr Brown as requested by MD  Will continue to monitor non obstructing stones.    Instructed to continue UTI prevention and restart estrace cream  per Dr Avery    Pt verbalized understanding

## 2024-05-14 ENCOUNTER — HOSPITAL ENCOUNTER (OUTPATIENT)
Dept: RADIOLOGY | Facility: HOSPITAL | Age: 54
Discharge: HOME | End: 2024-05-14
Attending: NURSE PRACTITIONER
Payer: OTHER GOVERNMENT

## 2024-05-14 VITALS — BODY MASS INDEX: 27.47 KG/M2 | WEIGHT: 186 LBS

## 2024-05-14 DIAGNOSIS — N13.30 HYDRONEPHROSIS, UNSPECIFIED HYDRONEPHROSIS TYPE: ICD-10-CM

## 2024-05-14 DIAGNOSIS — N13.5 URETEROPELVIC JUNCTION (UPJ) OBSTRUCTION, LEFT: ICD-10-CM

## 2024-05-14 PROCEDURE — 63600175 PHARM REV CODE 636 W HCPCS: Performed by: NURSE PRACTITIONER

## 2024-05-14 PROCEDURE — A9562 TC99M MERTIATIDE: HCPCS | Performed by: NURSE PRACTITIONER

## 2024-05-14 PROCEDURE — 78708 K FLOW/FUNCT IMAGE W/DRUG: CPT | Mod: 26,,, | Performed by: RADIOLOGY

## 2024-05-14 PROCEDURE — 78708 K FLOW/FUNCT IMAGE W/DRUG: CPT | Mod: TC

## 2024-05-14 RX ORDER — BETIATIDE 1 MG/1
5.3 INJECTION, POWDER, LYOPHILIZED, FOR SOLUTION INTRAVENOUS
Status: COMPLETED | OUTPATIENT
Start: 2024-05-14 | End: 2024-05-14

## 2024-05-14 RX ORDER — FUROSEMIDE 10 MG/ML
20 INJECTION INTRAMUSCULAR; INTRAVENOUS ONCE
Status: COMPLETED | OUTPATIENT
Start: 2024-05-14 | End: 2024-05-14

## 2024-05-14 RX ADMIN — BETIATIDE 5.3 MILLICURIE: 1 INJECTION, POWDER, LYOPHILIZED, FOR SOLUTION INTRAVENOUS at 10:05

## 2024-05-14 RX ADMIN — FUROSEMIDE 20 MG: 10 INJECTION, SOLUTION INTRAMUSCULAR; INTRAVENOUS at 10:05

## 2024-05-28 ENCOUNTER — PATIENT MESSAGE (OUTPATIENT)
Dept: FAMILY MEDICINE | Facility: CLINIC | Age: 54
End: 2024-05-28
Payer: OTHER GOVERNMENT

## 2024-05-28 DIAGNOSIS — Z00.00 ROUTINE GENERAL MEDICAL EXAMINATION AT A HEALTH CARE FACILITY: Primary | ICD-10-CM

## 2024-05-28 DIAGNOSIS — E06.3 HASHIMOTO'S THYROIDITIS: ICD-10-CM

## 2024-05-31 ENCOUNTER — PATIENT MESSAGE (OUTPATIENT)
Dept: UROLOGY | Facility: CLINIC | Age: 54
End: 2024-05-31
Payer: OTHER GOVERNMENT

## 2024-06-04 ENCOUNTER — E-CONSULT (OUTPATIENT)
Dept: ENDOCRINOLOGY | Facility: CLINIC | Age: 54
End: 2024-06-04
Payer: OTHER GOVERNMENT

## 2024-06-04 ENCOUNTER — PATIENT MESSAGE (OUTPATIENT)
Dept: FAMILY MEDICINE | Facility: CLINIC | Age: 54
End: 2024-06-04
Payer: OTHER GOVERNMENT

## 2024-06-04 ENCOUNTER — OFFICE VISIT (OUTPATIENT)
Dept: UROLOGY | Facility: CLINIC | Age: 54
End: 2024-06-04
Payer: OTHER GOVERNMENT

## 2024-06-04 VITALS
HEIGHT: 69 IN | WEIGHT: 186.06 LBS | SYSTOLIC BLOOD PRESSURE: 128 MMHG | BODY MASS INDEX: 27.56 KG/M2 | DIASTOLIC BLOOD PRESSURE: 81 MMHG | HEART RATE: 80 BPM

## 2024-06-04 DIAGNOSIS — N13.5 URETEROPELVIC JUNCTION (UPJ) OBSTRUCTION, LEFT: Primary | ICD-10-CM

## 2024-06-04 DIAGNOSIS — E06.3 HASHIMOTO'S THYROIDITIS: Primary | ICD-10-CM

## 2024-06-04 DIAGNOSIS — N20.0 NEPHROLITHIASIS: ICD-10-CM

## 2024-06-04 DIAGNOSIS — N39.0 RECURRENT UTI: ICD-10-CM

## 2024-06-04 LAB
BACTERIA #/AREA URNS AUTO: NORMAL /HPF
MICROSCOPIC COMMENT: NORMAL
RBC #/AREA URNS AUTO: 1 /HPF (ref 0–4)
WBC #/AREA URNS AUTO: 1 /HPF (ref 0–5)

## 2024-06-04 PROCEDURE — 99417 PROLNG OP E/M EACH 15 MIN: CPT | Mod: S$PBB,,, | Performed by: UROLOGY

## 2024-06-04 PROCEDURE — 99214 OFFICE O/P EST MOD 30 MIN: CPT | Mod: PBBFAC,PO | Performed by: UROLOGY

## 2024-06-04 PROCEDURE — 99999 PR PBB SHADOW E&M-EST. PATIENT-LVL IV: CPT | Mod: PBBFAC,,, | Performed by: UROLOGY

## 2024-06-04 PROCEDURE — 81001 URINALYSIS AUTO W/SCOPE: CPT | Performed by: UROLOGY

## 2024-06-04 PROCEDURE — 87086 URINE CULTURE/COLONY COUNT: CPT | Performed by: UROLOGY

## 2024-06-04 PROCEDURE — 99215 OFFICE O/P EST HI 40 MIN: CPT | Mod: S$PBB,,, | Performed by: UROLOGY

## 2024-06-04 PROCEDURE — 99451 NTRPROF PH1/NTRNET/EHR 5/>: CPT | Mod: S$PBB,,, | Performed by: INTERNAL MEDICINE

## 2024-06-04 RX ORDER — DEXTROAMPHETAMINE SACCHARATE, AMPHETAMINE ASPARTATE, DEXTROAMPHETAMINE SULFATE AND AMPHETAMINE SULFATE 5; 5; 5; 5 MG/1; MG/1; MG/1; MG/1
TABLET ORAL
COMMUNITY
Start: 2024-04-30

## 2024-06-04 RX ORDER — PROPRANOLOL HYDROCHLORIDE 10 MG/1
TABLET ORAL
COMMUNITY
Start: 2023-12-12

## 2024-06-04 RX ORDER — CEFAZOLIN SODIUM 2 G/50ML
2 SOLUTION INTRAVENOUS
OUTPATIENT
Start: 2024-06-04

## 2024-06-04 RX ORDER — DEXTROAMPHETAMINE SACCHARATE, AMPHETAMINE ASPARTATE MONOHYDRATE, DEXTROAMPHETAMINE SULFATE AND AMPHETAMINE SULFATE 5; 5; 5; 5 MG/1; MG/1; MG/1; MG/1
CAPSULE, EXTENDED RELEASE ORAL
COMMUNITY

## 2024-06-04 NOTE — PROGRESS NOTES
Procedure Order to Urology [[id number]]    Electronically signed by: Kemar Sebastian MD on 06/04/24 0849 Status: Active   Ordering user: Kemar Sebastian MD 06/04/24 0849 Authorized by: Kemar Sebastian MD   Ordering mode: Standard   Frequency:  06/04/24 -     Diagnoses  Ureteropelvic junction (UPJ) obstruction, left [N13.5]   Questionnaire    Question Answer   Procedure Cystoscopy with Stent Removal Comment - 9/10   Facility Name: [facility], Please order Local sedation, order poct urine

## 2024-06-04 NOTE — PROGRESS NOTES
[facility] Urology New Patient/H&P:     Jessy Mayberry is a 53 y.o. female who presents for evaluation of left UPJ obstruction to discuss pyeloplasty    She establish care with Dr. Avery in June of 2023 noting that she started having UTI/pyelo in March.  Symptoms would be flank pain not isolated to the right or left side, foul-smelling urine, frequency and urgency with associated fever up to 101.  Since March she is had at least 3 episodes.  Two of them are culture proven, E coli.  She received antibiotics for 10-14 days with each episode. Prior to this she denies any recurrent UTIs.  She does however state that as a teen she had a cystoscopy but she is not sure why. She also states that she has had blood in the urine on occasion for years found on her gynecology appointments.  Review of her urines in our system show that she has blood in the urine sometimes associated with UTI and sometimes not dating back to November 2022.  - CT in March 2023 when she had a UTI/pyelo and it showed normal right kidney and left hydro to UPJ with four left renal stones.  She has no history of kidney stones.  She does not think the pain is more on her left versus her right whenever she does have these episodes of pyelo.  - CT in June 2023 when she had UTI  showed that her right pelvis was larger than it was in March 2023.  High density area in the right UPJ ?  No previous CT urogram.  Still no ureteral stones.  5 days ago.  UTI risk factors:  Denies any pad usage but does state that she has some overactive bladder with urgency occasionally present over the last 4-5 months.  She is postmenopausal but postmenopausal since age 44.  Uses Estring vaginal suppository but infrequently.  Otherwise no other prevention for UTI taken.  Tends to urinate every 1-2 hours.  No diabetes.  No constipation.  No diarrhea.  PVR: 0. No divetirculitis. No previous vaginal surgeries other than leep. No smoking history.      After appt noted: low back pain,  noted worse on left and much worse after caffeine intake and occasional alcohol (wine). Avoiding both- drinking plenty water. Taking ibuprofen regularly   6/29/23 NM renal scan  Findings characteristic of dilated nonobstructed left renal collecting system. This can be seen with UPJ obstruction. Normal right nephrogram. Function: 54% left, 46% right.   She does however still has L flank pain that's most noticeable in the morning. Also when she did the renal scan had pain.   Started her on uti prevention- however she hasn't been consistent about it. Using cranberry but not UTIVA. She hasn't had any uti's. Sent her urine for cytology and ua marina. It showed 5 rbc and negative cytology.   MH for years. 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 Small LUP stones. Cysto neg 8/21/23.   7/29/2 CTU: The kidneys enhance symmetrically. Bilateral extrarenal pelvises unchanged. 1 cm left renal cyst. No hydronephrosis. Contrast is seen throughout the right renal collecting system and right ureter without filling defect. Contrast is seen throughout the left renal collecting system without filling defect. Contrast reaches the proximal left ureter on 19 minute delayed images. There is abrupt narrowing at the left UPJ (series 606, image 71). There is additionally cutoff of ureteral contrast at the level of the left gonadal vein (series 6, image 87) -- Findings concerning for left UPJ obstruction with severe narrowing at the junction, although contrast is seen in the proximal ureter on 19 minute delayed images. Additionally there is cutoff of contrast in the mid ureter at the level of the left gonadal vein concerning for another site of obstruction. However, there is no hydroureteronephrosis.     5/2/24 NP Rayes: treated for UTI 4/22/24 by PCP with macrobid Pt c/o dysuria, bladder spasms, urinary frequency and urgency that has been ongoing since completing antibiotic for recent UTI  Denies gross hematuria, flank pain, fever,  chills, nausea or vomiting. UA lg blood, sm leuk. Taking Utiva , d mannose, and probiotic. Not using estrace cream. Not taking myrbetriq, no OAB symptoms as baseline  - Pt had pain when IO cath attempted. Will sent voided urine for micro UA and culture. Start augmentin based on previous culture results  - CT RSS to evaluate kidney stones. Last imaging 7/2023  -F/u with GYN for possible vaginitis. Pt request diflucan for yeast infection with antibiotic use  -Continue UTI prevention supplements    CT 5/3/24  There is dilatation of the left renal collecting system and left extrarenal pelvis to the level of the left UVJ, once again suggestive of high-grade left UPJ obstruction and more pronounced in extent than was noted at the time of the prior study. There is a 3 mm non-obstructing stone present anteriorly in the interpolar region of the left kidney on series 2, image 45 and there is a 3 mm non-obstructing stone present in the interpolar region of the left kidney on series 2, image 50. There is a 4 mm new non-obstructing stone present within the dependent left extrarenal pelvis on series 2, image 59. There is an 11 mm probable cyst present within the lateral cortex of the interpolar region of the left kidney on series 2, image 47 which measures 17 HU on series 2, image 47, most likely a cyst, unchanged. There are no right renal stones identified. There is a right extrarenal pelvis present. The right ureter is normal in caliber and course without evidence of stones. The left ureter is normal in caliber and course without definite stones identified. There is diffuse bladder wall thickening present and there is perivesical fat stranding present, likely reflecting underlying cystitis, similar in extent to the prior study.     NM3 renal scan w lasix updated 5/14/24: The renographic curves demonstrate delayed time to peak activity bilaterally.  Time to peak activity on the left equals 8 minutes on the right 16 minutes.   There is normal excretion on the right following Lasix administration with no abnormal retention of activity at 30 minute interval.  Prolonged expiratory phase is observed on the left with abnormal retention of activity equal in 54% of maximum at 30 minutes. The left kidney accounts for 49% in the right kidney 51% global uptake.  The estimated renal plasma flow is 490 mL/min. In comparison to the previous examination, degree of dilatation of the left sided collecting system appears slightly greater.  Retention of activity within the left collecting system at the 30 minute interval has progressed compared to the prior year's examination.  - Mild increase in degree of dilatation of the left-sided collecting system compared to previous examination. Prolonged excretory phase on the left with abnormal retention of activity at the 30 minute interval also representing a detrimental change compared to prior study.    5/31/24: I started frequency 2 days ago and the burning started back today. Can we check for UTI - bacterial infection & do a urine culture ? Id like to stay ahead of it before it gets too bad.   - advised to go to urgent care as provider out of office    She presents today noting  Unable to provide urine specimen on arrival.  Still has intermittent dysuria.  Intermittent flank pain as above.  She had an SVT ablation 15 years ago.  She had a complete cardiac workup about 6 months ago.  She is followed by Dr. Nowak  No hematuria.  Inguinal hernia repair hx    Past Medical History:   Diagnosis Date    Adrenal insufficiency     Hypothyroidism     Neuropathy     Pituitary mass        Past Surgical History:   Procedure Laterality Date    ANKLE SURGERY      CARDIAC ELECTROPHYSIOLOGY MAPPING AND ABLATION      CYSTOSCOPY N/A 8/21/2023    Procedure: CYSTOSCOPY;  Surgeon: Shyanne Avery MD;  Location: [facility] OR;  Service: Urology;  Laterality: N/A;    DILATION AND CURETTAGE OF UTERUS      3 times     INGUINAL HERNIA REPAIR      twice    TONSILLECTOMY         Family History   Problem Relation Name Age of Onset    Ovarian cancer Mother  28    Hypertension Mother      Coronary artery disease Mother      Hyperlipidemia Mother      Cancer Mother      Skin cancer Mother  75    Hypertension Father      Retinal detachment Father      Atrial fibrillation Sister      Rheum arthritis Daughter      Asthma Daughter      Breast cancer Maternal Aunt  42    Bone cancer Maternal Aunt      Cancer Maternal Aunt      Rheum arthritis Maternal Aunt      Hepatitis Maternal Aunt      Heart disease Maternal Grandmother      Hypertension Maternal Grandmother      Kidney failure Maternal Grandmother      Atrial fibrillation Maternal Grandmother      Hypertension Brother      Hypertension Son      Psoriasis Child      Melanoma Neg Hx      Lupus Neg Hx         Social History     Socioeconomic History    Marital status:    Tobacco Use    Smoking status: Never    Smokeless tobacco: Never   Substance and Sexual Activity    Alcohol use: Yes     Comment: occassionally    Drug use: Never    Sexual activity: Not Currently     Social Determinants of Health     Financial Resource Strain: Patient Declined (4/22/2024)    Overall Financial Resource Strain (CARDIA)     Difficulty of Paying Living Expenses: Patient declined   Food Insecurity: Patient Declined (4/22/2024)    Hunger Vital Sign     Worried About Running Out of Food in the Last Year: Patient declined     Ran Out of Food in the Last Year: Patient declined   Transportation Needs: Patient Declined (4/22/2024)    PRAPARE - Transportation     Lack of Transportation (Medical): Patient declined     Lack of Transportation (Non-Medical): Patient declined   Physical Activity: Unknown (4/22/2024)    Exercise Vital Sign     Days of Exercise per Week: Patient declined     Minutes of Exercise per Session: 0 min   Stress: Patient Declined (4/22/2024)    [facility] [employer] -  "Occupational Stress Questionnaire     Feeling of Stress : Patient declined   Housing Stability: Unknown (4/22/2024)    Housing Stability Vital Sign     Unable to Pay for Housing in the Last Year: Patient declined       Review of patient's allergies indicates:   Allergen Reactions    Oxycodone      Other reaction(s): Unknown    Promethazine Other (See Comments)       Medications Reviewed: see MAR    Focused Physical Exam    Vitals:    06/04/24 0808   BP: 128/81   Pulse: 80     Body mass index is 27.48 kg/m². Weight: 84.4 kg (186 lb 1.1 oz) Height: 5' 9" (175.3 cm)       Abdomen: Soft, non-tender, nondistended, no CVA tenderness        Assessment/Diagnosis:    1. Ureteropelvic junction (UPJ) obstruction, left  Procedure Order to Urology    Procedure Order to Urology    Urinalysis    Urinalysis Microscopic    CULTURE, URINE    Procedure Order to Urology      2. Recurrent UTI  Urinalysis    Urinalysis Microscopic    CULTURE, URINE      3. Nephrolithiasis  X-Ray Abdomen AP 1 View          Plans:  Extensive review of all prior urologic workup done by Dr. Avery and NP, noting previous concern for mild UPJ obstruction, lately worsened with progressive dilation on imaging and significant obstruction on nuclear medicine renal scan, all found on workup for recurrent UTI    Discussed/reviewed all management options for UPJO including endoscopic management such as chronic stenting with stent exchanges, nephrostomy tube drainage, all which bypass obstruction, as well as strategies to relieve obstruction such as an endopyelotomy and more definitive reconstructive management such as pyeloplasty. We did discuss that primary endopyelotomy has an approximate 40% chance of success whereas primary pyeloplasty has an approximate 85-90%+ chance of success, and endopyelotomy is usually reserved for recurrence after pyeloplasty.    After reviewing all images with patient and discussion of treatment options, she elected to proceed with " robotic pyeloplasty for her L UPJ obstruction.  Imaging reviewed does have concern more for intrinsic narrowing than crossing vessel     We discussed the procedure in detail as well as lawrence/drain/stent management, overnight stay in the hospital, and postoperative recovery and restrictions. We discussed the risks and benefits of the operation including pain, infection, bleeding, scarring, anastamotic leak/urine leak, recurrence of stricture and need for future procedures, damage to surrounding intraabdominal structures, hernia, postoperative ileus. If crossing vessel, risk of bleeding, vascular complications, and in rare cases potential need for nephrectomy if any injury to hilar vessels that cannot be repaired.    We did also discuss that at the beginning of the procedure a cystoscopy with retrograde pyelogram will be performed to place a ureteral catheter within the ureter before transferring to the robotic operative room, as this would be used intraoperatively to facilitate stent placement.  As well, discuss possible concurrent pyelolithotomy.  The stones collecting system are small, and certainly the 1 in the dependent renal pelvis may drain out upon opening pyelotomy.  As the others are quite small, risk of further pyelolithotomy manipulation in the collecting system may outweigh benefits.  Will get KUB with preop to be able to assess if stone burden is visible on x-ray and able to be followed in the future if any are to remain.     All questions she had were answered in detail and appropriate informed consent obtained. Handout/patient literature provided  Will start with cysto/rpg/ureteral catheter placement for intraoperative stent placement and explained this portion of the procedure in detail as well.     L robotic pyeloplasty scheduled on 7/31/24 per pt request.  Given her recent infections, and her interim intermittent dysuria, as well, she will need ago today to lab to provide urine specimen.  Will chart  check results of UA micro and urine culture and if positive, treat and then start low-dose daily suppression pending the procedure based on cultures, and if the culture is negative today, will start low-dose prophylactic antibiotics to minimize risk of interim infection leading up to relief of obstruction. In interim if any left flank pain, uti, fever, chills, should notify us immediately  As well, with history of SVT and ablation, followed by Cardiology, will cc for preoperative clearance.  Up to provider discretion if safe to clear for intra-abdominal laparoscopic procedure under general anesthesia based on recent workup, or if needs interim visit for clearance.  Patient notes full workup 6 months ago    As well, discussed all stent symptoms and postoperative stent management leaving it indwelling for about 6 weeks status post repair.  Cyst[facility] with stent [facility] was booked on 9/10/24 as well    Total time spent in/on encounter today, including face to face time with patient, counseling, medical record review, interpretation of tests/results, , and treatment plan coordination: 100 minutes

## 2024-06-04 NOTE — CONSULTS
[facility]  Response for E-Consult     Patient Name: Jessy Mayberry  MRN: [id number]  Primary Care Provider: Roger Nation MD   Requesting Provider: Roger Nation MD  E-Consult to Endocrinology  Consult performed by: Eloy Morales MD  Consult ordered by: Roger Nation MD  Reason for consult: Hashimotos  Assessment/Recommendations: Patient found to have elevated TPO antibody with normal TSH on several occasions. Has complaints of fatigue, brain fog and weight gain. There is no evidence to support treating patients with thyroid hormone in the context of normal TSH and Hashimotos. The lone exception would be pregnancy where there is some evidence of keeping TSH below 2.5 in the setting of Hashimoto's. I would only start thyroid hormone if her TSH were elevated in this case. And if thyroid hormone is started in the future I would target a TSH in the normal range.            Recommendation:   Patient found to have elevated TPO antibody with normal TSH on several occasions. Has complaints of fatigue, brain fog and weight gain. There is no evidence to support treating patients with thyroid hormone in the context of normal TSH and Hashimotos. The lone exception would be pregnancy where there is some evidence of keeping TSH below 2.5 in the setting of Hashimoto's. I would only start thyroid hormone if her TSH were elevated in this case. And if thyroid hormone is started in the future I would target a TSH in the normal range.      Total time of Consultation: 5 minute    I did not speak to the requesting provider verbally about this.     *This eConsult is based on the clinical data available to me and is furnished without benefit of a physical examination. The eConsult will need to be interpreted in light of any clinical issues or changes in patient status not available to me at the time of filing this eConsults. Significant changes in patient condition or level of acuity should result in  immediate formal consultation and reevaluation. Please alert me if you have further questions.    Thank you for this eConsult referral.     Eloy Morales MD  [facility] 6th Fl

## 2024-06-04 NOTE — PROGRESS NOTES
Procedure Order to Urology [[id number]]    Electronically signed by: Kemar Sebastian MD on 06/04/24 1227 Status: Active   Ordering user: Kemar Sebastian MD 06/04/24 1227 Authorized by: Kemar Sebastian MD   Ordering mode: Standard   Frequency:  06/04/24 -     Diagnoses  Ureteropelvic junction (UPJ) obstruction, left [N13.5]   Questionnaire    Question Answer   Procedure Robotic Pyeloplasty Comment - left 7/31   Facility Name: [facility]   OP surgery admit , please order POLLY and SCD, CBC, BMP, PT/INR,Type and screen,U/A and culture  EKG, chest X ray, Start IV, NPO,General anesthesia,Ancef 2 grams ( alternative for PCN allergy is Cipro 400mg IV ) . (Will stay overnight in comments) High risk VTE with POLLY and SCD and reason for no pharmacologic VTE as risk of bleeding cpt code 50945

## 2024-06-05 LAB — BACTERIA UR CULT: NO GROWTH

## 2024-06-05 RX ORDER — CEFUROXIME AXETIL 250 MG/1
250 TABLET ORAL DAILY
Qty: 90 TABLET | Refills: 0 | Status: SHIPPED | OUTPATIENT
Start: 2024-06-05

## 2024-06-06 NOTE — PROGRESS NOTES
No current UTI  UA micro negative  Urine culture negative/no growth  No treatment dose antibiotics needed  Start low dose daily ceftin prophylaxis pending pyeloplasty - Rxed  As well continue all prior noted uti prevention measures supplements etc (cranberry probiotic etc) and stay well hydrated

## 2024-06-14 ENCOUNTER — TELEPHONE (OUTPATIENT)
Dept: UROLOGY | Facility: CLINIC | Age: 54
End: 2024-06-14
Payer: OTHER GOVERNMENT

## 2024-06-14 NOTE — TELEPHONE ENCOUNTER
Dr Julien    Patient is booked for robotic assisted laparoscopic pyeloplasty to resolve UPJ obstruction in the operating room under general anesthesia on 7/31/24.    Please review for cardiac clearance    with history of SVT and ablation, followed by Cardiology, will cc for preoperative clearance. Up to provider discretion if safe to clear for intra-abdominal laparoscopic procedure under general anesthesia based on recent workup, or if needs interim visit for clearance. Patient notes full workup 6 months ago

## 2024-07-02 ENCOUNTER — PATIENT MESSAGE (OUTPATIENT)
Dept: CARDIOLOGY | Facility: CLINIC | Age: 54
End: 2024-07-02
Payer: OTHER GOVERNMENT

## 2024-07-16 ENCOUNTER — PATIENT MESSAGE (OUTPATIENT)
Dept: UROLOGY | Facility: CLINIC | Age: 54
End: 2024-07-16
Payer: OTHER GOVERNMENT

## 2024-07-23 ENCOUNTER — HOSPITAL ENCOUNTER (OUTPATIENT)
Dept: PREADMISSION TESTING | Facility: HOSPITAL | Age: 54
Discharge: HOME | End: 2024-07-23
Attending: UROLOGY
Payer: OTHER GOVERNMENT

## 2024-07-23 ENCOUNTER — HOSPITAL ENCOUNTER (OUTPATIENT)
Dept: RADIOLOGY | Facility: HOSPITAL | Age: 54
Discharge: HOME | End: 2024-07-23
Attending: UROLOGY
Payer: OTHER GOVERNMENT

## 2024-07-23 ENCOUNTER — CLINICAL SUPPORT (OUTPATIENT)
Dept: UROLOGY | Facility: CLINIC | Age: 54
End: 2024-07-23
Payer: OTHER GOVERNMENT

## 2024-07-23 DIAGNOSIS — N20.0 NEPHROLITHIASIS: ICD-10-CM

## 2024-07-23 DIAGNOSIS — R82.998 CELLS AND CASTS IN URINE: Primary | ICD-10-CM

## 2024-07-23 DIAGNOSIS — Z01.810 PREOP CARDIOVASCULAR EXAM: ICD-10-CM

## 2024-07-23 DIAGNOSIS — N13.5 URETEROPELVIC JUNCTION (UPJ) OBSTRUCTION, LEFT: ICD-10-CM

## 2024-07-23 LAB
BILIRUB UR QL STRIP: NEGATIVE
CLARITY UR REFRACT.AUTO: CLEAR
COLOR UR AUTO: COLORLESS
GLUCOSE UR QL STRIP: NEGATIVE
HGB UR QL STRIP: ABNORMAL
KETONES UR QL STRIP: NEGATIVE
LEUKOCYTE ESTERASE UR QL STRIP: NEGATIVE
MICROSCOPIC COMMENT: NORMAL
NITRITE UR QL STRIP: NEGATIVE
PH UR STRIP: 7 [PH] (ref 5–8)
PROT UR QL STRIP: NEGATIVE
RBC #/AREA URNS AUTO: 0 /HPF (ref 0–4)
SP GR UR STRIP: 1.01 (ref 1–1.03)
URN SPEC COLLECT METH UR: ABNORMAL

## 2024-07-23 PROCEDURE — 93005 ELECTROCARDIOGRAM TRACING: CPT

## 2024-07-23 PROCEDURE — 93010 ELECTROCARDIOGRAM REPORT: CPT | Mod: ,,, | Performed by: INTERNAL MEDICINE

## 2024-07-23 PROCEDURE — 51701 INSERT BLADDER CATHETER: CPT | Mod: S$PBB,,, | Performed by: UROLOGY

## 2024-07-23 PROCEDURE — 81001 URINALYSIS AUTO W/SCOPE: CPT | Performed by: UROLOGY

## 2024-07-23 PROCEDURE — 87086 URINE CULTURE/COLONY COUNT: CPT | Performed by: UROLOGY

## 2024-07-23 PROCEDURE — 51701 INSERT BLADDER CATHETER: CPT | Mod: PBBFAC,PO

## 2024-07-23 PROCEDURE — 71046 X-RAY EXAM CHEST 2 VIEWS: CPT | Mod: TC

## 2024-07-23 PROCEDURE — 74018 RADEX ABDOMEN 1 VIEW: CPT | Mod: 26,,, | Performed by: RADIOLOGY

## 2024-07-23 PROCEDURE — 99499 UNLISTED E&M SERVICE: CPT | Mod: S$PBB,,, | Performed by: UROLOGY

## 2024-07-23 PROCEDURE — 74018 RADEX ABDOMEN 1 VIEW: CPT | Mod: TC

## 2024-07-23 PROCEDURE — 71046 X-RAY EXAM CHEST 2 VIEWS: CPT | Mod: 26,,, | Performed by: RADIOLOGY

## 2024-07-23 NOTE — PROGRESS NOTES
PT drape and prepped in sterile fashion   10 [place] Cath placed into bladder  50cc Urine obtained   Cath removed   Specimen prepared for lab

## 2024-07-23 NOTE — PLAN OF CARE
Pre-admit visit complete. Patient verbalized understanding of procedure instructions and education. Pt directed to imaging and lab. Pt denied any questions or concerns.

## 2024-07-23 NOTE — DISCHARGE INSTRUCTIONS
To confirm, Your doctor has instructed you that surgery is scheduled for:     Please report to [facility], Registration the morning of surgery. You must check-in and receive a wristband before going to your procedure.  [address] [place] [address] [place] [address]    Pre-Op will call the afternoon prior to surgery between 1:00 and 6:00 PM with the final arrival time.  Phone number: [phone]    PLEASE NOTE:  The surgery schedule has many variables which may affect the time of your surgery case.  Family members should be available if your surgery time changes.  Plan to be here the day of your procedure between 4-6 hours.    MEDICATIONS:  TAKE ONLY THESE MEDICATIONS WITH A SMALL SIP OF WATER THE MORNING OF YOUR PROCEDURE:          DO NOT TAKE THESE MEDICATIONS 5-7 DAYS PRIOR to your procedure or per your surgeon's request:   ASPIRIN, ALEVE, ADVIL, IBUPROFEN, FISH OIL VITAMIN E, HERBALS  (May take Tylenol)    ONLY if you are prescribed any types of blood thinners such as:  Aspirin, Coumadin, Plavix, Pradaxa, Xarelto, Aggrenox, Effient, Eliquis, Savasya, Brilinta, or any other, ask your surgeon whether you should stop taking them and how long before surgery you should stop.  You may also need to verify with the prescribing physician if it is ok to stop your medication.      INSTRUCTIONS IMPORTANT!!  Do not eat or drink anything between midnight and the time of your procedure- this includes gum, mints, and candy.  Do not smoke or drink alcoholic beverages 24 hours prior to your procedure.  Shower the night before AND the morning of your procedure with a Chlorhexidine wash such as Hibiclens or Dial antibacterial soap from the neck down.  Do not get it on your face or in your eyes.  You may use your own shampoo and face wash. This helps your skin to be as bacteria free as possible.    If you wear contact lenses, dentures, hearing aids or glasses, bring a container to put them in during surgery and give to  a family member for safe keeping.  Please leave all jewelry, piercing's and valuables at home. You must remove your false eyelashes prior to surgery.    DO NOT remove hair from the surgery site.  Do not shave the incision site unless you are given specific instructions to do so.    ONLY if you have been diagnosed with sleep apnea please bring your C-PAP machine.  ONLY if you wear home oxygen please bring your portable oxygen tank the day of your procedure.  ONLY if you have a history of OPEN HEART SURGERY you will need a clearance from your Cardiologist per Anesthesia.      ONLY for patients requiring bowel prep, written instructions will be given by your doctor's office.  ONLY if you have a neuro stimulator, please bring the controller with you the morning of surgery  ONLY if a type and screen test is needed before surgery, please return:  If your doctor has scheduled you for an overnight stay, bring a small overnight bag with any personal items you need.  Make arrangements in advance for transportation home by a responsible adult. You can not go home in an uber or a cab per hospital policy.  It is not safe to drive a vehicle during the 24 hours after anesthesia.          All  facilities and properties are tobacco free.  Smoking is NOT allowed.   If you have any questions about these instructions, call Pre-Op Admit  Nursing at [phone] or the Pre-Op Day Surgery Unit at [phone].

## 2024-07-24 LAB
BACTERIA UR CULT: NO GROWTH
OHS QRS DURATION: 86 MS
OHS QTC CALCULATION: 430 MS

## 2024-07-25 ENCOUNTER — OFFICE VISIT (OUTPATIENT)
Dept: CARDIOLOGY | Facility: CLINIC | Age: 54
End: 2024-07-25
Payer: OTHER GOVERNMENT

## 2024-07-25 VITALS
BODY MASS INDEX: 28.26 KG/M2 | DIASTOLIC BLOOD PRESSURE: 76 MMHG | OXYGEN SATURATION: 98 % | HEIGHT: 69 IN | WEIGHT: 190.81 LBS | SYSTOLIC BLOOD PRESSURE: 128 MMHG | HEART RATE: 81 BPM

## 2024-07-25 DIAGNOSIS — R06.09 DOE (DYSPNEA ON EXERTION): ICD-10-CM

## 2024-07-25 DIAGNOSIS — I47.10 SVT (SUPRAVENTRICULAR TACHYCARDIA): Primary | ICD-10-CM

## 2024-07-25 DIAGNOSIS — Z01.810 PREOP CARDIOVASCULAR EXAM: ICD-10-CM

## 2024-07-25 PROCEDURE — 99213 OFFICE O/P EST LOW 20 MIN: CPT | Mod: PBBFAC,PN | Performed by: NURSE PRACTITIONER

## 2024-07-25 PROCEDURE — 99999 PR PBB SHADOW E&M-EST. PATIENT-LVL III: CPT | Mod: PBBFAC,,, | Performed by: NURSE PRACTITIONER

## 2024-07-25 NOTE — LETTER
..  [facility]  [address]  [address]  [facility] [address]  Phone: [phone]  Fax: [phone] Date: 2024    Patient: Jessy Mayberry                      MRN#:[id number]  : 1970  Referring Physician:              Procedure:pyeloplasty     Current Outpatient Medications   Medication Sig Dispense Refill    calcium carbonate (TUMS ORAL) Take by mouth.      cefUROXime (CEFTIN) 250 MG tablet Take 1 tablet (250 mg total) by mouth once daily. 90 tablet 0    cyanocobalamin 1,000 mcg/mL injection INJECT 1 ML ( 1,000 MCG TOTAL ) INTO THE MUSCLE EVERY 28 DAYS. 10 mL 3    dextroamphetamine-amphetamine (ADDERALL XR) 20 MG 24 hr capsule       dextroamphetamine-amphetamine (ADDERALL) 20 mg tablet       estradioL (ESTRACE) 0.01 % (0.1 mg/gram) vaginal cream Place 1 g vaginally once daily. Apply pea sized amount up to second knuckle. Apply nightly for 2 weeks then every other night. 42.5 g 3    famotidine (PEPCID) 20 MG tablet Take 20 mg by mouth 2 (two) times daily.      FLUoxetine 20 MG capsule Take 1 capsule (20 mg total) by mouth once daily. 30 capsule 1    gabapentin (NEURONTIN) 300 MG capsule       loratadine (CLARITIN) 10 mg tablet       propranoloL (INDERAL) 10 MG tablet       sumatriptan (IMITREX) 25 MG Tab Take 1 tablet (25 mg total) by mouth every 2 (two) hours as needed. 10 tablet 5    triamcinolone acetonide 0.025% (KENALOG) 0.025 % cream Apply topically 2 (two) times daily. 80 g 0     No current facility-administered medications for this visit.       This patient has been assessed for risk factors for clearance of surgery with the following stipulations:    [] No Contraindications.      [x] Recommendations for NONE.    [x] Patient is LOW RISK    [x] Cleared for surgery with the following restrictions: none    [] Not Cleared for surgery due to the following reasons:    If you have any questions regarding the above, please  contact my office at [phone]    Clearing Clinician:           MAXX CorneliusC  Department of Cardiology   [facility] [place] [place]

## 2024-07-25 NOTE — PROGRESS NOTES
Subjective:    Patient ID:  Jessy Mayberry is a 54 y.o. female.  Chief Complaint   Patient presents with    sx cl     Fatigue     Ongoing stated by pt     Shortness of Breath     Ongoing stated by pt        HPI:  Patient presents today for follow-up appointment.  Patient has no complaints of chest pain, dizziness, palpitations, or syncope. She does have some mild SOB with exertion when she is fatigued. She suffers from chronic fatigue. Patient has been doing well and taking medications as ordered.  Denies any falls or head injuries.  Denies any blood in the stool or in the urine.      Review of patient's allergies indicates:   Allergen Reactions    Promethazine Other (See Comments) and Hallucinations     IV only.     Oxycodone Itching and Rash     Other reaction(s): Unknown       Past Medical History:   Diagnosis Date    Adrenal insufficiency     Hypothyroidism     Neuropathy     Pituitary mass     PONV (postoperative nausea and vomiting)      Past Surgical History:   Procedure Laterality Date    ANKLE SURGERY      CARDIAC ELECTROPHYSIOLOGY MAPPING AND ABLATION      CYSTOSCOPY N/A 8/21/2023    Procedure: CYSTOSCOPY;  Surgeon: Shyanne Avery MD;  Location: [facility];  Service: Urology;  Laterality: N/A;    DILATION AND CURETTAGE OF UTERUS      3 times    INGUINAL HERNIA REPAIR      twice    TONSILLECTOMY       Social History     Tobacco Use    Smoking status: Never    Smokeless tobacco: Never   Substance Use Topics    Alcohol use: Yes     Comment: occassionally    Drug use: Never     Family History   Problem Relation Name Age of Onset    Ovarian cancer Mother  28    Hypertension Mother      Coronary artery disease Mother      Hyperlipidemia Mother      Cancer Mother      Skin cancer Mother  75    Hypertension Father      Retinal detachment Father      Atrial fibrillation Sister      Rheum arthritis Daughter      Asthma Daughter      Breast cancer Maternal Aunt  42    Bone cancer Maternal Aunt      Cancer  Maternal Aunt      Rheum arthritis Maternal Aunt      Hepatitis Maternal Aunt      Heart disease Maternal Grandmother      Hypertension Maternal Grandmother      Kidney failure Maternal Grandmother      Atrial fibrillation Maternal Grandmother      Hypertension Brother      Hypertension Son      Psoriasis Child      Melanoma Neg Hx      Lupus Neg Hx          Review of Systems:   Per HPI         Objective:        Vitals:    07/25/24 1602   BP: 128/76   Pulse: 81       Lab Results   Component Value Date    WBC 4.27 07/23/2024    HGB 13.3 07/23/2024    HCT 39.9 07/23/2024     07/23/2024    CHOL 211 (H) 05/14/2024    TRIG 74 05/14/2024    HDL 69 05/14/2024    ALT 15 05/14/2024    AST 19 05/14/2024     07/23/2024    K 4.1 07/23/2024     07/23/2024    CREATININE 0.8 07/23/2024    BUN 10 07/23/2024    CO2 24 07/23/2024    TSH 4.358 05/14/2024    INR 1.0 07/23/2024    HGBA1C 4.6 05/14/2024        ECHOCARDIOGRAM RESULTS  Results for orders placed during the hospital encounter of 06/14/23    Echo    Interpretation Summary  · The left ventricle is normal in size with normal systolic function.  · Normal left ventricular diastolic function.  · The estimated ejection fraction is 61%.  · Atrial fibrillation not observed.  · Normal right ventricular size with normal right ventricular systolic function.  · Normal central venous pressure (3 mmHg).  · The estimated PA systolic pressure is 24 mmHg.        CURRENT/PREVIOUS VISIT EKG  Results for orders placed or performed during the hospital encounter of 07/23/24   EKG 12-lead    Collection Time: 07/23/24  9:00 AM   Result Value Ref Range    QRS Duration 86 ms    [facility] QTC Calculation 430 ms    Narrative    Test Reason : PREOP    Vent. Rate : 069 BPM     Atrial Rate : 069 BPM     P-R Int : 138 ms          QRS Dur : 086 ms      QT Int : 402 ms       P-R-T Axes : 071 084 080 degrees     QTc Int : 430 ms    Normal sinus rhythm  Normal ECG  Confirmed by Miky Nowak MD  Mp ([id number]) on 7/24/2024 10:22:23 AM    Referred By: FLORINA CHAIDEZ           Confirmed By:Miky Nowak MD     No valid procedures specified.   Results for orders placed during the hospital encounter of 08/16/23    Nuclear Stress - Cardiology Interpreted    Interpretation Summary    Normal myocardial perfusion scan. There is no evidence of myocardial ischemia or infarction.    There is a trivial to mild intensity perfusion abnormality in the  wall of the left ventricle, secondary to breast attenuation.    The gated perfusion images showed an ejection fraction of 65% at rest. The gated perfusion images showed an ejection fraction of 66% post stress. Normal ejection fraction is greater than 53%.    There is normal wall motion at rest and post stress.    LV cavity size is normal at rest and normal at stress.    The ECG portion of the study is negative for ischemia.    The patient reported no chest pain during the stress test.    There were no arrhythmias during stress.    The exercise capacity was average.    The patient exercised for 4 minutes 48 seconds on a Pranav protocol, corresponding to a functional capacity of 7 METS, achieving a peak heart rate of 144 bpm, which is 89 % of the age predicted maximum heart rate. Their exercise capacity was average.      Physical Exam:  CONSTITUTIONAL: No fever, no chills  HEENT: Normocephalic, atraumatic,pupils reactive to light                 NECK:  No JVD no carotid bruit  CVS: S1S2+, RRR  LUNGS: Clear  ABDOMEN: Soft, NT, BS+  EXTREMITIES: No cyanosis, edema  : No lawrence catheter  NEURO: AAO X 3  PSY: Normal affect      Medication List with Changes/Refills   Current Medications    CALCIUM CARBONATE (TUMS ORAL)    Take by mouth.    CEFUROXIME (CEFTIN) 250 MG TABLET    Take 1 tablet (250 mg total) by mouth once daily.    CYANOCOBALAMIN 1,000 MCG/ML INJECTION    INJECT 1 ML ( 1,000 MCG TOTAL ) INTO THE MUSCLE EVERY 28 DAYS.    DEXTROAMPHETAMINE-AMPHETAMINE (ADDERALL XR) 20  MG 24 HR CAPSULE        DEXTROAMPHETAMINE-AMPHETAMINE (ADDERALL) 20 MG TABLET        ESTRADIOL (ESTRACE) 0.01 % (0.1 MG/GRAM) VAGINAL CREAM    Place 1 g vaginally once daily. Apply pea sized amount up to second knuckle. Apply nightly for 2 weeks then every other night.    FAMOTIDINE (PEPCID) 20 MG TABLET    Take 20 mg by mouth 2 (two) times daily.    FLUOXETINE 20 MG CAPSULE    Take 1 capsule (20 mg total) by mouth once daily.    LORATADINE (CLARITIN) 10 MG TABLET        PROPRANOLOL (INDERAL) 10 MG TABLET        SUMATRIPTAN (IMITREX) 25 MG TAB    Take 1 tablet (25 mg total) by mouth every 2 (two) hours as needed.    TRIAMCINOLONE ACETONIDE 0.025% (KENALOG) 0.025 % CREAM    Apply topically 2 (two) times daily.   Discontinued Medications    ALPRAZOLAM (XANAX) 0.5 MG TABLET    Take 0.5 mg by mouth daily as needed.    BUPROPION (WELLBUTRIN XL) 300 MG 24 HR TABLET    Take 1 tablet (300 mg total) by mouth once daily.    GABAPENTIN (NEURONTIN) 300 MG CAPSULE        MELOXICAM (MOBIC) 15 MG TABLET    Take 1 tablet (15 mg total) by mouth daily as needed for Pain.    TRAMADOL (ULTRAM) 50 MG TABLET    Take 1 tablet (50 mg total) by mouth every 6 (six) hours as needed for Pain.             Assessment:       1. SVT (supraventricular tachycardia)    2. HAMMOND (dyspnea on exertion)    3. Preop cardiovascular exam         Plan:     Problem List Items Addressed This Visit          Unprioritized    SVT (supraventricular tachycardia) - Primary    Current Assessment & Plan     Denies any palpitations. Continue propranolol PRN. Recommend mag ox 400 mg po daily.          HAMMOND (dyspnea on exertion)    Current Assessment & Plan     Chronic and related to chronic fatigue. No change.            Preop cardiovascular exam    Current Assessment & Plan     EKG recently shows sinus rhythm with no ST-T wave changes noted. She had a negative stress test last year and does not have concerning symptoms. Advised that anesthesia comes with a certain risk of  cardiac events but based on the information we have, she is low to moderate risk and is cleared for surgery.             Follow up in about 6 months (around 1/25/2025).

## 2024-07-25 NOTE — ASSESSMENT & PLAN NOTE
EKG recently shows sinus rhythm with no ST-T wave changes noted. She had a negative stress test last year and does not have concerning symptoms. Advised that anesthesia comes with a certain risk of cardiac events but based on the information we have, she is low to moderate risk and is cleared for surgery.

## 2024-07-30 ENCOUNTER — ANESTHESIA EVENT (OUTPATIENT)
Dept: SURGERY | Facility: HOSPITAL | Age: 54
End: 2024-07-30
Payer: OTHER GOVERNMENT

## 2024-07-31 ENCOUNTER — ANESTHESIA (OUTPATIENT)
Dept: SURGERY | Facility: HOSPITAL | Age: 54
End: 2024-07-31
Payer: OTHER GOVERNMENT

## 2024-07-31 ENCOUNTER — HOSPITAL ENCOUNTER (OUTPATIENT)
Facility: HOSPITAL | Age: 54
Discharge: HOME | End: 2024-08-01
Attending: UROLOGY | Admitting: UROLOGY
Payer: OTHER GOVERNMENT

## 2024-07-31 DIAGNOSIS — N13.5 URETEROPELVIC JUNCTION (UPJ) OBSTRUCTION, LEFT: ICD-10-CM

## 2024-07-31 LAB
ANION GAP SERPL CALC-SCNC: 10 MMOL/L (ref 8–16)
BASOPHILS # BLD AUTO: 0.02 K/UL (ref 0–0.2)
BASOPHILS NFR BLD: 0.3 % (ref 0–1.9)
BUN SERPL-MCNC: 10 MG/DL (ref 6–20)
CALCIUM SERPL-MCNC: 8.1 MG/DL (ref 8.7–10.5)
CHLORIDE SERPL-SCNC: 106 MMOL/L (ref 95–110)
CO2 SERPL-SCNC: 22 MMOL/L (ref 23–29)
CREAT SERPL-MCNC: 0.9 MG/DL (ref 0.5–1.4)
DIFFERENTIAL METHOD BLD: ABNORMAL
EOSINOPHIL # BLD AUTO: 0 K/UL (ref 0–0.5)
EOSINOPHIL NFR BLD: 0.1 % (ref 0–8)
ERYTHROCYTE [DISTWIDTH] IN BLOOD BY AUTOMATED COUNT: 12.2 % (ref 11.5–14.5)
EST. GFR  (NO RACE VARIABLE): >60 ML/MIN/1.73 M^2
GLUCOSE SERPL-MCNC: 164 MG/DL (ref 70–110)
HCT VFR BLD AUTO: 36.5 % (ref 37–48.5)
HGB BLD-MCNC: 12.1 G/DL (ref 12–16)
IMM GRANULOCYTES # BLD AUTO: 0.04 K/UL (ref 0–0.04)
IMM GRANULOCYTES NFR BLD AUTO: 0.5 % (ref 0–0.5)
LYMPHOCYTES # BLD AUTO: 0.6 K/UL (ref 1–4.8)
LYMPHOCYTES NFR BLD: 8.8 % (ref 18–48)
MCH RBC QN AUTO: 30.4 PG (ref 27–31)
MCHC RBC AUTO-ENTMCNC: 33.2 G/DL (ref 32–36)
MCV RBC AUTO: 92 FL (ref 82–98)
MONOCYTES # BLD AUTO: 0.1 K/UL (ref 0.3–1)
MONOCYTES NFR BLD: 1.6 % (ref 4–15)
NEUTROPHILS # BLD AUTO: 6.5 K/UL (ref 1.8–7.7)
NEUTROPHILS NFR BLD: 88.7 % (ref 38–73)
NRBC BLD-RTO: 0 /100 WBC
PLATELET # BLD AUTO: 230 K/UL (ref 150–450)
PMV BLD AUTO: 9.6 FL (ref 9.2–12.9)
POTASSIUM SERPL-SCNC: 3.4 MMOL/L (ref 3.5–5.1)
RBC # BLD AUTO: 3.98 M/UL (ref 4–5.4)
SODIUM SERPL-SCNC: 138 MMOL/L (ref 136–145)
SPECIMEN SOURCE: NORMAL
WBC # BLD AUTO: 7.3 K/UL (ref 3.9–12.7)

## 2024-07-31 PROCEDURE — 71000033 HC RECOVERY, INTIAL HOUR: Performed by: UROLOGY

## 2024-07-31 PROCEDURE — 37000008 HC ANESTHESIA 1ST 15 MINUTES: Performed by: UROLOGY

## 2024-07-31 PROCEDURE — 63600175 PHARM REV CODE 636 W HCPCS: Performed by: UROLOGY

## 2024-07-31 PROCEDURE — 94799 UNLISTED PULMONARY SVC/PX: CPT

## 2024-07-31 PROCEDURE — 25000003 PHARM REV CODE 250: Performed by: NURSE ANESTHETIST, CERTIFIED REGISTERED

## 2024-07-31 PROCEDURE — 80048 BASIC METABOLIC PNL TOTAL CA: CPT | Performed by: UROLOGY

## 2024-07-31 PROCEDURE — C1729 CATH, DRAINAGE: HCPCS | Performed by: UROLOGY

## 2024-07-31 PROCEDURE — 27201423 OPTIME MED/SURG SUP & DEVICES STERILE SUPPLY: Performed by: UROLOGY

## 2024-07-31 PROCEDURE — 63600175 PHARM REV CODE 636 W HCPCS: Performed by: ANESTHESIOLOGY

## 2024-07-31 PROCEDURE — S5010 5% DEXTROSE AND 0.45% SALINE: HCPCS | Performed by: UROLOGY

## 2024-07-31 PROCEDURE — 63600175 PHARM REV CODE 636 W HCPCS: Performed by: NURSE ANESTHETIST, CERTIFIED REGISTERED

## 2024-07-31 PROCEDURE — 25000003 PHARM REV CODE 250: Performed by: UROLOGY

## 2024-07-31 PROCEDURE — 36415 COLL VENOUS BLD VENIPUNCTURE: CPT | Performed by: UROLOGY

## 2024-07-31 PROCEDURE — C1758 CATHETER, URETERAL: HCPCS | Performed by: UROLOGY

## 2024-07-31 PROCEDURE — 27000221 HC OXYGEN, UP TO 24 HOURS

## 2024-07-31 PROCEDURE — 82365 CALCULUS SPECTROSCOPY: CPT | Performed by: UROLOGY

## 2024-07-31 PROCEDURE — 25500020 PHARM REV CODE 255: Performed by: UROLOGY

## 2024-07-31 PROCEDURE — C1769 GUIDE WIRE: HCPCS | Performed by: UROLOGY

## 2024-07-31 PROCEDURE — 36000713 HC OR TIME LEV V EA ADD 15 MIN: Performed by: UROLOGY

## 2024-07-31 PROCEDURE — 63600175 PHARM REV CODE 636 W HCPCS: Mod: JZ,JG | Performed by: UROLOGY

## 2024-07-31 PROCEDURE — 37000009 HC ANESTHESIA EA ADD 15 MINS: Performed by: UROLOGY

## 2024-07-31 PROCEDURE — C2617 STENT, NON-COR, TEM W/O DEL: HCPCS | Performed by: UROLOGY

## 2024-07-31 PROCEDURE — 85025 COMPLETE CBC W/AUTO DIFF WBC: CPT | Performed by: UROLOGY

## 2024-07-31 PROCEDURE — 25000003 PHARM REV CODE 250: Performed by: ANESTHESIOLOGY

## 2024-07-31 PROCEDURE — 36000712 HC OR TIME LEV V 1ST 15 MIN: Performed by: UROLOGY

## 2024-07-31 PROCEDURE — 99900035 HC TECH TIME PER 15 MIN (STAT)

## 2024-07-31 PROCEDURE — 88305 TISSUE EXAM BY PATHOLOGIST: CPT | Mod: TC | Performed by: PATHOLOGY

## 2024-07-31 PROCEDURE — 71000039 HC RECOVERY, EACH ADD'L HOUR: Performed by: UROLOGY

## 2024-07-31 PROCEDURE — 94760 N-INVAS EAR/PLS OXIMETRY 1: CPT

## 2024-07-31 PROCEDURE — 94761 N-INVAS EAR/PLS OXIMETRY MLT: CPT

## 2024-07-31 DEVICE — STENT URETERAL UNIV 6FR 26CM: Type: IMPLANTABLE DEVICE | Site: URETER | Status: FUNCTIONAL

## 2024-07-31 RX ORDER — KETOROLAC TROMETHAMINE 30 MG/ML
INJECTION, SOLUTION INTRAMUSCULAR; INTRAVENOUS
Status: DISCONTINUED | OUTPATIENT
Start: 2024-07-31 | End: 2024-07-31

## 2024-07-31 RX ORDER — DIPHENHYDRAMINE HYDROCHLORIDE 50 MG/ML
12.5 INJECTION INTRAMUSCULAR; INTRAVENOUS EVERY 6 HOURS PRN
Status: DISCONTINUED | OUTPATIENT
Start: 2024-07-31 | End: 2024-07-31 | Stop reason: HOSPADM

## 2024-07-31 RX ORDER — ONDANSETRON HYDROCHLORIDE 2 MG/ML
INJECTION, SOLUTION INTRAMUSCULAR; INTRAVENOUS
Status: DISCONTINUED | OUTPATIENT
Start: 2024-07-31 | End: 2024-07-31

## 2024-07-31 RX ORDER — KETOROLAC TROMETHAMINE 30 MG/ML
15 INJECTION, SOLUTION INTRAMUSCULAR; INTRAVENOUS EVERY 6 HOURS
Status: DISCONTINUED | OUTPATIENT
Start: 2024-07-31 | End: 2024-08-01 | Stop reason: HOSPADM

## 2024-07-31 RX ORDER — ACETAMINOPHEN 10 MG/ML
1000 INJECTION, SOLUTION INTRAVENOUS EVERY 8 HOURS
Status: COMPLETED | OUTPATIENT
Start: 2024-07-31 | End: 2024-08-01

## 2024-07-31 RX ORDER — ONDANSETRON HYDROCHLORIDE 2 MG/ML
4 INJECTION, SOLUTION INTRAVENOUS ONCE AS NEEDED
Status: DISCONTINUED | OUTPATIENT
Start: 2024-07-31 | End: 2024-07-31 | Stop reason: HOSPADM

## 2024-07-31 RX ORDER — TRANEXAMIC ACID 100 MG/ML
INJECTION, SOLUTION INTRAVENOUS
Status: DISCONTINUED | OUTPATIENT
Start: 2024-07-31 | End: 2024-07-31

## 2024-07-31 RX ORDER — ACETAMINOPHEN 10 MG/ML
INJECTION, SOLUTION INTRAVENOUS
Status: DISCONTINUED | OUTPATIENT
Start: 2024-07-31 | End: 2024-07-31

## 2024-07-31 RX ORDER — FAMOTIDINE 20 MG/1
20 TABLET, FILM COATED ORAL 2 TIMES DAILY
Status: DISCONTINUED | OUTPATIENT
Start: 2024-07-31 | End: 2024-08-01 | Stop reason: HOSPADM

## 2024-07-31 RX ORDER — DOCUSATE SODIUM 100 MG/1
100 CAPSULE, LIQUID FILLED ORAL 2 TIMES DAILY
Status: DISCONTINUED | OUTPATIENT
Start: 2024-07-31 | End: 2024-08-01 | Stop reason: HOSPADM

## 2024-07-31 RX ORDER — GLUCAGON 1 MG
1 KIT INJECTION
Status: DISCONTINUED | OUTPATIENT
Start: 2024-07-31 | End: 2024-07-31 | Stop reason: HOSPADM

## 2024-07-31 RX ORDER — BUPIVACAINE HYDROCHLORIDE 5 MG/ML
INJECTION, SOLUTION EPIDURAL; INTRACAUDAL
Status: DISCONTINUED | OUTPATIENT
Start: 2024-07-31 | End: 2024-07-31 | Stop reason: HOSPADM

## 2024-07-31 RX ORDER — SCOLOPAMINE TRANSDERMAL SYSTEM 1 MG/1
1 PATCH, EXTENDED RELEASE TRANSDERMAL
Status: DISCONTINUED | OUTPATIENT
Start: 2024-07-31 | End: 2024-07-31 | Stop reason: HOSPADM

## 2024-07-31 RX ORDER — FENTANYL CITRATE 50 UG/ML
25 INJECTION, SOLUTION INTRAMUSCULAR; INTRAVENOUS EVERY 5 MIN PRN
Status: DISCONTINUED | OUTPATIENT
Start: 2024-07-31 | End: 2024-07-31 | Stop reason: HOSPADM

## 2024-07-31 RX ORDER — SIMETHICONE 80 MG
2 TABLET,CHEWABLE ORAL 3 TIMES DAILY
Status: DISCONTINUED | OUTPATIENT
Start: 2024-07-31 | End: 2024-08-01 | Stop reason: HOSPADM

## 2024-07-31 RX ORDER — DEXTROSE MONOHYDRATE AND SODIUM CHLORIDE 5; .45 G/100ML; G/100ML
INJECTION, SOLUTION INTRAVENOUS CONTINUOUS
Status: DISCONTINUED | OUTPATIENT
Start: 2024-07-31 | End: 2024-08-01

## 2024-07-31 RX ORDER — MIDAZOLAM HYDROCHLORIDE 1 MG/ML
INJECTION INTRAMUSCULAR; INTRAVENOUS
Status: DISCONTINUED | OUTPATIENT
Start: 2024-07-31 | End: 2024-07-31

## 2024-07-31 RX ORDER — EPHEDRINE SULFATE 50 MG/ML
INJECTION, SOLUTION INTRAVENOUS
Status: DISCONTINUED | OUTPATIENT
Start: 2024-07-31 | End: 2024-07-31

## 2024-07-31 RX ORDER — DEXAMETHASONE SODIUM PHOSPHATE 4 MG/ML
INJECTION, SOLUTION INTRA-ARTICULAR; INTRALESIONAL; INTRAMUSCULAR; INTRAVENOUS; SOFT TISSUE
Status: DISCONTINUED | OUTPATIENT
Start: 2024-07-31 | End: 2024-07-31

## 2024-07-31 RX ORDER — OXYCODONE HYDROCHLORIDE 5 MG/1
5 TABLET ORAL
Status: DISCONTINUED | OUTPATIENT
Start: 2024-07-31 | End: 2024-07-31 | Stop reason: HOSPADM

## 2024-07-31 RX ORDER — MEPERIDINE HYDROCHLORIDE 50 MG/ML
12.5 INJECTION INTRAMUSCULAR; INTRAVENOUS; SUBCUTANEOUS ONCE AS NEEDED
Status: DISCONTINUED | OUTPATIENT
Start: 2024-07-31 | End: 2024-07-31 | Stop reason: HOSPADM

## 2024-07-31 RX ORDER — LIDOCAINE HYDROCHLORIDE 20 MG/ML
INJECTION INTRAVENOUS
Status: DISCONTINUED | OUTPATIENT
Start: 2024-07-31 | End: 2024-07-31

## 2024-07-31 RX ORDER — LIDOCAINE HYDROCHLORIDE 10 MG/ML
1 INJECTION, SOLUTION EPIDURAL; INFILTRATION; INTRACAUDAL; PERINEURAL ONCE
Status: DISCONTINUED | OUTPATIENT
Start: 2024-07-31 | End: 2024-07-31 | Stop reason: HOSPADM

## 2024-07-31 RX ORDER — ACETAMINOPHEN 325 MG/1
650 TABLET ORAL EVERY 4 HOURS PRN
Status: DISCONTINUED | OUTPATIENT
Start: 2024-07-31 | End: 2024-08-01 | Stop reason: HOSPADM

## 2024-07-31 RX ORDER — PROPOFOL 10 MG/ML
VIAL (ML) INTRAVENOUS
Status: DISCONTINUED | OUTPATIENT
Start: 2024-07-31 | End: 2024-07-31

## 2024-07-31 RX ORDER — HYDROMORPHONE HYDROCHLORIDE 2 MG/ML
0.2 INJECTION, SOLUTION INTRAMUSCULAR; INTRAVENOUS; SUBCUTANEOUS EVERY 5 MIN PRN
Status: DISCONTINUED | OUTPATIENT
Start: 2024-07-31 | End: 2024-07-31 | Stop reason: HOSPADM

## 2024-07-31 RX ORDER — POLYETHYLENE GLYCOL 3350 17 G/17G
17 POWDER, FOR SOLUTION ORAL DAILY
Status: DISCONTINUED | OUTPATIENT
Start: 2024-08-01 | End: 2024-08-01 | Stop reason: HOSPADM

## 2024-07-31 RX ORDER — ONDANSETRON HYDROCHLORIDE 2 MG/ML
4 INJECTION, SOLUTION INTRAVENOUS EVERY 4 HOURS PRN
Status: DISCONTINUED | OUTPATIENT
Start: 2024-07-31 | End: 2024-08-01 | Stop reason: HOSPADM

## 2024-07-31 RX ORDER — MORPHINE SULFATE 2 MG/ML
2 INJECTION, SOLUTION INTRAMUSCULAR; INTRAVENOUS EVERY 4 HOURS PRN
Status: DISCONTINUED | OUTPATIENT
Start: 2024-07-31 | End: 2024-08-01

## 2024-07-31 RX ORDER — SODIUM CHLORIDE, SODIUM LACTATE, POTASSIUM CHLORIDE, CALCIUM CHLORIDE 600; 310; 30; 20 MG/100ML; MG/100ML; MG/100ML; MG/100ML
INJECTION, SOLUTION INTRAVENOUS CONTINUOUS
Status: DISCONTINUED | OUTPATIENT
Start: 2024-07-31 | End: 2024-07-31

## 2024-07-31 RX ORDER — ROCURONIUM BROMIDE 10 MG/ML
INJECTION, SOLUTION INTRAVENOUS
Status: DISCONTINUED | OUTPATIENT
Start: 2024-07-31 | End: 2024-07-31

## 2024-07-31 RX ORDER — METOCLOPRAMIDE HYDROCHLORIDE 5 MG/ML
10 INJECTION INTRAMUSCULAR; INTRAVENOUS ONCE
Status: COMPLETED | OUTPATIENT
Start: 2024-07-31 | End: 2024-07-31

## 2024-07-31 RX ORDER — HEPARIN SODIUM 10000 [USP'U]/ML
INJECTION, SOLUTION INTRAVENOUS; SUBCUTANEOUS
Status: DISCONTINUED | OUTPATIENT
Start: 2024-07-31 | End: 2024-07-31 | Stop reason: HOSPADM

## 2024-07-31 RX ORDER — CEFAZOLIN SODIUM 1 G/3ML
INJECTION, POWDER, FOR SOLUTION INTRAMUSCULAR; INTRAVENOUS
Status: DISCONTINUED | OUTPATIENT
Start: 2024-07-31 | End: 2024-07-31

## 2024-07-31 RX ORDER — FLUOXETINE HYDROCHLORIDE 20 MG/1
20 CAPSULE ORAL DAILY
Status: DISCONTINUED | OUTPATIENT
Start: 2024-08-01 | End: 2024-08-01 | Stop reason: HOSPADM

## 2024-07-31 RX ORDER — FENTANYL CITRATE 50 UG/ML
INJECTION, SOLUTION INTRAMUSCULAR; INTRAVENOUS
Status: DISCONTINUED | OUTPATIENT
Start: 2024-07-31 | End: 2024-07-31

## 2024-07-31 RX ADMIN — KETOROLAC TROMETHAMINE 30 MG: 30 INJECTION, SOLUTION INTRAMUSCULAR; INTRAVENOUS at 11:07

## 2024-07-31 RX ADMIN — FENTANYL CITRATE 50 MCG: 0.05 INJECTION, SOLUTION INTRAMUSCULAR; INTRAVENOUS at 08:07

## 2024-07-31 RX ADMIN — Medication 20 MG: at 07:07

## 2024-07-31 RX ADMIN — CEFAZOLIN 2 G: 2 INJECTION, POWDER, FOR SOLUTION INTRAMUSCULAR; INTRAVENOUS at 06:07

## 2024-07-31 RX ADMIN — SIMETHICONE 160 MG: 80 TABLET, CHEWABLE ORAL at 12:07

## 2024-07-31 RX ADMIN — ACETAMINOPHEN 1000 MG: 10 INJECTION INTRAVENOUS at 09:07

## 2024-07-31 RX ADMIN — ONDANSETRON 4 MG: 2 INJECTION INTRAMUSCULAR; INTRAVENOUS at 03:07

## 2024-07-31 RX ADMIN — TRANEXAMIC ACID 500 MG: 100 INJECTION, SOLUTION INTRAVENOUS at 06:07

## 2024-07-31 RX ADMIN — SCOPALAMINE 1 PATCH: 1 PATCH, EXTENDED RELEASE TRANSDERMAL at 06:07

## 2024-07-31 RX ADMIN — KETOROLAC TROMETHAMINE 15 MG: 30 INJECTION, SOLUTION INTRAMUSCULAR at 05:07

## 2024-07-31 RX ADMIN — Medication 150 MG: at 07:07

## 2024-07-31 RX ADMIN — KETOROLAC TROMETHAMINE 15 MG: 30 INJECTION, SOLUTION INTRAMUSCULAR at 11:07

## 2024-07-31 RX ADMIN — METOCLOPRAMIDE 10 MG: 5 INJECTION, SOLUTION INTRAMUSCULAR; INTRAVENOUS at 12:07

## 2024-07-31 RX ADMIN — FAMOTIDINE 20 MG: 20 TABLET, FILM COATED ORAL at 09:07

## 2024-07-31 RX ADMIN — ACETAMINOPHEN 1000 MG: 10 INJECTION, SOLUTION INTRAVENOUS at 08:07

## 2024-07-31 RX ADMIN — CEFAZOLIN 2 G: 1 INJECTION, POWDER, FOR SOLUTION INTRAVENOUS at 11:07

## 2024-07-31 RX ADMIN — ROCURONIUM BROMIDE 10 MG: 10 INJECTION, SOLUTION INTRAVENOUS at 08:07

## 2024-07-31 RX ADMIN — MIDAZOLAM HYDROCHLORIDE 2 MG: 1 INJECTION, SOLUTION INTRAMUSCULAR; INTRAVENOUS at 06:07

## 2024-07-31 RX ADMIN — DEXTROSE AND SODIUM CHLORIDE: 5; 450 INJECTION, SOLUTION INTRAVENOUS at 01:07

## 2024-07-31 RX ADMIN — ACETAMINOPHEN 1000 MG: 10 INJECTION INTRAVENOUS at 12:07

## 2024-07-31 RX ADMIN — ONDANSETRON 4 MG: 2 INJECTION INTRAMUSCULAR; INTRAVENOUS at 06:07

## 2024-07-31 RX ADMIN — EPHEDRINE SULFATE 10 MG: 50 INJECTION, SOLUTION INTRAMUSCULAR; INTRAVENOUS; SUBCUTANEOUS at 09:07

## 2024-07-31 RX ADMIN — SODIUM CHLORIDE, SODIUM GLUCONATE, SODIUM ACETATE, POTASSIUM CHLORIDE AND MAGNESIUM CHLORIDE: 526; 502; 368; 37; 30 INJECTION, SOLUTION INTRAVENOUS at 06:07

## 2024-07-31 RX ADMIN — DEXTROSE AND SODIUM CHLORIDE: 5; 450 INJECTION, SOLUTION INTRAVENOUS at 09:07

## 2024-07-31 RX ADMIN — EPHEDRINE SULFATE 10 MG: 50 INJECTION, SOLUTION INTRAMUSCULAR; INTRAVENOUS; SUBCUTANEOUS at 08:07

## 2024-07-31 RX ADMIN — FENTANYL CITRATE 100 MCG: 0.05 INJECTION, SOLUTION INTRAMUSCULAR; INTRAVENOUS at 07:07

## 2024-07-31 RX ADMIN — ONDANSETRON 4 MG: 2 INJECTION INTRAMUSCULAR; INTRAVENOUS at 11:07

## 2024-07-31 RX ADMIN — ROCURONIUM BROMIDE 40 MG: 10 INJECTION, SOLUTION INTRAVENOUS at 07:07

## 2024-07-31 RX ADMIN — ACETAMINOPHEN 650 MG: 325 TABLET ORAL at 02:07

## 2024-07-31 RX ADMIN — DOCUSATE SODIUM 100 MG: 100 CAPSULE, LIQUID FILLED ORAL at 09:07

## 2024-07-31 RX ADMIN — EPHEDRINE SULFATE 5 MG: 50 INJECTION, SOLUTION INTRAMUSCULAR; INTRAVENOUS; SUBCUTANEOUS at 07:07

## 2024-07-31 RX ADMIN — EPHEDRINE SULFATE 10 MG: 50 INJECTION, SOLUTION INTRAMUSCULAR; INTRAVENOUS; SUBCUTANEOUS at 10:07

## 2024-07-31 RX ADMIN — LIDOCAINE HYDROCHLORIDE 75 MG: 20 INJECTION, SOLUTION INTRAVENOUS at 07:07

## 2024-07-31 RX ADMIN — SIMETHICONE 160 MG: 80 TABLET, CHEWABLE ORAL at 09:07

## 2024-07-31 RX ADMIN — ROCURONIUM BROMIDE 20 MG: 10 INJECTION, SOLUTION INTRAVENOUS at 07:07

## 2024-07-31 RX ADMIN — ROCURONIUM BROMIDE 50 MG: 10 INJECTION, SOLUTION INTRAVENOUS at 07:07

## 2024-07-31 RX ADMIN — ROCURONIUM BROMIDE 10 MG: 10 INJECTION, SOLUTION INTRAVENOUS at 10:07

## 2024-07-31 RX ADMIN — FENTANYL CITRATE 50 MCG: 0.05 INJECTION, SOLUTION INTRAMUSCULAR; INTRAVENOUS at 10:07

## 2024-07-31 RX ADMIN — DEXAMETHASONE SODIUM PHOSPHATE 8 MG: 4 INJECTION, SOLUTION INTRA-ARTICULAR; INTRALESIONAL; INTRAMUSCULAR; INTRAVENOUS; SOFT TISSUE at 07:07

## 2024-07-31 RX ADMIN — SIMETHICONE 160 MG: 80 TABLET, CHEWABLE ORAL at 03:07

## 2024-07-31 NOTE — CARE UPDATE
07/31/24 1536   Patient Assessment/Suction   Level of Consciousness (AVPU) alert   Respiratory Effort Unlabored   PRE-TX-O2   Device (Oxygen Therapy) room air   SpO2 97 %   Pulse Oximetry Type Intermittent   $ Pulse Oximetry - Multiple Charge Pulse Oximetry - Multiple   Incentive Spirometer   $ Incentive Spirometer Charges done with encouragement   Incentive Spirometer Predicted Level (mL) 2160   Administration (IS) instruction provided, follow-up;mouthpiece utilized   Number of Repetitions (IS) 5   Level Incentive Spirometer (mL) 2250   Patient Tolerance (IS) good;no adverse signs/symptoms present   Tobacco Cessation Intervention   Do you use any type of tobacco product? No   Respiratory Evaluation   $ Care Plan Tech Time 15 min   $ Respiratory Evaluation Complete   Evaluation For New Orders   Admitting Diagnosis Surgery for UPJ   Current Surgeries Surgery for UPJ   Home Oxygen   Has Home Oxygen? No   Home Aerosol, MDI, DPI, and Other Treatments/Therapies   Home Respiratory Therapy Per Patient/Review of Chart No   Oxygen Care Plan   Oxygen Care Plan Per Protocol   Bronchodilator Care Plan   Rationale No Rationale found   Atelectasis Care Plan   Atelectasis Care Plan Incentive Spiromentry   I.S. Goal (ml) 2160 ml   Rationale Other  (Post op)   Airway Clearance Care Plan   Rationale No rationale found

## 2024-07-31 NOTE — TRANSFER OF CARE
"Anesthesia Transfer of Care Note    Patient: Jessy Mayberry    Procedure(s) Performed: Procedure(s) (LRB):  ROBOTIC PYELOPLASTY (Left)  CYSTOSCOPY, WITH RETROGRADE PYELOGRAM AND URETERAL STENT INSERTION (Left)  ROBOTIC PYELOLITHOTOMY (Left)    Patient location: PACU    Anesthesia Type: general    Transport from OR: Transported from OR on 6-10 L/min O2 by face mask with adequate spontaneous ventilation    Post pain: adequate analgesia    Post assessment: no apparent anesthetic complications and tolerated procedure well    Post vital signs: stable    Level of consciousness: sedated    Nausea/Vomiting: no nausea/vomiting    Complications: none    Transfer of care protocol was followed    Last vitals: Visit Vitals  /61   Pulse 78   Temp 36.4 °C (97.5 °F) (Skin)   Resp 15   Ht 5' 9" (1.753 m)   Wt 86.2 kg (190 lb)   LMP  (LMP Unknown)   SpO2 100%   Breastfeeding No   BMI 28.06 kg/m²     "

## 2024-07-31 NOTE — NURSING
Nurses Note -- 4 Eyes      7/31/2024   1:35 PM      Skin assessed during: Admit      [x] No Altered Skin Integrity Present    [x]Prevention Measures Documented      [] Yes- Altered Skin Integrity Present or Discovered   [] LDA Added if Not in Epic (Describe Wound)   [] New Altered Skin Integrity was Present on Admit and Documented in LDA   [] Wound Image Taken    Wound Care Consulted? No    Attending Nurse:  Primitivo Quintanilla RN/Staff Member:   Julianne    4 lap sites and OLIMPIA drain to L lower abdomen noted.

## 2024-07-31 NOTE — ANESTHESIA PROCEDURE NOTES
Intubation    Date/Time: 7/31/2024 7:09 AM    Performed by: Fabrice Tariq CRNA  Authorized by: Gilberto Nieto MD    Intubation:     Induction:  Intravenous    Intubated:  Postinduction    Mask Ventilation:  Easy mask    Attempts:  1    Attempted By:  CRNA    Method of Intubation:  Video laryngoscopy    Blade:  Mejias 3    Laryngeal View Grade: Grade I - full view of cords      Difficult Airway Encountered?: No      Complications:  None    Airway Device:  Oral endotracheal tube    Airway Device Size:  7.5    Style/Cuff Inflation:  Cuffed    Inflation Amount (mL):  4    Tube secured:  22    Secured at:  The lips    Placement Verified By:  Capnometry    Complicating Factors:  None    Findings Post-Intubation:  BS equal bilateral

## 2024-07-31 NOTE — PLAN OF CARE
[facility] - Med/Surg  Initial Discharge Assessment       Primary Care Provider: Roger Nation MD    Admission Diagnosis: Ureteropelvic junction (UPJ) obstruction, left [N13.5]    Admission Date: 7/31/2024  Expected Discharge Date:     Transition of Care Barriers: None    Payor:  / Plan:  [employer] / Product Type: Government /     Extended Emergency Contact Information  Primary Emergency Contact: Khalif Mayberry  Mobile Phone: [phone]  Relation: Spouse  Preferred language: English   needed? No    Discharge Plan A: Home with family  Discharge Plan B: Home      [facility] - [place], [place] - [address]  [address] [address]  Phone: [phone] Fax: [phone]    [id number] - [place] - [address]  [address] [address]  Phone: [phone] Fax: [phone]      Initial Assessment (most recent)       Adult Discharge Assessment - 07/31/24 1405          Discharge Assessment    Assessment Type Discharge Planning Assessment     Confirmed/corrected address, phone number and insurance Yes     Confirmed Demographics Correct on Facesheet     Source of Information health record     People in Home spouse     Do you expect to return to your current living situation? Yes     Do you have help at home or someone to help you manage your care at home? Yes     Prior to hospitilization cognitive status: Alert/Oriented     Current cognitive status: Alert/Oriented     Walking or Climbing Stairs Difficulty no     Dressing/Bathing Difficulty no     Equipment Currently Used at Home none     Readmission within 30 days? No     Patient currently being followed by outpatient case management? No     Do you currently have service(s) that help you manage your care at home? No     Do you take prescription medications? Yes     Do you have prescription coverage? Yes      How do you get to doctors appointments? family or friend will provide     Are you on dialysis? No     Do you take coumadin? No     Discharge Plan A Home with family     Discharge Plan B Home     DME Needed Upon Discharge  none     Transition of Care Barriers None

## 2024-07-31 NOTE — ANESTHESIA PREPROCEDURE EVALUATION
07/31/2024  Jessy Mayberry is a 54 y.o., female.      Pre-op Assessment    I have reviewed the Patient Summary Reports.     I have reviewed the Nursing Notes. I have reviewed the NPO Status.   I have reviewed the Medications.     Review of Systems  Anesthesia Hx:             Denies Family Hx of Anesthesia complications.   Personal Hx of Anesthesia complications, Post-Operative Nausea/Vomiting, in the past, but not with recent anesthetics / prophylaxis                    Cardiovascular:         Dysrhythmias        HAMMOND  ECG has been reviewed. H/o SVT                         Pulmonary:      Shortness of breath                  Renal/:     UPJ obstruction             Endocrine:   Hypothyroidism          Psych:  Psychiatric History                  Physical Exam  General: Well nourished, Cooperative, Alert and Oriented    Airway:  Mallampati: II   Mouth Opening: Normal  TM Distance: Normal  Tongue: Normal  Neck ROM: Normal ROM    Chest/Lungs:  Normal Respiratory Rate    Heart:  Rate: Normal  Rhythm: Regular Rhythm        Anesthesia Plan  Type of Anesthesia, risks & benefits discussed:    Anesthesia Type: Gen ETT  Intra-op Monitoring Plan: Standard ASA Monitors  Post Op Pain Control Plan: multimodal analgesia  Induction:  IV  Airway Plan: Video  Informed Consent: Informed consent signed with the Patient and all parties understand the risks and agree with anesthesia plan.  All questions answered.   ASA Score: 2    Ready For Surgery From Anesthesia Perspective.     .

## 2024-07-31 NOTE — ANESTHESIA POSTPROCEDURE EVALUATION
Anesthesia Post Evaluation    Patient: Jessy Mayberry    Procedure(s) Performed: Procedure(s) (LRB):  ROBOTIC PYELOPLASTY (Left)  CYSTOSCOPY, WITH RETROGRADE PYELOGRAM AND URETERAL STENT INSERTION (Left)  ROBOTIC PYELOLITHOTOMY (Left)    Final Anesthesia Type: general      Patient location during evaluation: PACU  Patient participation: Yes- Able to Participate  Level of consciousness: awake and alert  Post-procedure vital signs: reviewed and stable  Pain management: adequate  Airway patency: patent    PONV status at discharge: nausea (controlled)  Anesthetic complications: no      Cardiovascular status: blood pressure returned to baseline  Respiratory status: unassisted  Hydration status: euvolemic  Follow-up not needed.              Vitals Value Taken Time   /57 07/31/24 1334   Temp 36.5 °C (97.7 °F) 07/31/24 1334   Pulse 71 07/31/24 1334   Resp 14 07/31/24 1334   SpO2 96 % 07/31/24 1334         Event Time   Out of Recovery 13:20:00         Pain/Eliane Score: Pain Rating Prior to Med Admin: 4 (7/31/2024  2:56 PM)  Pain Rating Post Med Admin: 7 (7/31/2024  1:15 PM)  Eliane Score: 8 (7/31/2024  1:15 PM)

## 2024-07-31 NOTE — BRIEF OP NOTE
[facility] Urology Brief Op Note     Date: 7/31/24     Staff Surgeon: Kemar Sebastian MD        Bedside assistant: JOSE Molina     Pre-Op Diagnosis: left UPJ obstruction, left nephrolithiasis     Post-Op Diagnosis: same     Procedure(s) Performed:   Left robot assisted laparoscopic dismembered pyeloplasty (including retrograde placement of 6 Scottish by 26 cm double-J ureteral stent)  Left robot assisted laparoscopic pyelolithotomy/endoscopic removal of stones from renal pelvis via pyelotomy  Cystoscopy with left retrograde pyelogram and placement indwelling left ureteral catheter, separate procedure  Flouroscopy < 1 hour     Specimen(s): left UPJ, right kidney stones     Anesthesia: General endotracheal anesthesia     Findings:  High insertion ureter with intrinsic narrowing, and some proximal ureteral kinking, with significant dilated renal pelvis, and renal pelvis stone removed     Estimated Blood Loss: minimal     Drains: 7fr OLIMPIA, 18 fr lawrence     Complications: none     Disposition: pacu to floor

## 2024-07-31 NOTE — PROGRESS NOTES
Pt prepared for surgery. Pt resting in bed, with  . Family signed up for text messaging. Belongings kept by  including cell phone and glasses IS teaching performed. Fall risk and . Allergy armband placed. SCDs and TEDS on

## 2024-07-31 NOTE — PLAN OF CARE
Care Plan Reviewed. Ongoing and Progressing. Safety Maintained. Pt Tx to OR 4 for second part of procedure reported to BRITNI Barney. Will continue to monitor.

## 2024-07-31 NOTE — PLAN OF CARE
VSS. NAD. Resp E/U. Calm and cooperative. Speech appropriate. Tolerating clear liquids. IV patent and infusing.Incision x5 to abdomen CDI. OLIMPIA drain intact and draining well. Smith intact and draining well. Dr. Sebastian notified of lab results and ok with pt going to room. Family notified of patient status. All safety measures taken. Bed in low position. Bedrails up x2. Bed locked. Cleared by Anesthesia.

## 2024-07-31 NOTE — OP NOTE
[facility] Urology Operative Report     Date: 7/31/24     Staff Surgeon: Kemar Sebastian MD     Bedside assistant: JOSE Molina     Pre-Op Diagnosis: left UPJ obstruction, left nephrolithiasis     Post-Op Diagnosis: same     Procedure(s) Performed:   Left robot assisted laparoscopic dismembered pyeloplasty (including retrograde placement of 6 Citizen of [place] by 26 cm double-J ureteral stent)   2.  Left robot assisted laparoscopic pyelolithotomy/endoscopic removal of stones from renal pelvis via pyelotomy  3. Cystoscopy with left retrograde pyelogram and placement indwelling left ureteral catheter, separate procedure   4.   Flouroscopy < 1 hour     Specimen(s): left UPJ, left kidney stone     Anesthesia: General endotracheal anesthesia     Findings: High insertion ureter with intrinsic narrowing, and some proximal ureteral kinking, with significant dilated renal pelvis, and renal pelvis stone removed      Estimated Blood Loss: minimal     Drains: 7fr OLIMPIA, 18 fr lawrence     Complications: none     Indications for Procedure:   Ms Smiley is a 55 yo F with longstanding mild left UPJ obstruction with history of kidney stones and recurrent urinary tract infections recently more symptomatic and more recurrent infections found to have significant increase in renal pelvic dilation and obstruction of the left collecting system with nuclear medicine scan having clinically significant obstruction at greater than 15 minutes of T1 half were previously it did not.  After extensive management discussion with the patient, she elected to proceed with right robotic-assisted laparoscopic pyeloplasty after discussion of the risks and benefits, which included pain, infection, bleeding, scarring, urine leak, recurrent stricture, anastomotic leak, renal damage, injury to intra-abdominal structures including adjacent organs and neurovascular structures as well as the risks of general anesthesia, including heart attack, stroke, blood clot, and  death. Also discussed possible concurrent pyelolithotomy/stone extraction with nephroscopy. All questions answered and appropriate informed consent obtained.      Procedure in Detail:   After appropriate informed consent was obtained, the patient was taken to the operating room and placed in the lithotomy position. She was prepped and draped in standard cystoscopic fashion. Preoperative antibiotics were given and a WHO-approved time-out was performed.     A 21-Iraqi rigid Olympus cystoscope was passed into the patient's bladder via the urethra. Urethra and bladder neck normal and ureteral orifices were viewed on the trigone in orthotopic position  bilaterally with clear efflux.  Bladder was otherwise normal and free of mucosal lesions or abnormalities. The left ureteral orifice was located and a 5-Iraqi open-ended catheter was passed through the cystoscope into the patient's left ureter over a motion guidewire. This was passed up the ureter under fluoroscopic guidance, until tip seen in proximal ureter. Dilute Isovue contrast was injected in a retrograde fashion through the open-ended catheter under live fluoroscopy.  There was some evidence of high insertion and intrinsic narrowing with narrowed UPJ/proximal ureteral segment seen below dilated renal pelvis with pathognomonic jet streaming of contrast into a very dilated renal pelvis.  Five Iraqi open-ended catheter did not advance easily into the renal pelvis without the assistance of a guidewire, but was advanced into the pelvis given the significant intrinsic narrowing and high insertion of her ureter to facilitate identification intraoperatively.     The 5-Iraqi open-ended catheter was left indwelling in the left ureter with the tip at the level of obstruction as confirmed by fluoroscopy. An 18-Iraqi Smith catheter was then placed into the bladder and secured to the open-ended catheter with a silk suture. The indwelling ureteral catheter was capped with  lower lock iv cap.     At this time, the patient was taken out of lithotomy and transferred to robotic operative suite and placed in the left modified flank position with all appropriate pressure points padded and secured to the bed with 3-inch silk tape. She was then prepped and draped in standard sterile fashion, prepping in the Smith catheter and the open-ended catheter to the sterile field for manipulation during the case. The Smith catheter was placed to gravity drainage and the open-ended catheter was capped.      Veress needle passed percutaneously in avascular subcostal plane, and after saline drop test confirmed placement, abdomen insufflated to 15mmHg. Four 8 mm robotic trocar sites were then marked out 8 cm apart in a straight line starting 2 finger breaths inferior to the costal margin along the right lateral abdomen.  Camera passed through second incision, noting no significant trauma from the Veress needle except punctate areas on liver from tip x2, non-bleeding, and it was removed and the more superior port was placed under direct vision.  All 4 robotic trocars placed under direct vision. Once these robotic trocars were placed under direct vision, a 5 mm assistant port in inferior midline .  All these ports were placed under direct vision. Once all the ports were placed, the robot was docked over the patient's left shoulder along the axis of the kidney to the camera.      Once the robot was docked, the white line of Toldt was incised to drop the colon in the standard fashion. Once the colon was reflected medially, attention was turned towards the lower pole to dissect medially to identify the ureter.     The ureter was identified and A vessel loop was placed around the ureter to isolate it and assist with dissection. Careful ureterolysis was carried proximally towards the ureteropelvic junction, taking caution not to devascularize it. Gonadal vein dropped medially. Proximal ureteral lysis was  carefully performed tracing to dilated renal pelvis which was exposed and there was kinking and high insertion with intrinsic narrowing. 4th arm used to elevate Gerota's to suspend kidney laterally for working space and adequate pelviolysis, completely freeing the UPJ from surrounding fibrous attachments. At this point the 5french was uncapped to assist in decompression of the pelvis.       After placing a 4-0 vicryl stay suture on the anterior renal pelvis above the UPJ though more laterally, and the anterior proximal ureter distal to the strictured segment, robotic li scissors were used to transect the ureter from the pelvis. Before completely transecting, ureter was spatulated anteriorly about 2cm with robotic li, and then transected. The proximally strictured segment was excised and the pelviotomy was extended slightly for better drainage.      After transection, the pelviotomy was incised posteriorly for spatulation to facilitate reanastomosis in this location, transposing the reconstructed UPJ a bit more of a lower lateral insertion then it is present obstructed high medial insertion.  At this time, through the pelviotomy the endoscope was advanced up to the pelviotomy and ProGrasp was used to reach through the pelviotomy and grasp and remove renal pelvic stone. This moderate renal pelvic stone was collected and once pyelolihotomy performed and stone removed, renal pelvis irrigated.     A 4-0 Vicryl suture was then placed from apex of proximal posterior pelviotomy, to mid posterior wall of spatulated ureter and tied down slowly to reapproximate posterior plate in the midline. This suture was used to run the posterior plate of the anastamosis around laterally. A second suture was placed adjacent and used to run medially.      Once the posterolateral anastamosis was complete, a sensor guidewire was passed up through the 5-[place] open-ended catheter until it could be viewed to pass into the renal pelvis.  Robotic needle drivers were used to hold the wire in place and the 5-Romanian open-ended catheter was offloaded removing the Lawrence catheter and a 6-Romanian x 26 cm double-J ureteral stent was passed in a retrograde fashion over the wire until it could be viewed under direct vision through the anastomotic site. The stent was held in place as the wire was discontinued and the curl proximally was manually tucked into the kidney under direct vision. At this time, anastamotic sutures continued around anteriorly until meeting centrally, one reversed, and they were tied down completing the anastomosis without any urine leak contiguous with the renal pelvic closure      Once the anastamosis was complete,  Gerota's reapproximation was performed with 2-0 vicry suture. A 7-mm flat Umer-Lux drain was passed under direct vision through the inferolateral robotic trocar adjacent and posterolateral to the anastamotic site. The Umer-Lux drain was secured down with a 2-0 nylon drain suture. Robot was undocked at this time and The skin at all port sites was closed using 4-0 Monocryl running subcuticular suture overlapped with Dermabond. A 4 x 4 and Tegaderm were used as a drain dressing to the drain, which was placed to bulb suction.      Once the skin closure was complete, before extubation, digital flexible cystoscopy performed to confirm distal stent curl in appropriate position in bladder, and then her 18fr lawrence catheter was relpaced.      The patient tolerated the procedure well. There were no complications. All instrument, sponge, and needle counts were correct x2 at the end of the case          Disposition: She will remain overnight for observation and be discharged home once ambulatory, tolerating diet, and pain controlled, most often on POD 1.  Will plan stent removal in 6 weeks. Having removed larger central renal pelvis stone, and other smaller stones punctate and visible on KUB, will observe

## 2024-07-31 NOTE — H&P
[facility] Urology New Patient/H&P:      Jessy Mayberry is a 53 y.o. female who presents for evaluation of left UPJ obstruction to discuss pyeloplasty     She establish care with Dr. Avery in June of 2023 noting that she started having UTI/pyelo in March.  Symptoms would be flank pain not isolated to the right or left side, foul-smelling urine, frequency and urgency with associated fever up to 101.  Since March she is had at least 3 episodes.  Two of them are culture proven, E coli.  She received antibiotics for 10-14 days with each episode. Prior to this she denies any recurrent UTIs.  She does however state that as a teen she had a cystoscopy but she is not sure why. She also states that she has had blood in the urine on occasion for years found on her gynecology appointments.  Review of her urines in our system show that she has blood in the urine sometimes associated with UTI and sometimes not dating back to November 2022.  - CT in March 2023 when she had a UTI/pyelo and it showed normal right kidney and left hydro to UPJ with four left renal stones.  She has no history of kidney stones.  She does not think the pain is more on her left versus her right whenever she does have these episodes of pyelo.  - CT in June 2023 when she had UTI  showed that her right pelvis was larger than it was in March 2023.  High density area in the right UPJ ?  No previous CT urogram.  Still no ureteral stones.  5 days ago.  UTI risk factors:  Denies any pad usage but does state that she has some overactive bladder with urgency occasionally present over the last 4-5 months.  She is postmenopausal but postmenopausal since age 44.  Uses Estring vaginal suppository but infrequently.  Otherwise no other prevention for UTI taken.  Tends to urinate every 1-2 hours.  No diabetes.  No constipation.  No diarrhea.  PVR: 0. No divetirculitis. No previous vaginal surgeries other than leep. No smoking history.       After appt noted: low back  pain, noted worse on left and much worse after caffeine intake and occasional alcohol (wine). Avoiding both- drinking plenty water. Taking ibuprofen regularly   6/29/23 NM renal scan  Findings characteristic of dilated nonobstructed left renal collecting system. This can be seen with UPJ obstruction. Normal right nephrogram. Function: 54% left, 46% right.   She does however still has L flank pain that's most noticeable in the morning. Also when she did the renal scan had pain.   Started her on uti prevention- however she hasn't been consistent about it. Using cranberry but not UTIVA. She hasn't had any uti's. Sent her urine for cytology and ua marina. It showed 5 rbc and negative cytology.   MH for years. 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 Small LUP stones. Cysto neg 8/21/23.   7/29/2 CTU: The kidneys enhance symmetrically. Bilateral extrarenal pelvises unchanged. 1 cm left renal cyst. No hydronephrosis. Contrast is seen throughout the right renal collecting system and right ureter without filling defect. Contrast is seen throughout the left renal collecting system without filling defect. Contrast reaches the proximal left ureter on 19 minute delayed images. There is abrupt narrowing at the left UPJ (series 606, image 71). There is additionally cutoff of ureteral contrast at the level of the left gonadal vein (series 6, image 87) -- Findings concerning for left UPJ obstruction with severe narrowing at the junction, although contrast is seen in the proximal ureter on 19 minute delayed images. Additionally there is cutoff of contrast in the mid ureter at the level of the left gonadal vein concerning for another site of obstruction. However, there is no hydroureteronephrosis.      5/2/24 NP Rayes: treated for UTI 4/22/24 by PCP with macrobid Pt c/o dysuria, bladder spasms, urinary frequency and urgency that has been ongoing since completing antibiotic for recent UTI  Denies gross hematuria, flank pain,  fever, chills, nausea or vomiting. UA lg blood, sm leuk. Taking Utiva , d mannose, and probiotic. Not using estrace cream. Not taking myrbetriq, no OAB symptoms as baseline  - Pt had pain when IO cath attempted. Will sent voided urine for micro UA and culture. Start augmentin based on previous culture results  - CT RSS to evaluate kidney stones. Last imaging 7/2023  -F/u with GYN for possible vaginitis. Pt request diflucan for yeast infection with antibiotic use  -Continue UTI prevention supplements     CT 5/3/24  There is dilatation of the left renal collecting system and left extrarenal pelvis to the level of the left UVJ, once again suggestive of high-grade left UPJ obstruction and more pronounced in extent than was noted at the time of the prior study. There is a 3 mm non-obstructing stone present anteriorly in the interpolar region of the left kidney on series 2, image 45 and there is a 3 mm non-obstructing stone present in the interpolar region of the left kidney on series 2, image 50. There is a 4 mm new non-obstructing stone present within the dependent left extrarenal pelvis on series 2, image 59. There is an 11 mm probable cyst present within the lateral cortex of the interpolar region of the left kidney on series 2, image 47 which measures 17 HU on series 2, image 47, most likely a cyst, unchanged. There are no right renal stones identified. There is a right extrarenal pelvis present. The right ureter is normal in caliber and course without evidence of stones. The left ureter is normal in caliber and course without definite stones identified. There is diffuse bladder wall thickening present and there is perivesical fat stranding present, likely reflecting underlying cystitis, similar in extent to the prior study.      NM3 renal scan w lasix updated 5/14/24: The renographic curves demonstrate delayed time to peak activity bilaterally.  Time to peak activity on the left equals 8 minutes on the right 16  minutes.  There is normal excretion on the right following Lasix administration with no abnormal retention of activity at 30 minute interval.  Prolonged expiratory phase is observed on the left with abnormal retention of activity equal in 54% of maximum at 30 minutes. The left kidney accounts for 49% in the right kidney 51% global uptake.  The estimated renal plasma flow is 490 mL/min. In comparison to the previous examination, degree of dilatation of the left sided collecting system appears slightly greater.  Retention of activity within the left collecting system at the 30 minute interval has progressed compared to the prior year's examination.  - Mild increase in degree of dilatation of the left-sided collecting system compared to previous examination. Prolonged excretory phase on the left with abnormal retention of activity at the 30 minute interval also representing a detrimental change compared to prior study.     5/31/24: I started frequency 2 days ago and the burning started back today. Can we check for UTI - bacterial infection & do a urine culture ? Id like to stay ahead of it before it gets too bad.   - advised to go to urgent care as provider out of office     She presents today noting  Unable to provide urine specimen on arrival.  Still has intermittent dysuria.  Intermittent flank pain as above.  She had an SVT ablation 15 years ago.  She had a complete cardiac workup about 6 months ago.  She is followed by Dr. Nowak  No hematuria.  Inguinal hernia repair hx          Past Medical History:   Diagnosis Date    Adrenal insufficiency      Hypothyroidism      Neuropathy      Pituitary mass                 Past Surgical History:   Procedure Laterality Date    ANKLE SURGERY        CARDIAC ELECTROPHYSIOLOGY MAPPING AND ABLATION        CYSTOSCOPY N/A 8/21/2023     Procedure: CYSTOSCOPY;  Surgeon: Shyanne Avery MD;  Location: [facility] OR;  Service: Urology;  Laterality: N/A;    DILATION AND  CURETTAGE OF UTERUS         3 times    INGUINAL HERNIA REPAIR         twice    TONSILLECTOMY                    Family History   Problem Relation Name Age of Onset    Ovarian cancer Mother   28    Hypertension Mother        Coronary artery disease Mother        Hyperlipidemia Mother        Cancer Mother        Skin cancer Mother   75    Hypertension Father        Retinal detachment Father        Atrial fibrillation Sister        Rheum arthritis Daughter        Asthma Daughter        Breast cancer Maternal Aunt   42    Bone cancer Maternal Aunt        Cancer Maternal Aunt        Rheum arthritis Maternal Aunt        Hepatitis Maternal Aunt        Heart disease Maternal Grandmother        Hypertension Maternal Grandmother        Kidney failure Maternal Grandmother        Atrial fibrillation Maternal Grandmother        Hypertension Brother        Hypertension Son        Psoriasis Child        Melanoma Neg Hx        Lupus Neg Hx             Social History            Socioeconomic History    Marital status:    Tobacco Use    Smoking status: Never    Smokeless tobacco: Never   Substance and Sexual Activity    Alcohol use: Yes       Comment: occassionally    Drug use: Never    Sexual activity: Not Currently      Social Determinants of Health           Financial Resource Strain: Patient Declined (4/22/2024)     Overall Financial Resource Strain (CARDIA)      Difficulty of Paying Living Expenses: Patient declined   Food Insecurity: Patient Declined (4/22/2024)     Hunger Vital Sign      Worried About Running Out of Food in the Last Year: Patient declined      Ran Out of Food in the Last Year: Patient declined   Transportation Needs: Patient Declined (4/22/2024)     PRAPARE - Transportation      Lack of Transportation (Medical): Patient declined      Lack of Transportation (Non-Medical): Patient declined   Physical Activity: Unknown (4/22/2024)     Exercise Vital Sign      Days of Exercise per Week: Patient declined      " Minutes of Exercise per Session: 0 min   Stress: Patient Declined (4/22/2024)     [employer] - Occupational Stress Questionnaire      Feeling of Stress : Patient declined   Housing Stability: Unknown (4/22/2024)     Housing Stability Vital Sign      Unable to Pay for Housing in the Last Year: Patient declined               Review of patient's allergies indicates:   Allergen Reactions    Oxycodone         Other reaction(s): Unknown    Promethazine Other (See Comments)         Medications Reviewed: see MAR     Focused Physical Exam         Vitals:     06/04/24 0808   BP: 128/81   Pulse: 80      Body mass index is 27.48 kg/m². Weight: 84.4 kg (186 lb 1.1 oz) Height: 5' 9" (175.3 cm)         Abdomen: Soft, non-tender, nondistended, no CVA tenderness  RRR  CTAB    10 pt ros neg except as noted         Assessment/Diagnosis:     1. Ureteropelvic junction (UPJ) obstruction, left  Procedure Order to Urology     Procedure Order to Urology     Urinalysis     Urinalysis Microscopic     CULTURE, URINE     Procedure Order to Urology       2. Recurrent UTI  Urinalysis     Urinalysis Microscopic     CULTURE, URINE       3. Nephrolithiasis  X-Ray Abdomen AP 1 View             Plans:  Extensive review of all prior urologic workup done by Dr. Avery and NP, noting previous concern for mild UPJ obstruction, lately worsened with progressive dilation on imaging and significant obstruction on nuclear medicine renal scan, all found on workup for recurrent UTI     Discussed/reviewed all management options for UPJO including endoscopic management such as chronic stenting with stent exchanges, nephrostomy tube drainage, all which bypass obstruction, as well as strategies to relieve obstruction such as an endopyelotomy and more definitive reconstructive management such as pyeloplasty. We did discuss that primary endopyelotomy has an approximate 40% chance of success whereas primary pyeloplasty has an " approximate 85-90%+ chance of success, and endopyelotomy is usually reserved for recurrence after pyeloplasty.    After reviewing all images with patient and discussion of treatment options, she elected to proceed with robotic pyeloplasty for her L UPJ obstruction.  Imaging reviewed does have concern more for intrinsic narrowing than crossing vessel     We discussed the procedure in detail as well as lawrence/drain/stent management, overnight stay in the hospital, and postoperative recovery and restrictions. We discussed the risks and benefits of the operation including pain, infection, bleeding, scarring, anastamotic leak/urine leak, recurrence of stricture and need for future procedures, damage to surrounding intraabdominal structures, hernia, postoperative ileus. If crossing vessel, risk of bleeding, vascular complications, and in rare cases potential need for nephrectomy if any injury to hilar vessels that cannot be repaired.    We did also discuss that at the beginning of the procedure a cystoscopy with retrograde pyelogram will be performed to place a ureteral catheter within the ureter before transferring to the robotic operative room, as this would be used intraoperatively to facilitate stent placement.  As well, discuss possible concurrent pyelolithotomy.  The stones collecting system are small, and certainly the 1 in the dependent renal pelvis may drain out upon opening pyelotomy.  As the others are quite small, risk of further pyelolithotomy manipulation in the collecting system may outweigh benefits.  Will get KUB with preop to be able to assess if stone burden is visible on x-ray and able to be followed in the future if any are to remain.     All questions she had were answered in detail and appropriate informed consent obtained. Handout/patient literature provided  Will start with cysto/rpg/ureteral catheter placement for intraoperative stent placement and explained this portion of the procedure in detail  as well.     L robotic pyeloplasty scheduled on 7/31/24 per pt request.  Given her recent infections, and her interim intermittent dysuria, as well, she will need ago today to lab to provide urine specimen.  Will chart check results of UA micro and urine culture and if positive, treat and then start low-dose daily suppression pending the procedure based on cultures, and if the culture is negative today, will start low-dose prophylactic antibiotics to minimize risk of interim infection leading up to relief of obstruction. In interim if any left flank pain, uti, fever, chills, should notify us immediately  As well, with history of SVT and ablation, followed by Cardiology, will cc for preoperative clearance.  Up to provider discretion if safe to clear for intra-abdominal laparoscopic procedure under general anesthesia based on recent workup, or if needs interim visit for clearance.  Patient notes full workup 6 months ago     As well, discussed all stent symptoms and postoperative stent management leaving it indwelling for about 6 weeks status post repair.  Cystoscopy with stent removal at the ASC was booked on 9/10/24 as well    Kub with punctate 2mm upper and mid pole stone

## 2024-08-01 LAB
ANION GAP SERPL CALC-SCNC: 9 MMOL/L (ref 8–16)
BASOPHILS # BLD AUTO: 0.01 K/UL (ref 0–0.2)
BASOPHILS NFR BLD: 0.1 % (ref 0–1.9)
BODY FLUID SOURCE, CREATININE: NORMAL
BUN SERPL-MCNC: 6 MG/DL (ref 6–20)
CALCIUM SERPL-MCNC: 8.6 MG/DL (ref 8.7–10.5)
CHLORIDE SERPL-SCNC: 109 MMOL/L (ref 95–110)
CO2 SERPL-SCNC: 22 MMOL/L (ref 23–29)
CREAT FLD-MCNC: 0.8 MG/DL
CREAT SERPL-MCNC: 0.8 MG/DL (ref 0.5–1.4)
DIFFERENTIAL METHOD BLD: ABNORMAL
EOSINOPHIL # BLD AUTO: 0 K/UL (ref 0–0.5)
EOSINOPHIL NFR BLD: 0.1 % (ref 0–8)
ERYTHROCYTE [DISTWIDTH] IN BLOOD BY AUTOMATED COUNT: 12.2 % (ref 11.5–14.5)
EST. GFR  (NO RACE VARIABLE): >60 ML/MIN/1.73 M^2
GLUCOSE SERPL-MCNC: 119 MG/DL (ref 70–110)
HCT VFR BLD AUTO: 32.8 % (ref 37–48.5)
HGB BLD-MCNC: 10.8 G/DL (ref 12–16)
IMM GRANULOCYTES # BLD AUTO: 0.03 K/UL (ref 0–0.04)
IMM GRANULOCYTES NFR BLD AUTO: 0.4 % (ref 0–0.5)
LYMPHOCYTES # BLD AUTO: 1.6 K/UL (ref 1–4.8)
LYMPHOCYTES NFR BLD: 18.5 % (ref 18–48)
MAGNESIUM SERPL-MCNC: 2 MG/DL (ref 1.6–2.6)
MCH RBC QN AUTO: 30 PG (ref 27–31)
MCHC RBC AUTO-ENTMCNC: 32.9 G/DL (ref 32–36)
MCV RBC AUTO: 91 FL (ref 82–98)
MONOCYTES # BLD AUTO: 0.7 K/UL (ref 0.3–1)
MONOCYTES NFR BLD: 8 % (ref 4–15)
NEUTROPHILS # BLD AUTO: 6.1 K/UL (ref 1.8–7.7)
NEUTROPHILS NFR BLD: 72.9 % (ref 38–73)
NRBC BLD-RTO: 0 /100 WBC
PHOSPHATE SERPL-MCNC: 3.2 MG/DL (ref 2.7–4.5)
PLATELET # BLD AUTO: 230 K/UL (ref 150–450)
PMV BLD AUTO: 10.1 FL (ref 9.2–12.9)
POTASSIUM SERPL-SCNC: 3.9 MMOL/L (ref 3.5–5.1)
RBC # BLD AUTO: 3.6 M/UL (ref 4–5.4)
SODIUM SERPL-SCNC: 140 MMOL/L (ref 136–145)
WBC # BLD AUTO: 8.39 K/UL (ref 3.9–12.7)

## 2024-08-01 PROCEDURE — 84100 ASSAY OF PHOSPHORUS: CPT | Performed by: UROLOGY

## 2024-08-01 PROCEDURE — 85025 COMPLETE CBC W/AUTO DIFF WBC: CPT | Performed by: UROLOGY

## 2024-08-01 PROCEDURE — 94799 UNLISTED PULMONARY SVC/PX: CPT

## 2024-08-01 PROCEDURE — 36415 COLL VENOUS BLD VENIPUNCTURE: CPT | Performed by: UROLOGY

## 2024-08-01 PROCEDURE — 94761 N-INVAS EAR/PLS OXIMETRY MLT: CPT

## 2024-08-01 PROCEDURE — 63600175 PHARM REV CODE 636 W HCPCS: Performed by: UROLOGY

## 2024-08-01 PROCEDURE — 80048 BASIC METABOLIC PNL TOTAL CA: CPT | Performed by: UROLOGY

## 2024-08-01 PROCEDURE — 83735 ASSAY OF MAGNESIUM: CPT | Performed by: UROLOGY

## 2024-08-01 PROCEDURE — 25000003 PHARM REV CODE 250: Performed by: UROLOGY

## 2024-08-01 PROCEDURE — 82570 ASSAY OF URINE CREATININE: CPT | Performed by: UROLOGY

## 2024-08-01 PROCEDURE — S5010 5% DEXTROSE AND 0.45% SALINE: HCPCS | Performed by: UROLOGY

## 2024-08-01 RX ORDER — TRAMADOL HYDROCHLORIDE 50 MG/1
50 TABLET ORAL EVERY 8 HOURS PRN
Qty: 8 TABLET | Refills: 0 | Status: SHIPPED | OUTPATIENT
Start: 2024-08-01

## 2024-08-01 RX ORDER — SUMATRIPTAN 50 MG/1
50 TABLET, FILM COATED ORAL ONCE
Status: COMPLETED | OUTPATIENT
Start: 2024-08-01 | End: 2024-08-01

## 2024-08-01 RX ORDER — SIMETHICONE 80 MG
80 TABLET,CHEWABLE ORAL 3 TIMES DAILY PRN
COMMUNITY
Start: 2024-08-01

## 2024-08-01 RX ORDER — TAMSULOSIN HYDROCHLORIDE 0.4 MG/1
0.4 CAPSULE ORAL DAILY
Qty: 30 CAPSULE | Refills: 1 | Status: SHIPPED | OUTPATIENT
Start: 2024-08-01 | End: 2024-09-30

## 2024-08-01 RX ORDER — POLYETHYLENE GLYCOL 3350 17 G/17G
17 POWDER, FOR SOLUTION ORAL DAILY
COMMUNITY
Start: 2024-08-02

## 2024-08-01 RX ORDER — ACETAMINOPHEN 325 MG/1
650 TABLET ORAL EVERY 6 HOURS PRN
COMMUNITY
Start: 2024-08-01

## 2024-08-01 RX ORDER — DOCUSATE SODIUM 100 MG/1
100 CAPSULE, LIQUID FILLED ORAL 2 TIMES DAILY
COMMUNITY
Start: 2024-08-01

## 2024-08-01 RX ADMIN — FLUOXETINE HYDROCHLORIDE 20 MG: 20 CAPSULE ORAL at 09:08

## 2024-08-01 RX ADMIN — ACETAMINOPHEN 1000 MG: 10 INJECTION INTRAVENOUS at 05:08

## 2024-08-01 RX ADMIN — KETOROLAC TROMETHAMINE 15 MG: 30 INJECTION, SOLUTION INTRAMUSCULAR at 05:08

## 2024-08-01 RX ADMIN — SUMATRIPTAN SUCCINATE 50 MG: 50 TABLET ORAL at 06:08

## 2024-08-01 RX ADMIN — DEXTROSE AND SODIUM CHLORIDE: 5; 450 INJECTION, SOLUTION INTRAVENOUS at 06:08

## 2024-08-01 RX ADMIN — DOCUSATE SODIUM 100 MG: 100 CAPSULE, LIQUID FILLED ORAL at 09:08

## 2024-08-01 RX ADMIN — SIMETHICONE 160 MG: 80 TABLET, CHEWABLE ORAL at 09:08

## 2024-08-01 RX ADMIN — CEFAZOLIN 2 G: 2 INJECTION, POWDER, FOR SOLUTION INTRAMUSCULAR; INTRAVENOUS at 11:08

## 2024-08-01 RX ADMIN — FAMOTIDINE 20 MG: 20 TABLET, FILM COATED ORAL at 09:08

## 2024-08-01 RX ADMIN — KETOROLAC TROMETHAMINE 15 MG: 30 INJECTION, SOLUTION INTRAMUSCULAR at 01:08

## 2024-08-01 RX ADMIN — ONDANSETRON 4 MG: 2 INJECTION INTRAMUSCULAR; INTRAVENOUS at 01:08

## 2024-08-01 RX ADMIN — CEFAZOLIN 2 G: 2 INJECTION, POWDER, FOR SOLUTION INTRAMUSCULAR; INTRAVENOUS at 02:08

## 2024-08-01 NOTE — NURSING
Attempted to get patient up to ambulate prior to giving nightly medications. Patient requested to get IV tylenol dose and allow her rest for a little bit before ambulating. Educated patient on importance of ambulating at least once during this shift. Verbalized understanding. Will attempt to ambulate patient again.

## 2024-08-01 NOTE — DISCHARGE SUMMARY
[facility]/Surg  Urology  Discharge Summary      Patient Name: Jessy Mayberry  MRN: [id number]  Admission Date: 7/31/2024  Hospital Length of Stay: 1 days  Discharge Date and Time:  08/01/2024 12:54 PM  Attending Physician: Kemar Sebastian MD  Discharging Provider: Kemar Sebastian MD  Primary Care Physician: Roger Nation MD    HPI:   Ms Smiley is a 53 yo F with longstanding mild left UPJ obstruction with history of kidney stones and recurrent urinary tract infections recently more symptomatic and more recurrent infections found to have significant increase in renal pelvic dilation and obstruction of the left collecting system with nuclear medicine scan having clinically significant obstruction at greater than 15 minutes of T1 half were previously it did not.  After extensive management discussion with the patient, she elected to proceed with right robotic-assisted laparoscopic pyeloplasty after discussion of the risks and benefits. Also discussed possible concurrent pyelolithotomy/stone extraction with nephroscopy. All questions answered and appropriate informed consent obtained.     Procedure(s) (LRB):  ROBOTIC PYELOPLASTY (Left)  CYSTOSCOPY, WITH RETROGRADE PYELOGRAM AND URETERAL STENT INSERTION (Left)  ROBOTIC PYELOLITHOTOMY (Left)     Indwelling Lines/Drains at time of discharge:   Lines/Drains/Airways       Drain  Duration                            Ureteral Drain/Stent 07/31/24 Left ureter 5 Fr. 1 day                    Hospital Course   Convalesced well medical-surgical floor.  Postop day 0 she did have some initial postop nausea, controlled after a few doses of Zofran.  She did get out of bed to chair and was ambulatory overnight postop day 0-1.  Labs and vital stable.  Mild dilutional trend down in H/H by morning on IV fluids which were discontinued and she tolerated some breakfast, but remained ambulatory throughout the morning, pain controlled, pass flatus.  Smith catheter removed,  voided without any significant flank pain.  OLIMPIA drain only put out 15 cc overnight, and had only put out about 10-15 cc through the morning after ambulating and voiding, and OLIMPIA cream creatinine was equivalent with serum/negative for urine leak as expected.  She was able to tolerate more lunch than breakfast without any increasing abdominal distention or discomfort.  Voiding spontaneously.  Ambulatory and passing flatus.  Discharged home in stable condition after OLIMPIA drain removed.    Pending Diagnostic Studies:       Procedure Component Value Units Date/Time    Specimen to Pathology - Surgery [[id number]] Collected: 07/31/24 1112    Order Status: Sent Lab Status: No result     Specimen: Tissue             There are no hospital problems to display for this patient.        Discharged Condition: good    Disposition: Home or Self Care    Follow Up:   Follow-up Information       Kemar Sebastian MD Follow up on 9/10/2024.    Specialty: Urology  Why: for stent removal and postop check at [facility] - no prep or fasting needed - will get call friday prior with afternoon arrival time  Contact information:  [address] [place] [address] [address]  [facility] [address]  [phone]                           Patient Instructions:      No dressing needed   Order Comments: Will see surgical glue over incisions eventually start to peel, then ok at that time to assist in removing in shower     Change dressing (specify)   Order Comments: To drain site only as needed for drainage for first few days, bandaid after first 1-2 dressing changes,  then when drainage stops leave open to air     Activity as tolerated   Order Comments: 1. Daily bowel regimen until regular again - use Colace 100mg 2x/day stool softener (start this evening) and Miralax 1 cap daily (start in AM). Both are OTC. Continue stool softeners and miralax until regular again, though If no BM in 3-4 days at home, take milk of magnesia as directed. Do not push or strain.   2. No  "lifting/pushing/pulling >10 lbs x 4 weeks  3. No driving if needing Rx pain meds, otherwise ok  4. No aspirin/fish oil/Rocky/bloodthinners x3 days.   5. Ok to shower. pat incisions dry. No baths/soaks. Can help "surgical glue" peel off in shower starting after 1 week or once starts to flake up (help it along after 1 week). Stitches will dissolve on their own. No ointments  6. Drink a lot of water to keep urine clear - though blood in urine is expected as long as stent is in place.  May be intermittent.  For increasing blood in urine, increase hydration and decrease activity.  7. Take incentive spiromoter home and continue use to prevent pneumonia  8. Alternate advil/motrin/ibuprofen (400-600 mg every 6 hours) with tylenol (500-650mg every 6 hours) so something available every 3hr if needed, before resorting to Rx pain meds.   - in the first 24 to 48 hours, is ok to schedule these while awake, then move to "as needed"  9. May feel intermittent flank pain - ESPECIALLY during and just after urinating. "Stent pain". Take flomax nightly for ureteral relaxation to decrease this, remain well hydrated, and use antiinflammatories when this happens. And avoid constipation (see number 1)   - if any dizziness or low blood pressure with flomax use, stop taking. Rare 10% of pts  10. May have some more urgency frequency while stent is in place  11. Use your ceftin 250 to take 5 full days of full dose antibiotics aka 500mg 2x/day (aka 2 capsules twice daily) x5 days starting with dose tonight, then can STOP IT     Medications:  Reconciled Home Medications:      Medication List        START taking these medications      acetaminophen 325 MG tablet  Commonly known as: TYLENOL  Take 2 tablets (650 mg total) by mouth every 6 (six) hours as needed (pain -- alternate with ibuprofen.motrin.advil 400-600mg every 6  hours so something avail every 3).     docusate sodium 100 MG capsule  Commonly known as: COLACE  Take 1 capsule (100 mg total) " by mouth 2 (two) times daily.     polyethylene glycol 17 gram Pwpk  Commonly known as: GLYCOLAX  Take 17 g by mouth once daily.  Start taking on: August 2, 2024     simethicone 80 MG chewable tablet  Commonly known as: MYLICON  Take 1 tablet (80 mg total) by mouth 3 (three) times daily as needed (gas pain/bloating (aka gas x)).     tamsulosin 0.4 mg Cap  Commonly known as: FLOMAX  Take 1 capsule (0.4 mg total) by mouth once daily.     traMADoL 50 mg tablet  Commonly known as: ULTRAM  Take 1 tablet (50 mg total) by mouth every 8 (eight) hours as needed (pain not relieved by otc meds).            CONTINUE taking these medications      cefUROXime 250 MG tablet  Commonly known as: CEFTIN  Take 1 tablet (250 mg total) by mouth once daily.     cyanocobalamin 1,000 mcg/mL injection  INJECT 1 ML ( 1,000 MCG TOTAL ) INTO THE MUSCLE EVERY 28 DAYS.     * dextroamphetamine-amphetamine 20 MG 24 hr capsule  Commonly known as: ADDERALL XR     * dextroamphetamine-amphetamine 20 mg tablet  Commonly known as: ADDERALL     estradioL 0.01 % (0.1 mg/gram) vaginal cream  Commonly known as: ESTRACE  Place 1 g vaginally once daily. Apply pea sized amount up to second knuckle. Apply nightly for 2 weeks then every other night.     famotidine 20 MG tablet  Commonly known as: PEPCID  Take 20 mg by mouth 2 (two) times daily.     FLUoxetine 20 MG capsule  Take 1 capsule (20 mg total) by mouth once daily.     loratadine 10 mg tablet  Commonly known as: CLARITIN     propranoloL 10 MG tablet  Commonly known as: INDERAL     sumatriptan 25 MG Tab  Commonly known as: IMITREX  Take 1 tablet (25 mg total) by mouth every 2 (two) hours as needed.     triamcinolone acetonide 0.025% 0.025 % cream  Commonly known as: KENALOG  Apply topically 2 (two) times daily.     TUMS ORAL  Take by mouth.           * This list has 2 medication(s) that are the same as other medications prescribed for you. Read the directions carefully, and ask your doctor or other care  provider to review them with you.                  Time spent on the discharge of patient: 40   minutes    Kemar Sebastian MD  Urology  [facility]/Surg

## 2024-08-01 NOTE — NURSING
Lawrence removed without difficulty, lawrence tip intact. Pt educated on need to void with in 6 hours of lawrence removal, pt verbalized understanding.

## 2024-08-01 NOTE — NURSING
Patient complaining of headache and states tylenol doesn't work that she take Imitrex at home for HA's. Requesting a dose. Kemar STILES MD notified and orders for home dose given.

## 2024-08-01 NOTE — CARE UPDATE
07/31/24 2017   Patient Assessment/Suction   Level of Consciousness (AVPU) alert   Respiratory Effort Unlabored   Expansion/Accessory Muscles/Retractions expansion symmetric;no use of accessory muscles   All Lung Fields Breath Sounds Anterior:;Lateral:;diminished;equal bilaterally   Rhythm/Pattern, Respiratory pattern regular;unlabored   Cough Frequency infrequent   Cough Type nonproductive;good   PRE-TX-O2   Device (Oxygen Therapy) room air   SpO2 (!) 94 %   Pulse Oximetry Type Intermittent   $ Pulse Oximetry - Single Charge Pulse Oximetry - Single   Pulse 77   Resp 18   Incentive Spirometer   $ Incentive Spirometer Charges done with encouragement   Incentive Spirometer Predicted Level (mL) 2160   Administration (IS) instruction provided, follow-up;proper technique demonstrated   Number of Repetitions (IS) 6   Level Incentive Spirometer (mL) 2250   Patient Tolerance (IS) good;no adverse signs/symptoms present

## 2024-08-01 NOTE — PLAN OF CARE
Follow up arranged by Dr [facility]  Pt is clear for dc from [facility]       08/01/24 8803   Final Note   Assessment Type Final Discharge Note   Anticipated Discharge Disposition Home   Hospital Resources/Appts/Education Provided Appointments scheduled and added to AVS

## 2024-08-01 NOTE — NURSING
Patient ambulated in room and ambulate 1 lap around nursing unit with out difficulty. Tolerated well. NAD noted.

## 2024-08-01 NOTE — NURSING
Attempted to get patient to ambulate again. Patient requested to get scheduled toradol first and wait little bit after before ambulating. Educated patient again on importance of ambulating. Patient verbalized understanding. Will attempt again to ambulate patient.

## 2024-08-01 NOTE — PLAN OF CARE
Plan of care reviewed with patient, verbalized understanding. IV intact and patent with IVF infusing as ordered, no s/s infection or infiltration noted to site. ABX given as ordered. Meds given per MAR. Ambulated with x1 assist. Pain managed with scheduled medication. IS at bedside and encouraged use. NAD noted. Purposeful q2hr rounding done. Safety maintained with side rails up x3, bed wheels locked, bed in lowest position, bed alarm set, slip resistant socks maintained, and call light with in reach of patient. Patient educated to call for assistance when needed, verbalized understanding. Patient remains free from falls. No further needs expressed at this time. Will continue to monitor.     Problem: Adult Inpatient Plan of Care  Goal: Plan of Care Review  Outcome: Progressing  Goal: Patient-Specific Goal (Individualized)  Outcome: Progressing  Goal: Absence of Hospital-Acquired Illness or Injury  Outcome: Progressing  Goal: Optimal Comfort and Wellbeing  Outcome: Progressing  Goal: Readiness for Transition of Care  Outcome: Progressing     Problem: Infection  Goal: Absence of Infection Signs and Symptoms  Outcome: Progressing     Problem: Wound  Goal: Optimal Coping  Outcome: Progressing  Goal: Optimal Functional Ability  Outcome: Progressing  Goal: Absence of Infection Signs and Symptoms  Outcome: Progressing  Goal: Improved Oral Intake  Outcome: Progressing  Goal: Optimal Pain Control and Function  Outcome: Progressing  Goal: Skin Health and Integrity  Outcome: Progressing  Goal: Optimal Wound Healing  Outcome: Progressing     Problem: Fall Injury Risk  Goal: Absence of Fall and Fall-Related Injury  Outcome: Progressing     Problem: Pain Acute  Goal: Optimal Pain Control and Function  Outcome: Progressing

## 2024-08-02 ENCOUNTER — PATIENT MESSAGE (OUTPATIENT)
Dept: UROLOGY | Facility: CLINIC | Age: 54
End: 2024-08-02
Payer: OTHER GOVERNMENT

## 2024-08-02 VITALS
HEART RATE: 70 BPM | DIASTOLIC BLOOD PRESSURE: 64 MMHG | WEIGHT: 190 LBS | TEMPERATURE: 98 F | BODY MASS INDEX: 28.14 KG/M2 | HEIGHT: 69 IN | SYSTOLIC BLOOD PRESSURE: 143 MMHG | OXYGEN SATURATION: 98 % | RESPIRATION RATE: 16 BRPM

## 2024-08-02 RX ORDER — ONDANSETRON 4 MG/1
4 TABLET, ORALLY DISINTEGRATING ORAL EVERY 8 HOURS PRN
Qty: 10 TABLET | Refills: 0 | Status: SHIPPED | OUTPATIENT
Start: 2024-08-02

## 2024-08-02 RX ORDER — CEFUROXIME AXETIL 500 MG/1
500 TABLET ORAL 2 TIMES DAILY
Qty: 10 TABLET | Refills: 0 | Status: SHIPPED | OUTPATIENT
Start: 2024-08-02

## 2024-08-14 ENCOUNTER — TELEPHONE (OUTPATIENT)
Dept: PHYSICAL MEDICINE AND REHAB | Facility: CLINIC | Age: 54
End: 2024-08-14
Payer: OTHER GOVERNMENT

## 2024-08-14 NOTE — TELEPHONE ENCOUNTER
PT is on the phone with Estuardo. PT was advised to reschedule appt due to  authorization needed for appt to be approved first. PT refused to reschedule and requested her appt not be changed. Left appt where it is and will remove at 12pm if not authorized.

## 2024-08-14 NOTE — TELEPHONE ENCOUNTER
----- Message from Digna Agustin sent at 8/14/2024  9:59 AM CDT -----  Contact: pt  Type: Needs Medical Advice    Who Called: pt    Best Call Back Number:[phone]    Additional Information: Requesting a call back regarding pt that has appt today.  this pt said someone called her stating there is no referral and the appt would need to be cancelled (no notes on chart). Pt is needing the office to call her to confirm she can be seen today.  (Pt has referral attached to appt)    Please Advise- Thank you

## 2024-08-15 ENCOUNTER — PATIENT MESSAGE (OUTPATIENT)
Dept: UROLOGY | Facility: CLINIC | Age: 54
End: 2024-08-15
Payer: OTHER GOVERNMENT

## 2024-08-16 ENCOUNTER — CLINICAL SUPPORT (OUTPATIENT)
Dept: UROLOGY | Facility: CLINIC | Age: 54
End: 2024-08-16
Payer: OTHER GOVERNMENT

## 2024-08-16 ENCOUNTER — PATIENT MESSAGE (OUTPATIENT)
Dept: FAMILY MEDICINE | Facility: CLINIC | Age: 54
End: 2024-08-16
Payer: OTHER GOVERNMENT

## 2024-08-16 ENCOUNTER — LAB VISIT (OUTPATIENT)
Dept: LAB | Facility: HOSPITAL | Age: 54
End: 2024-08-16
Attending: UROLOGY
Payer: OTHER GOVERNMENT

## 2024-08-16 DIAGNOSIS — R82.998 CELLS AND CASTS IN URINE: ICD-10-CM

## 2024-08-16 DIAGNOSIS — R82.998 CELLS AND CASTS IN URINE: Primary | ICD-10-CM

## 2024-08-16 DIAGNOSIS — R10.30 INGUINAL PAIN, UNSPECIFIED LATERALITY: Primary | ICD-10-CM

## 2024-08-16 LAB
BACTERIA #/AREA URNS HPF: ABNORMAL /HPF
BILIRUB UR QL STRIP: NEGATIVE
CLARITY UR: CLEAR
COLOR UR: YELLOW
GLUCOSE UR QL STRIP: NEGATIVE
HGB UR QL STRIP: ABNORMAL
HYALINE CASTS #/AREA URNS LPF: 0 /LPF
KETONES UR QL STRIP: NEGATIVE
LEUKOCYTE ESTERASE UR QL STRIP: ABNORMAL
MICROSCOPIC COMMENT: ABNORMAL
NITRITE UR QL STRIP: NEGATIVE
PH UR STRIP: 7 [PH] (ref 5–8)
PROT UR QL STRIP: ABNORMAL
RBC #/AREA URNS HPF: 20 /HPF (ref 0–4)
SP GR UR STRIP: 1.01 (ref 1–1.03)
UNIDENT CRYS URNS QL MICRO: 2
URN SPEC COLLECT METH UR: ABNORMAL
UROBILINOGEN UR STRIP-ACNC: NEGATIVE EU/DL
WBC #/AREA URNS HPF: 58 /HPF (ref 0–5)

## 2024-08-16 PROCEDURE — 99999 PR PBB SHADOW E&M-EST. PATIENT-LVL I: CPT | Mod: PBBFAC,,,

## 2024-08-16 PROCEDURE — 99211 OFF/OP EST MAY X REQ PHY/QHP: CPT | Mod: PBBFAC,PO

## 2024-08-16 PROCEDURE — 87086 URINE CULTURE/COLONY COUNT: CPT | Performed by: UROLOGY

## 2024-08-16 PROCEDURE — 81000 URINALYSIS NONAUTO W/SCOPE: CPT | Performed by: UROLOGY

## 2024-08-18 LAB — BACTERIA UR CULT: NO GROWTH

## 2024-08-27 ENCOUNTER — CLINICAL SUPPORT (OUTPATIENT)
Dept: UROLOGY | Facility: CLINIC | Age: 54
End: 2024-08-27
Payer: OTHER GOVERNMENT

## 2024-08-27 ENCOUNTER — LAB VISIT (OUTPATIENT)
Dept: LAB | Facility: HOSPITAL | Age: 54
End: 2024-08-27
Attending: UROLOGY
Payer: OTHER GOVERNMENT

## 2024-08-27 ENCOUNTER — PATIENT MESSAGE (OUTPATIENT)
Dept: UROLOGY | Facility: CLINIC | Age: 54
End: 2024-08-27

## 2024-08-27 DIAGNOSIS — R82.998 CELLS AND CASTS IN URINE: ICD-10-CM

## 2024-08-27 DIAGNOSIS — R82.998 CELLS AND CASTS IN URINE: Primary | ICD-10-CM

## 2024-08-27 LAB
BACTERIA #/AREA URNS HPF: ABNORMAL /HPF
BILIRUB UR QL STRIP: NEGATIVE
CLARITY UR: ABNORMAL
COLOR UR: YELLOW
GLUCOSE UR QL STRIP: NEGATIVE
GRAM STN SPEC: NORMAL
HGB UR QL STRIP: ABNORMAL
HYALINE CASTS #/AREA URNS LPF: 0 /LPF
KETONES UR QL STRIP: NEGATIVE
LEUKOCYTE ESTERASE UR QL STRIP: ABNORMAL
MICROSCOPIC COMMENT: ABNORMAL
NITRITE UR QL STRIP: NEGATIVE
PH UR STRIP: 6 [PH] (ref 5–8)
PROT UR QL STRIP: ABNORMAL
RBC #/AREA URNS HPF: 15 /HPF (ref 0–4)
SP GR UR STRIP: 1.01 (ref 1–1.03)
URN SPEC COLLECT METH UR: ABNORMAL
UROBILINOGEN UR STRIP-ACNC: NEGATIVE EU/DL
WBC #/AREA URNS HPF: >100 /HPF (ref 0–5)
WBC CLUMPS URNS QL MICRO: ABNORMAL

## 2024-08-27 PROCEDURE — 51701 INSERT BLADDER CATHETER: CPT | Mod: S$PBB,79,, | Performed by: UROLOGY

## 2024-08-27 PROCEDURE — 87205 SMEAR GRAM STAIN: CPT | Performed by: UROLOGY

## 2024-08-27 PROCEDURE — 81000 URINALYSIS NONAUTO W/SCOPE: CPT | Performed by: UROLOGY

## 2024-08-27 PROCEDURE — 87086 URINE CULTURE/COLONY COUNT: CPT | Performed by: UROLOGY

## 2024-08-27 PROCEDURE — 51701 INSERT BLADDER CATHETER: CPT | Mod: PBBFAC,PO

## 2024-08-27 PROCEDURE — 87186 SC STD MICRODIL/AGAR DIL: CPT | Performed by: UROLOGY

## 2024-08-27 PROCEDURE — 99499 UNLISTED E&M SERVICE: CPT | Mod: S$PBB,,, | Performed by: UROLOGY

## 2024-08-27 RX ORDER — AMOXICILLIN AND CLAVULANATE POTASSIUM 875; 125 MG/1; MG/1
1 TABLET, FILM COATED ORAL 2 TIMES DAILY
Qty: 20 TABLET | Refills: 0 | Status: SHIPPED | OUTPATIENT
Start: 2024-08-27 | End: 2024-08-29

## 2024-08-27 RX ORDER — PHENAZOPYRIDINE HYDROCHLORIDE 200 MG/1
200 TABLET, FILM COATED ORAL 3 TIMES DAILY PRN
Qty: 15 TABLET | Refills: 0 | Status: SHIPPED | OUTPATIENT
Start: 2024-08-27 | End: 2024-09-06

## 2024-08-27 NOTE — PROGRESS NOTES
PT drape and prepped in sterile fashion   10 Turkish Cath placed into bladder  60 cc Urine obtained   Cath removed   Specimen prepared for lab

## 2024-08-28 LAB — BACTERIA UR CULT: ABNORMAL

## 2024-08-29 RX ORDER — CEPHALEXIN 500 MG/1
1000 CAPSULE ORAL 3 TIMES DAILY
Qty: 50 CAPSULE | Refills: 0 | Status: SHIPPED | OUTPATIENT
Start: 2024-08-29

## 2024-08-30 NOTE — TELEPHONE ENCOUNTER
Spoke with patient, explained she will need to  new Rx of Keflex and stop the Augmentin. Please start Keflex ASAP. Patient voiced understanding and stated she will  as soon as the pharmacy opens today.

## 2024-08-30 NOTE — TELEPHONE ENCOUNTER
Had previously been in close communication via [employer] but did NOT read this message about her new urine culture and request to replace augmentin with new keflex Rx ASAP    Please call first thing and review and make sure takes 3 doses keflex today (even If already took am augmentin)

## 2024-09-03 ENCOUNTER — HOSPITAL ENCOUNTER (OUTPATIENT)
Facility: HOSPITAL | Age: 54
Discharge: HOME | End: 2024-09-03
Attending: UROLOGY | Admitting: UROLOGY
Payer: OTHER GOVERNMENT

## 2024-09-03 ENCOUNTER — PATIENT MESSAGE (OUTPATIENT)
Dept: SURGERY | Facility: HOSPITAL | Age: 54
End: 2024-09-03

## 2024-09-03 DIAGNOSIS — N13.5 URETEROPELVIC JUNCTION (UPJ) OBSTRUCTION, LEFT: ICD-10-CM

## 2024-09-03 LAB
BILIRUBIN, UA POC OHS: NEGATIVE
BLOOD, UA POC OHS: ABNORMAL
CLARITY, UA POC OHS: CLEAR
COLOR, UA POC OHS: YELLOW
GLUCOSE, UA POC OHS: NEGATIVE
KETONES, UA POC OHS: NEGATIVE
LEUKOCYTES, UA POC OHS: ABNORMAL
NITRITE, UA POC OHS: NEGATIVE
PH, UA POC OHS: 6
PROTEIN, UA POC OHS: 30
SPECIFIC GRAVITY, UA POC OHS: 1.01
UROBILINOGEN, UA POC OHS: 0.2

## 2024-09-03 PROCEDURE — 63600175 PHARM REV CODE 636 W HCPCS: Performed by: UROLOGY

## 2024-09-03 PROCEDURE — 52310 CYSTOSCOPY AND TREATMENT: CPT | Performed by: UROLOGY

## 2024-09-03 PROCEDURE — 87086 URINE CULTURE/COLONY COUNT: CPT | Performed by: UROLOGY

## 2024-09-03 PROCEDURE — 52310 CYSTOSCOPY AND TREATMENT: CPT | Mod: 58,,, | Performed by: UROLOGY

## 2024-09-03 PROCEDURE — 25000003 PHARM REV CODE 250: Performed by: UROLOGY

## 2024-09-03 RX ORDER — LIDOCAINE HYDROCHLORIDE 20 MG/ML
JELLY TOPICAL
Status: DISCONTINUED | OUTPATIENT
Start: 2024-09-03 | End: 2024-09-03 | Stop reason: HOSPADM

## 2024-09-03 RX ORDER — CEFTRIAXONE 2 G/1
1 INJECTION, POWDER, FOR SOLUTION INTRAMUSCULAR; INTRAVENOUS
Status: COMPLETED | OUTPATIENT
Start: 2024-09-03 | End: 2024-09-03

## 2024-09-03 RX ORDER — IBUPROFEN 600 MG/1
600 TABLET ORAL 3 TIMES DAILY
COMMUNITY

## 2024-09-03 RX ADMIN — CEFTRIAXONE SODIUM 1 G: 500 INJECTION, POWDER, FOR SOLUTION INTRAMUSCULAR; INTRAVENOUS at 12:09

## 2024-09-03 NOTE — PLAN OF CARE
Discharge instructions given to pt, verbalized understanding.  Refused fluids.  Lower back pain 3/10.  Ambulating out in no distress.

## 2024-09-03 NOTE — H&P
[facility] New Patient/H&P:      Jessy Mayberry is a 53 y.o. female who presents for evaluation of left UPJ obstruction to discuss pyeloplasty     She establish care with Dr. Avery in June of 2023 noting that she started having UTI/pyelo in March.  Symptoms would be flank pain not isolated to the right or left side, foul-smelling urine, frequency and urgency with associated fever up to 101.  Since March she is had at least 3 episodes.  Two of them are culture proven, E coli.  She received antibiotics for 10-14 days with each episode. Prior to this she denies any recurrent UTIs.  She does however state that as a teen she had a cystoscopy but she is not sure why. She also states that she has had blood in the urine on occasion for years found on her gynecology appointments.  Review of her urines in our system show that she has blood in the urine sometimes associated with UTI and sometimes not dating back to November 2022.  - CT in March 2023 when she had a UTI/pyelo and it showed normal right kidney and left hydro to UPJ with four left renal stones.  She has no history of kidney stones.  She does not think the pain is more on her left versus her right whenever she does have these episodes of pyelo.  - CT in June 2023 when she had UTI  showed that her right pelvis was larger than it was in March 2023.  High density area in the right UPJ ?  No previous CT urogram.  Still no ureteral stones.  5 days ago.  UTI risk factors:  Denies any pad usage but does state that she has some overactive bladder with urgency occasionally present over the last 4-5 months.  She is postmenopausal but postmenopausal since age 44.  Uses Estring vaginal suppository but infrequently.  Otherwise no other prevention for UTI taken.  Tends to urinate every 1-2 hours.  No diabetes.  No constipation.  No diarrhea.  PVR: 0. No divetirculitis. No previous vaginal surgeries other than leep. No smoking history.       After appt noted: low back  pain, noted worse on left and much worse after caffeine intake and occasional alcohol (wine). Avoiding both- drinking plenty water. Taking ibuprofen regularly   6/29/23 NM renal scan  Findings characteristic of dilated nonobstructed left renal collecting system. This can be seen with UPJ obstruction. Normal right nephrogram. Function: 54% left, 46% right.   She does however still has L flank pain that's most noticeable in the morning. Also when she did the renal scan had pain.   Started her on uti prevention- however she hasn't been consistent about it. Using cranberry but not UTIVA. She hasn't had any uti's. Sent her urine for cytology and ua marina. It showed 5 rbc and negative cytology.   MH for years. 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 Small LUP stones. Cysto neg 8/21/23.   7/29/2 CTU: The kidneys enhance symmetrically. Bilateral extrarenal pelvises unchanged. 1 cm left renal cyst. No hydronephrosis. Contrast is seen throughout the right renal collecting system and right ureter without filling defect. Contrast is seen throughout the left renal collecting system without filling defect. Contrast reaches the proximal left ureter on 19 minute delayed images. There is abrupt narrowing at the left UPJ (series 606, image 71). There is additionally cutoff of ureteral contrast at the level of the left gonadal vein (series 6, image 87) -- Findings concerning for left UPJ obstruction with severe narrowing at the junction, although contrast is seen in the proximal ureter on 19 minute delayed images. Additionally there is cutoff of contrast in the mid ureter at the level of the left gonadal vein concerning for another site of obstruction. However, there is no hydroureteronephrosis.      5/2/24 NP Rayes: treated for UTI 4/22/24 by PCP with macrobid Pt c/o dysuria, bladder spasms, urinary frequency and urgency that has been ongoing since completing antibiotic for recent UTI  Denies gross hematuria, flank pain,  fever, chills, nausea or vomiting. UA lg blood, sm leuk. Taking Utiva , d mannose, and probiotic. Not using estrace cream. Not taking myrbetriq, no OAB symptoms as baseline  - Pt had pain when IO cath attempted. Will sent voided urine for micro UA and culture. Start augmentin based on previous culture results  - CT RSS to evaluate kidney stones. Last imaging 7/2023  -F/u with GYN for possible vaginitis. Pt request diflucan for yeast infection with antibiotic use  -Continue UTI prevention supplements     CT 5/3/24  There is dilatation of the left renal collecting system and left extrarenal pelvis to the level of the left UVJ, once again suggestive of high-grade left UPJ obstruction and more pronounced in extent than was noted at the time of the prior study. There is a 3 mm non-obstructing stone present anteriorly in the interpolar region of the left kidney on series 2, image 45 and there is a 3 mm non-obstructing stone present in the interpolar region of the left kidney on series 2, image 50. There is a 4 mm new non-obstructing stone present within the dependent left extrarenal pelvis on series 2, image 59. There is an 11 mm probable cyst present within the lateral cortex of the interpolar region of the left kidney on series 2, image 47 which measures 17 HU on series 2, image 47, most likely a cyst, unchanged. There are no right renal stones identified. There is a right extrarenal pelvis present. The right ureter is normal in caliber and course without evidence of stones. The left ureter is normal in caliber and course without definite stones identified. There is diffuse bladder wall thickening present and there is perivesical fat stranding present, likely reflecting underlying cystitis, similar in extent to the prior study.      NM3 renal scan w lasix updated 5/14/24: The renographic curves demonstrate delayed time to peak activity bilaterally.  Time to peak activity on the left equals 8 minutes on the right 16  minutes.  There is normal excretion on the right following Lasix administration with no abnormal retention of activity at 30 minute interval.  Prolonged expiratory phase is observed on the left with abnormal retention of activity equal in 54% of maximum at 30 minutes. The left kidney accounts for 49% in the right kidney 51% global uptake.  The estimated renal plasma flow is 490 mL/min. In comparison to the previous examination, degree of dilatation of the left sided collecting system appears slightly greater.  Retention of activity within the left collecting system at the 30 minute interval has progressed compared to the prior year's examination.  - Mild increase in degree of dilatation of the left-sided collecting system compared to previous examination. Prolonged excretory phase on the left with abnormal retention of activity at the 30 minute interval also representing a detrimental change compared to prior study.     5/31/24: I started frequency 2 days ago and the burning started back today. Can we check for UTI - bacterial infection & do a urine culture ? Id like to stay ahead of it before it gets too bad.   - advised to go to urgent care as provider out of office     She presents today noting  Unable to provide urine specimen on arrival.  Still has intermittent dysuria.  Intermittent flank pain as above.  She had an SVT ablation 15 years ago.  She had a complete cardiac workup about 6 months ago.  She is followed by Dr. Nowak  No hematuria.  Inguinal hernia repair hx             Past Medical History:   Diagnosis Date    Adrenal insufficiency      Hypothyroidism      Neuropathy      Pituitary mass                     Past Surgical History:   Procedure Laterality Date    ANKLE SURGERY        CARDIAC ELECTROPHYSIOLOGY MAPPING AND ABLATION        CYSTOSCOPY N/A 8/21/2023     Procedure: CYSTOSCOPY;  Surgeon: Shyanne Avery MD;  Location: [facility] OR;  Service: Urology;  Laterality: N/A;    DILATION AND  CURETTAGE OF UTERUS         3 times    INGUINAL HERNIA REPAIR         twice    TONSILLECTOMY                         Family History   Problem Relation Name Age of Onset    Ovarian cancer Mother   28    Hypertension Mother        Coronary artery disease Mother        Hyperlipidemia Mother        Cancer Mother        Skin cancer Mother   75    Hypertension Father        Retinal detachment Father        Atrial fibrillation Sister        Rheum arthritis Daughter        Asthma Daughter        Breast cancer Maternal Aunt   42    Bone cancer Maternal Aunt        Cancer Maternal Aunt        Rheum arthritis Maternal Aunt        Hepatitis Maternal Aunt        Heart disease Maternal Grandmother        Hypertension Maternal Grandmother        Kidney failure Maternal Grandmother        Atrial fibrillation Maternal Grandmother        Hypertension Brother        Hypertension Son        Psoriasis Child        Melanoma Neg Hx        Lupus Neg Hx             Social History                Socioeconomic History    Marital status:    Tobacco Use    Smoking status: Never    Smokeless tobacco: Never   Substance and Sexual Activity    Alcohol use: Yes       Comment: occassionally    Drug use: Never    Sexual activity: Not Currently      Social Determinants of Health              Financial Resource Strain: Patient Declined (4/22/2024)     Overall Financial Resource Strain (CARDIA)      Difficulty of Paying Living Expenses: Patient declined   Food Insecurity: Patient Declined (4/22/2024)     Hunger Vital Sign      Worried About Running Out of Food in the Last Year: Patient declined      Ran Out of Food in the Last Year: Patient declined   Transportation Needs: Patient Declined (4/22/2024)     PRAPARE - Transportation      Lack of Transportation (Medical): Patient declined      Lack of Transportation (Non-Medical): Patient declined   Physical Activity: Unknown (4/22/2024)     Exercise Vital Sign      Days of Exercise per Week: Patient  "declined      Minutes of Exercise per Session: 0 min   Stress: Patient Declined (4/22/2024)     [employer] [place] [employer] - Occupational Stress Questionnaire      Feeling of Stress : Patient declined   Housing Stability: Unknown (4/22/2024)     Housing Stability Vital Sign      Unable to Pay for Housing in the Last Year: Patient declined                   Review of patient's allergies indicates:   Allergen Reactions    Oxycodone         Other reaction(s): Unknown    Promethazine Other (See Comments)         Medications Reviewed: see MAR     Focused Physical Exam           Vitals:     06/04/24 0808   BP: 128/81   Pulse: 80      Body mass index is 27.48 kg/m². Weight: 84.4 kg (186 lb 1.1 oz) Height: 5' 9" (175.3 cm)         Abdomen: Soft, non-tender, nondistended, no CVA tenderness  RRR  CTAB     10 pt ros neg except as noted         Assessment/Diagnosis:     1. Ureteropelvic junction (UPJ) obstruction, left  Procedure Order to Urology     Procedure Order to Urology     Urinalysis     Urinalysis Microscopic     CULTURE, URINE     Procedure Order to Urology       2. Recurrent UTI  Urinalysis     Urinalysis Microscopic     CULTURE, URINE       3. Nephrolithiasis  X-Ray Abdomen AP 1 View             Plans:  Extensive review of all prior urologic workup done by Dr. Avery and NP, noting previous concern for mild UPJ obstruction, lately worsened with progressive dilation on imaging and significant obstruction on nuclear medicine renal scan, all found on workup for recurrent UTI     Discussed/reviewed all management options for UPJO including endoscopic management such as chronic stenting with stent exchanges, nephrostomy tube drainage, all which bypass obstruction, as well as strategies to relieve obstruction such as an endopyelotomy and more definitive reconstructive management such as pyeloplasty. We did discuss that primary endopyelotomy has an approximate 40% chance of success whereas primary " pyeloplasty has an approximate 85-90%+ chance of success, and endopyelotomy is usually reserved for recurrence after pyeloplasty.    After reviewing all images with patient and discussion of treatment options, she elected to proceed with robotic pyeloplasty for her L UPJ obstruction.  Imaging reviewed does have concern more for intrinsic narrowing than crossing vessel     We discussed the procedure in detail as well as lawrence/drain/stent management, overnight stay in the hospital, and postoperative recovery and restrictions. We discussed the risks and benefits of the operation including pain, infection, bleeding, scarring, anastamotic leak/urine leak, recurrence of stricture and need for future procedures, damage to surrounding intraabdominal structures, hernia, postoperative ileus. If crossing vessel, risk of bleeding, vascular complications, and in rare cases potential need for nephrectomy if any injury to hilar vessels that cannot be repaired.    We did also discuss that at the beginning of the procedure a cystoscopy with retrograde pyelogram will be performed to place a ureteral catheter within the ureter before transferring to the robotic operative room, as this would be used intraoperatively to facilitate stent placement.  As well, discuss possible concurrent pyelolithotomy.  The stones collecting system are small, and certainly the 1 in the dependent renal pelvis may drain out upon opening pyelotomy.  As the others are quite small, risk of further pyelolithotomy manipulation in the collecting system may outweigh benefits.  Will get KUB with preop to be able to assess if stone burden is visible on x-ray and able to be followed in the future if any are to remain.     All questions she had were answered in detail and appropriate informed consent obtained. Handout/patient literature provided  Will start with cysto/rpg/ureteral catheter placement for intraoperative stent placement and explained this portion of the  procedure in detail as well.     L robotic pyeloplasty scheduled on 7/31/24 per pt request.  Given her recent infections, and her interim intermittent dysuria, as well, she will need ago today to lab to provide urine specimen.  Will chart check results of UA micro and urine culture and if positive, treat and then start low-dose daily suppression pending the procedure based on cultures, and if the culture is negative today, will start low-dose prophylactic antibiotics to minimize risk of interim infection leading up to relief of obstruction. In interim if any left flank pain, uti, fever, chills, should notify us immediately  As well, with history of SVT and ablation, followed by Cardiology, will cc for preoperative clearance.  Up to provider discretion if safe to clear for intra-abdominal laparoscopic procedure under general anesthesia based on recent workup, or if needs interim visit for clearance.  Patient notes full workup 6 months ago     As well, discussed all stent symptoms and postoperative stent management leaving it indwelling for about 6 weeks status post repair.  Cystoscopy with stent removal at the [facility] was booked on 9/10/24 as well     Kub with punctate 2mm upper and mid pole stone    7/31 pyloplasty pyelolithotomy    Hx utis  Has expresed concer  But ucx neg 7/23 and 8/16  Though 8/27 ucx positive with prev ecoli - on augmentin but not tested so vhsnged to keflex, 1g rocpehin givem ucx sent, and proceed with stent removal

## 2024-09-04 VITALS
RESPIRATION RATE: 17 BRPM | OXYGEN SATURATION: 97 % | BODY MASS INDEX: 28.15 KG/M2 | SYSTOLIC BLOOD PRESSURE: 135 MMHG | TEMPERATURE: 98 F | WEIGHT: 190.06 LBS | DIASTOLIC BLOOD PRESSURE: 82 MMHG | HEART RATE: 75 BPM | HEIGHT: 69 IN

## 2024-09-05 NOTE — OP NOTE
[facility] Urology Operative/Brief Discharge Note     Date: 9/3/24     Staff Surgeon: Kemar Sebastian MD     Pre-Op Diagnosis: right indwelling ureteral stent s/p robotic pyeloplasty     Post-Op Diagnosis: same     Procedure(s) Performed:   Cystoscopy with right ureteral stent removal     Specimen(s): urine culture      Anesthesia: local urojet       Findings: ureteral stent, removed      Estimated Blood Loss: none      Drains: none      Complications: none      Indications for procedure:   54-year-old F w/ hx rec UTIs and progressive R UPJO now status post R robotic pyeloplasty/pyelolithotomy on 7/31/24. Cleared UTI in May 2024 under care of VALENTINO Najera, and on further workup of UPJO per Dr Avery, worsening obstruction with stones seen in obstructed collecting system, and above procedures uncomplicated. Had UTI concerns postop which were attributed to stent symptoms given negative Ucx on 8/16/24 but then symptomatic with cloudy urine flank pain and fever for which Ucx + for similar MDR Ecoli UTI on 8/27/24 and has been on antibiotics with clinical improvement and improvement in UA    Procedure in detail:  After informed consent, the patient was placed in supine position and prepped and drapped in standard cystoscopic fashion and 2% xylocaine jelly was instilled into the urethra. A flexible cystoscope was passed into the bladder via the urethra.     Urethra appeared normal with meatus recessed and higher than expected orthotopic position, and trigone orthotopic with distal stent curl  seen emanating from right ureteral orifice without any calcifications on distal curl of stent.   2-pronged grasper was passed through the scope and used to grasp and remove the stent. Stent was inspected on the back table and found to be completely in tact. Patient tolerated procedure well. There were no complications.        Disposition: home.   New urine culture sent today to document clearance, but given UTI with stent concerning  for pyelonephritis given ascending infection with stent, should complete current course of Keflex times 10 days (started on Augmentin per previous sensitivities however [facility] panel does not test for Augmentin extensive sensitivities were sent out to [facility] which were still pending but on recommendation of ID had switch to Keflex last week on discussion of this, and she clinically improved).    We did review that post pyeloplasty, there can be residual dilation in the collecting system at three-month, but initial ultrasound would be at three-month.  Typically in the setting of UPJ obstruction monitoring would get ultrasound at three-month and plan at six-month with follow up at that time, however if three-month ultrasound was negative for hydronephrosis would push the following ultrasound out to 9 months and see at that time.  However given her stones present with obstruction, of which he had no history of stones before in her likely consequence of obstruction, of which the central renal pelvic was removed, and small nonobstructing stones may remain, as well as recurrent UTI history, will see her back in 3 months with KUB and renal ultrasound prior.  I will go ahead and set up an appointment with her managing urologist at six-month, so there was close follow-up for all of these issues, and should further surveillance imaging be needed, can be plan before that time.    We did also review that while UPJ obstruction can yield urinary tract infections, it was also possible that lower tract source is primary, and infections may ascend, even in the absence of stent.  She does have some urethral vaginal anatomic considerations which may predispose her to lower tract infection, and should continue all of her routine UTI prevention measures such as cranberry etcetera she has been doing previously.     Discharge home today status post uncomplicated procedure as above  Diet - stone prevention  Follow up:  NICHOLE/KUB in 3 mos  with me,  6 mos with Dr Avery (prn in interim with NP)  Instructions:   Drink lots of water, may have blood or burning for 24-48 hours, complete abx, stop flomax  Meds:     Medication List        CONTINUE taking these medications      acetaminophen 325 MG tablet  Commonly known as: TYLENOL  Take 2 tablets (650 mg total) by mouth every 6 (six) hours as needed (pain -- alternate with ibuprofen.motrin.advil 400-600mg every 6  hours so something avail every 3).     cephALEXin 500 MG capsule  Commonly known as: KEFLEX  Take 2 capsules (1,000 mg total) by mouth 3 (three) times daily.     cyanocobalamin 1,000 mcg/mL injection  INJECT 1 ML ( 1,000 MCG TOTAL ) INTO THE MUSCLE EVERY 28 DAYS.     * dextroamphetamine-amphetamine 20 MG 24 hr capsule  Commonly known as: ADDERALL XR     * dextroamphetamine-amphetamine 20 mg tablet  Commonly known as: ADDERALL     docusate sodium 100 MG capsule  Commonly known as: COLACE  Take 1 capsule (100 mg total) by mouth 2 (two) times daily.     estradioL 0.01 % (0.1 mg/gram) vaginal cream  Commonly known as: ESTRACE  Place 1 g vaginally once daily. Apply pea sized amount up to second knuckle. Apply nightly for 2 weeks then every other night.     famotidine 20 MG tablet  Commonly known as: PEPCID     FLUoxetine 20 MG capsule  Take 1 capsule (20 mg total) by mouth once daily.     ibuprofen 600 MG tablet  Commonly known as: ADVIL,MOTRIN     loratadine 10 mg tablet  Commonly known as: CLARITIN     ondansetron 4 MG Tbdl  Commonly known as: ZOFRAN-ODT  Take 1 tablet (4 mg total) by mouth every 8 (eight) hours as needed (nasuea).     phenazopyridine 200 MG tablet  Commonly known as: PYRIDIUM  Take 1 tablet (200 mg total) by mouth 3 (three) times daily as needed (dysuria).     polyethylene glycol 17 gram Pwpk  Commonly known as: GLYCOLAX  Take 17 g by mouth once daily.     propranoloL 10 MG tablet  Commonly known as: INDERAL     simethicone 80 MG chewable tablet  Commonly known as:  MYLICON  Take 1 tablet (80 mg total) by mouth 3 (three) times daily as needed (gas pain/bloating (aka gas x)).     sumatriptan 25 MG Tab  Commonly known as: IMITREX  Take 1 tablet (25 mg total) by mouth every 2 (two) hours as needed.     tamsulosin 0.4 mg Cap  Commonly known as: FLOMAX  Take 1 capsule (0.4 mg total) by mouth once daily.     triamcinolone acetonide 0.025% 0.025 % cream  Commonly known as: KENALOG  Apply topically 2 (two) times daily.     TUMS ORAL           * This list has 2 medication(s) that are the same as other medications prescribed for you. Read the directions carefully, and ask your doctor or other care provider to review them with you.                STOP taking these medications      traMADoL 50 mg tablet  Commonly known as: ULTRAM

## 2024-09-06 LAB — BACTERIA UR CULT: NO GROWTH

## 2024-10-02 ENCOUNTER — OFFICE VISIT (OUTPATIENT)
Dept: DERMATOLOGY | Facility: CLINIC | Age: 54
End: 2024-10-02
Payer: OTHER GOVERNMENT

## 2024-10-02 DIAGNOSIS — L91.8 SKIN TAGS, MULTIPLE ACQUIRED: ICD-10-CM

## 2024-10-02 DIAGNOSIS — L73.8 SEBACEOUS HYPERPLASIA: ICD-10-CM

## 2024-10-02 DIAGNOSIS — L21.9 SEBORRHEIC DERMATITIS: ICD-10-CM

## 2024-10-02 DIAGNOSIS — L30.8 OTHER ECZEMA: Primary | ICD-10-CM

## 2024-10-02 DIAGNOSIS — D22.9 MULTIPLE BENIGN NEVI: ICD-10-CM

## 2024-10-02 DIAGNOSIS — L81.1 MELASMA: ICD-10-CM

## 2024-10-02 DIAGNOSIS — L57.0 AK (ACTINIC KERATOSIS): ICD-10-CM

## 2024-10-02 DIAGNOSIS — D18.01 CHERRY ANGIOMA: ICD-10-CM

## 2024-10-02 DIAGNOSIS — L81.6 POIKILODERMA: ICD-10-CM

## 2024-10-02 DIAGNOSIS — L82.1 SEBORRHEIC KERATOSIS: ICD-10-CM

## 2024-10-02 PROCEDURE — 17000 DESTRUCT PREMALG LESION: CPT | Mod: AQ,S$GLB,, | Performed by: STUDENT IN AN ORGANIZED HEALTH CARE EDUCATION/TRAINING PROGRAM

## 2024-10-02 PROCEDURE — 99214 OFFICE O/P EST MOD 30 MIN: CPT | Mod: 25,AQ,S$GLB, | Performed by: STUDENT IN AN ORGANIZED HEALTH CARE EDUCATION/TRAINING PROGRAM

## 2024-10-02 RX ORDER — FLUOCINOLONE ACETONIDE 0.11 MG/ML
OIL AURICULAR (OTIC)
Qty: 20 ML | Refills: 5 | Status: SHIPPED | OUTPATIENT
Start: 2024-10-02

## 2024-10-02 RX ORDER — PIMECROLIMUS 10 MG/G
CREAM TOPICAL 2 TIMES DAILY
Qty: 60 G | Refills: 1 | Status: SHIPPED | OUTPATIENT
Start: 2024-10-02

## 2024-10-02 NOTE — PROGRESS NOTES
Subjective:      Patient ID:  Jessy Mayberry is a 54 y.o. female who presents for   Chief Complaint   Patient presents with    Rash     eyelids     LOV 10/19/21    Patient here today for skin check TBSE  Complains of spot on nose, applying triamcinolone and has gotten smaller.    Also complains of rash on eyelids x years. Comes and goes. Uses triamcinolone cream, helps some but always comes back. Wears artificial nails, started 3 months ago, had rash for several years prior.   Denies wearing contacts or using eye drops  Uses la roche posey.   Used to wear artifical eyelashes.    Also complains of itching in ears. Has tried applying triamcinolone in ears, helps some.     Denies Phx of NMSC  Denies Fhx of MM            Review of Systems   Constitutional:  Negative for fever, chills and fatigue.   Respiratory:  Negative for cough and shortness of breath.    Gastrointestinal:  Negative for nausea and vomiting.   Skin:  Positive for activity-related sunscreen use. Negative for daily sunscreen use.   Hematologic/Lymphatic: Does not bruise/bleed easily.       Objective:   Physical Exam   Constitutional: She appears well-developed and well-nourished. No distress.   Neurological: She is alert and oriented to person, place, and time. She is not disoriented.   Psychiatric: She has a normal mood and affect.   Skin:   Areas Examined (abnormalities noted in diagram):   Scalp / Hair Palpated and Inspected  Head / Face Inspection Performed  Neck Inspection Performed  Chest / Axilla Inspection Performed  Abdomen Inspection Performed  Genitals / Buttocks / Groin Inspection Performed  Back Inspection Performed  RUE Inspected  LUE Inspection Performed  RLE Inspected  LLE Inspection Performed  Nails and Digits Inspection Performed                     Diagram Legend     Erythematous scaling macule/papule c/w actinic keratosis       Vascular papule c/w angioma      Pigmented verrucoid papule/plaque c/w seborrheic keratosis      Yellow  umbilicated papule c/w sebaceous hyperplasia      Irregularly shaped tan macule c/w lentigo     1-2 mm smooth white papules consistent with Milia      Movable subcutaneous cyst with punctum c/w epidermal inclusion cyst      Subcutaneous movable cyst c/w pilar cyst      Firm pink to brown papule c/w dermatofibroma      Pedunculated fleshy papule(s) c/w skin tag(s)      Evenly pigmented macule c/w junctional nevus     Mildly variegated pigmented, slightly irregular-bordered macule c/w mildly atypical nevus      Flesh colored to evenly pigmented papule c/w intradermal nevus       Pink pearly papule/plaque c/w basal cell carcinoma      Erythematous hyperkeratotic cursted plaque c/w SCC      Surgical scar with no sign of skin cancer recurrence      Open and closed comedones      Inflammatory papules and pustules      Verrucoid papule consistent consistent with wart     Erythematous eczematous patches and plaques     Dystrophic onycholytic nail with subungual debris c/w onychomycosis     Umbilicated papule    Erythematous-base heme-crusted tan verrucoid plaque consistent with inflamed seborrheic keratosis     Erythematous Silvery Scaling Plaque c/w Psoriasis     See annotation      Assessment / Plan:        Other eczema  -     pimecrolimus (ELIDEL) 1 % cream; Apply topically 2 (two) times daily.  Dispense: 60 g; Refill: 1  Eyelids  Stop tac cream  Switch to vanicream facial moisturizer or ointment  Avoid rubbing eyes  Discussed patch testing, will get her scheduled    Cherry angioma  This is a benign vascular lesion. Reassurance given. No treatment required.     Sebaceous hyperplasia  This is a common condition representing benign enlargement of the sebaceous lobule. It typically occurs in adulthood. Reassurance given to patient.     Seborrheic dermatitis  -     fluocinolone acetonide oiL 0.01 % Drop; Use on AA of ears BID  Dispense: 20 mL; Refill: 5  Ears    Multiple benign nevi  Careful dermoscopy evaluation of nevi  performed with none identified as needing biopsy today  Monitor for new mole or moles that are becoming bigger, darker, irritated, or developing irregular borders.   Total body skin examination performed today including at least 12 points as noted in physical examination. No lesions suspicious for malignancy noted.  Patient instructed in importance in daily broad spectrum sun protection of at least spf 30. Mineral sunscreen ingredients preferred (Zinc +/- Titanium) and can be found OTC.   Patient encouraged to wear hat for all outdoor exposure.   Also discussed sun avoidance and use of protective clothing.    Seborrheic keratosis  These are benign inherited growths without a malignant potential. Reassurance given to patient. No treatment is necessary.   Left upper back  Discussed risks and benefits of LN2  Melasma  Poikiloderma  Neck  Sun sunscreen, sun protection  Offered lightening cream, she declines    AK (actinic keratosis)  Cryosurgery Procedure Note    Verbal consent from the patient is obtained and the patient is aware of the precancerous quality and need for treatment of these lesions. Liquid nitrogen cryosurgery is applied to the 1 actinic keratoses, as detailed in the physical exam, to produce a freeze injury. The patient is aware that blisters may form and is instructed on wound care with gentle cleansing and use of vaseline ointment to keep moist until healed. The patient is supplied a handout on cryosurgery and is instructed to call if lesions do not completely resolve.    Skin tags, multiple acquired  - discussed that lesions are benign, non-cancerous. Removal is cosmetic. Price sheet provided to patient. Discussed risks of scarring and dyspigmentation after removal.        Will schedule for hair loss and skin tags      No follow-ups on file.

## 2024-10-02 NOTE — PATIENT INSTRUCTIONS

## 2024-10-27 ENCOUNTER — OFFICE VISIT (OUTPATIENT)
Dept: URGENT CARE | Facility: CLINIC | Age: 54
End: 2024-10-27
Payer: OTHER GOVERNMENT

## 2024-10-27 VITALS
DIASTOLIC BLOOD PRESSURE: 81 MMHG | OXYGEN SATURATION: 97 % | HEIGHT: 69 IN | BODY MASS INDEX: 27.7 KG/M2 | SYSTOLIC BLOOD PRESSURE: 114 MMHG | TEMPERATURE: 98 F | WEIGHT: 187 LBS | RESPIRATION RATE: 12 BRPM | HEART RATE: 89 BPM

## 2024-10-27 DIAGNOSIS — J06.9 BACTERIAL URI: ICD-10-CM

## 2024-10-27 DIAGNOSIS — B96.89 BACTERIAL URI: ICD-10-CM

## 2024-10-27 DIAGNOSIS — R09.81 NASAL SINUS CONGESTION: Primary | ICD-10-CM

## 2024-10-27 LAB
CTP QC/QA: YES
FLUAV AG NPH QL: NEGATIVE
FLUBV AG NPH QL: NEGATIVE
S PYO RRNA THROAT QL PROBE: NEGATIVE
SARS-COV-2 AG RESP QL IA.RAPID: NEGATIVE

## 2024-10-27 PROCEDURE — 96372 THER/PROPH/DIAG INJ SC/IM: CPT | Mod: S$GLB,,, | Performed by: NURSE PRACTITIONER

## 2024-10-27 PROCEDURE — 99214 OFFICE O/P EST MOD 30 MIN: CPT | Mod: 25,S$GLB,, | Performed by: NURSE PRACTITIONER

## 2024-10-27 PROCEDURE — 87804 INFLUENZA ASSAY W/OPTIC: CPT | Mod: QW,,, | Performed by: NURSE PRACTITIONER

## 2024-10-27 PROCEDURE — 87811 SARS-COV-2 COVID19 W/OPTIC: CPT | Mod: QW,S$GLB,, | Performed by: NURSE PRACTITIONER

## 2024-10-27 PROCEDURE — 87880 STREP A ASSAY W/OPTIC: CPT | Mod: QW,,, | Performed by: NURSE PRACTITIONER

## 2024-10-27 RX ORDER — AZITHROMYCIN 250 MG/1
TABLET, FILM COATED ORAL
Qty: 6 TABLET | Refills: 0 | Status: SHIPPED | OUTPATIENT
Start: 2024-10-27 | End: 2024-11-01

## 2024-10-27 RX ORDER — BROMPHENIRAMINE MALEATE, PSEUDOEPHEDRINE HYDROCHLORIDE, AND DEXTROMETHORPHAN HYDROBROMIDE 2; 30; 10 MG/5ML; MG/5ML; MG/5ML
5-10 SYRUP ORAL EVERY 6 HOURS PRN
Qty: 118 ML | Refills: 0 | Status: SHIPPED | OUTPATIENT
Start: 2024-10-27 | End: 2024-11-06

## 2024-10-27 RX ORDER — DEXAMETHASONE SODIUM PHOSPHATE 4 MG/ML
6 INJECTION, SOLUTION INTRA-ARTICULAR; INTRALESIONAL; INTRAMUSCULAR; INTRAVENOUS; SOFT TISSUE
Status: COMPLETED | OUTPATIENT
Start: 2024-10-27 | End: 2024-10-27

## 2024-10-27 RX ADMIN — DEXAMETHASONE SODIUM PHOSPHATE 6 MG: 4 INJECTION, SOLUTION INTRA-ARTICULAR; INTRALESIONAL; INTRAMUSCULAR; INTRAVENOUS; SOFT TISSUE at 02:10

## 2024-12-03 ENCOUNTER — HOSPITAL ENCOUNTER (OUTPATIENT)
Dept: RADIOLOGY | Facility: HOSPITAL | Age: 54
Discharge: HOME | End: 2024-12-03
Attending: UROLOGY
Payer: OTHER GOVERNMENT

## 2024-12-03 ENCOUNTER — PATIENT MESSAGE (OUTPATIENT)
Dept: UROLOGY | Facility: CLINIC | Age: 54
End: 2024-12-03
Payer: OTHER GOVERNMENT

## 2024-12-03 DIAGNOSIS — N13.5 URETEROPELVIC JUNCTION (UPJ) OBSTRUCTION, LEFT: ICD-10-CM

## 2024-12-03 DIAGNOSIS — N13.5 URETEROPELVIC JUNCTION (UPJ) OBSTRUCTION, LEFT: Primary | ICD-10-CM

## 2024-12-03 PROCEDURE — 76770 US EXAM ABDO BACK WALL COMP: CPT | Mod: TC

## 2024-12-03 PROCEDURE — 74018 RADEX ABDOMEN 1 VIEW: CPT | Mod: TC

## 2024-12-03 PROCEDURE — 76770 US EXAM ABDO BACK WALL COMP: CPT | Mod: 26,,, | Performed by: RADIOLOGY

## 2024-12-03 PROCEDURE — 74018 RADEX ABDOMEN 1 VIEW: CPT | Mod: 26,,, | Performed by: RADIOLOGY

## 2024-12-04 ENCOUNTER — OFFICE VISIT (OUTPATIENT)
Dept: UROLOGY | Facility: CLINIC | Age: 54
End: 2024-12-04
Payer: OTHER GOVERNMENT

## 2024-12-04 VITALS — WEIGHT: 186.94 LBS | BODY MASS INDEX: 27.69 KG/M2 | HEIGHT: 69 IN

## 2024-12-04 DIAGNOSIS — R39.89 BLADDER PAIN: ICD-10-CM

## 2024-12-04 DIAGNOSIS — R35.0 URINARY FREQUENCY: Primary | ICD-10-CM

## 2024-12-04 DIAGNOSIS — R39.15 URINARY URGENCY: ICD-10-CM

## 2024-12-04 LAB
BACTERIA #/AREA URNS AUTO: ABNORMAL /HPF
BILIRUBIN, UA POC OHS: ABNORMAL
BLOOD, UA POC OHS: ABNORMAL
CLARITY, UA POC OHS: ABNORMAL
COLOR, UA POC OHS: YELLOW
GLUCOSE, UA POC OHS: NEGATIVE
KETONES, UA POC OHS: ABNORMAL
LEUKOCYTES, UA POC OHS: ABNORMAL
MICROSCOPIC COMMENT: ABNORMAL
NITRITE, UA POC OHS: NEGATIVE
PH, UA POC OHS: 7
PROTEIN, UA POC OHS: 30
RBC #/AREA URNS AUTO: 25 /HPF (ref 0–4)
SPECIFIC GRAVITY, UA POC OHS: 1.02
UROBILINOGEN, UA POC OHS: 0.2
WBC #/AREA URNS AUTO: >100 /HPF (ref 0–5)

## 2024-12-04 PROCEDURE — 99999 PR PBB SHADOW E&M-EST. PATIENT-LVL IV: CPT | Mod: PBBFAC,,, | Performed by: NURSE PRACTITIONER

## 2024-12-04 PROCEDURE — 99999PBSHW POCT URINALYSIS(INSTRUMENT): Mod: PBBFAC,,,

## 2024-12-04 PROCEDURE — 81001 URINALYSIS AUTO W/SCOPE: CPT | Performed by: NURSE PRACTITIONER

## 2024-12-04 PROCEDURE — 99214 OFFICE O/P EST MOD 30 MIN: CPT | Mod: PBBFAC,PO,25 | Performed by: NURSE PRACTITIONER

## 2024-12-04 PROCEDURE — 51701 INSERT BLADDER CATHETER: CPT | Mod: PBBFAC,PO | Performed by: NURSE PRACTITIONER

## 2024-12-04 PROCEDURE — 87086 URINE CULTURE/COLONY COUNT: CPT | Performed by: NURSE PRACTITIONER

## 2024-12-04 PROCEDURE — 81003 URINALYSIS AUTO W/O SCOPE: CPT | Mod: PBBFAC,PO | Performed by: NURSE PRACTITIONER

## 2024-12-04 PROCEDURE — 87088 URINE BACTERIA CULTURE: CPT | Performed by: NURSE PRACTITIONER

## 2024-12-04 PROCEDURE — 87186 SC STD MICRODIL/AGAR DIL: CPT | Performed by: NURSE PRACTITIONER

## 2024-12-04 RX ORDER — CEFDINIR 300 MG/1
300 CAPSULE ORAL 2 TIMES DAILY
Qty: 14 CAPSULE | Refills: 0 | Status: SHIPPED | OUTPATIENT
Start: 2024-12-04 | End: 2024-12-11

## 2024-12-04 NOTE — PROGRESS NOTES
[facility]  Group: Gena/Jaz/Stephanie/Edin  NPs: Blank Yang/Laura Najera    Note today written by:Laura Najera  Date of Service: 12/04/2024      CHIEF COMPLAINT: UTI.    PRESENTING ILLNESS:    Jessy Mayberry is a 54 y.o. female who presents for UTI. Last clinic visit was 6/4/24 with Dr Sebastian    She establish care with Dr. Avery in June of 2023 noting that she started having UTI/pyelo in March.  Symptoms would be flank pain not isolated to the right or left side, foul-smelling urine, frequency and urgency with associated fever up to 101.  Since March she is had at least 3 episodes.  Two of them are culture proven, E coli.  She received antibiotics for 10-14 days with each episode. Prior to this she denies any recurrent UTIs.  She does however state that as a teen she had a cystoscopy but she is not sure why. She also states that she has had blood in the urine on occasion for years found on her gynecology appointments.  Review of her urines in our system show that she has blood in the urine sometimes associated with UTI and sometimes not dating back to November 2022.  - CT in March 2023 when she had a UTI/pyelo and it showed normal right kidney and left hydro to UPJ with four left renal stones.  She has no history of kidney stones.  She does not think the pain is more on her left versus her right whenever she does have these episodes of pyelo.  - CT in June 2023 when she had UTI  showed that her right pelvis was larger than it was in March 2023.  High density area in the right UPJ ?  No previous CT urogram.  Still no ureteral stones.  5 days ago.  UTI risk factors:  Denies any pad usage but does state that she has some overactive bladder with urgency occasionally present over the last 4-5 months.  She is postmenopausal but postmenopausal since age 44.  Uses Estring vaginal suppository but infrequently.  Otherwise no other prevention for  UTI taken.  Tends to urinate every 1-2 hours.  No diabetes.  No constipation.  No diarrhea.  PVR: 0. No divetirculitis. No previous vaginal surgeries other than leep. No smoking history.       After appt noted: low back pain, noted worse on left and much worse after caffeine intake and occasional alcohol (wine). Avoiding both- drinking plenty water. Taking ibuprofen regularly   6/29/23 NM renal scan  Findings characteristic of dilated nonobstructed left renal collecting system. This can be seen with UPJ obstruction. Normal right nephrogram. Function: 54% left, 46% right.   She does however still has L flank pain that's most noticeable in the morning. Also when she did the renal scan had pain.   Started her on uti prevention- however she hasn't been consistent about it. Using cranberry but not UTIVA. She hasn't had any uti's. Sent her urine for cytology and ua marina. It showed 5 rbc and negative cytology.   MH for years. 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 Small LUP stones. Cysto neg 8/21/23.   7/29/2 CTU: The kidneys enhance symmetrically. Bilateral extrarenal pelvises unchanged. 1 cm left renal cyst. No hydronephrosis. Contrast is seen throughout the right renal collecting system and right ureter without filling defect. Contrast is seen throughout the left renal collecting system without filling defect. Contrast reaches the proximal left ureter on 19 minute delayed images. There is abrupt narrowing at the left UPJ (series 606, image 71). There is additionally cutoff of ureteral contrast at the level of the left gonadal vein (series 6, image 87) -- Findings concerning for left UPJ obstruction with severe narrowing at the junction, although contrast is seen in the proximal ureter on 19 minute delayed images. Additionally there is cutoff of contrast in the mid ureter at the level of the left gonadal vein concerning for another site of obstruction. However, there is no hydroureteronephrosis.      5/2/24  NP Reinaldo: treated for UTI 4/22/24 by PCP with macrobid Pt c/o dysuria, bladder spasms, urinary frequency and urgency that has been ongoing since completing antibiotic for recent UTI  Denies gross hematuria, flank pain, fever, chills, nausea or vomiting. UA lg blood, sm leuk. Taking Utiva , d mannose, and probiotic. Not using estrace cream. Not taking myrbetriq, no OAB symptoms as baseline  - Pt had pain when IO cath attempted. Will sent voided urine for micro UA and culture. Start augmentin based on previous culture results  - CT RSS to evaluate kidney stones. Last imaging 7/2023  -F/u with GYN for possible vaginitis. Pt request diflucan for yeast infection with antibiotic use  -Continue UTI prevention supplements     CT 5/3/24  There is dilatation of the left renal collecting system and left extrarenal pelvis to the level of the left UVJ, once again suggestive of high-grade left UPJ obstruction and more pronounced in extent than was noted at the time of the prior study. There is a 3 mm non-obstructing stone present anteriorly in the interpolar region of the left kidney on series 2, image 45 and there is a 3 mm non-obstructing stone present in the interpolar region of the left kidney on series 2, image 50. There is a 4 mm new non-obstructing stone present within the dependent left extrarenal pelvis on series 2, image 59. There is an 11 mm probable cyst present within the lateral cortex of the interpolar region of the left kidney on series 2, image 47 which measures 17 HU on series 2, image 47, most likely a cyst, unchanged. There are no right renal stones identified. There is a right extrarenal pelvis present. The right ureter is normal in caliber and course without evidence of stones. The left ureter is normal in caliber and course without definite stones identified. There is diffuse bladder wall thickening present and there is perivesical fat stranding present, likely reflecting underlying cystitis, similar in  extent to the prior study.      NM3 renal scan w lasix updated 5/14/24: The renographic curves demonstrate delayed time to peak activity bilaterally.  Time to peak activity on the left equals 8 minutes on the right 16 minutes.  There is normal excretion on the right following Lasix administration with no abnormal retention of activity at 30 minute interval.  Prolonged expiratory phase is observed on the left with abnormal retention of activity equal in 54% of maximum at 30 minutes. The left kidney accounts for 49% in the right kidney 51% global uptake.  The estimated renal plasma flow is 490 mL/min. In comparison to the previous examination, degree of dilatation of the left sided collecting system appears slightly greater.  Retention of activity within the left collecting system at the 30 minute interval has progressed compared to the prior year's examination.  - Mild increase in degree of dilatation of the left-sided collecting system compared to previous examination. Prolonged excretory phase on the left with abnormal retention of activity at the 30 minute interval also representing a detrimental change compared to prior study.     5/31/24: I started frequency 2 days ago and the burning started back today. Can we check for UTI - bacterial infection & do a urine culture ? Id like to stay ahead of it before it gets too bad.   - advised to go to urgent care as provider out of office     She presents today noting  Unable to provide urine specimen on arrival.  Still has intermittent dysuria.  Intermittent flank pain as above.  She had an SVT ablation 15 years ago.  She had a complete cardiac workup about 6 months ago.  She is followed by Dr. Nowak  No hematuria.  Inguinal hernia repair hx     7/31/24 Procedure(s) Performed:   Left robot assisted laparoscopic dismembered pyeloplasty (including retrograde placement of 6 Congolese by 26 cm double-J ureteral stent)   2.  Left robot assisted laparoscopic  pyelolithotomy/endoscopic removal of stones from renal pelvis via pyelotomy  3. Cystoscopy with left retrograde pyelogram and placement indwelling left ureteral catheter, separate procedure   Findings: High insertion ureter with intrinsic narrowing, and some proximal ureteral kinking, with significant dilated renal pelvis, and renal pelvis stone removed     9/4/24 Procedure(s) Performed:   Cystoscopy with right ureteral stent removal    12/4/24  Pt presents today for UTI  C/o fatigue that started 3 days ago followed by urinary frequency, urgency, and low back pain. Yesterday she started with suprapubic pain.  Denies dysuria, gross hematuria, flank pain, fever, chills, nausea or vomiting.  IO cath UA lg leuk, mod blood / PVR 20 ml    12/3/24   NICHOLE: Prominent left extrarenal pelvis with mild left hydronephrosis. Left lower pole renal calculus suspected   KUB: no apparent stones      Urine culture:  Lab Results   Component Value Date    LABURIN No growth 09/03/2024    LABURIN (A) 08/27/2024     ESCHERICHIA COLI  >100,000 cfu/ml  Isolate sent out for reference testing      LABURIN No growth 08/16/2024    LABURIN No growth 07/23/2024    LABURIN No growth 06/04/2024    LABURIN ESCHERICHIA COLI  >100,000 cfu/ml   (A) 05/02/2024    LABURIN ESCHERICHIA COLI  > 100,000 cfu/ml   (A) 04/22/2024    LABURIN ESCHERICHIA COLI  10,000 - 49,999 cfu/ml   (A) 06/10/2023    LABURIN Final report (A) 06/10/2023    LABURIN ESCHERICHIA COLI  >100,000 cfu/ml   (A) 03/14/2023       REVIEW OF SYSTEMS: neg unless stated above in hpi    PATIENT HISTORY:  Past Medical History:   Diagnosis Date    Adrenal insufficiency     Hypothyroidism     Neuropathy     Pituitary mass     PONV (postoperative nausea and vomiting)        Past Surgical History:   Procedure Laterality Date    ANKLE SURGERY      CARDIAC ELECTROPHYSIOLOGY MAPPING AND ABLATION      CYSTOSCOPY N/A 8/21/2023    Procedure: CYSTOSCOPY;  Surgeon: Shyanne Avery MD;  Location:  [facility] OR;  Service: Urology;  Laterality: N/A;    CYSTOSCOPY W/ URETERAL STENT REMOVAL Left 9/3/2024    Procedure: CYSTOSCOPY, WITH URETERAL STENT REMOVAL;  Surgeon: Kemar Sebastian MD;  Location: [facility] OR;  Service: Urology;  Laterality: Left;    CYSTOSCOPY WITH URETEROSCOPY, RETROGRADE PYELOGRAPHY, AND INSERTION OF STENT Left 7/31/2024    Procedure: CYSTOSCOPY, WITH RETROGRADE PYELOGRAM AND URETERAL STENT INSERTION;  Surgeon: Kemar Sebastian MD;  Location: [facility] OR;  Service: Urology;  Laterality: Left;    DILATION AND CURETTAGE OF UTERUS      3 times    INGUINAL HERNIA REPAIR      twice    ROBOT-ASSISTED LAPAROSCOPIC PYELOLITHOTOMY Left 7/31/2024    Procedure: ROBOTIC PYELOLITHOTOMY;  Surgeon: Kemar Sebastian MD;  Location: [facility] OR;  Service: Urology;  Laterality: Left;    ROBOT-ASSISTED LAPAROSCOPIC PYELOPLASTY Left 7/31/2024    Procedure: ROBOTIC PYELOPLASTY;  Surgeon: Kemar Sebastian MD;  Location: [facility] OR;  Service: Urology;  Laterality: Left;    TONSILLECTOMY         Allergies:  Promethazine and Oxycontin [oxycodone]      PHYSICAL EXAMINATION:  Constitutional: She is oriented to person, place, and time. She appears well-developed and well-nourished.  She is in no apparent distress.  Abdominal:  She exhibits no distension.  There is no CVA tenderness.   Neurological: She is alert and oriented to person, place, and time.   Psych: Cooperative with normal affect.    Genitourinary:  Urethral meatus is normal  Consent verbally obtained. And in and out cath was performed under sterile conditions immediately after voiding. No issues with IO cath placement. The PVR was 20 ml  No betadine used    Physical Exam      LABS:      Lab Results   Component Value Date    CREATININE 0.8 08/01/2024     Lab Results   Component Value Date    HGBA1C 4.6 05/14/2024         IMPRESSION:    Encounter Diagnoses   Name Primary?    Urinary frequency Yes    Urinary urgency     Bladder pain        PLAN:  -Catheterized  urine sent for micro UA and culture  Start omnicef based on POCT UA and symptoms  Will adjust antibiotic based on culture if needed  Discussed side effects and indications for omnicef. Prescription sent to the pharmacy. Pt verbalized understanding.    -Good water intake for proper hydration    -RTC as scheduled with Dr Avery      I encouraged her or any of her family members to call or email me with questions and/or concerns.      30 minutes of total time spent on the encounter, which includes face to face time and non-face to face time preparing to see the patient (eg, review of tests), Obtaining and/or reviewing separately obtained history, Documenting clinical information in the electronic or other health record, Independently interpreting results (not separately reported) and communicating results to the patient/family/caregiver, or Care coordination (not separately reported).

## 2024-12-07 LAB — BACTERIA UR CULT: ABNORMAL

## 2024-12-11 ENCOUNTER — E-VISIT (OUTPATIENT)
Dept: UROLOGY | Facility: CLINIC | Age: 54
End: 2024-12-11
Payer: OTHER GOVERNMENT

## 2024-12-11 DIAGNOSIS — N13.5 URETEROPELVIC JUNCTION (UPJ) OBSTRUCTION, LEFT: Primary | ICD-10-CM

## 2024-12-11 DIAGNOSIS — N39.0 RECURRENT UTI: ICD-10-CM

## 2024-12-11 NOTE — Clinical Note
Set lab visit this week for ua/ucx Set NICHOLE on 2/28 prior to 3/3 visit with jessica All orders in

## 2024-12-14 NOTE — PROGRESS NOTES
Patient ID: Jessy Mayberry is a 54 y.o. female.    Chief Complaint: Results (Renal imaging implications)    The patient initiated a request through [employer] on 12/11/2024 for evaluation and management with a chief complaint of Results (Renal imaging implications)     I evaluated the questionnaire submission on 12/11 and 12/13.    Ohs Peq Evisit General    12/12/2024 11:20 AM CST - Filed by Patient   Do you agree to participate in an E-Visit? Yes   If you have any of the following symptoms, please present to your local emergency room or call 911:  I acknowledge   Choose the state of your primary residence [place]   Do you have any of the following pregnancy-related conditions? None   What is the main issue you would like addressed today? Can you please let me know what the recent test results mean? Anything needing addressed or all good?  (KUB & US)   Thank you   Please describe your symptoms Can you please let me know what the recent test results mean? Anything needing addressed or all good?  (KUB & US)   Thank you   Where is your problem located? Kidney- post op follow up w Dr Soliman   How severe are your symptoms? Mild   Have you had these symptoms before? Yes   How long have you been having these symptoms? For more than a month   Please list any medications or treatments you have used for your condition and indicate if it was effective or not. Na   What makes this feel better? Na   What makes this feel worse? Na   Are these symptoms related to a condition that you currently have? No   Please describe any probable cause for these symptoms Na   Provide any additional information you feel is important. Can you please let me know what the recent test results mean? Anything needing addressed or all good?  (KUB & US)   Thank you   Please attach any relevant images or files    Are you able to take your vital signs? No         Encounter Diagnoses   Name Primary?    Ureteropelvic junction (UPJ) obstruction, left Yes     Recurrent UTI         Orders Placed This Encounter   Procedures    Urine culture     Standing Status:   Future     Standing Expiration Date:   3/13/2026     Order Specific Question:   Send normal result to authorizing provider's In Basket if patient is active on MyChart:     Answer:   Yes    US Retroperitoneal Complete     Standing Status:   Future     Standing Expiration Date:   12/13/2025     Order Specific Question:   May the Radiologist modify the order per protocol to meet the clinical needs of the patient?     Answer:   Yes     Order Specific Question:   Release to patient     Answer:   Immediate    Urinalysis     Standing Status:   Future     Standing Expiration Date:   2/11/2026     Order Specific Question:   Collection Type     Answer:   Urine, Clean Catch        54-year-old F w/ hx rec UTIs and progressive L UPJO now status post L robotic pyeloplasty/pyelolithotomy on 7/31/24.     Cleared UTI in May 2024 under care of NP Reinaldo, and on further workup of UPJO per Dr Avery, worsening obstruction with stones seen in obstructed collecting system, and above procedures uncomplicated.   Had UTI concerns postop which were attributed to stent symptoms given negative Ucx on 8/16/24   but then symptomatic with cloudy urine flank pain and fever for which Ucx + for similar MDR Ecoli UTI on 8/27/24 and has been on antibiotics with clinical improvement and improvement in UA     Stent removed 9/3/24 at which time Ucx had cleared    She was set for her routine 3 mos surveillance imaging but around time she went for imaging noted uti concernd again and came in and saw VALENTINO Najera and [place] urine + for Ecoli uti again - given course of ceftin 12/4-12/11  Advised discuss imaging results with MD    12/3/24 kub neg and NICHOLE with mild L hydro/pelvic fullness  Personal review noted pelvic dilation without sig talisha syst dilation, and much improved when compared to preop CT    Advised pt of above, noting residual dilation still  expected at 3 mos postop, and would recheck 3 mos later  She has visit with her managing urologist set on 3/3/25, so will repeat NICHOLE prior  As well provided stone prevention recs - though likely stones formed in obstruction  And noted based on Hx and level of upjo her recurrent utis are likely of lower tract source not upper tract obstruction and continue conservative preventative strategies and supplements etc and discuss further mgmt at urology follow up  Though will get test of cure ua/ucx to make sure recent UTI cleared         Follow up in 3 months (on 3/3/2025) for with dr lopez with ultrasound prior (as well as recheck urine at lab next week).    UA/Ucx next wee  Dr Burroughs 3/3/25 as sched  Will get NICHOLE week prior  NP prn in interim    E-Visit Time Tracking:    Day 1 Time (in minutes): 5  Day 2 Time (in minutes): 2  Day 3 Time (in minutes): 20    Total Time (in minutes): 27

## 2025-01-09 DIAGNOSIS — L30.8 OTHER ECZEMA: ICD-10-CM

## 2025-01-11 RX ORDER — PIMECROLIMUS 10 MG/G
CREAM TOPICAL 2 TIMES DAILY
Qty: 60 G | Refills: 1 | Status: SHIPPED | OUTPATIENT
Start: 2025-01-11

## 2025-01-31 ENCOUNTER — PATIENT MESSAGE (OUTPATIENT)
Dept: UROLOGY | Facility: CLINIC | Age: 55
End: 2025-01-31
Payer: MEDICARE

## 2025-03-07 ENCOUNTER — HOSPITAL ENCOUNTER (OUTPATIENT)
Dept: RADIOLOGY | Facility: HOSPITAL | Age: 55
Discharge: HOME | End: 2025-03-07
Attending: UROLOGY
Payer: COMMERCIAL

## 2025-03-07 DIAGNOSIS — N13.5 URETEROPELVIC JUNCTION (UPJ) OBSTRUCTION, LEFT: ICD-10-CM

## 2025-03-07 PROCEDURE — 76770 US EXAM ABDO BACK WALL COMP: CPT | Mod: 26,,, | Performed by: RADIOLOGY

## 2025-03-07 PROCEDURE — 76770 US EXAM ABDO BACK WALL COMP: CPT | Mod: TC

## 2025-03-10 ENCOUNTER — OFFICE VISIT (OUTPATIENT)
Dept: UROLOGY | Facility: CLINIC | Age: 55
End: 2025-03-10
Payer: COMMERCIAL

## 2025-03-10 ENCOUNTER — PATIENT MESSAGE (OUTPATIENT)
Dept: UROLOGY | Facility: CLINIC | Age: 55
End: 2025-03-10

## 2025-03-10 DIAGNOSIS — N13.5 URETEROPELVIC JUNCTION (UPJ) OBSTRUCTION, LEFT: ICD-10-CM

## 2025-03-10 DIAGNOSIS — N39.0 RECURRENT UTI: Primary | ICD-10-CM

## 2025-03-10 PROCEDURE — 98006 SYNCH AUDIO-VIDEO EST MOD 30: CPT | Mod: 95,,, | Performed by: UROLOGY

## 2025-03-10 RX ORDER — ESTRADIOL 0.1 MG/G
1 CREAM VAGINAL
Qty: 42.5 G | Refills: 1 | Status: SHIPPED | OUTPATIENT
Start: 2025-03-10 | End: 2026-03-10

## 2025-03-10 NOTE — PATIENT INSTRUCTIONS
's typed/written- Abbreviated/Short Plan:  Continue utiva   Continue probiotic  Start estrace cream   Fu in 6 months      The following assessment plan was created by [employer] via ambient listening:  Assessment & Plan    N39.0 Recurrent UTI  N13.5 Ureteropelvic junction (UPJ) obstruction, left    IMPRESSION:   Considered UTI prevention strategies for postmenopausal patient with history of left UPJ obstruction (surgically corrected) and recurrent E. coli UTIs   Assessed recent ultrasound showing kidney draining properly post-surgery   Evaluated family history of breast cancer (maternal aunt) in relation to potential hormone therapy   Determined low risk for bladder cancer given patient's non-smoking status and minimal hematuria   Noted overactive bladder symptoms are minimal, with patient reporting only 2 nighttime voids    Please review the short plan as above for concise and accurate plan. The dictated/AI generated plan may have some inaccuracies .    PLAN SUMMARY:   Prescribed Estrace vaginal cream: Apply pea-sized amount to urethra and vagina nightly for 2 weeks, then 2-3 times weekly for maintenance   Continue Uteva cranberry capsules as previously prescribed   Educated patient on UTI prevention and vaginal health in postmenopausal women   Discussed potential causes of microscopic hematuria   Follow-up appointment scheduled in 6 months    RECURRENT UTI:   Educated the patient about the increased risk of recurrent UTIs in postmenopausal women.   Discussed the application method and benefits of vaginal estrogen cream for UTI prevention and vaginal health.   Prescribed Estrace vaginal cream: Apply a pea-sized amount to the urethra and vagina using a finger nightly for 2 weeks, then 2-3 times weekly for maintenance.   Continued the prescription of Uteva cranberry capsules as previously prescribed.   Scheduled a follow-up visit in 6 months.    URETEROPELVIC JUNCTION (UPJ) OBSTRUCTION, LEFT:    Informed the patient about potential causes of microscopic hematuria and its common occurrence in some individuals.             Portions of this note was generated with the assistance of ambient listening technology. Verbal consent was obtained by the patient and accompanying visitor(s) for the recording of patient appointment to facilitate this note. I attest to having reviewed and edited the generated note for accuracy, though some syntax or spelling errors may persist. Please contact the author of this note for any clarification.        Hormone cream- #1 prevention for RECURRENT UTI in post menopausal women  Why hormone cream is recommended:  We all have bacteria that cover our bodies, however we want good bacteria, not bad bacteria. When you lack hormones,  your vagina starts to let the bad bacteria take over because there is an imbalance. The hormone cream allows for good bacteria to take over again and this can help decrease the risks of UTIs. It can also help with vaginal dryness.   Who should not use it: patients with breast cancer or history of breast cancer.   Please let  know if you have a personal  history of breast cancer, uterine cancer, cervical cancer or a history of blood clots or heart attack.  Pricing: around $40-70/tube which should last 6 months if using finger to apply  If it costs more than $70 we can write you for a compounded less expensive form of estrace cream from [employer]. Please let us know. They will mail it to you for around $8 or you can pick it up in [place]. They will call you for payment first and you can ask the address. If you haven't heard from them in 2 days, please call them to confirm they received the prescription.   You can also see if it is covered by [employer] coupon card instead of insurance. Check with your pharmacy or go to [employer] I can send the prescription to the pharmacy with the cheapest prices.   How to apply:  Do not use the  applicator, just your finger. This will make it last longer. Apply small smear to 2nd knuckle. Apply around urethra and into vagina to 2nd knuckle. Prescription should last around 6 months.   Apply inside vagina with finger before bedtime x 2 weeks and then 2 to 3x per week after this indefinitely.  You will only see improvement if you use on a regular basis in about a month. (less dryness, maybe some less overactive bladder, less UTIs possibly if having UTIs).   It is not a medicine to use as needed. Typically a lifelong medication.     Good Rx Coupon

## 2025-03-10 NOTE — PROGRESS NOTES
The patient location is: HOME  The state in which patient is residing at time of visit: [place]  Visit type: Virtual visit with AUDIO AND VIDEO  Total time spent in medical discussion: 20 minutes  Total time spent on date of the encounter: 30 minutes    The chief complaint leading to consultation is: L UPJO s/p pyeloplasty fu  Date of Service: 03/10/2025  Shyanne Avery MD    Each patient to whom he or she provides medical services by telemedicine is:    (1) informed of the relationship between the physician and patient and the respective role of any other health care provider with respect to management of the patient; and   (2) notified that he or she may decline to receive medical services by telemedicine and may withdraw from such care at any time.  (3) Patient verbally consented to treatment via phone, according to the clinic consent to treat policies outlined by the registrar    This service was not originating from a related E/M service provided within the previous 7 days nor will  to an E/M service or procedure within the next 24 hours or my soonest available appointment.  Prevailing standard of care was able to be met in this audio-only visit.          [facility] Note - [place]  Staff: MD Gena  PCP: Roger Nation MD  Date of Service: 03/10/2025      Subjective:     HPI: Jessy Mayberry is a 54 y.o. female     Initial consult by me in clinic on 6/22/23 for recurrent pyelo since march:  She states that she started having UTI/pyelo in March.  Symptoms would be flank pain not isolated to the right or left side, foul-smelling urine, frequency and urgency with associated fever up to 101.  Since March she is had at least 3 episodes.  Two of them are culture proven, E coli.  She received antibiotics for 10-14 days with each episode.  Prior to this she denies any recurrent UTIs.  She does however state that as a teen she had a cystoscopy but she is not sure  why  She also states that she has had blood in the urine on occasion for years found on her gynecology appointments.  Review of her urines in our system show that she has blood in the urine sometimes associated with UTI and sometimes not dating back to November 2022.  She had a CT in March 2023 when she had a UTI/pyelo and it showed normal right kidney and left hydro to UPJ with for left renal stones.  She has no history of kidney stones.  She does not think the pain is more on her left versus her right whenever she does have these episodes of pyelo.  She had another CT in June 2023 when she had UTI and it actually showed that her right pelvis was larger than it was in March 2023.  High density area in the right UPJ ?  No previous CT urogram.  Still no ureteral stones.  5 days ago.  UTI risk factors:  Denies any pad usage but does state that she has some overactive bladder with urgency occasionally present over the last 4-5 months.  She is postmenopausal but postmenopausal since age 44.  Uses Estring vaginal suppository but infrequently.  Otherwise no other prevention for UTI taken.  Tends to urinate every 1-2 hours.  No diabetes.  No constipation.  No diarrhea.  PVR: 0. No divetirculitis. No previous vaginal surgeries other than leep.   Stone risk factors:  Left UPJ.  No history of Topamax.  Microhematuria risk factors:  Kidney stones can explain it, no smoking history.  Denies any gross hematuria.  On no blood thinners.  Still has uterus.  History of leep.  No vaginal bleeding.  Voided urine today with small blood.  Just finish antibiotics about                     Interval history 7/20/23:  6/29/23 renal scan: Findings characteristic of dilated nonobstructed left renal collecting system. This can be seen with UPJ obstruction. Normal right nephrogram. Function: 54% left, 46% right.   She does however still has L flank pain that's most noticeable in the morning.  Also when she did the renal scan had pain.      Started her on uti prevention- however she hasn't been consistent about it. Using cranberry but not UTIVA. She hasn't had any uti's.   Sent her urine for cytology and ua marina. It showed 5 rbc and negative cytology. Not sure why ctu and cysto were cancelled or not scheduled.     Interval history by ME/ - CLINIC virtual visit (video/audio) on 3/10/25:  History of Present Illness    CHIEF COMPLAINT:  Ms. Mayberry presents for a follow-up visit regarding her history of left UPJ obstruction and recurrent urinary tract infections.    HPI:  Ms. Mayberry is a 54-year-old female with a history of left UPJ (ureteropelvic junction) obstruction. She underwent a procedure with  (name not specified) on July 31st of last year to fix the obstruction and remove kidney stones. Since the procedure, she has had two E. coli UTIs. Her menopausal symptoms began at age 44 or 45. She had an IUD removed about 1.5 to 2 years ago and has not had a period since then. She urinates about 2 times per night and has been taking Uteva cranberry capsules for UTI prevention since starting care with this provider. She still has some blood in her urine, though she does not have any kidney stones currently. She denies having any more UTIs since December or overactive bladder symptoms during the day.    PERTINENT MEDICATIONS:  Uteva cranberry capsules, daily, for UTI prevention    PERTINENT MEDICAL HISTORY:  Left UPJ obstruction  E. coli UTIs: 2 occurrences after UPJ repair  Postmenopausal    PERTINENT FAMILY HISTORY:  Maternal aunt: Breast cancer    PERTINENT SURGICAL HISTORY:  Left UPJ obstruction repair and kidney stone removal: July 31st of previous year, performed by  [name not provided]    PERTINENT TEST RESULTS:  Urinalysis: Recent, 4 RBCs, no squamous cells  Urine culture: December (previous year), positive for E. coli UTI  Urine culture: Earlier instance (date not specified), positive for E. coli UTI Ultrasound: March (current  year), showed kidney is draining, no obstruction         Procedure(s) Performed by : 7/31/24  Left robot assisted laparoscopic dismembered pyeloplasty (including retrograde placement of 6 Nigerien by 26 cm double-J ureteral stent)   2.  Left robot assisted laparoscopic pyelolithotomy/endoscopic removal of stones from renal pelvis via pyelotomy  3. Cystoscopy with left retrograde pyelogram and placement indwelling left ureteral catheter, separate procedure   4.   Flouroscopy < 1 hour    Rbus 3/7/25 No hydronephrosis           Urine history: family history of kidney, bladder or prostate cancer:No, personal or family history of kidney stones: No,tobacco use: No, anticoagulation: No  3/7/25  Ng, 1+bld, 4 rbc  12/4/24 E.coli, Mod bld/large wbc  9/3/24  NG, Mod bld/smal leuk  8/27/24 E.coli, Cath: 3+bld/3+leuk  8/16/24 NG, Cath: 2+bld/3+leuk  7/23/24 Ng  6/4/24  Ng  5/2/24  E.coli  4/22/24 E.coli   6/22/23 Void: small bld- 5 rbc/1 wbc/occ bact, ctyology neg  6/10/23 E.coli, void: 3+leuk/3+bld, >100 rbc/<100 wbc/few bact  4/26/23 1+bld, 5 rbc/1 wbc  3/14/23 E.coli, void: 3+bld/1+bld/nit+,21 rbc/>100 wbc/few wbc  11/29/22 Void: 1+bld, 4 rbc/ 2 wbc/rare bact    REVIEW OF SYSTEMS:  Negative except for as stated above       Objective:     There were no vitals filed for this visit.        Assessment:     Jessy Mayberry is a 54 y.o. female with     1. Recurrent UTI    2. Ureteropelvic junction (UPJ) obstruction, left            Plan:     's typed/written- Abbreviated/Short Plan:  Continue utiva   Continue probiotic  Start estrace cream   Fu in 6 months      The following assessment plan was created by [employer] via ambient listening:  Assessment & Plan    N39.0 Recurrent UTI  N13.5 Ureteropelvic junction (UPJ) obstruction, left    IMPRESSION:   Considered UTI prevention strategies for postmenopausal patient with history of left UPJ obstruction (surgically corrected) and recurrent E. coli UTIs   Assessed  recent ultrasound showing kidney draining properly post-surgery   Evaluated family history of breast cancer (maternal aunt) in relation to potential hormone therapy   Determined low risk for bladder cancer given patient's non-smoking status and minimal hematuria   Noted overactive bladder symptoms are minimal, with patient reporting only 2 nighttime voids    Please review the short plan as above for concise and accurate plan. The dictated/AI generated plan may have some inaccuracies .    PLAN SUMMARY:   Prescribed Estrace vaginal cream: Apply pea-sized amount to urethra and vagina nightly for 2 weeks, then 2-3 times weekly for maintenance   Continue Uteva cranberry capsules as previously prescribed   Educated patient on UTI prevention and vaginal health in postmenopausal women   Discussed potential causes of microscopic hematuria   Follow-up appointment scheduled in 6 months    RECURRENT UTI:   Educated the patient about the increased risk of recurrent UTIs in postmenopausal women.   Discussed the application method and benefits of vaginal estrogen cream for UTI prevention and vaginal health.   Prescribed Estrace vaginal cream: Apply a pea-sized amount to the urethra and vagina using a finger nightly for 2 weeks, then 2-3 times weekly for maintenance.   Continued the prescription of Uteva cranberry capsules as previously prescribed.   Scheduled a follow-up visit in 6 months.    URETEROPELVIC JUNCTION (UPJ) OBSTRUCTION, LEFT:   Informed the patient about potential causes of microscopic hematuria and its common occurrence in some individuals.             Portions of this note was generated with the assistance of ambient listening technology. Verbal consent was obtained by the patient and accompanying visitor(s) for the recording of patient appointment to facilitate this note. I attest to having reviewed and edited the generated note for accuracy, though some syntax or spelling errors may persist. Please contact the  author of this note for any clarification.    Visit today included increased complexity associated with the care of the episodic problem addressed and managing the longitudinal care of the patient due to the serious and/or complex managed problem(s)             Shyanne Avery MD

## 2025-03-12 ENCOUNTER — RESULTS FOLLOW-UP (OUTPATIENT)
Dept: OBSTETRICS AND GYNECOLOGY | Facility: CLINIC | Age: 55
End: 2025-03-12

## 2025-03-12 ENCOUNTER — OFFICE VISIT (OUTPATIENT)
Dept: OBSTETRICS AND GYNECOLOGY | Facility: CLINIC | Age: 55
End: 2025-03-12
Payer: COMMERCIAL

## 2025-03-12 ENCOUNTER — HOSPITAL ENCOUNTER (OUTPATIENT)
Dept: RADIOLOGY | Facility: HOSPITAL | Age: 55
Discharge: HOME | End: 2025-03-12
Payer: COMMERCIAL

## 2025-03-12 VITALS
BODY MASS INDEX: 26.94 KG/M2 | DIASTOLIC BLOOD PRESSURE: 74 MMHG | HEIGHT: 69 IN | WEIGHT: 181.88 LBS | SYSTOLIC BLOOD PRESSURE: 112 MMHG

## 2025-03-12 DIAGNOSIS — Z01.419 WELL WOMAN EXAM: Primary | ICD-10-CM

## 2025-03-12 DIAGNOSIS — Z12.4 CERVICAL CANCER SCREENING: ICD-10-CM

## 2025-03-12 DIAGNOSIS — R53.83 FATIGUE, UNSPECIFIED TYPE: ICD-10-CM

## 2025-03-12 DIAGNOSIS — E06.3 HASHIMOTO'S THYROIDITIS: ICD-10-CM

## 2025-03-12 DIAGNOSIS — Z11.51 ENCOUNTER FOR SCREENING FOR HUMAN PAPILLOMAVIRUS (HPV): ICD-10-CM

## 2025-03-12 DIAGNOSIS — Z12.31 BREAST CANCER SCREENING BY MAMMOGRAM: ICD-10-CM

## 2025-03-12 PROCEDURE — 99396 PREV VISIT EST AGE 40-64: CPT | Mod: S$GLB,,,

## 2025-03-12 PROCEDURE — 77063 BREAST TOMOSYNTHESIS BI: CPT | Mod: 26,,, | Performed by: RADIOLOGY

## 2025-03-12 PROCEDURE — 99999 PR PBB SHADOW E&M-EST. PATIENT-LVL V: CPT | Mod: PBBFAC,,,

## 2025-03-12 PROCEDURE — 77067 SCR MAMMO BI INCL CAD: CPT | Mod: 26,,, | Performed by: RADIOLOGY

## 2025-03-12 PROCEDURE — 77063 BREAST TOMOSYNTHESIS BI: CPT | Mod: TC,PN

## 2025-03-12 NOTE — PROGRESS NOTES
HISTORY OF PRESENT ILLNESS:    Jessy Mayberry is a 54 y.o. female, , No LMP recorded (lmp unknown). Patient is postmenopausal.,  presents for a routine annual exam . Recently prescribed Estrace cream by urology for frequent UTIs. Has not start. Reports increased fatigue, concerns about hormones. She is PMB. NO vaginal bleeding after IUD removal in . Hx of long Covid and Hashimotos. No longer taking Synthroid as directed by PCP    Last pap:  - NILM   Last mammogram: 2024 NL TC 12.99%  SA: has not been sexually active with spouse for 5 years  Contraception: post menopausal status.    Sexually transmitted infection risk: very low risk of STD exposure.     This is the extent of the patient's complaints at this time.     Past Medical History:   Diagnosis Date    Adrenal insufficiency     Hypothyroidism     Neuropathy     Pituitary mass     PONV (postoperative nausea and vomiting)        Past Surgical History:   Procedure Laterality Date    ANKLE SURGERY      CARDIAC ELECTROPHYSIOLOGY MAPPING AND ABLATION      CYSTOSCOPY N/A 2023    Procedure: CYSTOSCOPY;  Surgeon: Shyanne Avery MD;  Location: [facility];  Service: Urology;  Laterality: N/A;    CYSTOSCOPY W/ URETERAL STENT REMOVAL Left 9/3/2024    Procedure: CYSTOSCOPY, WITH URETERAL STENT REMOVAL;  Surgeon: Kemar Sebastian MD;  Location: [facility];  Service: Urology;  Laterality: Left;    CYSTOSCOPY WITH URETEROSCOPY, RETROGRADE PYELOGRAPHY, AND INSERTION OF STENT Left 2024    Procedure: CYSTOSCOPY, WITH RETROGRADE PYELOGRAM AND URETERAL STENT INSERTION;  Surgeon: Kemar Sebastian MD;  Location: [facility];  Service: Urology;  Laterality: Left;    DILATION AND CURETTAGE OF UTERUS      3 times    INGUINAL HERNIA REPAIR      twice    ROBOT-ASSISTED LAPAROSCOPIC PYELOLITHOTOMY Left 2024    Procedure: ROBOTIC PYELOLITHOTOMY;  Surgeon: Kemar Sebastian MD;  Location: [facility];  Service: Urology;  Laterality: Left;     ROBOT-ASSISTED LAPAROSCOPIC PYELOPLASTY Left 2024    Procedure: ROBOTIC PYELOPLASTY;  Surgeon: Kemar Sebastian MD;  Location: [facility];  Service: Urology;  Laterality: Left;    TONSILLECTOMY         MEDICATIONS AND ALLERGIES:    Current Medications[1]    Review of patient's allergies indicates:   Allergen Reactions    Promethazine Other (See Comments) and Hallucinations     IV only.     Oxycontin [oxycodone] Itching and Rash     Other reaction(s): Unknown       Family History   Problem Relation Name Age of Onset    Ovarian cancer Mother  28    Hypertension Mother      Coronary artery disease Mother      Hyperlipidemia Mother      Cancer Mother      Skin cancer Mother  75    Hypertension Father      Retinal detachment Father      Atrial fibrillation Sister      Rheum arthritis Daughter      Asthma Daughter      Breast cancer Maternal Aunt  42    Bone cancer Maternal Aunt      Cancer Maternal Aunt      Rheum arthritis Maternal Aunt      Hepatitis Maternal Aunt      Heart disease Maternal Grandmother      Hypertension Maternal Grandmother      Kidney failure Maternal Grandmother      Atrial fibrillation Maternal Grandmother      Hypertension Brother      Hypertension Son      Psoriasis Child      Melanoma Neg Hx      Lupus Neg Hx         Social History[2]    OB History    Para Term  AB Living   4 3 3  1    SAB IAB Ectopic Multiple Live Births   1          # Outcome Date GA Lbr Dre/2nd Weight Sex Type Anes PTL Lv   4 SAB            3 Term            2 Term            1 Term                   COMPREHENSIVE GYN HISTORY:  PAP History: Denies abnormal Paps.  Infection History: Denies STDs. Denies PID.  Benign History: Denies uterine fibroids. Denies ovarian cysts. Denies endometriosis. Denies other conditions.  Cancer History: Denies cervical cancer. Denies uterine cancer or hyperplasia. Denies ovarian cancer. Denies vulvar cancer or pre-cancer. Denies vaginal cancer or pre-cancer. Denies breast  "cancer. Denies colon cancer.      ROS:  GENERAL: No weight changes. No swelling. No fatigue. No fever.  BREASTS: No pain. No lumps. No discharge.  ABDOMEN: No pain. No nausea. No vomiting. No diarrhea. No constipation.  REPRODUCTIVE: No abnormal bleeding. No pelvic pain.   VULVA: No pain. No lesions. No itching.  VAGINA: No relaxation. No itching. No odor. No discharge. No lesions.  URINARY: No incontinence. No nocturia. No frequency. No dysuria.    /74 (BP Location: Right arm, Patient Position: Sitting)   Ht 5' 9" (1.753 m)   Wt 82.5 kg (181 lb 14.1 oz)   LMP  (LMP Unknown) Comment: implant removed 6 months  BMI 26.86 kg/m²     PE:  Physical Exam:   Constitutional: She is oriented to person, place, and time. She appears well-developed and well-nourished. No distress.    HENT:   Head: Normocephalic.    Eyes: Pupils are equal, round, and reactive to light.      Pulmonary/Chest: Effort normal. No respiratory distress. She exhibits no mass, no tenderness, no laceration, no edema, no deformity, no swelling and no retraction. Right breast exhibits no inverted nipple, no mass, no nipple discharge, no skin change, no tenderness, no bleeding and no swelling. Left breast exhibits no inverted nipple, no mass, no nipple discharge, no skin change, no tenderness, no bleeding and no swelling.        Abdominal: Soft. She exhibits no distension. There is no abdominal tenderness.     Genitourinary:    Inguinal canal, vagina, uterus, right adnexa and left adnexa normal.      Pelvic exam was performed with patient supine.   The external female genitalia was normal.     Labial bartholins normal.Cervix is normal. Vagina exhibits no lesion. No erythema, vaginal discharge, tenderness or bleeding in the vagina.    No signs of injury in the vagina.      pap smear completedUterus consistancy normal and Uerus contour normal            Musculoskeletal: Normal range of motion and moves all extremeties.       Neurological: She is alert " and oriented to person, place, and time.    Skin: Skin is warm and dry.    Psychiatric: She has a normal mood and affect. Judgment and thought content normal.        PROCEDURES/ORDERS:  Pap      Assessment/Plan:    Well woman exam  -     Liquid-Based Pap Smear, Screening  -     HPV High Risk Genotypes, PCR    Hashimoto's thyroiditis  -     TSH; Future; Expected date: 03/12/2025  -     T4, FREE; Future; Expected date: 03/12/2025    Fatigue, unspecified type  -     CBC W/ AUTO DIFFERENTIAL; Future; Expected date: 03/12/2025  -     Ferritin; Future; Expected date: 03/12/2025  -     IRON AND TIBC; Future; Expected date: 03/12/2025    Breast cancer screening by mammogram  -     Mammo Digital Screening Bilat w/ Al (XPD); Future; Expected date: 03/12/2025    Cervical cancer screening  -     Liquid-Based Pap Smear, Screening    Encounter for screening for human papillomavirus (HPV)  -     HPV High Risk Genotypes, PCR        COUNSELING:  The patient was counseled today on:  -A.C.S. Pap and pelvic exam guidelines, recomendations for yearly mammogram, monthly self breast exams and to follow up with her PCP for other health maintenance.    FOLLOW-UP  after lab work and will follow.          [1]   Current Outpatient Medications:     acetaminophen (TYLENOL) 325 MG tablet, Take 2 tablets (650 mg total) by mouth every 6 (six) hours as needed (pain -- alternate with ibuprofen.motrin.advil 400-600mg every 6  hours so something avail every 3)., Disp: , Rfl:     calcium carbonate (TUMS ORAL), Take by mouth., Disp: , Rfl:     cyanocobalamin 1,000 mcg/mL injection, INJECT 1 ML ( 1,000 MCG TOTAL ) INTO THE MUSCLE EVERY 28 DAYS., Disp: 10 mL, Rfl: 3    dextroamphetamine-amphetamine (ADDERALL XR) 20 MG 24 hr capsule, , Disp: , Rfl:     dextroamphetamine-amphetamine (ADDERALL) 20 mg tablet, , Disp: , Rfl:     estradioL (ESTRACE) 0.01 % (0.1 mg/gram) vaginal cream, Place 1 g vaginally twice a week. Use 1 g on finger and apply twice a week  after using nightly for 2 weeks, Disp: 42.5 g, Rfl: 1    famotidine (PEPCID) 20 MG tablet, Take 20 mg by mouth 2 (two) times daily., Disp: , Rfl:     fluocinolone acetonide oiL 0.01 % Drop, Use on AA of ears BID, Disp: 20 mL, Rfl: 5    FLUoxetine 20 MG capsule, Take 1 capsule (20 mg total) by mouth once daily., Disp: 30 capsule, Rfl: 1    ibuprofen (ADVIL,MOTRIN) 600 MG tablet, Take 600 mg by mouth 3 (three) times daily., Disp: , Rfl:     loratadine (CLARITIN) 10 mg tablet, , Disp: , Rfl:     ondansetron (ZOFRAN-ODT) 4 MG TbDL, Take 1 tablet (4 mg total) by mouth every 8 (eight) hours as needed (nasuea)., Disp: 10 tablet, Rfl: 0    pimecrolimus (ELIDEL) 1 % cream, Apply topically 2 (two) times daily., Disp: 60 g, Rfl: 1    propranoloL (INDERAL) 10 MG tablet, , Disp: , Rfl:     sumatriptan (IMITREX) 25 MG Tab, Take 1 tablet (25 mg total) by mouth every 2 (two) hours as needed., Disp: 10 tablet, Rfl: 5    triamcinolone acetonide 0.025% (KENALOG) 0.025 % cream, Apply topically 2 (two) times daily., Disp: 80 g, Rfl: 0  [2]   Social History  Socioeconomic History    Marital status:    Tobacco Use    Smoking status: Never    Smokeless tobacco: Never   Substance and Sexual Activity    Alcohol use: Yes     Comment: occassionally    Drug use: Never    Sexual activity: Not Currently     Social Drivers of Health     Financial Resource Strain: Patient Declined (4/22/2024)    Overall Financial Resource Strain (CARDIA)     Difficulty of Paying Living Expenses: Patient declined   Food Insecurity: Patient Declined (4/22/2024)    Hunger Vital Sign     Worried About Running Out of Food in the Last Year: Patient declined     Ran Out of Food in the Last Year: Patient declined   Transportation Needs: Patient Declined (4/22/2024)    PRAPARE - Transportation     Lack of Transportation (Medical): Patient declined     Lack of Transportation (Non-Medical): Patient declined   Physical Activity: Unknown (4/22/2024)    Exercise Vital  Sign     Days of Exercise per Week: Patient declined   Stress: Patient Declined (4/22/2024)    [employer] - Occupational Stress Questionnaire     Feeling of Stress : Patient declined   Housing Stability: Unknown (4/22/2024)    Housing Stability Vital Sign     Unable to Pay for Housing in the Last Year: Patient declined

## 2025-03-31 ENCOUNTER — OFFICE VISIT (OUTPATIENT)
Dept: DERMATOLOGY | Facility: CLINIC | Age: 55
End: 2025-03-31
Payer: MEDICARE

## 2025-03-31 DIAGNOSIS — L65.9 HAIR THINNING: Primary | ICD-10-CM

## 2025-03-31 DIAGNOSIS — L72.0 MILIA: ICD-10-CM

## 2025-03-31 DIAGNOSIS — L57.8 ACTINIC SKIN DAMAGE: ICD-10-CM

## 2025-03-31 DIAGNOSIS — H01.133 EYELID ECZEMA, RIGHT: ICD-10-CM

## 2025-03-31 PROCEDURE — 99214 OFFICE O/P EST MOD 30 MIN: CPT | Mod: S$GLB,,, | Performed by: STUDENT IN AN ORGANIZED HEALTH CARE EDUCATION/TRAINING PROGRAM

## 2025-03-31 RX ORDER — TRETINOIN 0.5 MG/G
CREAM TOPICAL NIGHTLY
Qty: 20 G | Refills: 4 | Status: SHIPPED | OUTPATIENT
Start: 2025-03-31

## 2025-03-31 NOTE — PROGRESS NOTES
Subjective:      Patient ID:  Jessy Mayberry is a 54 y.o. female who presents for   Chief Complaint   Patient presents with    Hair Loss     LOV 10/2/24    Patient here today for hair loss x years. Losing eyebrows, eyelashes, leg hair, underarm hair, etc.   Has hx of hashimoto's- diagnosed 17 years ago, first noticed she was losing large clumps of hair at the time. Since then thyroid levels have been normal.   About 3 years ago, she feels her hair is still shedding, feels her hairline is receding. Hair loss is worse in the front. Feels her ponytail is more thin.   Washes ahri 1-2 x a week.   Feels her eyebrows are thinning     Occasional itching or burning on scalp behind ears, not a prominent issue.     Propranolol prn anxiety- a few times a months  Imitrex as needed.   Takes multivitamin daily     Medical problems listed by patient: long covid, chronic fatigue, neuropathy, cognitive dysfunction, hashimotos    Dad has hx of hair loss in his 40s- now bald  Mom in her 80s with thin hair, occurred in her late 60-70s.   Has gone through menopause.     Current Outpatient Medications:   ·  acetaminophen (TYLENOL) 325 MG tablet, Take 2 tablets (650 mg total) by mouth every 6 (six) hours as needed (pain -- alternate with ibuprofen.motrin.advil 400-600mg every 6  hours so something avail every 3)., Disp: , Rfl:   ·  calcium carbonate (TUMS ORAL), Take by mouth., Disp: , Rfl:   ·  cyanocobalamin 1,000 mcg/mL injection, INJECT 1 ML ( 1,000 MCG TOTAL ) INTO THE MUSCLE EVERY 28 DAYS., Disp: 10 mL, Rfl: 3  ·  dextroamphetamine-amphetamine (ADDERALL XR) 20 MG 24 hr capsule, , Disp: , Rfl:   ·  dextroamphetamine-amphetamine (ADDERALL) 20 mg tablet, , Disp: , Rfl:   ·  estradioL (ESTRACE) 0.01 % (0.1 mg/gram) vaginal cream, Place 1 g vaginally twice a week. Use 1 g on finger and apply twice a week after using nightly for 2 weeks, Disp: 42.5 g, Rfl: 1  ·  famotidine (PEPCID) 20 MG tablet, Take 20 mg by mouth 2 (two) times daily.,  Disp: , Rfl:   ·  fluocinolone acetonide oiL 0.01 % Drop, Use on AA of ears BID, Disp: 20 mL, Rfl: 5    ·  pimecrolimus (ELIDEL) 1 % cream, Apply topically 2 (two) times daily., Disp: 60 g, Rfl: 1  ·  propranoloL (INDERAL) 10 MG tablet, , Disp: , Rfl:   ·  sumatriptan (IMITREX) 25 MG Tab, Take 1 tablet (25 mg total) by mouth every 2 (two) hours as needed., Disp: 10 tablet, Rfl: 5  ·  triamcinolone acetonide 0.025% (KENALOG) 0.025 % cream, Apply topically 2 (two) times daily., Disp: 80 g, Rfl: 0          Review of Systems   Constitutional:  Negative for fever, chills and fatigue.   Respiratory:  Negative for cough and shortness of breath.    Gastrointestinal:  Negative for nausea and vomiting.   Skin:  Positive for activity-related sunscreen use. Negative for daily sunscreen use.   Hematologic/Lymphatic: Does not bruise/bleed easily.       Objective:   Physical Exam   Constitutional: She appears well-developed and well-nourished.   Neurological: She is alert and oriented to person, place, and time.   Psychiatric: She has a normal mood and affect.   Skin:   Areas Examined (abnormalities noted in diagram):   Scalp / Hair Palpated and Inspected  Head / Face Inspection Performed            Diagram Legend     Erythematous scaling macule/papule c/w actinic keratosis       Vascular papule c/w angioma      Pigmented verrucoid papule/plaque c/w seborrheic keratosis      Yellow umbilicated papule c/w sebaceous hyperplasia      Irregularly shaped tan macule c/w lentigo     1-2 mm smooth white papules consistent with Milia      Movable subcutaneous cyst with punctum c/w epidermal inclusion cyst      Subcutaneous movable cyst c/w pilar cyst      Firm pink to brown papule c/w dermatofibroma      Pedunculated fleshy papule(s) c/w skin tag(s)      Evenly pigmented macule c/w junctional nevus     Mildly variegated pigmented, slightly irregular-bordered macule c/w mildly atypical nevus      Flesh colored to evenly pigmented papule c/w  intradermal nevus       Pink pearly papule/plaque c/w basal cell carcinoma      Erythematous hyperkeratotic cursted plaque c/w SCC      Surgical scar with no sign of skin cancer recurrence      Open and closed comedones      Inflammatory papules and pustules      Verrucoid papule consistent consistent with wart     Erythematous eczematous patches and plaques     Dystrophic onycholytic nail with subungual debris c/w onychomycosis     Umbilicated papule    Erythematous-base heme-crusted tan verrucoid plaque consistent with inflamed seborrheic keratosis     Erythematous Silvery Scaling Plaque c/w Psoriasis     See annotation      Assessment / Plan:        Hair thinning  Mild thinning along frontal scalp, no signs of scarring  Possible mild androgenic alopecia  No signs of lab abnormalities- get pcp to order vitamin D  Discussed rogaine topical vs. PO minoxidil vs. Dutasteride/finasteride  Will start w/ topical rogaine 5% solution  Hold off on treating eyebrows for now due to eyelid eczema    Actinic skin damage  -     tretinoin (RETIN-A) 0.05 % cream; Apply topically every evening.  Dispense: 20 g; Refill: 4  - reviewed side effects of tretinoin including erythema, peeling/scaling, dryness, burning, pruritus, photosensitivity, temporary worsening of acne. Reviewed that skin irritation consisting of erythema and peeling may occur during the first month of treatment. If this occurs instructed to use moisturizer and decrease tretinoin use to 3 nights per week until side effects subside and then increase use to daily as tolerated.     Milia  Benign, reassurance    Eyelid eczema, right  Not improving with tcs, elidel  Start zoryve 0.15% once daily  Will get her scheduled for patch testing           No follow-ups on file.

## 2025-05-19 ENCOUNTER — CLINICAL SUPPORT (OUTPATIENT)
Dept: DERMATOLOGY | Facility: CLINIC | Age: 55
End: 2025-05-19
Payer: MEDICARE

## 2025-05-19 DIAGNOSIS — L23.9 ALLERGIC CONTACT DERMATITIS, UNSPECIFIED TRIGGER: Primary | ICD-10-CM

## 2025-05-19 PROCEDURE — 95044 PATCH/APPLICATION TESTS: CPT | Mod: S$GLB,,, | Performed by: DERMATOLOGY

## 2025-05-19 NOTE — PROGRESS NOTES
Pt here today for the TRUE test allergy patch testing.  46 allergens were applie to the clear skin of pts' mid  to upper back.  Pt reminded not to shower/bathe/wash hair, to avoid sleeping on her back, to avoid scratching the patch sites and to avoid activities that cause sweating.   Pt to return on Wednesday to have the patches removed and for the first reading.   Panel 1  1   Nickel Sulfate  1-   2- 7   Colophony  1-   2-   2   Wool Alcohols  1-   2- 8   Paraben Mix  1-   2-   3   Neomycin Sulfate  1-   2- 9   Negative Control  1-   2-   4   Potassium Dichromate  1-   2- 10   Balsam of [place]  1-   2-   5   Matheus Mix  1-   2- 11   Ethylenediamine Dihydrochloride  1-   2-   6   Fragrance Mix  1-   2- 12   Cobalt Dichloride  1-   2-     Panel 2  13   p-tert Butylphenol  Formaldehyde Resin  1- 2- 19   Methyldibromo Glutaronitrile  1- 2-   14   Epoxy Resin  1- 2- 20   p-Phenylenediamine  1- 2-   15   Carba Mix  1- 2- 21   Formaldehyde  1- 2-   16   Black Rubber Mix  1- 2- 22   Mercapto Mix  1- 2-   17   Cl+ Me-Isothiazolinone  1- 2- 23  Thimerosal  1- 2-   18   Quaternium-15  1- 2- 24   Thiuram Mix  1-   2-     Panel 3  25   Diazolidinyl Urea  1- 2- 31 Hydrocortisone-17-Butyrate  1- 2-   26   Quinoline mix  1- 2- 32 Mercaptobenzothiazole  1- 2-   27   Tixocortol-21-Pivalate  1- 2- 33   Bacitracin  1- 2-   28   Composite mix  1- 2- 34   Parthenolide  1- 2-   29   Imidazolidinyl Urea  1- 2- 35   Disperse Blue 106  1- 2-   30  Budesonide  1-   2- 36  2-Bromo-2-Nitropropane-1,3-diol  1-   2-     Panel 4  41 Cinnamic-aldehyde  1- 2- 46  Ethyl acrylate  1- 2-   42 Propylene glycol  1- 2- 47 Methyl methacrylate  1- 2-   43  Cocamidopropyl betaine  1- 2- 48 Chlorhexidine  1- 2-   44  Tea tree oil  1- 2- 49 Benzisothiazolinone  1- 2-   45 Propolis B  1- 2- 50  Clobetasol 17 propionate  1- 2-

## 2025-05-21 ENCOUNTER — CLINICAL SUPPORT (OUTPATIENT)
Dept: DERMATOLOGY | Facility: CLINIC | Age: 55
End: 2025-05-21
Payer: MEDICARE

## 2025-05-21 DIAGNOSIS — L23.9 ALLERGIC CONTACT DERMATITIS, UNSPECIFIED TRIGGER: Primary | ICD-10-CM

## 2025-05-21 NOTE — PROGRESS NOTES
Pt here today for her allergen patches to be removed which was completed with no positive reactions noted.  Pt to return on Friday, May 23 for the final reading and to see Dr Zaldivar.  Pt reminded not to bathe/shower until after her visit on Friday.   Panel 1  1   Nickel Sulfate  1-   2- 7   Colophony  1-   2-   2   Wool Alcohols  1-   2- 8   Paraben Mix  1-   2-   3   Neomycin Sulfate  1-   2- 9   Negative Control  1-   2-   4   Potassium Dichromate  1-   2- 10   Balsam of [place]  1-   2-   5   Matheus Mix  1-   2- 11   Ethylenediamine Dihydrochloride  1-   2-   6   Fragrance Mix  1-   2- 12   Cobalt Dichloride  1-   2-     Panel 2  13   p-tert Butylphenol  Formaldehyde Resin  1- 2- 19   Methyldibromo Glutaronitrile  1- 2-   14   Epoxy Resin  1- 2- 20   p-Phenylenediamine  1- 2-   15   Carba Mix  1- 2- 21   Formaldehyde  1- 2-   16   Black Rubber Mix  1- 2- 22   Mercapto Mix  1- 2-   17   Cl+ Me-Isothiazolinone  1- 2- 23  Thimerosal  1- 2-   18   Quaternium-15  1- 2- 24   Thiuram Mix  1-   2-     Panel 3  25   Diazolidinyl Urea  1- 2- 31 Hydrocortisone-17-Butyrate  1- 2-   26   Quinoline mix  1- 2- 32 Mercaptobenzothiazole  1- 2-   27   Tixocortol-21-Pivalate  1- 2- 33   Bacitracin  1- 2-   28   Composite mix  1- 2- 34   Parthenolide  1- 2-   29   Imidazolidinyl Urea  1- 2- 35   Disperse Blue 106  1- 2-   30  Budesonide  1- 2- 36  2-Bromo-2-Nitropropane-1,3-diol  1-   2-     Panel 4  41 Cinnamic-aldehyde  1- 2- 46  Ethyl acrylate  1- 2-   42 Propylene glycol  1- 2- 47 Methyl methacrylate  1- 2-   43  Cocamidopropyl betaine  1- 2- 48 Chlorhexidine  1- 2-   44  Tea tree oil  1- 2- 49 Benzisothiazolinone  1- 2-   45 Propolis B  1- 2- 50  Clobetasol 17 propionate  1- 2-

## 2025-05-23 ENCOUNTER — OFFICE VISIT (OUTPATIENT)
Dept: DERMATOLOGY | Facility: CLINIC | Age: 55
End: 2025-05-23
Payer: MEDICARE

## 2025-05-23 DIAGNOSIS — L23.9 ALLERGIC CONTACT DERMATITIS, UNSPECIFIED TRIGGER: Primary | ICD-10-CM

## 2025-05-23 NOTE — PROGRESS NOTES
Subjective:      Patient ID:  Jessy Mayberry is a 54 y.o. female who presents for No chief complaint on file.    Patch test, final read      Current Outpatient Medications:   ·  acetaminophen (TYLENOL) 325 MG tablet, Take 2 tablets (650 mg total) by mouth every 6 (six) hours as needed (pain -- alternate with ibuprofen.motrin.advil 400-600mg every 6  hours so something avail every 3)., Disp: , Rfl:   ·  calcium carbonate (TUMS ORAL), Take by mouth., Disp: , Rfl:   ·  cyanocobalamin 1,000 mcg/mL injection, INJECT 1 ML ( 1,000 MCG TOTAL ) INTO THE MUSCLE EVERY 28 DAYS., Disp: 10 mL, Rfl: 3  ·  dextroamphetamine-amphetamine (ADDERALL XR) 20 MG 24 hr capsule, , Disp: , Rfl:   ·  dextroamphetamine-amphetamine (ADDERALL) 20 mg tablet, , Disp: , Rfl:   ·  estradioL (ESTRACE) 0.01 % (0.1 mg/gram) vaginal cream, Place 1 g vaginally twice a week. Use 1 g on finger and apply twice a week after using nightly for 2 weeks, Disp: 42.5 g, Rfl: 1  ·  famotidine (PEPCID) 20 MG tablet, Take 20 mg by mouth 2 (two) times daily., Disp: , Rfl:   ·  fluocinolone acetonide oiL 0.01 % Drop, Use on AA of ears BID, Disp: 20 mL, Rfl: 5  ·  FLUoxetine 20 MG capsule, Take 1 capsule (20 mg total) by mouth once daily., Disp: 30 capsule, Rfl: 1  ·  ibuprofen (ADVIL,MOTRIN) 600 MG tablet, Take 600 mg by mouth 3 (three) times daily., Disp: , Rfl:   ·  loratadine (CLARITIN) 10 mg tablet, , Disp: , Rfl:   ·  ondansetron (ZOFRAN-ODT) 4 MG TbDL, Take 1 tablet (4 mg total) by mouth every 8 (eight) hours as needed (nasuea)., Disp: 10 tablet, Rfl: 0  ·  pimecrolimus (ELIDEL) 1 % cream, Apply topically 2 (two) times daily., Disp: 60 g, Rfl: 1  ·  propranoloL (INDERAL) 10 MG tablet, , Disp: , Rfl:   ·  sumatriptan (IMITREX) 25 MG Tab, Take 1 tablet (25 mg total) by mouth every 2 (two) hours as needed., Disp: 10 tablet, Rfl: 5  ·  tretinoin (RETIN-A) 0.05 % cream, Apply topically every evening., Disp: 20 g, Rfl: 4  ·  triamcinolone acetonide 0.025% (KENALOG)  0.025 % cream, Apply topically 2 (two) times daily., Disp: 80 g, Rfl: 0          Review of Systems    Objective:   Physical Exam   Constitutional: She appears well-developed and well-nourished. No distress.   Neurological: She is alert and oriented to person, place, and time. She is not disoriented.   Psychiatric: She has a normal mood and affect.   Skin:               Diagram Legend     Erythematous scaling macule/papule c/w actinic keratosis       Vascular papule c/w angioma      Pigmented verrucoid papule/plaque c/w seborrheic keratosis      Yellow umbilicated papule c/w sebaceous hyperplasia      Irregularly shaped tan macule c/w lentigo     1-2 mm smooth white papules consistent with Milia      Movable subcutaneous cyst with punctum c/w epidermal inclusion cyst      Subcutaneous movable cyst c/w pilar cyst      Firm pink to brown papule c/w dermatofibroma      Pedunculated fleshy papule(s) c/w skin tag(s)      Evenly pigmented macule c/w junctional nevus     Mildly variegated pigmented, slightly irregular-bordered macule c/w mildly atypical nevus      Flesh colored to evenly pigmented papule c/w intradermal nevus       Pink pearly papule/plaque c/w basal cell carcinoma      Erythematous hyperkeratotic cursted plaque c/w SCC      Surgical scar with no sign of skin cancer recurrence      Open and closed comedones      Inflammatory papules and pustules      Verrucoid papule consistent consistent with wart     Erythematous eczematous patches and plaques     Dystrophic onycholytic nail with subungual debris c/w onychomycosis     Umbilicated papule    Erythematous-base heme-crusted tan verrucoid plaque consistent with inflamed seborrheic keratosis     Erythematous Silvery Scaling Plaque c/w Psoriasis     See annotation      Assessment / Plan:        Allergic contact dermatitis, unspecified trigger    No allergen identified  Follow up with Dr Renteria for further management           No follow-ups on file.

## 2025-06-10 ENCOUNTER — ON-DEMAND VIRTUAL (OUTPATIENT)
Dept: URGENT CARE | Facility: CLINIC | Age: 55
End: 2025-06-10
Payer: MEDICARE

## 2025-06-10 DIAGNOSIS — N30.00 ACUTE CYSTITIS WITHOUT HEMATURIA: Primary | ICD-10-CM

## 2025-06-10 RX ORDER — AMOXICILLIN AND CLAVULANATE POTASSIUM 875; 125 MG/1; MG/1
1 TABLET, FILM COATED ORAL EVERY 12 HOURS
Qty: 14 TABLET | Refills: 0 | Status: SHIPPED | OUTPATIENT
Start: 2025-06-10 | End: 2025-06-17

## 2025-06-10 RX ORDER — PHENAZOPYRIDINE HYDROCHLORIDE 200 MG/1
200 TABLET, FILM COATED ORAL 3 TIMES DAILY PRN
Qty: 15 TABLET | Refills: 0 | Status: SHIPPED | OUTPATIENT
Start: 2025-06-10

## 2025-06-10 RX ORDER — ONDANSETRON 4 MG/1
4 TABLET, ORALLY DISINTEGRATING ORAL EVERY 6 HOURS PRN
Qty: 20 TABLET | Refills: 0 | Status: SHIPPED | OUTPATIENT
Start: 2025-06-10

## 2025-06-10 NOTE — PROGRESS NOTES
Subjective:      Patient ID: Jessy Mayberry is a 54 y.o. female.    Vitals:  vitals were not taken for this visit.     Chief Complaint: Urinary Tract Infection      Visit Type: TELE AUDIOVISUAL - This visit was conducted virtually based on  subjective information and limited objective exam    Present with the patient at the time of consultation: TELEMED PRESENT WITH PATIENT: None  LOCATION OF PATIENT [place], [place]  Two patient identifiers used to verify patient- saying out date of birth and full name.       Past Medical History:   Diagnosis Date    Adrenal insufficiency     Hypothyroidism     Neuropathy     Pituitary mass     PONV (postoperative nausea and vomiting)      Past Surgical History:   Procedure Laterality Date    ANKLE SURGERY      CARDIAC ELECTROPHYSIOLOGY MAPPING AND ABLATION      CYSTOSCOPY N/A 8/21/2023    Procedure: CYSTOSCOPY;  Surgeon: Shyanne Avery MD;  Location: [facility] OR;  Service: Urology;  Laterality: N/A;    CYSTOSCOPY W/ URETERAL STENT REMOVAL Left 9/3/2024    Procedure: CYSTOSCOPY, WITH URETERAL STENT REMOVAL;  Surgeon: Kemar Sebastian MD;  Location: [facility] OR;  Service: Urology;  Laterality: Left;    CYSTOSCOPY WITH URETEROSCOPY, RETROGRADE PYELOGRAPHY, AND INSERTION OF STENT Left 7/31/2024    Procedure: CYSTOSCOPY, WITH RETROGRADE PYELOGRAM AND URETERAL STENT INSERTION;  Surgeon: Kemar Sebastian MD;  Location: [facility] OR;  Service: Urology;  Laterality: Left;    DILATION AND CURETTAGE OF UTERUS      3 times    INGUINAL HERNIA REPAIR      twice    ROBOT-ASSISTED LAPAROSCOPIC PYELOLITHOTOMY Left 7/31/2024    Procedure: ROBOTIC PYELOLITHOTOMY;  Surgeon: Kemar Sebastian MD;  Location: [facility];  Service: Urology;  Laterality: Left;    ROBOT-ASSISTED LAPAROSCOPIC PYELOPLASTY Left 7/31/2024    Procedure: ROBOTIC PYELOPLASTY;  Surgeon: Kemar Sebastian MD;  Location: [facility] OR;  Service: Urology;  Laterality: Left;    TONSILLECTOMY       Review of patient's allergies  indicates:   Allergen Reactions    Promethazine Other (See Comments) and Hallucinations     IV only.     Oxycontin [oxycodone] Itching and Rash     Other reaction(s): Unknown     Medications Ordered Prior to Encounter[1]  Family History   Problem Relation Name Age of Onset    Ovarian cancer Mother  28    Hypertension Mother      Coronary artery disease Mother      Hyperlipidemia Mother      Cancer Mother      Skin cancer Mother  75    Hypertension Father      Retinal detachment Father      Atrial fibrillation Sister      Rheum arthritis Daughter      Asthma Daughter      Breast cancer Maternal Aunt  42    Bone cancer Maternal Aunt      Cancer Maternal Aunt      Rheum arthritis Maternal Aunt      Hepatitis Maternal Aunt      Heart disease Maternal Grandmother      Hypertension Maternal Grandmother      Kidney failure Maternal Grandmother      Atrial fibrillation Maternal Grandmother      Hypertension Brother      Hypertension Son      Psoriasis Child      Melanoma Neg Hx      Lupus Neg Hx         Medications Ordered                [id number] - [place] - [address] AT [facility] [address]   [address] [address]    Telephone: [phone]   Fax: [phone]   Hours: Not open 24 hours                         E-Prescribed (3 of 3)              amoxicillin-clavulanate 875-125mg (AUGMENTIN) 875-125 mg per tablet    Sig: Take 1 tablet by mouth every 12 (twelve) hours. for 7 days       Start: 6/10/25     Quantity: 14 tablet Refills: 0                         ondansetron (ZOFRAN-ODT) 4 MG TbDL    Sig: Take 1 tablet (4 mg total) by mouth every 6 (six) hours as needed (nausea).       Start: 6/10/25     Quantity: 20 tablet Refills: 0                         phenazopyridine (PYRIDIUM) 200 MG tablet    Sig: Take 1 tablet (200 mg total) by mouth 3 (three) times daily as needed for Pain.       Start: 6/10/25     Quantity: 15 tablet Refills: 0                            No questionnaires on file.    53 yo female c/o dysuria, frequency, urgency for the past 2 days.   Hx of UTI in past with similar sx, last tx with augmentin.   Reports low back pain with low grade fever.    Denies N/V, flank pain, hematuria, abd/pelvic pain, vaginal discharge.         Constitution: Positive for fever. Negative for chills.   Gastrointestinal:  Negative for abdominal pain, nausea and vomiting.   Genitourinary:  Positive for dysuria, frequency and urgency. Negative for urine decreased, flank pain, hematuria, vaginal pain, vaginal discharge, vaginal odor and pelvic pain.   Musculoskeletal:  Positive for back pain.        Objective:   The physical exam was conducted virtually.    AAO x 3 ; no acute distress noted; appearance normal; mood and behavior normal; thought process normal  Head- normocephalic  Nose- appears normal, no discharge or erythema  Eyes- pupils appear normal in size, no drainage, no erythema  Ears- normal appearing; no discharge, no erythema  Mouth- appears normal  Oropharynx- no erythema, lesions  Lungs- breathing at a normal rate, no acute distress noted  Heart- no reports of tachycardia, palpitations, chest pain  Abdomen- non distended, non tender reported by patient  Skin- warm and dry, no erythema or edema noted by patient or visualized  Psych- as above; no si/hi      Assessment:     1. Acute cystitis without hematuria        Plan:     PLEASE READ YOUR DISCHARGE INSTRUCTIONS ENTIRELY AS IT CONTAINS IMPORTANT INFORMATION.        Take the antibiotics to completion.      Drink plenty of fluids, wipe front to back, take showers not baths, no scented soaps, wear breathable cotton underwear, urinate after sexual intercourse.      Please go to the ER for worsening symptoms including fever, worsening flank pain, vomiting, etc.         Please return or see your primary care doctor if you develop new or worsening symptoms.     Thank you for choosing [facility]!    Our  goal in the [facility] is to always provide outstanding medical care. You may receive a survey by mail or e-mail in the next week regarding your experience today. We would greatly appreciate you completing and returning the survey. Your feedback provides us with a way to recognize our staff who provide very good care, and it helps us learn how to improve when your experience was below our aspiration of excellence.         We appreciate you trusting us with your medical care. We hope you feel better soon. We will be happy to take care of you for all of your future medical needs.    You must understand that you've received an Urgent Care treatment only and that you may be released before all your medical problems are known or treated. You, the patient, will arrange for follow up care as instructed.    Follow up with your PCP or specialty clinic as directed in the next 1-2 weeks if not improved or as needed.  You can call [phone] to schedule an appointment with the appropriate provider.    If your condition worsens we recommend that you receive another evaluation in person, with your primary care provider, urgent care or at the emergency room immediately or contact your primary medical clinics after hours call service to discuss your concerns.         Acute cystitis without hematuria  -     amoxicillin-clavulanate 875-125mg (AUGMENTIN) 875-125 mg per tablet; Take 1 tablet by mouth every 12 (twelve) hours. for 7 days  Dispense: 14 tablet; Refill: 0  -     phenazopyridine (PYRIDIUM) 200 MG tablet; Take 1 tablet (200 mg total) by mouth 3 (three) times daily as needed for Pain.  Dispense: 15 tablet; Refill: 0  -     ondansetron (ZOFRAN-ODT) 4 MG TbDL; Take 1 tablet (4 mg total) by mouth every 6 (six) hours as needed (nausea).  Dispense: 20 tablet; Refill: 0                         [1]   Current Outpatient Medications on File Prior to Visit   Medication Sig Dispense Refill    acetaminophen  (TYLENOL) 325 MG tablet Take 2 tablets (650 mg total) by mouth every 6 (six) hours as needed (pain -- alternate with ibuprofen.motrin.advil 400-600mg every 6  hours so something avail every 3).      calcium carbonate (TUMS ORAL) Take by mouth.      cyanocobalamin 1,000 mcg/mL injection INJECT 1 ML ( 1,000 MCG TOTAL ) INTO THE MUSCLE EVERY 28 DAYS. 10 mL 3    dextroamphetamine-amphetamine (ADDERALL XR) 20 MG 24 hr capsule       dextroamphetamine-amphetamine (ADDERALL) 20 mg tablet       estradioL (ESTRACE) 0.01 % (0.1 mg/gram) vaginal cream Place 1 g vaginally twice a week. Use 1 g on finger and apply twice a week after using nightly for 2 weeks 42.5 g 1    famotidine (PEPCID) 20 MG tablet Take 20 mg by mouth 2 (two) times daily.      fluocinolone acetonide oiL 0.01 % Drop Use on AA of ears BID 20 mL 5    FLUoxetine 20 MG capsule Take 1 capsule (20 mg total) by mouth once daily. 30 capsule 1    ibuprofen (ADVIL,MOTRIN) 600 MG tablet Take 600 mg by mouth 3 (three) times daily.      loratadine (CLARITIN) 10 mg tablet       ondansetron (ZOFRAN-ODT) 4 MG TbDL Take 1 tablet (4 mg total) by mouth every 8 (eight) hours as needed (nasuea). 10 tablet 0    pimecrolimus (ELIDEL) 1 % cream Apply topically 2 (two) times daily. 60 g 1    propranoloL (INDERAL) 10 MG tablet       sumatriptan (IMITREX) 25 MG Tab Take 1 tablet (25 mg total) by mouth every 2 (two) hours as needed. 10 tablet 5    tretinoin (RETIN-A) 0.05 % cream Apply topically every evening. 20 g 4    triamcinolone acetonide 0.025% (KENALOG) 0.025 % cream Apply topically 2 (two) times daily. 80 g 0     No current facility-administered medications on file prior to visit.

## 2025-08-18 RX ORDER — ESTRADIOL 0.1 MG/G
CREAM VAGINAL
Qty: 42.5 G | Refills: 1 | Status: SHIPPED | OUTPATIENT
Start: 2025-08-18

## 2025-08-20 ENCOUNTER — PATIENT MESSAGE (OUTPATIENT)
Dept: ADMINISTRATIVE | Facility: HOSPITAL | Age: 55
End: 2025-08-20
Payer: MEDICARE

## (undated) DEVICE — SUT ETHILON 2-0 BLK PS-2

## (undated) DEVICE — ELECTRODE REM PLYHSV RETURN 9

## (undated) DEVICE — CLIP HEMO-LOK MLX LARGE LF

## (undated) DEVICE — BOWL STERILE LARGE 32OZ

## (undated) DEVICE — KIT ENDOKIT EGD/COLON/ERCP

## (undated) DEVICE — PLUG CATH DRAIN TUBE PROTECTOR

## (undated) DEVICE — SET IRR URLGY 2LINE UNIV SPIKE

## (undated) DEVICE — SYR 10CC LUER LOCK

## (undated) DEVICE — LOOP VESSEL YELLOW MAXI

## (undated) DEVICE — SET CYSTO IRR DRP CHMBR 84IN

## (undated) DEVICE — OBTURATOR BLADELESS 8MM XI CLR

## (undated) DEVICE — SUT PDS II 0 CT-1 VIL MONO

## (undated) DEVICE — JELLY SURGILUBE LUBE TUBE 2OZ

## (undated) DEVICE — Device

## (undated) DEVICE — METER URINE DRAIN BAG 400ML

## (undated) DEVICE — SUT MONOCRYL 4-0 PS-2

## (undated) DEVICE — GLOVE SENSICARE PI ALOE 7

## (undated) DEVICE — TRAY CATH FOL SIL 10ML 18FR

## (undated) DEVICE — EVACUATOR WOUND BULB 100CC

## (undated) DEVICE — GOWN POLY REINF BRTH SLV LG

## (undated) DEVICE — CLIP HEMO-LOK ML

## (undated) DEVICE — NDL PNEUMOPERITONEUM 150MM

## (undated) DEVICE — TOWEL OR DISP STRL BLUE 4/PK

## (undated) DEVICE — SCRUB DYNA-HEX LIQ 4% CHG 4OZ

## (undated) DEVICE — DRESSING TRANS 4X4 TEGADERM

## (undated) DEVICE — ARMCRADLE LAMINECTOMY

## (undated) DEVICE — SPONGE BULKEE II ABSRB 6X6.75

## (undated) DEVICE — GOWN POLY REINF BRTH SLV XL

## (undated) DEVICE — DRAPE COLUMN DAVINCI XI

## (undated) DEVICE — GAUZE DRAIN N WVN 6PLY 4X4IN

## (undated) DEVICE — SOL ELECTROLUBE ANTI-STIC

## (undated) DEVICE — GLOVE SURG ULTRA TOUCH 6

## (undated) DEVICE — CRADLE POSITIONER FOAM DISP

## (undated) DEVICE — GUIDE WIRE MOTION .035 X 150CM

## (undated) DEVICE — LEGGING CLEAR POLY 2/PACK

## (undated) DEVICE — CONTAINER SPECIMEN OR STER 4OZ

## (undated) DEVICE — BLADE SURG CARBON STEEL SZ11

## (undated) DEVICE — SWABSTICK PVP-1 SINGLE 10%

## (undated) DEVICE — DRAIN SIL FLT 7MM FULL PERF ST

## (undated) DEVICE — PACK SIRUS BASIC V SURG STRL

## (undated) DEVICE — DRAPE MEDIUM SHEET 40X70IN

## (undated) DEVICE — NDL SAFETY 21G X 1 1/2 ECLPSE

## (undated) DEVICE — GLOVE SENSICARE PI GRN 7

## (undated) DEVICE — COVER TABLE 44X90 STERILE

## (undated) DEVICE — SCISSOR 5MMX35CM DIRECT DRIVE

## (undated) DEVICE — SUT SILK 0 STRANDS 30IN BLK

## (undated) DEVICE — CATH POLLACK OPEN-END FLEXI-TI

## (undated) DEVICE — SKINMARKER W/RULER DEVON

## (undated) DEVICE — TROCAR ENDOPATH XCEL 5X100MM

## (undated) DEVICE — SOL IRRI STRL WATER 1000ML

## (undated) DEVICE — DRAPE CYSTOSCOPY LARGE

## (undated) DEVICE — COVER TIP CURVED SCISSORS XI

## (undated) DEVICE — PACK CUSTOM UNIV BASIN SLI

## (undated) DEVICE — APPLICATOR CHLORAPREP ORN 26ML

## (undated) DEVICE — LINER SUCTION 3000CC

## (undated) DEVICE — DRAPE ARM DAVINCI XI

## (undated) DEVICE — STOPCOCK ROT ADPT M CLLR 3-WAY

## (undated) DEVICE — TROCAR ENDOPATH XCEL 12X100MM

## (undated) DEVICE — IRRIGATOR SUCTION W/TIP

## (undated) DEVICE — ADHESIVE DERMABOND ADVANCED

## (undated) DEVICE — GOWN POLY REINF X-LONG XL

## (undated) DEVICE — PAD PINK TRENDELENBURG POS XL

## (undated) DEVICE — SOL CLEARIFY VISUALIZATION LAP

## (undated) DEVICE — SUT VICRYL 4-0 RB1 27IN UD

## (undated) DEVICE — SPONGE COTTON TRAY 4X4IN

## (undated) DEVICE — DRAPE LEGGINGS CUFF 33X51IN

## (undated) DEVICE — SUT VICRYL 4/0 RB-1 27 DYE

## (undated) DEVICE — TOWEL OR XRAY WHITE 17X26IN

## (undated) DEVICE — DRAPE LAPSCP ABDOMINAL 20X36

## (undated) DEVICE — SET TUBE PNEUMOCLEAR SE HI FLO

## (undated) DEVICE — SEAL UNIVERSAL 5MM-8MM XI

## (undated) DEVICE — SYR ONLY LUER LOCK 20CC

## (undated) DEVICE — SUT CTD VICRYL VIL BR SH 27

## (undated) DEVICE — SOL NACL IRR 3000ML

## (undated) DEVICE — SLEEVE SCD EXPRESS KNEE MEDIUM

## (undated) DEVICE — HEMOSTAT SURGICEL 4X8IN

## (undated) DEVICE — BAG PRESSURE INFUSER 3000CC

## (undated) DEVICE — COVER PROXIMA MAYO STAND

## (undated) DEVICE — GLOVE SENSICARE PI ALOE 6.5

## (undated) DEVICE — GLOVE SENSICARE PI GRN 6.5